# Patient Record
Sex: FEMALE | Race: WHITE | Employment: OTHER | URBAN - METROPOLITAN AREA
[De-identification: names, ages, dates, MRNs, and addresses within clinical notes are randomized per-mention and may not be internally consistent; named-entity substitution may affect disease eponyms.]

---

## 2018-11-20 ENCOUNTER — HOSPITAL ENCOUNTER (OUTPATIENT)
Dept: GENERAL RADIOLOGY | Age: 73
Discharge: HOME | End: 2018-11-20
Payer: MEDICARE

## 2018-11-20 ENCOUNTER — HOSPITAL ENCOUNTER (OUTPATIENT)
Age: 73
Discharge: HOME | End: 2018-11-20
Payer: MEDICARE

## 2018-11-20 DIAGNOSIS — R05.9 COUGH: ICD-10-CM

## 2018-11-20 PROCEDURE — 71046 X-RAY EXAM CHEST 2 VIEWS: CPT

## 2018-11-29 ENCOUNTER — HOSPITAL ENCOUNTER (OUTPATIENT)
Dept: CT IMAGING | Age: 73
Discharge: HOME | End: 2018-11-29
Payer: MEDICARE

## 2018-11-29 DIAGNOSIS — J98.4 DISEASE OF LUNG: ICD-10-CM

## 2018-11-29 LAB
ANION GAP SERPL CALCULATED.3IONS-SCNC: 10 MMOL/L (ref 3–16)
BUN BLDV-MCNC: 16 MG/DL (ref 7–20)
CALCIUM SERPL-MCNC: 10.3 MG/DL (ref 8.3–10.6)
CHLORIDE BLD-SCNC: 103 MMOL/L (ref 99–110)
CO2: 27 MMOL/L (ref 21–32)
CREAT SERPL-MCNC: 0.6 MG/DL (ref 0.6–1.2)
GFR AFRICAN AMERICAN: >60
GFR NON-AFRICAN AMERICAN: >60
GLUCOSE BLD-MCNC: 118 MG/DL (ref 70–99)
POTASSIUM SERPL-SCNC: 5 MMOL/L (ref 3.5–5.1)
SODIUM BLD-SCNC: 140 MMOL/L (ref 136–145)

## 2018-11-29 PROCEDURE — 80048 BASIC METABOLIC PNL TOTAL CA: CPT

## 2018-11-29 PROCEDURE — 71260 CT THORAX DX C+: CPT

## 2018-11-29 PROCEDURE — 36415 COLL VENOUS BLD VENIPUNCTURE: CPT

## 2018-11-29 PROCEDURE — 6360000004 HC RX CONTRAST MEDICATION: Performed by: INTERNAL MEDICINE

## 2018-11-29 RX ADMIN — IOPAMIDOL 75 ML: 755 INJECTION, SOLUTION INTRAVENOUS at 17:34

## 2018-12-17 ENCOUNTER — HOSPITAL ENCOUNTER (OUTPATIENT)
Dept: GENERAL RADIOLOGY | Age: 73
Discharge: HOME | End: 2018-12-17
Payer: MEDICARE

## 2018-12-17 ENCOUNTER — HOSPITAL ENCOUNTER (OUTPATIENT)
Age: 73
Discharge: HOME | End: 2018-12-17
Payer: MEDICARE

## 2018-12-17 DIAGNOSIS — J18.8 OTHER PNEUMONIA, UNSPECIFIED ORGANISM: ICD-10-CM

## 2018-12-17 PROCEDURE — 71046 X-RAY EXAM CHEST 2 VIEWS: CPT

## 2019-01-24 ENCOUNTER — APPOINTMENT (OUTPATIENT)
Dept: GENERAL RADIOLOGY | Age: 74
End: 2019-01-24
Payer: MEDICARE

## 2019-01-24 ENCOUNTER — HOSPITAL ENCOUNTER (EMERGENCY)
Age: 74
Discharge: HOME | End: 2019-01-24
Payer: MEDICARE

## 2019-01-24 VITALS
HEART RATE: 86 BPM | TEMPERATURE: 97 F | WEIGHT: 180 LBS | DIASTOLIC BLOOD PRESSURE: 76 MMHG | BODY MASS INDEX: 28.19 KG/M2 | OXYGEN SATURATION: 99 % | SYSTOLIC BLOOD PRESSURE: 133 MMHG | RESPIRATION RATE: 18 BRPM

## 2019-01-24 DIAGNOSIS — S52.601A CLOSED FRACTURE OF DISTAL END OF RIGHT ULNA, UNSPECIFIED FRACTURE MORPHOLOGY, INITIAL ENCOUNTER: Primary | ICD-10-CM

## 2019-01-24 PROCEDURE — 99284 EMERGENCY DEPT VISIT MOD MDM: CPT | Performed by: ORTHOPAEDIC SURGERY

## 2019-01-24 PROCEDURE — 73110 X-RAY EXAM OF WRIST: CPT

## 2019-01-24 PROCEDURE — 4500000023 HC ED LEVEL 3 PROCEDURE

## 2019-01-24 PROCEDURE — 6370000000 HC RX 637 (ALT 250 FOR IP): Performed by: NURSE PRACTITIONER

## 2019-01-24 PROCEDURE — 99283 EMERGENCY DEPT VISIT LOW MDM: CPT

## 2019-01-24 RX ORDER — HYDROCODONE BITARTRATE AND ACETAMINOPHEN 5; 325 MG/1; MG/1
1 TABLET ORAL EVERY 6 HOURS PRN
Qty: 10 TABLET | Refills: 0 | Status: SHIPPED | OUTPATIENT
Start: 2019-01-24 | End: 2019-01-27

## 2019-01-24 RX ORDER — LISINOPRIL 10 MG/1
10 TABLET ORAL DAILY
COMMUNITY

## 2019-01-24 RX ORDER — AMLODIPINE BESYLATE 5 MG/1
5 TABLET ORAL DAILY
COMMUNITY

## 2019-01-24 RX ORDER — IBUPROFEN 800 MG/1
800 TABLET ORAL EVERY 8 HOURS PRN
Qty: 20 TABLET | Refills: 0 | Status: ON HOLD | OUTPATIENT
Start: 2019-01-24 | End: 2021-07-11 | Stop reason: HOSPADM

## 2019-01-24 RX ORDER — IBUPROFEN 800 MG/1
800 TABLET ORAL ONCE
Status: COMPLETED | OUTPATIENT
Start: 2019-01-24 | End: 2019-01-24

## 2019-01-24 RX ADMIN — IBUPROFEN 800 MG: 800 TABLET, FILM COATED ORAL at 10:12

## 2019-01-24 ASSESSMENT — PAIN SCALES - GENERAL: PAINLEVEL_OUTOF10: 8

## 2019-01-24 ASSESSMENT — ENCOUNTER SYMPTOMS
ABDOMINAL PAIN: 0
NAUSEA: 0
SHORTNESS OF BREATH: 0
CHEST TIGHTNESS: 0
VOMITING: 0
DIARRHEA: 0

## 2019-01-24 ASSESSMENT — PAIN DESCRIPTION - PAIN TYPE: TYPE: ACUTE PAIN

## 2019-01-29 ENCOUNTER — OFFICE VISIT (OUTPATIENT)
Dept: ORTHOPEDIC SURGERY | Age: 74
End: 2019-01-29
Payer: MEDICARE

## 2019-01-29 VITALS
HEIGHT: 67 IN | DIASTOLIC BLOOD PRESSURE: 73 MMHG | SYSTOLIC BLOOD PRESSURE: 127 MMHG | WEIGHT: 180 LBS | BODY MASS INDEX: 28.25 KG/M2 | RESPIRATION RATE: 16 BRPM | HEART RATE: 77 BPM

## 2019-01-29 DIAGNOSIS — S52.691A OTHER CLOSED FRACTURE OF DISTAL END OF RIGHT ULNA, INITIAL ENCOUNTER: Primary | ICD-10-CM

## 2019-01-29 DIAGNOSIS — M25.531 RIGHT WRIST PAIN: ICD-10-CM

## 2019-01-29 PROCEDURE — G8419 CALC BMI OUT NRM PARAM NOF/U: HCPCS | Performed by: ORTHOPAEDIC SURGERY

## 2019-01-29 PROCEDURE — 1090F PRES/ABSN URINE INCON ASSESS: CPT | Performed by: ORTHOPAEDIC SURGERY

## 2019-01-29 PROCEDURE — 3017F COLORECTAL CA SCREEN DOC REV: CPT | Performed by: ORTHOPAEDIC SURGERY

## 2019-01-29 PROCEDURE — 1036F TOBACCO NON-USER: CPT | Performed by: ORTHOPAEDIC SURGERY

## 2019-01-29 PROCEDURE — 4040F PNEUMOC VAC/ADMIN/RCVD: CPT | Performed by: ORTHOPAEDIC SURGERY

## 2019-01-29 PROCEDURE — G8484 FLU IMMUNIZE NO ADMIN: HCPCS | Performed by: ORTHOPAEDIC SURGERY

## 2019-01-29 PROCEDURE — 1123F ACP DISCUSS/DSCN MKR DOCD: CPT | Performed by: ORTHOPAEDIC SURGERY

## 2019-01-29 PROCEDURE — 1101F PT FALLS ASSESS-DOCD LE1/YR: CPT | Performed by: ORTHOPAEDIC SURGERY

## 2019-01-29 PROCEDURE — L3908 WHO COCK-UP NONMOLDE PRE OTS: HCPCS | Performed by: ORTHOPAEDIC SURGERY

## 2019-01-29 PROCEDURE — 25600 CLTX DST RDL FX/EPHYS SEP WO: CPT | Performed by: ORTHOPAEDIC SURGERY

## 2019-01-29 PROCEDURE — G8427 DOCREV CUR MEDS BY ELIG CLIN: HCPCS | Performed by: ORTHOPAEDIC SURGERY

## 2019-01-29 PROCEDURE — G8400 PT W/DXA NO RESULTS DOC: HCPCS | Performed by: ORTHOPAEDIC SURGERY

## 2019-01-29 PROCEDURE — 99214 OFFICE O/P EST MOD 30 MIN: CPT | Performed by: ORTHOPAEDIC SURGERY

## 2019-01-29 RX ORDER — ZAFIRLUKAST 10 MG/1
TABLET, FILM COATED ORAL
Refills: 2 | COMMUNITY
Start: 2019-01-08 | End: 2022-05-24 | Stop reason: ALTCHOICE

## 2019-01-31 PROBLEM — S52.601A CLOSED FRACTURE OF LOWER END OF RIGHT ULNA: Status: ACTIVE | Noted: 2019-01-31

## 2019-03-12 ENCOUNTER — OFFICE VISIT (OUTPATIENT)
Dept: ORTHOPEDIC SURGERY | Age: 74
End: 2019-03-12

## 2019-03-12 VITALS — HEIGHT: 67 IN | BODY MASS INDEX: 28.25 KG/M2 | WEIGHT: 180 LBS | RESPIRATION RATE: 16 BRPM

## 2019-03-12 DIAGNOSIS — S52.691A OTHER CLOSED FRACTURE OF DISTAL END OF RIGHT ULNA, INITIAL ENCOUNTER: Primary | ICD-10-CM

## 2019-03-12 PROCEDURE — 99024 POSTOP FOLLOW-UP VISIT: CPT | Performed by: ORTHOPAEDIC SURGERY

## 2019-03-23 ENCOUNTER — HOSPITAL ENCOUNTER (OUTPATIENT)
Dept: PHYSICAL THERAPY | Age: 74
Setting detail: THERAPIES SERIES
Discharge: HOME | End: 2019-03-23
Payer: MEDICARE

## 2019-03-23 PROCEDURE — 97530 THERAPEUTIC ACTIVITIES: CPT

## 2019-03-23 PROCEDURE — 97110 THERAPEUTIC EXERCISES: CPT

## 2019-03-23 PROCEDURE — 97161 PT EVAL LOW COMPLEX 20 MIN: CPT

## 2019-03-23 ASSESSMENT — PAIN DESCRIPTION - PAIN TYPE: TYPE: ACUTE PAIN

## 2019-03-23 ASSESSMENT — PAIN SCALES - GENERAL: PAINLEVEL_OUTOF10: 2

## 2019-04-08 ENCOUNTER — HOSPITAL ENCOUNTER (OUTPATIENT)
Dept: PHYSICAL THERAPY | Age: 74
Setting detail: THERAPIES SERIES
Discharge: HOME | End: 2019-04-08
Payer: MEDICARE

## 2019-04-08 PROCEDURE — 97140 MANUAL THERAPY 1/> REGIONS: CPT

## 2019-04-08 PROCEDURE — 97110 THERAPEUTIC EXERCISES: CPT

## 2019-04-08 NOTE — FLOWSHEET NOTE
Physical Therapy Daily Treatment Note  Date:  2019    Patient Name:  Jacqueline Issa    :  1945  MRN: [id number]  Restrictions/Precautions:    Medical/Treatment Diagnosis Information:   Diagnosis: Closed fracture of distal end of Right Ulna  Treatment Diagnosis: Decreased R wrist and hand strength, ROM, neuromuscular control, flexiblity, tenderness to palpation capsular restrictions and pain. Tracking Information:  Physician Information Referring Practitioner: Genoveva Rai MD     Plan of Care Sent Date: 3/23 Signed Received: Yes   Visit Count / Total Visits      Insurance Approved Visits  /  Approved Dates:     Insurance Information PT Insurance Information: Medicare/[employer]    Progress Note/G-codes   []  Yes  [x]  No Next Due: #9     Pain level: 1/10 left wrist    Subjective:   - Pt states that she is continuing to feel improved and is not experiencing much pain at this time. She states that strength seems to be her biggest limiting factor and she is hoping to address that through therapy. Objective:  Observation:   - Patient arrives 12 minutes late to appt  Test measurements:      Exercises:  Exercise/Equipment Resistance/Repetitions Other comments   Neutral gripping with the digi-flex    Pincer  with the digi-flex x10 5'' squeeze  3# resistance    x10 5'' squeeze  3# resistance    Supination/pronation with hammer Neutral into pronation  2 x 10    Neutral into supination  2 x 10                                                                   Other Therapeutic Activities:  3/23/19 Pt was educated on PT POC, Diagnosis, Prognosis, pathomechanics as well as frequency and duration of scheduling future physical therapy appointments. Time was also taken on this day to answer all patient questions and participation in PT.  Extensive discussion with pt on significant lack of ROM, flexibility and strength on R wrist/hand compared to L as well as overall impacts with progression towards pt goals of improving functional usage of R hand/wrist. Pt educated on goals of PT and expected time frames for recovery instructing pt that HEP is imperative to progressing towards LTG's. Home Exercise Program:  3/23/19 Patient was educated on home exercise program including distrubution of handout describing exercises, sets, repetitions, frequency and intensity. Exercises/activities include: stretching of wrist flexors, extensors and supinators, AROM of wrist flex/ext, pro/sup and RD/UD, band for finger extensors and orange putty for gripping, finger pull apart and key turn. Manual Treatments: 4/8/19 - Grade 1-2 dorsal glides of distal radioulnar joint, grade 1-2 palmar glides of carpals on radius/ulna, grade 1-2 dorsal and palmar intercarpal glides, general distraction of carpals on radius/ulna, gentle PROM into wrist extension and forearm supination - 12'      Modalities:      Timed Code Treatment Minutes:  28    Total Treatment Minutes:  28    Treatment/Activity Tolerance:  [x] Patient tolerated treatment well [] Patient limited by fatigue  [] Patient limited by pain  [] Patient limited by other medical complications  [x] Other: Pt demos decreased ROM of the left wrist, particularly into extension and supination. Strengthening was geared toward increasing  strength in a neutral position, as well as addressing pincer  and supination/pronation control. 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       Prognosis: [x] Good [x] Fair  [] Poor    Patient Requires Follow-up: [x] Yes  [] No    Plan:   [x] Continue per plan of care [] Alter current plan (see comments)  [] Plan of care initiated [] Hold pending MD visit [] Discharge    Plan for Next Session:  Focus on restoration of wrist and hand ROM, joint mobilizations to  strength and neuromuscular control as symptoms allow; progress HEP for strength Electronically signed by:  Ninoska Young SPT  Therapist was present, directed the patient's care, made skilled judgement, and was responsible for assessment and treatment of the patient.     Nancy Akins, DPT, PT

## 2019-04-12 ENCOUNTER — HOSPITAL ENCOUNTER (OUTPATIENT)
Dept: PHYSICAL THERAPY | Age: 74
Setting detail: THERAPIES SERIES
Discharge: HOME | End: 2019-04-12
Payer: MEDICARE

## 2019-04-12 PROCEDURE — 97110 THERAPEUTIC EXERCISES: CPT

## 2019-04-12 PROCEDURE — 97140 MANUAL THERAPY 1/> REGIONS: CPT

## 2019-04-12 NOTE — FLOWSHEET NOTE
ROM, flexibility and strength on R wrist/hand compared to L as well as overall impacts with progression towards pt goals of improving functional usage of R hand/wrist. Pt educated on goals of PT and expected time frames for recovery instructing pt that HEP is imperative to progressing towards LTG's. Home Exercise Program:  3/23/19 Patient was educated on home exercise program including distrubution of handout describing exercises, sets, repetitions, frequency and intensity. Exercises/activities include: stretching of wrist flexors, extensors and supinators, AROM of wrist flex/ext, pro/sup and RD/UD, band for finger extensors and orange putty for gripping, finger pull apart and key turn. Manual Treatments:  4/12: Manual strength wrist flex/ext and pronation/supination. General wrist distraction at 1st row of carpals with anterior and posterior glides, MWM with anterior carpal glide with wrist ext and posterior carpal glide with flexion x 10 each x 12 mins total.    4/8/19 - Grade 1-2 dorsal glides of distal radioulnar joint, grade 1-2 palmar glides of carpals on radius/ulna, grade 1-2 dorsal and palmar intercarpal glides, general distraction of carpals on radius/ulna, gentle PROM into wrist extension and forearm supination - 12'      Modalities:      Timed Code Treatment Minutes:  31    Total Treatment Minutes:  31    Treatment/Activity Tolerance:  [x] Patient tolerated treatment well [] Patient limited by fatigue  [] Patient limited by pain  [] Patient limited by other medical complications  [x] Other: good ROM with manual techniques very soft end feel at end range in all planes with NO issues with pain, after manual with therex f/u and challenging resistance pt demonstrates a lot of shaking due to fatigue and lack of functional strength.  Pt needs to work to progress and maintain PROM and continue to work on building functional strength and neuromuscular control for full restoration of utilization of R wrist/hand.        Prognosis: [x] Good [x] Fair  [] Poor    Patient Requires Follow-up: [x] Yes  [] No    Plan:   [x] Continue per plan of care [] Alter current plan (see comments)  [] Plan of care initiated [] Hold pending MD visit [] Discharge    Plan for Next Session:  Focus on restoration of wrist and hand ROM, joint mobilizations to  strength and neuromuscular control as symptoms allow; progress HEP for strength     Electronically signed by:  Juancarlos Rosas PT

## 2019-04-16 ENCOUNTER — HOSPITAL ENCOUNTER (OUTPATIENT)
Dept: PHYSICAL THERAPY | Age: 74
Setting detail: THERAPIES SERIES
Discharge: HOME | End: 2019-04-16
Payer: MEDICARE

## 2019-04-16 PROCEDURE — 97140 MANUAL THERAPY 1/> REGIONS: CPT

## 2019-04-16 PROCEDURE — 97110 THERAPEUTIC EXERCISES: CPT

## 2019-04-16 NOTE — FLOWSHEET NOTE
R wrist/hand compared to L as well as overall impacts with progression towards pt goals of improving functional usage of R hand/wrist. Pt educated on goals of PT and expected time frames for recovery instructing pt that HEP is imperative to progressing towards LTG's. Home Exercise Program:  3/23/19 Patient was educated on home exercise program including distrubution of handout describing exercises, sets, repetitions, frequency and intensity. Exercises/activities include: stretching of wrist flexors, extensors and supinators, AROM of wrist flex/ext, pro/sup and RD/UD, band for finger extensors and orange putty for gripping, finger pull apart and key turn. Manual Treatments:  4/16, 4/12: Manual strength wrist flex/ext and pronation/supination. General wrist distraction at 1st row of carpals with anterior and posterior glides, MWM with anterior carpal glide with wrist ext and posterior carpal glide with flexion x 10 each x 12 mins total.    4/8/19 - Grade 1-2 dorsal glides of distal radioulnar joint, grade 1-2 palmar glides of carpals on radius/ulna, grade 1-2 dorsal and palmar intercarpal glides, general distraction of carpals on radius/ulna, gentle PROM into wrist extension and forearm supination - 12'      Modalities:      Timed Code Treatment Minutes:  31    Total Treatment Minutes:  31    Treatment/Activity Tolerance:  [x] Patient tolerated treatment well [] Patient limited by fatigue  [] Patient limited by pain  [] Patient limited by other medical complications  [x] Other: continues to progress well with overall mobility and ROM, PROM WFL for full wrist extension however still lacking functional strength for full AROM of wrist extension. NO issues with pain with ROM other than slight wrist pain with full end range of supination with PROM. Will continue to progress with physical therapy for full return of functional strength and neuromuscular control for achievement of LTG's.          Prognosis: [x] Good [x] Fair  [] Poor    Patient Requires Follow-up: [x] Yes  [] No    Plan:   [x] Continue per plan of care [] Alter current plan (see comments)  [] Plan of care initiated [] Hold pending MD visit [] Discharge    Plan for Next Session:  Focus on restoration of wrist and hand ROM, joint mobilizations to  strength and neuromuscular control as symptoms allow; progress HEP for strength     Electronically signed by:  Aurelio Cota PT

## 2019-04-19 ENCOUNTER — HOSPITAL ENCOUNTER (OUTPATIENT)
Dept: PHYSICAL THERAPY | Age: 74
Setting detail: THERAPIES SERIES
Discharge: HOME | End: 2019-04-19
Payer: MEDICARE

## 2019-04-19 PROCEDURE — 97530 THERAPEUTIC ACTIVITIES: CPT

## 2019-04-19 NOTE — FLOWSHEET NOTE
Physical Therapy Daily Treatment Note  Date:  2019    Patient Name:  Lynette Comer    :  1945  MRN: [id number]  Restrictions/Precautions:    Medical/Treatment Diagnosis Information:   Diagnosis: Closed fracture of distal end of Right Ulna  Treatment Diagnosis: Decreased R wrist and hand strength, ROM, neuromuscular control, flexiblity, tenderness to palpation capsular restrictions and pain. Tracking Information:  Physician Information Referring Practitioner: Teena Echevarria MD     Plan of Care Sent Date: 3/23 Signed Received: Yes   Visit Count / Total Visits      Insurance Approved Visits  /  Approved Dates:     Insurance Information PT Insurance Information: [email or web address]    Progress Note/G-codes   []  Yes  [x]  No Next Due: #9     Pain level: 1/10 left wrist    Subjective:  See progress note    Objective:  See progress note  Observation:   - Patient arrives 12 minutes late to appt  Test measurements:      Exercises:  Exercise/Equipment Resistance/Repetitions Other comments   Neutral gripping with the digi-flex    Pincer  with the digi-flex    Supination/pronation with Weighted bar    Wrist flex/ext After manual   therabar After manual                                                         Other Therapeutic Activities:  3/23/19 Pt was educated on PT POC, Diagnosis, Prognosis, pathomechanics as well as frequency and duration of scheduling future physical therapy appointments. Time was also taken on this day to answer all patient questions and participation in PT. Extensive discussion with pt on significant lack of ROM, flexibility and strength on R wrist/hand compared to L as well as overall impacts with progression towards pt goals of improving functional usage of R hand/wrist. Pt educated on goals of PT and expected time frames for recovery instructing pt that HEP is imperative to progressing towards LTG's.      Home Exercise Program:  3/23/19 Patient was educated on home exercise program including distrubution of handout describing exercises, sets, repetitions, frequency and intensity. Exercises/activities include: stretching of wrist flexors, extensors and supinators, AROM of wrist flex/ext, pro/sup and RD/UD, band for finger extensors and orange putty for gripping, finger pull apart and key turn. Manual Treatments:  4/16, 4/12: Manual strength wrist flex/ext and pronation/supination.  General wrist distraction at 1st row of carpals with anterior and posterior glides, MWM with anterior carpal glide with wrist ext and posterior carpal glide with flexion x 10 each x 12 mins total.    4/8/19 - Grade 1-2 dorsal glides of distal radioulnar joint, grade 1-2 palmar glides of carpals on radius/ulna, grade 1-2 dorsal and palmar intercarpal glides, general distraction of carpals on radius/ulna, gentle PROM into wrist extension and forearm supination - 12'      Modalities:      Timed Code Treatment Minutes:  31    Total Treatment Minutes:  31    Treatment/Activity Tolerance:  [x] Patient tolerated treatment well [] Patient limited by fatigue  [] Patient limited by pain  [] Patient limited by other medical complications  [x] Other: See progress note        Prognosis: [x] Good [x] Fair  [] Poor    Patient Requires Follow-up: [x] Yes  [] No    Plan:   [x] Continue per plan of care [] Alter current plan (see comments)  [] Plan of care initiated [] Hold pending MD visit [] Discharge    Plan for Next Session:  F/u prn     Electronically signed by:  Eric Bianchi PT

## 2019-04-19 NOTE — PROGRESS NOTES
Outpatient Physical Therapy    Phone: [phone] Fax: [phone]    Physical Therapy Progress/Discharge Note  Date: 2019        Patient Name:  Prince Montano    :  1945  MRN: [id number]  Restrictions/Precautions:      Medical/Treatment Diagnosis Information:  ·    ·    Insurance/Certification information:     Physician Information:     Plan of care signed (Y/N): ***   Visit# / total visits:  ***/***  Pain level: ***/10     Time Period for Report: ***   Cancels/No-shows to date:  ***    Plan of Care/Treatment to date:  ? Therapeutic Exercise      ? Modalities:  ? Therapeutic Activity        ? Ultrasound    ? Gait Training        ? Cervical Traction   ? Neuromuscular Re-education      ? Cold/hotpack    ? Instruction in HEP        ? Lumbar Traction  ? Manual Therapy        ? Electrical Stimulation            ? Aquatic Therapy        ? Iontophoresis            ? Lymphedema management  ? Women's Health     Other:  ? Vestibular Rehab        ?    ?  Needed                        Significant Findings At Last Visit/Comments:    Subjective:            Objective:                                                            Functional Outcome Measures:                                          Assessment:       Plan:              Progress towards goals:         Current Frequency/Duration:  # Days per week: ? 1 day # Weeks: ? 1 week ? 4 weeks      ? 2 days   ? 2 weeks ? 5 weeks      ? 3 days   ? 3 weeks ? 6 weeks     Rehab Potential: ? Excellent ? Good ? Fair  ? Poor     Goal Status:  ? Achieved ? Partially Achieved  ? Not Achieved     Patient Status: ? Continue per initial plan of Care     ? Patient now discharged     ? Additional visits requested, Please re-certify for additional visits:      Requested frequency/duration:  X/week for weeks    Electronically signed by:  Marina Owusu PT    If you have any questions or concerns, please don't hesitate to call.   Thank you for your referral.    Physician Signature:________________________________Date:__________________  By signing above, therapists plan is approved by physician

## 2019-04-22 NOTE — PROGRESS NOTES
Outpatient Physical Therapy    Phone: [phone] Fax: [phone]    Physical Therapy Progress/Discharge Note  Date: 2019        Patient Name:  Chayo Gibson    :  1945  MRN: [id number]    Medical/Treatment Diagnosis Information:  · Diagnosis: Closed fracture of distal end of Right Ulna  · Treatment Diagnosis: Decreased R wrist and hand strength, ROM, neuromuscular control, flexiblity, tenderness to palpation capsular restrictions and pain. Insurance/Certification information:  PT Insurance Information: Medicare/[employer]  Physician Information:  Referring Practitioner: Seun Parkinson MD  Plan of care signed (Y/N): y   Visit# / total visits:    Pain level: 0/10     Time Period for Report: 3/23 -    Cancels/No-shows to date:  0    Plan of Care/Treatment to date:  xTherapeutic Exercise      ? Modalities:  X Therapeutic Activity        ? Ultrasound    ? Gait Training        ? Cervical Traction   ? Neuromuscular Re-education      ? Cold/hotpack    X Instruction in HEP        ? Lumbar Traction  X Manual Therapy        ? Electrical Stimulation            ? Aquatic Therapy        ? Iontophoresis            ? Lymphedema management  ? Women's Health     Other:  ? Vestibular Rehab        ?    ?  Needed                        Significant Findings At Last Visit/Comments:    Subjective:  Subjective  Subjective: Pt reports she has continued to do really well, working hard on her HEP and feels things are coming a long really well. Difficulty at times with full functional strength opening smaller objects but feels her right had is much stronger than it was. Overall NO limitations and feels comfortable with her HEP.    Pain Screening  Patient Currently in Pain: Denies         Objective:            AROM RUE (degrees)  R Elbow Flexion 0-145: 140  R Elbow Extension 145-0: 0  R Forearm Pron 0-90: 90  R Forearm Supination  0-90: 85  R Wrist Flexion 0-80: 60  R Wrist Extension 0-70: 53  Strength RUE  R report Quick Dash score of less than or equal to 14 points to ensure subjective reporting of symptoms progressing towards LTG's and pt goals. GOAL MET    Current Frequency/Duration:  # Days per week: ? 1 day # Weeks: ? 1 week ? 4 weeks      X 2 days   X 2 weeks ? 5 weeks      ? 3 days   ? 3 weeks ? 6 weeks     Rehab Potential:   Excellent ? Good ? Fair  ? Poor     Goal Status:  ? Achieved X Partially Achieved  ? Not Achieved     Patient Status: X Continue per initial plan of Care; f/u prn within 30 days of this note, if pt does not contact this office within that time this will serve as her formal discharge not as well. ? Patient now discharged     ? Additional visits requested, Please re-certify for additional visits:      Requested frequency/duration:  X/week for weeks    Electronically signed by:        Wagner Mendoza PT DPT [id number]    If you have any questions or concerns, please don't hesitate to call.   Thank you for your referral.    Physician Signature:________________________________Date:__________________  By signing above, therapists plan is approved by physician

## 2019-05-14 ENCOUNTER — OFFICE VISIT (OUTPATIENT)
Dept: ORTHOPEDIC SURGERY | Age: 74
End: 2019-05-14
Payer: MEDICARE

## 2019-05-14 VITALS — HEIGHT: 67 IN | BODY MASS INDEX: 28.25 KG/M2 | RESPIRATION RATE: 16 BRPM | WEIGHT: 180 LBS

## 2019-05-14 DIAGNOSIS — S52.691A OTHER CLOSED FRACTURE OF DISTAL END OF RIGHT ULNA, INITIAL ENCOUNTER: Primary | ICD-10-CM

## 2019-05-14 PROCEDURE — 1123F ACP DISCUSS/DSCN MKR DOCD: CPT | Performed by: ORTHOPAEDIC SURGERY

## 2019-05-14 PROCEDURE — 1090F PRES/ABSN URINE INCON ASSESS: CPT | Performed by: ORTHOPAEDIC SURGERY

## 2019-05-14 PROCEDURE — G8400 PT W/DXA NO RESULTS DOC: HCPCS | Performed by: ORTHOPAEDIC SURGERY

## 2019-05-14 PROCEDURE — 3017F COLORECTAL CA SCREEN DOC REV: CPT | Performed by: ORTHOPAEDIC SURGERY

## 2019-05-14 PROCEDURE — 4040F PNEUMOC VAC/ADMIN/RCVD: CPT | Performed by: ORTHOPAEDIC SURGERY

## 2019-05-14 PROCEDURE — G8419 CALC BMI OUT NRM PARAM NOF/U: HCPCS | Performed by: ORTHOPAEDIC SURGERY

## 2019-05-14 PROCEDURE — 1036F TOBACCO NON-USER: CPT | Performed by: ORTHOPAEDIC SURGERY

## 2019-05-14 PROCEDURE — G8427 DOCREV CUR MEDS BY ELIG CLIN: HCPCS | Performed by: ORTHOPAEDIC SURGERY

## 2019-05-14 PROCEDURE — 99213 OFFICE O/P EST LOW 20 MIN: CPT | Performed by: ORTHOPAEDIC SURGERY

## 2019-05-15 NOTE — PROGRESS NOTES
CHIEF COMPLAINT: Right wrist pain/ minimally displaced distal ulna fracture. DATE OF INJURY: 1/24/2019, DOT: 1/29/2019    HISTORY:  Ms. Dagmar Sever is a 68 y.o.  female right handed who presents today for f/u evaluation of a right wrist injury. The patient reports that this injury occurred when she slipped on the ice and hit the curb with her right wrist while at home. She was first seen and evaluated in [facility], when she was x-rayed and splinted, and asked to f/u with Orthopedics. The patient denies any other injuries. Rates pain a 0/10 VAS, aching, at times and show improvment. Movement makes the pain worse, and resting makes the pain better. No numbness or tingling sensation. She works as a . She denies smoking, but is a diabetic. She has EDS.      Past Medical History:   Diagnosis Date    Asthma     Diverticulitis        Past Surgical History:   Procedure Laterality Date    BREAST LUMPECTOMY      HYSTERECTOMY         Social History     Socioeconomic History    Marital status:      Spouse name: Not on file    Number of children: Not on file    Years of education: Not on file    Highest education level: Not on file   Occupational History    Not on file   Social Needs    Financial resource strain: Not on file    Food insecurity:     Worry: Not on file     Inability: Not on file    Transportation needs:     Medical: Not on file     Non-medical: Not on file   Tobacco Use    Smoking status: Never Smoker    Smokeless tobacco: Never Used   Substance and Sexual Activity    Alcohol use: No    Drug use: No    Sexual activity: Not on file   Lifestyle    Physical activity:     Days per week: Not on file     Minutes per session: Not on file    Stress: Not on file   Relationships    Social connections:     Talks on phone: Not on file     Gets together: Not on file     Attends Gnosticist service: Not on file     Active member of club or organization: Not on file     Attends meetings of clubs or organizations: Not on file     Relationship status: Not on file    Intimate partner violence:     Fear of current or ex partner: Not on file     Emotionally abused: Not on file     Physically abused: Not on file     Forced sexual activity: Not on file   Other Topics Concern    Not on file   Social History Narrative    Not on file       No family history on file. Current Outpatient Medications on File Prior to Visit   Medication Sig Dispense Refill    zafirlukast (ACCOLATE) 10 MG tablet TK 1 T PO QD  2    lisinopril (PRINIVIL;ZESTRIL) 10 MG tablet Take 10 mg by mouth daily      amLODIPine (NORVASC) 5 MG tablet Take 5 mg by mouth daily      ibuprofen (ADVIL;MOTRIN) 800 MG tablet Take 1 tablet by mouth every 8 hours as needed for Pain or Fever 20 tablet 0    albuterol (PROVENTIL) (2.5 MG/3ML) 0.083% nebulizer solution Take 2.5 mg by nebulization every 6 hours as needed. No current facility-administered medications on file prior to visit. Pertinent items are noted in HPI  Review of systems reviewed from Patient History Form dated on 1/29/2019 and available in the patient's chart under the Media tab. No change. PHYSICAL EXAMINATION:  Ms. Judge Mcadams is a very pleasant 68 y.o.  female who presents today in no acute distress, awake, alert, and oriented. She is well dressed, nourished and  groomed. Patient with normal affect. Height is  5' 7\" (1.702 m), weight is 180 lb (81.6 kg), Body mass index is 28.19 kg/m². Resting respiratory rate is 16. On evaluation of her right upper extremity, there is no deformity. There is no swelling and no ecchymosis. She is not tender to palpation over the distal radius, and otherwise nontender over the remainder of the extremity. Range of motion is good of the right wrist, no mechanical block. The skin overlying the right wrist is intact without evidence of lesion, laceration or abrasion.   Distal pulses are 2+ and symmetric

## 2019-06-28 ENCOUNTER — APPOINTMENT (OUTPATIENT)
Dept: GENERAL RADIOLOGY | Age: 74
End: 2019-06-28
Payer: MEDICARE

## 2019-06-28 ENCOUNTER — HOSPITAL ENCOUNTER (EMERGENCY)
Age: 74
Discharge: HOME | End: 2019-06-29
Payer: MEDICARE

## 2019-06-28 VITALS
OXYGEN SATURATION: 96 % | WEIGHT: 180 LBS | BODY MASS INDEX: 28.25 KG/M2 | DIASTOLIC BLOOD PRESSURE: 76 MMHG | SYSTOLIC BLOOD PRESSURE: 115 MMHG | RESPIRATION RATE: 16 BRPM | HEART RATE: 75 BPM | TEMPERATURE: 98.2 F | HEIGHT: 67 IN

## 2019-06-28 DIAGNOSIS — M79.671 RIGHT FOOT PAIN: Primary | ICD-10-CM

## 2019-06-28 DIAGNOSIS — M25.471 RIGHT ANKLE SWELLING: ICD-10-CM

## 2019-06-28 PROCEDURE — 73610 X-RAY EXAM OF ANKLE: CPT

## 2019-06-28 PROCEDURE — 99284 EMERGENCY DEPT VISIT MOD MDM: CPT

## 2019-06-28 PROCEDURE — 73630 X-RAY EXAM OF FOOT: CPT

## 2019-06-28 RX ORDER — HYDROCODONE BITARTRATE AND ACETAMINOPHEN 5; 325 MG/1; MG/1
2 TABLET ORAL ONCE
Status: COMPLETED | OUTPATIENT
Start: 2019-06-29 | End: 2019-06-29

## 2019-06-28 ASSESSMENT — PAIN SCALES - GENERAL: PAINLEVEL_OUTOF10: 4

## 2019-06-29 PROCEDURE — 6370000000 HC RX 637 (ALT 250 FOR IP): Performed by: PHYSICIAN ASSISTANT

## 2019-06-29 RX ADMIN — HYDROCODONE BITARTRATE AND ACETAMINOPHEN 2 TABLET: 5; 325 TABLET ORAL at 00:10

## 2019-06-29 ASSESSMENT — ENCOUNTER SYMPTOMS
NAUSEA: 0
SHORTNESS OF BREATH: 0
VOMITING: 0

## 2019-06-29 ASSESSMENT — PAIN SCALES - GENERAL: PAINLEVEL_OUTOF10: 7

## 2019-06-29 NOTE — ED NOTES
Pt c/o right ankle pain after walking and hiking in [place] on vacation. Pt states the pain is so bad that they have been wearing their shoes at night to help give support. Pt states they can barely walk on it it due to tenderness. No redness or bruising noted on foot. Pt states pain is 7/10. Medication administered per order. Family is at bedside. Both aware of the plan of care. Call light is within reach.        Julius Gifford RN  06/29/19 [id number]

## 2019-06-29 NOTE — ED PROVIDER NOTES
Gastrointestinal: Negative for nausea and vomiting. Musculoskeletal: Positive for arthralgias. Skin: Negative for wound. Neurological: Negative for weakness and numbness. Positives and Pertinent negatives as per HPI. Except as noted abovein the ROS, all other systems were reviewed and negative. PAST MEDICAL HISTORY     Past Medical History:   Diagnosis Date    Asthma     Diverticulitis          SURGICAL HISTORY     Past Surgical History:   Procedure Laterality Date    BREAST LUMPECTOMY      HYSTERECTOMY           CURRENTMEDICATIONS       Discharge Medication List as of 6/29/2019 12:29 AM      CONTINUE these medications which have NOT CHANGED    Details   zafirlukast (ACCOLATE) 10 MG tablet TK 1 T PO QD, R-2Historical Med      lisinopril (PRINIVIL;ZESTRIL) 10 MG tablet Take 10 mg by mouth dailyHistorical Med      amLODIPine (NORVASC) 5 MG tablet Take 5 mg by mouth dailyHistorical Med      ibuprofen (ADVIL;MOTRIN) 800 MG tablet Take 1 tablet by mouth every 8 hours as needed for Pain or Fever, Disp-20 tablet, R-0Print      albuterol (PROVENTIL) (2.5 MG/3ML) 0.083% nebulizer solution Take 2.5 mg by nebulization every 6 hours as needed. ALLERGIES     Erythromycin and Other    FAMILYHISTORY     History reviewed. No pertinent family history.        SOCIAL HISTORY       Social History     Socioeconomic History    Marital status:      Spouse name: None    Number of children: None    Years of education: None    Highest education level: None   Occupational History    None   Social Needs    Financial resource strain: None    Food insecurity:     Worry: None     Inability: None    Transportation needs:     Medical: None     Non-medical: None   Tobacco Use    Smoking status: Never Smoker    Smokeless tobacco: Never Used   Substance and Sexual Activity    Alcohol use: No    Drug use: No    Sexual activity: None   Lifestyle    Physical activity:     Days per week: None Minutes per session: None    Stress: None   Relationships    Social connections:     Talks on phone: None     Gets together: None     Attends Alevism service: None     Active member of club or organization: None     Attends meetings of clubs or organizations: None     Relationship status: None    Intimate partner violence:     Fear of current or ex partner: None     Emotionally abused: None     Physically abused: None     Forced sexual activity: None   Other Topics Concern    None   Social History Narrative    None       SCREENINGS             PHYSICAL EXAM    (up to 7 for level 4, 8 or more for level 5)     ED Triage Vitals [06/28/19 2223]   BP Temp Temp src Pulse Resp SpO2 Height Weight   115/76 98.2 °F (36.8 °C) -- 75 16 96 % 5' 7\" (1.702 m) 180 lb (81.6 kg)       Physical Exam   Constitutional: She is oriented to person, place, and time. She appears well-developed and well-nourished. HENT:   Head: Normocephalic and atraumatic. Right Ear: External ear normal.   Left Ear: External ear normal.   Nose: Nose normal.   Eyes: Right eye exhibits no discharge. Left eye exhibits no discharge. Neck: Normal range of motion. Neck supple. No tracheal deviation present. Cardiovascular: Intact distal pulses. Pulmonary/Chest: Effort normal. No respiratory distress. Musculoskeletal: Normal range of motion. There is tenderness to palpation to the dorsal there is no overlying erythema, warmth or ecchymosis. Dorsalis pedis and posterior tibialis pulses are 2+. Normal sensation light touch. Neurovascularly intact. Compartments are soft. No tenderness over the calf. Negative Homans sign. Neurological: She is alert and oriented to person, place, and time. Skin: Skin is warm and dry. She is not diaphoretic. Psychiatric: She has a normal mood and affect. Her behavior is normal.   Nursing note and vitals reviewed.       DIAGNOSTIC RESULTS   LABS:    Labs Reviewed - No data to display    All other labs were within normal range or not returned as of this dictation. EKG: All EKG's are interpreted by the Emergency Department Physician in the absence of a cardiologist.  Please see their note for interpretation of EKG. RADIOLOGY:   Non-plain film images such as CT, Ultrasound and MRI are read by the radiologist. Plain radiographic images are visualized andpreliminarily interpreted by the  ED Provider with the below findings:        Interpretation perthe Radiologist below, if available at the time of this note:    XR ANKLE RIGHT (MIN 3 VIEWS)   Final Result   Mild lateral ankle soft tissue swelling. No underlying acute fracture or   malalignment. XR FOOT RIGHT (MIN 3 VIEWS)   Final Result   No acute osseous abnormality. VL Extremity Venous Right    (Results Pending)     Xr Ankle Right (min 3 Views)    Result Date: 6/29/2019  EXAMINATION: 3 XRAY VIEWS OF THE RIGHT ANKLE 6/28/2019 11:47 pm COMPARISON: None. HISTORY: ORDERING SYSTEM PROVIDED HISTORY: injury TECHNOLOGIST PROVIDED HISTORY: Reason for exam:->injury FINDINGS: Bones are intact and in anatomic alignment. Tibial plafond and talar dome are smooth. Grossly symmetric ankle mortise, slightly limited on oblique views. No joint effusion. Mild asymmetric lateral ankle soft tissue swelling. Mild lateral ankle soft tissue swelling. No underlying acute fracture or malalignment. Xr Foot Right (min 3 Views)    Result Date: 6/28/2019  EXAMINATION: 3 XRAY VIEWS OF THE RIGHT FOOT 6/28/2019 10:28 pm COMPARISON: None. HISTORY: ORDERING SYSTEM PROVIDED HISTORY: R foot pain TECHNOLOGIST PROVIDED HISTORY: Reason for exam:->R foot pain Ordering Physician Provided Reason for Exam: R foot pain Acuity: Acute Type of Exam: Initial FINDINGS: Bones are intact and in anatomic alignment. Joint spaces are maintained. No appreciable focal soft tissue swelling. No acute osseous abnormality.            PROCEDURES   Unless otherwise noted below, none

## 2021-05-25 ENCOUNTER — PRE-PROCEDURE TELEPHONE (OUTPATIENT)
Dept: WOMENS IMAGING | Age: 76
End: 2021-05-25

## 2021-05-25 ENCOUNTER — HOSPITAL ENCOUNTER (OUTPATIENT)
Dept: WOMENS IMAGING | Age: 76
Discharge: HOME | End: 2021-05-25
Payer: MEDICARE

## 2021-05-25 ENCOUNTER — HOSPITAL ENCOUNTER (OUTPATIENT)
Dept: ULTRASOUND IMAGING | Age: 76
Discharge: HOME | End: 2021-05-25
Payer: MEDICARE

## 2021-05-25 DIAGNOSIS — N64.4 MASTODYNIA: ICD-10-CM

## 2021-05-25 PROCEDURE — 76641 ULTRASOUND BREAST COMPLETE: CPT

## 2021-05-25 PROCEDURE — G0279 TOMOSYNTHESIS, MAMMO: HCPCS

## 2021-06-03 ENCOUNTER — HOSPITAL ENCOUNTER (OUTPATIENT)
Dept: ULTRASOUND IMAGING | Age: 76
Discharge: HOME | End: 2021-06-03
Payer: MEDICARE

## 2021-06-03 ENCOUNTER — HOSPITAL ENCOUNTER (OUTPATIENT)
Dept: WOMENS IMAGING | Age: 76
Discharge: HOME | End: 2021-06-03
Payer: MEDICARE

## 2021-06-03 DIAGNOSIS — R22.31 MASS OF AXILLA, RIGHT: ICD-10-CM

## 2021-06-03 DIAGNOSIS — N63.10 BREAST MASS, RIGHT: ICD-10-CM

## 2021-06-03 DIAGNOSIS — R92.8 ABNORMAL MAMMOGRAM: ICD-10-CM

## 2021-06-03 DIAGNOSIS — R59.0 AXILLARY LYMPHADENOPATHY: ICD-10-CM

## 2021-06-03 PROCEDURE — 88305 TISSUE EXAM BY PATHOLOGIST: CPT

## 2021-06-03 PROCEDURE — C1894 INTRO/SHEATH, NON-LASER: HCPCS

## 2021-06-03 PROCEDURE — 77065 DX MAMMO INCL CAD UNI: CPT

## 2021-06-03 PROCEDURE — 2500000003 HC RX 250 WO HCPCS: Performed by: INTERNAL MEDICINE

## 2021-06-03 PROCEDURE — 88342 IMHCHEM/IMCYTCHM 1ST ANTB: CPT

## 2021-06-03 PROCEDURE — 88360 TUMOR IMMUNOHISTOCHEM/MANUAL: CPT

## 2021-06-03 PROCEDURE — 88341 IMHCHEM/IMCYTCHM EA ADD ANTB: CPT

## 2021-06-03 RX ORDER — LIDOCAINE HYDROCHLORIDE AND EPINEPHRINE 10; 10 MG/ML; UG/ML
20 INJECTION, SOLUTION INFILTRATION; PERINEURAL ONCE
Status: COMPLETED | OUTPATIENT
Start: 2021-06-03 | End: 2021-06-03

## 2021-06-03 RX ORDER — LIDOCAINE HYDROCHLORIDE 10 MG/ML
5 INJECTION, SOLUTION EPIDURAL; INFILTRATION; INTRACAUDAL; PERINEURAL ONCE
Status: COMPLETED | OUTPATIENT
Start: 2021-06-03 | End: 2021-06-03

## 2021-06-03 RX ADMIN — LIDOCAINE HYDROCHLORIDE 5 ML: 10 INJECTION, SOLUTION EPIDURAL; INFILTRATION; INTRACAUDAL; PERINEURAL at 10:10

## 2021-06-03 RX ADMIN — LIDOCAINE HYDROCHLORIDE,EPINEPHRINE BITARTRATE 20 ML: 10; .01 INJECTION, SOLUTION INFILTRATION; PERINEURAL at 10:27

## 2021-06-03 RX ADMIN — LIDOCAINE HYDROCHLORIDE,EPINEPHRINE BITARTRATE 20 ML: 10; .01 INJECTION, SOLUTION INFILTRATION; PERINEURAL at 09:55

## 2021-06-03 RX ADMIN — LIDOCAINE HYDROCHLORIDE 5 ML: 10 INJECTION, SOLUTION EPIDURAL; INFILTRATION; INTRACAUDAL; PERINEURAL at 10:25

## 2021-06-03 RX ADMIN — LIDOCAINE HYDROCHLORIDE 5 ML: 10 INJECTION, SOLUTION EPIDURAL; INFILTRATION; INTRACAUDAL; PERINEURAL at 09:54

## 2021-06-03 ASSESSMENT — PAIN SCALES - GENERAL
PAINLEVEL_OUTOF10: 3
PAINLEVEL_OUTOF10: 3

## 2021-06-04 ENCOUNTER — FOLLOWUP TELEPHONE ENCOUNTER (OUTPATIENT)
Dept: WOMENS IMAGING | Age: 76
End: 2021-06-04

## 2021-06-04 NOTE — TELEPHONE ENCOUNTER
Nurse Navigator reviewed breast biopsy results showing Invasive carcinoma on all three sites, on the pathology report. NN explained the terminology and reviewed the results for ER/FL and the additional HER2 test. We discussed several questions and process for establishing a care team.     Patient offered [address] and patient prefers to stay at [facility]. NN offered additional website reading if interested. Given ACS, [email or web address], NCCN pt, and [email or web address].  Pt/family given NN phone number for further assistance. 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 Presented with pain in her axilla. Her daughter Kan Silva moved home and lives with her since the pandemic. She has a supportive sister in town also and strong jeffery.

## 2021-06-08 RX ORDER — ATORVASTATIN CALCIUM 20 MG/1
TABLET, FILM COATED ORAL
COMMUNITY
Start: 2021-05-08

## 2021-06-10 ENCOUNTER — OFFICE VISIT (OUTPATIENT)
Dept: SURGERY | Age: 76
End: 2021-06-10
Payer: MEDICARE

## 2021-06-10 VITALS
HEART RATE: 97 BPM | HEIGHT: 67 IN | OXYGEN SATURATION: 95 % | BODY MASS INDEX: 26.84 KG/M2 | WEIGHT: 171 LBS | RESPIRATION RATE: 19 BRPM | SYSTOLIC BLOOD PRESSURE: 148 MMHG | DIASTOLIC BLOOD PRESSURE: 90 MMHG

## 2021-06-10 DIAGNOSIS — C50.919 INVASIVE CARCINOMA OF BREAST (HCC): Primary | ICD-10-CM

## 2021-06-10 DIAGNOSIS — C50.911 LOCALLY ADVANCED CARCINOMA OF BREAST, RIGHT (HCC): ICD-10-CM

## 2021-06-10 DIAGNOSIS — C50.911 PRIMARY BREAST MALIGNANCY, RIGHT (HCC): Primary | ICD-10-CM

## 2021-06-10 PROCEDURE — 99205 OFFICE O/P NEW HI 60 MIN: CPT | Performed by: SURGERY

## 2021-06-10 PROCEDURE — 3017F COLORECTAL CA SCREEN DOC REV: CPT | Performed by: SURGERY

## 2021-06-10 PROCEDURE — 4040F PNEUMOC VAC/ADMIN/RCVD: CPT | Performed by: SURGERY

## 2021-06-10 PROCEDURE — 1090F PRES/ABSN URINE INCON ASSESS: CPT | Performed by: SURGERY

## 2021-06-10 PROCEDURE — G8417 CALC BMI ABV UP PARAM F/U: HCPCS | Performed by: SURGERY

## 2021-06-10 PROCEDURE — 1123F ACP DISCUSS/DSCN MKR DOCD: CPT | Performed by: SURGERY

## 2021-06-10 PROCEDURE — G2212 PROLONG OUTPT/OFFICE VIS: HCPCS | Performed by: SURGERY

## 2021-06-10 PROCEDURE — 1036F TOBACCO NON-USER: CPT | Performed by: SURGERY

## 2021-06-10 PROCEDURE — G8400 PT W/DXA NO RESULTS DOC: HCPCS | Performed by: SURGERY

## 2021-06-10 PROCEDURE — G8427 DOCREV CUR MEDS BY ELIG CLIN: HCPCS | Performed by: SURGERY

## 2021-06-10 RX ORDER — SODIUM CHLORIDE, SODIUM LACTATE, POTASSIUM CHLORIDE, CALCIUM CHLORIDE 600; 310; 30; 20 MG/100ML; MG/100ML; MG/100ML; MG/100ML
INJECTION, SOLUTION INTRAVENOUS CONTINUOUS
Status: CANCELLED | OUTPATIENT
Start: 2021-06-10

## 2021-06-10 RX ORDER — SODIUM CHLORIDE 9 MG/ML
25 INJECTION, SOLUTION INTRAVENOUS PRN
Status: CANCELLED | OUTPATIENT
Start: 2021-06-10

## 2021-06-10 RX ORDER — SODIUM CHLORIDE 0.9 % (FLUSH) 0.9 %
5-40 SYRINGE (ML) INJECTION EVERY 12 HOURS SCHEDULED
Status: CANCELLED | OUTPATIENT
Start: 2021-06-10

## 2021-06-10 RX ORDER — SODIUM CHLORIDE 0.9 % (FLUSH) 0.9 %
5-40 SYRINGE (ML) INJECTION PRN
Status: CANCELLED | OUTPATIENT
Start: 2021-06-10

## 2021-06-10 NOTE — PROGRESS NOTES
PCP:  Medical Oncology:  Radiation:  Other:      [id number] STAGE:  IIIA right breast cancer      HPI:  Ms. Hogan is a 77 y. o. woman who presents today with a new diagnosis of grade 3, right sided IDC and DCIS, ER - [place]- HER2 positive. She reports that begun noticing a change to the right breast in December 2020 2 January 2021. This began with slight redness and burning pain of the breast.  She then began to have some associated arm pain. She thought this may be a reaction to something and monitored for a short time. She elected to wait until sometime the past after her Covid vaccine to obtain breast imaging but then underwent diagnostic breast imaging. In the most recent months she has also noticed a retraction of the right nipple. She is uncertain whether she has noticed a contour change or an asymmetry of the appearance of the breast.  Prior to this she has had a benign left breast biopsy. She has a family history of breast cancer. She has not noticed any new abnormalities of the contralateral breast such as masses, skin changes, color changes, nipple discharge, or changes to the nipple-areolar complex. She is here today in initial consultation to discuss her recent breast diagnosis. She was referred by [facility] radiology. INTERVAL HISTORY:    On 5/25/2021 she underwent bilateral breast imaging. The left breast is negative. Stable postoperative changes are noted. Within the right breast there is a high density mass with spiculated margins in the 9 to 10 o'clock position measuring 5 cm. It is associated with pleomorphic calcifications. Additionally there is a high density oval mass with lobulated contour which appears continuous and located in the 10 to 11 o'clock position, measuring 6 cm. The right nipple appears retracted. There are at least 6 abnormal appearing lymph nodes. Ultrasound correlate of the right breast 9:00 location identified a 5.2 x 2.7 x 4 cm mass.   In the 11 o'clock position the lobulated mass measures 6.3 x 4.2 x 5 cm and appears continuous with the 9:00 mass. In the inferior aspect of the axillary tail there is a hypoechoic mass with indistinct margins and multiple surrounding abnormal lymph nodes. At least 9 abnormal lymph nodes are evident. BI-RADS 5. On 6/3/2021 she underwent two site core needle biopsy of the right breast and axillary lymph node. [id number]     Pathology of the right breast 9 o'clock position identified high-grade invasive carcinoma with DCIS. Metaplastic carcinoma cannot be excluded. ER negative [place] negative HER-2 positive. Pathology of the right breast 11 o'clock position identified high-grade invasive carcinoma with DCIS. Metaplastic carcinoma cannot be excluded. ER negative [place] negative HER-2 positive. Pathology of the right axillary lymph node identified metastatic carcinoma. Review of Systems   Respiratory:        Snoring   Psychiatric/Behavioral: The patient is nervous/anxious.    :    There isno new onset of persistent dry cough, abdominal pains, new onset of headache, change in bowel function, or bony tenderness to suggest metastatic disease at this time. Pathology:    Department of Pathology   FINAL SURGICAL PATHOLOGY REPORT   Patient Name: Varsha Masterson             Accession No:  [id number]    Age Sex:   1945    75 Y / F       Location:      [facility]   Account No:   [de-identified]                  Collected:     2021   [facility] Rec No:    [id number]                 Received:      2021   Attend Phys:   Symone Crook MD            Completed:     2021   Perform Phys: Symone Crook MD             FINAL DIAGNOSIS:        A. Right breast, 9 o'clock, 8 cm from nipple, biopsy:        - Invasive carcinoma (See Comment).        B. Right breast, 11 o'clock, 9 cm from nipple, biopsy:        - Invasive carcinoma (See Comment).        C. Axilla, right, biopsy:        - Positive for carcinoma (2.5 mm).         - Small number of lymphocytes present. COMMENT: Parts A and B show similar high grade carcinoma morphology. 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 GATA3 stain is mostly positive;   compatible with mammary carcinoma. P40 stains performed on both parts   show scattered reactivity, compatible with partial squamous   differentiation. Together with morphologically identified patchy   keratinization (especially in part B), a component of metaplastic   carcinoma cannot be excluded; definitive classification relies on   resection specimen if clinically indicated. Histologic Grade ([place] Histologic Score): Grade 3 (total score 8;   tubule/ nuclear/ mitotic score: 3/3/2)   Longest length on slide: 6 mm in part A and 10mm in part B     Biomarkers (Performed on block B1):      Estrogen receptor: Negative (<%)      Progesterone receptor: Negative (<1%)      HER2 IHC: Positive (Score 3+)     Biomarkers (Performed on block C1):      Estrogen receptor: Negative (<%)      Progesterone receptor: Negative (<1%)      HER2 IHC: Positive (Score 3+)     Cold ischemia time: Less than 1 hour   Duration of fixation: Greater than 6 hours and less than 72 hours   Performed by immunohistochemistry with manual quantitation. ER clone is   [place] SP1. IL clone is [place] clone [id number]. HER2 clone is [employer]way   anti-her-h2ineu (4B5).  Tests are performed on formalin fixed paraffin   embedded tissue using ULTRAVIEW universal DAB detection kit. Positive and   negative control tissue shows appropriate staining. ER and IL scoring includes the percentage of cells staining positive and   average intensity of expression.  Interpretation follows the ASCO/CAP   guidelines, with any staining of >1% considered positive.  ER results   between 1 and 10% are considered are considered low positive. Her2/cristiano scoring follows the ASCO/CAP guidelines: 0, 1+, 2+, 3+.  Her2/cristiano \"positive\" (3+) requires circumferential membrane staining that is   complete, intense and in >10% of tumor cells. Equivocal (2+) cases are   defined as weak to moderate complete membrane staining in >10% of cells. Negative cases (0 or 1+, respectively) display no staining or incomplete   membrane staining that is faint/barely perceptible and in >10% of tumor   cells.   Select cases, including all 2+/equivocal for Her2/cristiano on routine   IHC staining, will be sent for Her2/cristiano analysis by FISH. References:   Sussy Hairston et al, Estrogen and Progesterone Receptor Testing in   Breast Cancer, Arch Pathol Lab Med, Vol 144, May, 2020   Preet Bundy et al, Human Epidermal Growth Factor Receptor 2 Testing in   Breast Cancer, Arch Pathol Lab Med, Vol 142, November, 2018     All immunohistochemical (IHC) stains were performed using single   antibodies on separate tissue sections.  No multiple antibody cocktails   were used unless specifically documented.  Appropriate positive controls   were performed with each antibody and the results were reviewed.  The   control stains showed the expected positive results within technically   acceptable parameters. Analyte specific reagent (ASR) disclaimer: The use of one or more   reagents in the above tests is regulated as an analyte specific reagent. These tests were developed and their performance characteristics   determined by the [facility]. They have   not been cleared by the [employer] and Drug Administration. The FDA has   determined that such clearance or approval is not necessary.      Technical component is performed at [facility].   MELISSA/MELISSA             Past Medical History:   Diagnosis Date    Asthma     Diverticulitis    :        Past Surgical History:   Procedure Laterality Date    BREAST LUMPECTOMY      HYSTERECTOMY      US BREAST BIOPSY NEEDLE ADDITIONAL RIGHT Right 6/3/2021    US BREAST BIOPSY NEEDLE ADDITIONAL RIGHT 6/3/2021 [facility] ULTRASOUND    US BREAST NEEDLE BIOPSY RIGHT Right 6/3/2021    US BREAST NEEDLE BIOPSY RIGHT 6/3/2021 [facility] ULTRASOUND    US LYMPH NODE BIOPSY  6/3/2021    US LYMPH NODE BIOPSY 6/3/2021 [facility] ULTRASOUND   :        Allergies as of 06/10/2021 - Fully Reviewed 06/10/2021   Allergen Reaction Noted    Erythromycin  2019    Other  2010   :        Social History     Tobacco Use    Smoking status: Never Smoker    Smokeless tobacco: Never Used   Vaping Use    Vaping Use: Never used   Substance Use Topics    Alcohol use: No    Drug use: No   :      Family History:  Maternal aunt: Breast cancer, diagnosed age 61,  age 59  Maternal aunt: Breast cancer, diagnosed age 62s  Sister: Colon cancer, diagnosed age 50  No additional family history of breast or ovarian cancer    Hormonal History:   a.0  m.0  Menarche at age 16. Postmenopausal at age42  Hysterectomy: at age 37  No estrogen supplementation currently but 25-year use of Premarin. Stopped 10 years ago. OCP not taking    Medications:  Medication documentation has been reviewed in the electronic medical record and patient office intake form. Exam:  BP (!) 148/90 (Site: Right Upper Arm, Position: Sitting, Cuff Size: Medium Adult)   Pulse 97   Resp 19   Ht 5' 7\" (1.702 m)   Wt 171 lb (77.6 kg)   SpO2 95%   BMI 26.78 kg/m²     Constitutional: She appears well-nourished. No apparent distress. Breast: The patient was examined in the upright and supine position. She has a \"DD\" cup breast. Breasts are pathologically (malignancy) asymmetric and ptotic. Right: The right breast is notably misshapen with visible contour change to the upper portion of the breast.  There is a large palpably abnormal region encompassing the majority of the upper breast measuring at least 10+ centimeters. There are palpably enlarged but not fixed lymph nodes present.   There is a mild erythematous skin change associated with the mass. This does not encompass large majority of the breast.  There are no significant edematous changes. The nipple is sunken in and retracted. Left: There were no new masses or changes in breast contour. There were no skin changes of the breast or nipple areolar complex. There was no nipple inversion or discharge. There is no axillary lymphadenopathy palpated bilaterally. Head: Normocephalic andatraumatic. Eyes: EOM are normal. Pupils are equal, round, and reactive tolight. Neck: Neck supple. No tracheal deviation present.   : regular rate. Extremities appear well perfused. Pulmonary: respirations are non-labored and without audible distress  Lymphatics: no palpable axillary orcervical lymphadenopathy. Skin: No rash noted. No erythema. Neurologic: alert and oriented. Assessment/Plan:  [id number] STAGE:  IIIA right breast cancer  ER - [place]- HER2 positive. I have reviewed the results of her most recent breast imaging and pathology with her. The surgical rational and technical details of undergoing breast conservation therapy versus a mastectomy were reviewed. At this point, her tumor to breast ratio is unfavorable for breast conservation. Given the tumor size, her positive lymph node status and disease biology, I would like to refer her to for consideration for neoadjuvant chemotherapy to hopefully downstage the breast and axilla. We will have a more thorough discussion of surgical details at our postchemotherapy appointment. I generally reviewed the details of axillary procedures. I discussed the technique of a sentinel lymph node biopsy as well as a level I and II axillary lymph nodedissection. I discussed the potential effects of chemotherapy on both the breast and axilla. Systemic therapy including chemotherapy and endocrine therapy were reviewed. I discussed the potential clinical response that may be noticed soon after chemotherapy begins.   I discussed the possibility of complete pathologic response. I have recommended she undergo a consultation with medical oncology to discuss neoadjuvant chemotherapy. We discussed that radiation therapy is traditionally given to patients undergoing breast conservation therapy and at times following mastectomy to reduce the risk of local recurrence. I recommend a postoperative consultation with radiation oncology. Fertility does not apply. The role of genetic consultation was discussed. She would like to think about this. Reconstructive options in patients undergoing breast conservation versus a mastectomy were reviewed. If mastectomy is ultimately decided upon she would like to consider reconstructive options. We will revisit this as we approach surgery. The risk of identifying metastatic disease on staging studies was discussed and they will be obtained. Anticipated clinic follow up and survivorship were discussed. Self breast evaluation was reviewed. We will have her back in the surgical oncology office in about 8 weeks, midway through chemotherapy. All of the patient's questions were answered at this time however, she was encouraged to call the office with any further inquiries. Approximately 90 minutes of time were spent in preparation, direct patient contact, record review, imaging interpretation, care coordination, documentation and activities otherwise related to this encounter.

## 2021-06-11 ENCOUNTER — TELEPHONE (OUTPATIENT)
Dept: SURGERY | Age: 76
End: 2021-06-11

## 2021-06-11 DIAGNOSIS — C50.919 INVASIVE CARCINOMA OF BREAST (HCC): Primary | ICD-10-CM

## 2021-06-11 DIAGNOSIS — R92.8 ABNORMAL MAMMOGRAM OF RIGHT BREAST: ICD-10-CM

## 2021-06-11 DIAGNOSIS — C50.911 INVASIVE DUCTAL CARCINOMA OF RIGHT BREAST, STAGE 3 (HCC): ICD-10-CM

## 2021-06-11 DIAGNOSIS — R92.8 ABNORMAL MAMMOGRAM: ICD-10-CM

## 2021-06-11 NOTE — TELEPHONE ENCOUNTER
I called patient , to inform her I put two orders in the system an CT - Tap and bone scan. She will call central scheduling to set up appointments. Encouraged her to call with any questions or concerns. She is already set up for 8 week / mid chemo follow up.   Thanks, Callum Varghese

## 2021-06-17 ENCOUNTER — HOSPITAL ENCOUNTER (OUTPATIENT)
Dept: NON INVASIVE DIAGNOSTICS | Age: 76
Discharge: HOME | End: 2021-06-17
Payer: MEDICARE

## 2021-06-17 DIAGNOSIS — Z51.11 ENCOUNTER FOR ANTINEOPLASTIC CHEMOTHERAPY: ICD-10-CM

## 2021-06-17 LAB
LV EF: 58 %
LVEF MODALITY: NORMAL

## 2021-06-17 PROCEDURE — 93356 MYOCRD STRAIN IMG SPCKL TRCK: CPT

## 2021-06-17 PROCEDURE — 93306 TTE W/DOPPLER COMPLETE: CPT

## 2021-06-18 NOTE — PROGRESS NOTES
Name_______________________________________Printed:____________________  Date and time of Leno@[employer] Time:____[address]____________   1. The instructions given regarding when and if a patient needs to stop oral intake prior to surgery varies. Follow the specific instructions you were given                  _x__Nothing to eat or to drink after Midnight the night before.                   ____Carbo loading or ERAS instructions will be given to select patients-if you have been given those instructions -please do the following                           The evening before your surgery after dinner before midnight drink 40 ounces of gatorade. If you are diabetic use sugar free. The morning of surgery drink 40 ounces of water. This needs to be finished 3 hours prior to your surgery start time. 2. Take the following pills with a small sip of water on the morning of surgery___________________________________________________                  Do not take blood pressure medications ending in pril or sartan the timmy prior to surgery or the morning of surgery_   3. Aspirin, Ibuprofen, Advil, Naproxen, Vitamin E and other Anti-inflammatory products and supplements should be stopped for 5 -7days before surgery or as directed by your physician. 4. Check with your Doctor regarding stopping Plavix, Coumadin,Eliquis, Lovenox,Effient,Pradaxa,Xarelto, Fragmin or other blood thinners and follow their instructions. 5. Do not smoke, and do not drink any alcoholic beverages 24 hours prior to surgery. This includes NA Beer. Refrain from the usage of any recreational drugs. 6. You may brush your teeth and gargle the morning of surgery. DO NOT SWALLOW WATER   7. You MUST make arrangements for a responsible adult to stay on site while you are here and take you home after your surgery. You will not be allowed to leave alone or drive yourself home.   It is strongly suggested someone stay with you

## 2021-06-23 ENCOUNTER — APPOINTMENT (OUTPATIENT)
Dept: GENERAL RADIOLOGY | Age: 76
End: 2021-06-23
Attending: SURGERY
Payer: MEDICARE

## 2021-06-23 ENCOUNTER — ANESTHESIA (OUTPATIENT)
Dept: OPERATING ROOM | Age: 76
End: 2021-06-23
Payer: MEDICARE

## 2021-06-23 ENCOUNTER — ANESTHESIA EVENT (OUTPATIENT)
Dept: OPERATING ROOM | Age: 76
End: 2021-06-23
Payer: MEDICARE

## 2021-06-23 ENCOUNTER — TELEPHONE (OUTPATIENT)
Dept: SURGERY | Age: 76
End: 2021-06-23

## 2021-06-23 ENCOUNTER — HOSPITAL ENCOUNTER (OUTPATIENT)
Age: 76
Setting detail: OUTPATIENT SURGERY
Discharge: HOME | End: 2021-06-23
Attending: SURGERY | Admitting: SURGERY
Payer: MEDICARE

## 2021-06-23 VITALS
OXYGEN SATURATION: 100 % | RESPIRATION RATE: 16 BRPM | BODY MASS INDEX: 28.02 KG/M2 | HEIGHT: 67 IN | TEMPERATURE: 96.8 F | SYSTOLIC BLOOD PRESSURE: 158 MMHG | WEIGHT: 178.5 LBS | DIASTOLIC BLOOD PRESSURE: 73 MMHG | HEART RATE: 67 BPM

## 2021-06-23 VITALS
SYSTOLIC BLOOD PRESSURE: 107 MMHG | RESPIRATION RATE: 12 BRPM | TEMPERATURE: 96.8 F | DIASTOLIC BLOOD PRESSURE: 56 MMHG | OXYGEN SATURATION: 96 %

## 2021-06-23 DIAGNOSIS — Z17.0 MALIGNANT NEOPLASM OF RIGHT BREAST IN FEMALE, ESTROGEN RECEPTOR POSITIVE, UNSPECIFIED SITE OF BREAST (HCC): Primary | ICD-10-CM

## 2021-06-23 DIAGNOSIS — C50.911 MALIGNANT NEOPLASM OF RIGHT BREAST IN FEMALE, ESTROGEN RECEPTOR POSITIVE, UNSPECIFIED SITE OF BREAST (HCC): Primary | ICD-10-CM

## 2021-06-23 LAB
GLUCOSE BLD-MCNC: 150 MG/DL (ref 70–99)
PERFORMED ON: ABNORMAL

## 2021-06-23 PROCEDURE — C1788 PORT, INDWELLING, IMP: HCPCS | Performed by: SURGERY

## 2021-06-23 PROCEDURE — 36561 INSERT TUNNELED CV CATH: CPT | Performed by: SURGERY

## 2021-06-23 PROCEDURE — 6370000000 HC RX 637 (ALT 250 FOR IP): Performed by: ANESTHESIOLOGY

## 2021-06-23 PROCEDURE — 2580000003 HC RX 258: Performed by: NURSE ANESTHETIST, CERTIFIED REGISTERED

## 2021-06-23 PROCEDURE — 7100000000 HC PACU RECOVERY - FIRST 15 MIN: Performed by: SURGERY

## 2021-06-23 PROCEDURE — 3600000002 HC SURGERY LEVEL 2 BASE: Performed by: SURGERY

## 2021-06-23 PROCEDURE — 7100000001 HC PACU RECOVERY - ADDTL 15 MIN: Performed by: SURGERY

## 2021-06-23 PROCEDURE — 77001 FLUOROGUIDE FOR VEIN DEVICE: CPT

## 2021-06-23 PROCEDURE — 6360000002 HC RX W HCPCS: Performed by: NURSE ANESTHETIST, CERTIFIED REGISTERED

## 2021-06-23 PROCEDURE — 3600000012 HC SURGERY LEVEL 2 ADDTL 15MIN: Performed by: SURGERY

## 2021-06-23 PROCEDURE — 2580000003 HC RX 258: Performed by: SURGERY

## 2021-06-23 PROCEDURE — 71045 X-RAY EXAM CHEST 1 VIEW: CPT

## 2021-06-23 PROCEDURE — 7100000010 HC PHASE II RECOVERY - FIRST 15 MIN: Performed by: SURGERY

## 2021-06-23 PROCEDURE — 3700000000 HC ANESTHESIA ATTENDED CARE: Performed by: SURGERY

## 2021-06-23 PROCEDURE — 2500000003 HC RX 250 WO HCPCS: Performed by: NURSE ANESTHETIST, CERTIFIED REGISTERED

## 2021-06-23 PROCEDURE — 2500000003 HC RX 250 WO HCPCS: Performed by: SURGERY

## 2021-06-23 PROCEDURE — 2709999900 HC NON-CHARGEABLE SUPPLY: Performed by: SURGERY

## 2021-06-23 PROCEDURE — 6360000002 HC RX W HCPCS

## 2021-06-23 PROCEDURE — 3700000001 HC ADD 15 MINUTES (ANESTHESIA): Performed by: SURGERY

## 2021-06-23 PROCEDURE — 7100000011 HC PHASE II RECOVERY - ADDTL 15 MIN: Performed by: SURGERY

## 2021-06-23 PROCEDURE — 6360000002 HC RX W HCPCS: Performed by: SURGERY

## 2021-06-23 DEVICE — PORT IMPL INFUSION 16X26 MM PWR INJ POLYURETHANE CATH XCELA: Type: IMPLANTABLE DEVICE | Site: SUBCLAVIAN | Status: FUNCTIONAL

## 2021-06-23 RX ORDER — HYDROCODONE BITARTRATE AND ACETAMINOPHEN 5; 325 MG/1; MG/1
1 TABLET ORAL PRN
Status: COMPLETED | OUTPATIENT
Start: 2021-06-23 | End: 2021-06-23

## 2021-06-23 RX ORDER — DIPHENHYDRAMINE HYDROCHLORIDE 50 MG/ML
12.5 INJECTION INTRAMUSCULAR; INTRAVENOUS
Status: DISCONTINUED | OUTPATIENT
Start: 2021-06-23 | End: 2021-06-23 | Stop reason: HOSPADM

## 2021-06-23 RX ORDER — FENTANYL CITRATE 50 UG/ML
25 INJECTION, SOLUTION INTRAMUSCULAR; INTRAVENOUS EVERY 5 MIN PRN
Status: DISCONTINUED | OUTPATIENT
Start: 2021-06-23 | End: 2021-06-23 | Stop reason: HOSPADM

## 2021-06-23 RX ORDER — LIDOCAINE HYDROCHLORIDE 20 MG/ML
INJECTION, SOLUTION EPIDURAL; INFILTRATION; INTRACAUDAL; PERINEURAL PRN
Status: DISCONTINUED | OUTPATIENT
Start: 2021-06-23 | End: 2021-06-23 | Stop reason: SDUPTHER

## 2021-06-23 RX ORDER — PROMETHAZINE HYDROCHLORIDE 25 MG/ML
6.25 INJECTION, SOLUTION INTRAMUSCULAR; INTRAVENOUS EVERY 30 MIN PRN
Status: DISCONTINUED | OUTPATIENT
Start: 2021-06-23 | End: 2021-06-23 | Stop reason: HOSPADM

## 2021-06-23 RX ORDER — HYDROCODONE BITARTRATE AND ACETAMINOPHEN 5; 325 MG/1; MG/1
2 TABLET ORAL PRN
Status: COMPLETED | OUTPATIENT
Start: 2021-06-23 | End: 2021-06-23

## 2021-06-23 RX ORDER — HYDROMORPHONE HCL 110MG/55ML
0.5 PATIENT CONTROLLED ANALGESIA SYRINGE INTRAVENOUS EVERY 5 MIN PRN
Status: DISCONTINUED | OUTPATIENT
Start: 2021-06-23 | End: 2021-06-23 | Stop reason: HOSPADM

## 2021-06-23 RX ORDER — HYDROCODONE BITARTRATE AND ACETAMINOPHEN 5; 325 MG/1; MG/1
1-2 TABLET ORAL EVERY 4 HOURS PRN
Qty: 15 TABLET | Refills: 0 | Status: SHIPPED | OUTPATIENT
Start: 2021-06-23 | End: 2021-06-26

## 2021-06-23 RX ORDER — PROPOFOL 10 MG/ML
INJECTION, EMULSION INTRAVENOUS PRN
Status: DISCONTINUED | OUTPATIENT
Start: 2021-06-23 | End: 2021-06-23 | Stop reason: SDUPTHER

## 2021-06-23 RX ORDER — MEPERIDINE HYDROCHLORIDE 25 MG/ML
12.5 INJECTION INTRAMUSCULAR; INTRAVENOUS; SUBCUTANEOUS EVERY 5 MIN PRN
Status: DISCONTINUED | OUTPATIENT
Start: 2021-06-23 | End: 2021-06-23 | Stop reason: HOSPADM

## 2021-06-23 RX ORDER — FENTANYL CITRATE 50 UG/ML
50 INJECTION, SOLUTION INTRAMUSCULAR; INTRAVENOUS EVERY 5 MIN PRN
Status: DISCONTINUED | OUTPATIENT
Start: 2021-06-23 | End: 2021-06-23 | Stop reason: HOSPADM

## 2021-06-23 RX ORDER — ONDANSETRON 2 MG/ML
INJECTION INTRAMUSCULAR; INTRAVENOUS PRN
Status: DISCONTINUED | OUTPATIENT
Start: 2021-06-23 | End: 2021-06-23 | Stop reason: SDUPTHER

## 2021-06-23 RX ORDER — LIDOCAINE HYDROCHLORIDE 10 MG/ML
INJECTION, SOLUTION INFILTRATION; PERINEURAL
Status: COMPLETED | OUTPATIENT
Start: 2021-06-23 | End: 2021-06-23

## 2021-06-23 RX ORDER — HYDROMORPHONE HCL 110MG/55ML
0.25 PATIENT CONTROLLED ANALGESIA SYRINGE INTRAVENOUS EVERY 5 MIN PRN
Status: DISCONTINUED | OUTPATIENT
Start: 2021-06-23 | End: 2021-06-23 | Stop reason: HOSPADM

## 2021-06-23 RX ORDER — SODIUM CHLORIDE 9 MG/ML
INJECTION, SOLUTION INTRAVENOUS CONTINUOUS PRN
Status: DISCONTINUED | OUTPATIENT
Start: 2021-06-23 | End: 2021-06-23 | Stop reason: SDUPTHER

## 2021-06-23 RX ORDER — FENTANYL CITRATE 50 UG/ML
INJECTION, SOLUTION INTRAMUSCULAR; INTRAVENOUS PRN
Status: DISCONTINUED | OUTPATIENT
Start: 2021-06-23 | End: 2021-06-23 | Stop reason: SDUPTHER

## 2021-06-23 RX ORDER — PROPOFOL 10 MG/ML
INJECTION, EMULSION INTRAVENOUS CONTINUOUS PRN
Status: DISCONTINUED | OUTPATIENT
Start: 2021-06-23 | End: 2021-06-23 | Stop reason: SDUPTHER

## 2021-06-23 RX ADMIN — ONDANSETRON 4 MG: 2 INJECTION INTRAMUSCULAR; INTRAVENOUS at 12:30

## 2021-06-23 RX ADMIN — CEFAZOLIN 2000 MG: 10 INJECTION, POWDER, FOR SOLUTION INTRAVENOUS at 12:23

## 2021-06-23 RX ADMIN — PROPOFOL 40 MG: 10 INJECTION, EMULSION INTRAVENOUS at 12:30

## 2021-06-23 RX ADMIN — PROPOFOL 140 MCG/KG/MIN: 10 INJECTION, EMULSION INTRAVENOUS at 12:30

## 2021-06-23 RX ADMIN — FENTANYL CITRATE 25 MCG: 50 INJECTION, SOLUTION INTRAMUSCULAR; INTRAVENOUS at 12:56

## 2021-06-23 RX ADMIN — SODIUM CHLORIDE: 9 INJECTION, SOLUTION INTRAVENOUS at 12:23

## 2021-06-23 RX ADMIN — LIDOCAINE HYDROCHLORIDE 20 MG: 20 INJECTION, SOLUTION EPIDURAL; INFILTRATION; INTRACAUDAL; PERINEURAL at 12:30

## 2021-06-23 RX ADMIN — FENTANYL CITRATE 25 MCG: 50 INJECTION, SOLUTION INTRAMUSCULAR; INTRAVENOUS at 12:30

## 2021-06-23 RX ADMIN — FENTANYL CITRATE 25 MCG: 50 INJECTION, SOLUTION INTRAMUSCULAR; INTRAVENOUS at 13:06

## 2021-06-23 RX ADMIN — FENTANYL CITRATE 25 MCG: 50 INJECTION, SOLUTION INTRAMUSCULAR; INTRAVENOUS at 12:36

## 2021-06-23 RX ADMIN — HYDROCODONE BITARTRATE AND ACETAMINOPHEN 1 TABLET: 5; 325 TABLET ORAL at 14:10

## 2021-06-23 ASSESSMENT — PULMONARY FUNCTION TESTS
PIF_VALUE: 1
PIF_VALUE: 0
PIF_VALUE: 1

## 2021-06-23 ASSESSMENT — PAIN DESCRIPTION - PAIN TYPE: TYPE: SURGICAL PAIN

## 2021-06-23 ASSESSMENT — PAIN DESCRIPTION - ORIENTATION: ORIENTATION: LEFT

## 2021-06-23 ASSESSMENT — PAIN DESCRIPTION - LOCATION: LOCATION: SHOULDER

## 2021-06-23 ASSESSMENT — PAIN SCALES - GENERAL
PAINLEVEL_OUTOF10: 3
PAINLEVEL_OUTOF10: 2

## 2021-06-23 ASSESSMENT — PAIN DESCRIPTION - DESCRIPTORS: DESCRIPTORS: BURNING

## 2021-06-23 ASSESSMENT — PAIN DESCRIPTION - FREQUENCY: FREQUENCY: CONTINUOUS

## 2021-06-23 NOTE — BRIEF OP NOTE
Brief Postoperative Note      Patient: Paarm Irizarry  YOB: 1945  MRN: [id number]    Date of Procedure: 6/23/2021    Pre-Op Diagnosis: C50.411 BREAST CANCER    Post-Op Diagnosis: Same       Procedure(s):  PLACEMENT OF POWER PORT A CATHETER    Surgeon(s):  Tigre Rai MD    Assistant:  First Assistant: Pascale Gracia    Anesthesia: Monitor Anesthesia Care    Estimated Blood Loss (mL): Minimal    Complications: None    Specimens:   * No specimens in log *    Implants:  Implant Name Type Inv.  Item Serial No.  Lot No. LRB No. Used Action   PORT IMPL INFUSION [id number] MM PWR INJ POLYURETHANE CATH XCELA  PORT IMPL INFUSION [id number] MM PWR INJ POLYURETHANE CATH [facility]- [id number] Left 1 Implanted         Drains: * No LDAs found *    Findings: Left subclavian Port-A-Cath placed without complication    Electronically signed by Tigre Rai MD on 6/23/2021 at 1:01 PM

## 2021-06-23 NOTE — PROGRESS NOTES
Patient to PACU from OR. Awakens to voice. VSS. Denies pain. Surgical incision to left upper chest c/d/i.

## 2021-06-23 NOTE — PROGRESS NOTES
Pt to bay 6 vss incision site to upper left chest d and I well approx no swelling or drainage noted. Call light in reach up to br to void.  Will continue to monitor

## 2021-06-23 NOTE — PROGRESS NOTES
Pt request pain medicine for a level 3/10 pain to upper left chest. Discharge instructions given family at bedside. Pharmacy up to deliver medication.

## 2021-06-23 NOTE — ANESTHESIA PRE PROCEDURE
Department of Anesthesiology  Preprocedure Note       Name:  Rosa Maria Adams   Age:  77 y.o.  :  1945                                          MRN:  [id number]         Date:  2021      Surgeon: Petra Carlos):  Kaden Walker MD    Procedure: Procedure(s):  PLACEMENT OF POWER PORT A CATHETER    Medications prior to admission:   Prior to Admission medications    Medication Sig Start Date End Date Taking? Authorizing Provider   atorvastatin (LIPITOR) 20 MG tablet TAKE 1 TABLET BY MOUTH AT BEDTIME AS DIRECTED. 21   Historical Provider, MD   metFORMIN (GLUCOPHAGE) 1000 MG tablet TAKE 1 TABLET BY MOUTH TWO TIMES A DAY 5/3/21   Historical Provider, MD   zafirlukast (ACCOLATE) 10 MG tablet TK 1 T PO QD 19   Historical Provider, MD   lisinopril (PRINIVIL;ZESTRIL) 10 MG tablet Take 10 mg by mouth daily    Historical Provider, MD   amLODIPine (NORVASC) 5 MG tablet Take 5 mg by mouth daily    Historical Provider, MD   ibuprofen (ADVIL;MOTRIN) 800 MG tablet Take 1 tablet by mouth every 8 hours as needed for Pain or Fever 19   Jari Castleman, APRN - CNP   albuterol (PROVENTIL) (2.5 MG/3ML) 0.083% nebulizer solution Take 2.5 mg by nebulization every 6 hours as needed. Historical Provider, MD       Current medications:    No current facility-administered medications for this encounter. Current Outpatient Medications   Medication Sig Dispense Refill    atorvastatin (LIPITOR) 20 MG tablet TAKE 1 TABLET BY MOUTH AT BEDTIME AS DIRECTED.       metFORMIN (GLUCOPHAGE) 1000 MG tablet TAKE 1 TABLET BY MOUTH TWO TIMES A DAY      zafirlukast (ACCOLATE) 10 MG tablet TK 1 T PO QD  2    lisinopril (PRINIVIL;ZESTRIL) 10 MG tablet Take 10 mg by mouth daily      amLODIPine (NORVASC) 5 MG tablet Take 5 mg by mouth daily      ibuprofen (ADVIL;MOTRIN) 800 MG tablet Take 1 tablet by mouth every 8 hours as needed for Pain or Fever 20 tablet 0    albuterol (PROVENTIL) (2.5 MG/3ML) 0.083% nebulizer solution Take 2.5 mg by nebulization every 6 hours as needed. Allergies: Allergies   Allergen Reactions    Erythromycin     Other      fireants       Problem List:    Patient Active Problem List   Diagnosis Code    Closed fracture of lower end of right ulna S52.601A       Past Medical History:        Diagnosis Date    Asthma     Cancer ([facility] [address] 06/2021    right Breast    Diabetes mellitus ([facility] [address]     Diverticulitis     Hypertension        Past Surgical History:        Procedure Laterality Date    BREAST LUMPECTOMY Left     HYSTERECTOMY      US BREAST BIOPSY NEEDLE ADDITIONAL RIGHT Right 6/3/2021    US BREAST BIOPSY NEEDLE ADDITIONAL RIGHT 6/3/2021 [facility]    US BREAST NEEDLE BIOPSY RIGHT Right 6/3/2021    US BREAST NEEDLE BIOPSY RIGHT 6/3/2021 [facility]    US LYMPH NODE BIOPSY  6/3/2021    US LYMPH NODE BIOPSY 6/3/2021 [facility]       Social History:    Social History     Tobacco Use    Smoking status: Never Smoker    Smokeless tobacco: Never Used   Substance Use Topics    Alcohol use: No                                Counseling given: Not Answered      Vital Signs (Current):   Vitals:    06/18/21 1623   Weight: 180 lb (81.6 kg)   Height: 5' 7\" (1.702 m)                                              BP Readings from Last 3 Encounters:   06/10/21 (!) 148/90   06/28/19 115/76   01/29/19 127/73       NPO Status:                                                                                 BMI:   Wt Readings from Last 3 Encounters:   06/10/21 171 lb (77.6 kg)   06/28/19 180 lb (81.6 kg)   05/14/19 180 lb (81.6 kg)     Body mass index is 28.19 kg/m².     CBC:   Lab Results   Component Value Date    WBC 6.0 04/30/2021    RBC 4.06 04/30/2021    HGB 12.8 04/30/2021    HCT 37.9 04/30/2021    MCV 93.3 04/30/2021    RDW 13.2 04/30/2021     04/30/2021       CMP:   Lab Results   Component Value Date     04/30/2021    K 4.9 04/30/2021     04/30/2021    CO2 23 04/30/2021    BUN 19 04/30/2021    CREATININE 0.6 04/30/2021    GFRAA >60 04/30/2021    GFRAA >60 02/14/2013    AGRATIO 1.8 04/30/2021    LABGLOM >60 04/30/2021    GLUCOSE 139 04/30/2021    PROT 6.8 04/30/2021    PROT 7.0 02/14/2013    CALCIUM 10.9 04/30/2021    BILITOT 0.4 04/30/2021    ALKPHOS 92 04/30/2021    AST 22 04/30/2021    ALT 19 04/30/2021       POC Tests: No results for input(s): POCGLU, POCNA, POCK, POCCL, POCBUN, POCHEMO, POCHCT in the last 72 hours. Coags: No results found for: PROTIME, INR, APTT    HCG (If Applicable): No results found for: PREGTESTUR, PREGSERUM, HCG, HCGQUANT     ABGs: No results found for: PHART, PO2ART, LEF6EDU, PFC5AVK, BEART, T8SLRMOF     Type & Screen (If Applicable):  No results found for: LABABO, LABRH    Drug/Infectious Status (If Applicable):  No results found for: HIV, HEPCAB    COVID-19 Screening (If Applicable): No results found for: COVID19        Anesthesia Evaluation  Patient summary reviewed and Nursing notes reviewed  Airway: Mallampati: II  TM distance: >3 FB   Neck ROM: full  Mouth opening: > = 3 FB Dental:          Pulmonary:   (+) asthma:                            Cardiovascular:  Exercise tolerance: poor (<4 METS),   (+) hypertension: moderate,                   Neuro/Psych:               GI/Hepatic/Renal:             Endo/Other:    (+) DiabetesType II DM, well controlled, , .                  ROS comment: BREAST CA Abdominal:           Vascular:                                        Anesthesia Plan      MAC and general     ASA 2       Induction: intravenous. MIPS: Prophylactic antiemetics administered. Anesthetic plan and risks discussed with patient. Plan discussed with CRNA.                   Bobby Huang MD   6/23/2021

## 2021-06-23 NOTE — TELEPHONE ENCOUNTER
Called in Ativan 0.5mg tab-1 time dose. Take one tablet by mouth 1 hour prior to procedure - For anxiety F41.9. Called into [facility].    Thanks, Nayeli Livingston

## 2021-06-23 NOTE — ANESTHESIA POSTPROCEDURE EVALUATION
Department of Anesthesiology  Postprocedure Note    Patient: Rosa Maria Adams  MRN: [id number]  YOB: 1945  Date of evaluation: 6/23/2021  Time:  1:14 PM     Procedure Summary     Date: 06/23/21 Room / Location: [address]    Anesthesia Start: 1226 Anesthesia Stop: 6196    Procedure: PLACEMENT OF POWER PORT A CATHETER (N/A Neck) Diagnosis: (C50.411 BREAST CANCER)    Surgeons: Kaden Walker MD Responsible Provider: Radha Mayorga MD    Anesthesia Type: MAC, general ASA Status: 2          Anesthesia Type: MAC, general    Alexey Phase I: Alexey Score: 8    Alexey Phase II:      Last vitals: Reviewed and per EMR flowsheets.        Anesthesia Post Evaluation    Patient location during evaluation: PACU  Patient participation: complete - patient participated  Level of consciousness: awake and alert  Pain score: 2  Airway patency: patent  Nausea & Vomiting: no vomiting  Complications: no  Cardiovascular status: blood pressure returned to baseline  Respiratory status: acceptable  Hydration status: euvolemic

## 2021-06-23 NOTE — OP NOTE
Operative note                                                                  Preoperative diagnosis -stage IIIa carcinoma of the right breast    Postoperative diagnosis -same    Procedure - placement of left subclavian Port-A-Cath    Surgeon-Evnes Valdez with local    EBL-minimal    Operative indications and consent -43-year-old who is brought in today for placement of a Port-A-Cath for treatment of right-sided breast cancer. The patient was explained the risks, benefits and possible complications including risk of bleeding, pneumothorax, infection and port malfunction. Details of the procedure - the patient was brought to the operating room and placed in the supine position on the operative table. A timeout was performed. The patient was prepped and draped in the usual sterile fashion. The bed was placed in the Trendelenburg position. The skin and subcutaneous tissue was infiltrated with 1% lidocaine. A wire was then placed into the left subclavian vein. This was confirmed with fluoroscopy. The dilator and sheath were then placed over the wire. The wire and dilator were then removed and the Port-A-Cath tubing was fed into the distal superior vena cava. This was again confirmed with fluoroscopy. The skin and subcutaneous tissue was injected with 1% lidocaine approximately 2 cm beneath the initial insertion site. A 3 cm transverse incision was made and a prepectoral pocket was created using cautery. The Port-A-Cath tubing was fed from the initial insertion site into the pocket and then connected to the Port-A-Cath reservoir. The reservoir was then placed in the pocket and sutured in place with 2-0 Prolene sutures. The port was accessed. It had excellent blood return and flushed easily. The entire device was surveyed under fluoroscopy.   There were no kinks within the tubing and there was good position of the catheter tip in the superior vena cava. The subcutaneous layer was closed with interrupted 3-0 Vicryl sutures followed by a running 4-0 Vicryl suture in the skin. Dermabond was applied. The patient tolerated the procedure without difficulty and was transferred to the recovery room in stable condition.         Shi Carlos MD     Electronically signed by Shi Carlos MD on 6/23/2021 at 12:59 PM

## 2021-06-23 NOTE — PROGRESS NOTES
CLINICAL PHARMACY NOTE: MEDS TO BEDS    Total # of Prescriptions Filled: 1   The following medications were delivered to the patient:  · Norco 5-325 mg    Additional Documentation:   Delivered to Patient daughter-Signed  Lyndsay Sol CPhT

## 2021-06-25 DIAGNOSIS — C50.919 INVASIVE CARCINOMA OF BREAST (HCC): Primary | ICD-10-CM

## 2021-06-28 ENCOUNTER — HOSPITAL ENCOUNTER (OUTPATIENT)
Dept: NUCLEAR MEDICINE | Age: 76
Discharge: HOME | End: 2021-06-28
Payer: MEDICARE

## 2021-06-28 ENCOUNTER — HOSPITAL ENCOUNTER (OUTPATIENT)
Dept: CT IMAGING | Age: 76
Discharge: HOME | End: 2021-06-28
Payer: MEDICARE

## 2021-06-28 ENCOUNTER — HOSPITAL ENCOUNTER (OUTPATIENT)
Age: 76
Discharge: HOME | End: 2021-06-28
Payer: MEDICARE

## 2021-06-28 DIAGNOSIS — C50.911 INVASIVE DUCTAL CARCINOMA OF RIGHT BREAST, STAGE 3 (HCC): ICD-10-CM

## 2021-06-28 DIAGNOSIS — R92.8 ABNORMAL MAMMOGRAM OF RIGHT BREAST: ICD-10-CM

## 2021-06-28 DIAGNOSIS — C50.919 INVASIVE CARCINOMA OF BREAST (HCC): ICD-10-CM

## 2021-06-28 DIAGNOSIS — R92.8 ABNORMAL MAMMOGRAM: ICD-10-CM

## 2021-06-28 LAB
BUN BLDV-MCNC: 15 MG/DL (ref 7–20)
CREAT SERPL-MCNC: 0.6 MG/DL (ref 0.6–1.2)
GFR AFRICAN AMERICAN: >60
GFR NON-AFRICAN AMERICAN: >60

## 2021-06-28 PROCEDURE — 78306 BONE IMAGING WHOLE BODY: CPT

## 2021-06-28 PROCEDURE — 6360000004 HC RX CONTRAST MEDICATION: Performed by: SURGERY

## 2021-06-28 PROCEDURE — A9503 TC99M MEDRONATE: HCPCS | Performed by: SURGERY

## 2021-06-28 PROCEDURE — 74177 CT ABD & PELVIS W/CONTRAST: CPT

## 2021-06-28 PROCEDURE — 84520 ASSAY OF UREA NITROGEN: CPT

## 2021-06-28 PROCEDURE — 82565 ASSAY OF CREATININE: CPT

## 2021-06-28 PROCEDURE — 36415 COLL VENOUS BLD VENIPUNCTURE: CPT

## 2021-06-28 PROCEDURE — 2580000003 HC RX 258: Performed by: SURGERY

## 2021-06-28 PROCEDURE — 3430000000 HC RX DIAGNOSTIC RADIOPHARMACEUTICAL: Performed by: SURGERY

## 2021-06-28 RX ORDER — TC 99M MEDRONATE 20 MG/10ML
25.75 INJECTION, POWDER, LYOPHILIZED, FOR SOLUTION INTRAVENOUS
Status: COMPLETED | OUTPATIENT
Start: 2021-06-28 | End: 2021-06-28

## 2021-06-28 RX ORDER — SODIUM CHLORIDE 0.9 % (FLUSH) 0.9 %
5-40 SYRINGE (ML) INJECTION PRN
Status: DISCONTINUED | OUTPATIENT
Start: 2021-06-28 | End: 2021-06-29 | Stop reason: HOSPADM

## 2021-06-28 RX ADMIN — Medication 10 ML: at 09:07

## 2021-06-28 RX ADMIN — IOHEXOL 50 ML: 240 INJECTION, SOLUTION INTRATHECAL; INTRAVASCULAR; INTRAVENOUS; ORAL at 08:22

## 2021-06-28 RX ADMIN — IOPAMIDOL 75 ML: 755 INJECTION, SOLUTION INTRAVENOUS at 08:22

## 2021-06-28 RX ADMIN — TC 99M MEDRONATE 25.75 MILLICURIE: 20 INJECTION, POWDER, LYOPHILIZED, FOR SOLUTION INTRAVENOUS at 09:07

## 2021-07-08 ENCOUNTER — HOSPITAL ENCOUNTER (INPATIENT)
Age: 76
LOS: 3 days | Discharge: HOME | DRG: 684 | End: 2021-07-11
Attending: STUDENT IN AN ORGANIZED HEALTH CARE EDUCATION/TRAINING PROGRAM | Admitting: INTERNAL MEDICINE
Payer: MEDICARE

## 2021-07-08 ENCOUNTER — APPOINTMENT (OUTPATIENT)
Dept: CT IMAGING | Age: 76
DRG: 684 | End: 2021-07-08
Payer: MEDICARE

## 2021-07-08 DIAGNOSIS — N17.9 AKI (ACUTE KIDNEY INJURY) (HCC): Primary | ICD-10-CM

## 2021-07-08 LAB
A/G RATIO: 1.3 (ref 1.1–2.2)
ALBUMIN SERPL-MCNC: 3.8 G/DL (ref 3.4–5)
ALP BLD-CCNC: 130 U/L (ref 40–129)
ALT SERPL-CCNC: 20 U/L (ref 10–40)
ANION GAP SERPL CALCULATED.3IONS-SCNC: 16 MMOL/L (ref 3–16)
AST SERPL-CCNC: 15 U/L (ref 15–37)
BASOPHILS ABSOLUTE: 0 K/UL (ref 0–0.2)
BASOPHILS RELATIVE PERCENT: 0 %
BILIRUB SERPL-MCNC: <0.2 MG/DL (ref 0–1)
BILIRUBIN URINE: NEGATIVE
BLOOD, URINE: NEGATIVE
BUN BLDV-MCNC: 60 MG/DL (ref 7–20)
CALCIUM SERPL-MCNC: 9.1 MG/DL (ref 8.3–10.6)
CHLORIDE BLD-SCNC: 92 MMOL/L (ref 99–110)
CLARITY: ABNORMAL
CO2: 18 MMOL/L (ref 21–32)
COLOR: YELLOW
COMMENT UA: ABNORMAL
CREAT SERPL-MCNC: 4.6 MG/DL (ref 0.6–1.2)
EOSINOPHILS ABSOLUTE: 0 K/UL (ref 0–0.6)
EOSINOPHILS RELATIVE PERCENT: 0 %
EPITHELIAL CELLS, UA: 2 /HPF (ref 0–5)
GFR AFRICAN AMERICAN: 11
GFR NON-AFRICAN AMERICAN: 9
GLOBULIN: 2.9 G/DL
GLUCOSE BLD-MCNC: 347 MG/DL (ref 70–99)
GLUCOSE URINE: 100 MG/DL
HCT VFR BLD CALC: 40 % (ref 36–48)
HEMOGLOBIN: 13 G/DL (ref 12–16)
HYALINE CASTS: ABNORMAL /LPF (ref 0–2)
KETONES, URINE: NEGATIVE MG/DL
LACTIC ACID, SEPSIS: 1.6 MMOL/L (ref 0.4–1.9)
LEUKOCYTE ESTERASE, URINE: ABNORMAL
LIPASE: 44 U/L (ref 13–60)
LYMPHOCYTES ABSOLUTE: 1.5 K/UL (ref 1–5.1)
LYMPHOCYTES RELATIVE PERCENT: 6 %
MCH RBC QN AUTO: 30.4 PG (ref 26–34)
MCHC RBC AUTO-ENTMCNC: 32.6 G/DL (ref 31–36)
MCV RBC AUTO: 93.3 FL (ref 80–100)
MICROSCOPIC EXAMINATION: YES
MONOCYTES ABSOLUTE: 1 K/UL (ref 0–1.3)
MONOCYTES RELATIVE PERCENT: 4 %
NEUTROPHILS ABSOLUTE: 22.7 K/UL (ref 1.7–7.7)
NEUTROPHILS RELATIVE PERCENT: 90 %
NITRITE, URINE: NEGATIVE
PDW BLD-RTO: 13 % (ref 12.4–15.4)
PH UA: 5.5 (ref 5–8)
PLATELET # BLD: 332 K/UL (ref 135–450)
PLATELET SLIDE REVIEW: ADEQUATE
PMV BLD AUTO: 8.4 FL (ref 5–10.5)
POTASSIUM SERPL-SCNC: 4.5 MMOL/L (ref 3.5–5.1)
PROTEIN UA: 30 MG/DL
RBC # BLD: 4.28 M/UL (ref 4–5.2)
RBC # BLD: NORMAL 10*6/UL
RBC UA: 1 /HPF (ref 0–4)
SLIDE REVIEW: ABNORMAL
SODIUM BLD-SCNC: 126 MMOL/L (ref 136–145)
SPECIFIC GRAVITY UA: 1.02 (ref 1–1.03)
TOTAL PROTEIN: 6.7 G/DL (ref 6.4–8.2)
URINE REFLEX TO CULTURE: YES
URINE TYPE: ABNORMAL
UROBILINOGEN, URINE: 0.2 E.U./DL
WBC # BLD: 25.2 K/UL (ref 4–11)
WBC UA: 23 /HPF (ref 0–5)

## 2021-07-08 PROCEDURE — 1200000000 HC SEMI PRIVATE

## 2021-07-08 PROCEDURE — 99283 EMERGENCY DEPT VISIT LOW MDM: CPT

## 2021-07-08 PROCEDURE — 2580000003 HC RX 258: Performed by: PHYSICIAN ASSISTANT

## 2021-07-08 PROCEDURE — 83690 ASSAY OF LIPASE: CPT

## 2021-07-08 PROCEDURE — 83605 ASSAY OF LACTIC ACID: CPT

## 2021-07-08 PROCEDURE — 6360000002 HC RX W HCPCS: Performed by: PHYSICIAN ASSISTANT

## 2021-07-08 PROCEDURE — 87077 CULTURE AEROBIC IDENTIFY: CPT

## 2021-07-08 PROCEDURE — 85025 COMPLETE CBC W/AUTO DIFF WBC: CPT

## 2021-07-08 PROCEDURE — 87150 DNA/RNA AMPLIFIED PROBE: CPT

## 2021-07-08 PROCEDURE — 96372 THER/PROPH/DIAG INJ SC/IM: CPT

## 2021-07-08 PROCEDURE — 81001 URINALYSIS AUTO W/SCOPE: CPT

## 2021-07-08 PROCEDURE — 87086 URINE CULTURE/COLONY COUNT: CPT

## 2021-07-08 PROCEDURE — 87040 BLOOD CULTURE FOR BACTERIA: CPT

## 2021-07-08 PROCEDURE — 80053 COMPREHEN METABOLIC PANEL: CPT

## 2021-07-08 PROCEDURE — 74176 CT ABD & PELVIS W/O CONTRAST: CPT

## 2021-07-08 PROCEDURE — 36415 COLL VENOUS BLD VENIPUNCTURE: CPT

## 2021-07-08 RX ORDER — PROMETHAZINE HYDROCHLORIDE 25 MG/ML
25 INJECTION, SOLUTION INTRAMUSCULAR; INTRAVENOUS ONCE
Status: COMPLETED | OUTPATIENT
Start: 2021-07-08 | End: 2021-07-08

## 2021-07-08 RX ORDER — ONDANSETRON 8 MG/1
8 TABLET, ORALLY DISINTEGRATING ORAL EVERY 8 HOURS PRN
Status: ON HOLD | COMMUNITY
End: 2021-07-15 | Stop reason: HOSPADM

## 2021-07-08 RX ORDER — SODIUM CHLORIDE, SODIUM LACTATE, POTASSIUM CHLORIDE, CALCIUM CHLORIDE 600; 310; 30; 20 MG/100ML; MG/100ML; MG/100ML; MG/100ML
1000 INJECTION, SOLUTION INTRAVENOUS ONCE
Status: COMPLETED | OUTPATIENT
Start: 2021-07-08 | End: 2021-07-09

## 2021-07-08 RX ORDER — ONDANSETRON 2 MG/ML
4 INJECTION INTRAMUSCULAR; INTRAVENOUS ONCE
Status: COMPLETED | OUTPATIENT
Start: 2021-07-08 | End: 2021-07-08

## 2021-07-08 RX ADMIN — PROMETHAZINE HYDROCHLORIDE 25 MG: 25 INJECTION INTRAMUSCULAR; INTRAVENOUS at 19:35

## 2021-07-08 RX ADMIN — ONDANSETRON 4 MG: 2 INJECTION INTRAMUSCULAR; INTRAVENOUS at 23:47

## 2021-07-08 RX ADMIN — SODIUM CHLORIDE, POTASSIUM CHLORIDE, SODIUM LACTATE AND CALCIUM CHLORIDE 1000 ML: 600; 310; 30; 20 INJECTION, SOLUTION INTRAVENOUS at 22:20

## 2021-07-08 RX ADMIN — SODIUM CHLORIDE, POTASSIUM CHLORIDE, SODIUM LACTATE AND CALCIUM CHLORIDE 1000 ML: 600; 310; 30; 20 INJECTION, SOLUTION INTRAVENOUS at 23:46

## 2021-07-08 ASSESSMENT — ENCOUNTER SYMPTOMS
SHORTNESS OF BREATH: 0
DIARRHEA: 1
COUGH: 0
RHINORRHEA: 0
ABDOMINAL PAIN: 0
NAUSEA: 1
VOMITING: 1

## 2021-07-09 PROBLEM — E11.9 TYPE 2 DIABETES MELLITUS (HCC): Status: ACTIVE | Noted: 2021-07-09

## 2021-07-09 PROBLEM — E78.00 HYPERCHOLESTEROLEMIA: Chronic | Status: ACTIVE | Noted: 2021-07-09

## 2021-07-09 PROBLEM — I10 ESSENTIAL HYPERTENSION: Chronic | Status: ACTIVE | Noted: 2021-07-09

## 2021-07-09 PROBLEM — K52.9 GASTROENTERITIS: Status: ACTIVE | Noted: 2021-07-09

## 2021-07-09 LAB
ANION GAP SERPL CALCULATED.3IONS-SCNC: 14 MMOL/L (ref 3–16)
BANDED NEUTROPHILS RELATIVE PERCENT: 23 % (ref 0–7)
BASOPHILS ABSOLUTE: 0 K/UL (ref 0–0.2)
BASOPHILS RELATIVE PERCENT: 0 %
BUN BLDV-MCNC: 57 MG/DL (ref 7–20)
CALCIUM SERPL-MCNC: 9 MG/DL (ref 8.3–10.6)
CHLORIDE BLD-SCNC: 97 MMOL/L (ref 99–110)
CO2: 19 MMOL/L (ref 21–32)
CREAT SERPL-MCNC: 3.5 MG/DL (ref 0.6–1.2)
EOSINOPHILS ABSOLUTE: 0 K/UL (ref 0–0.6)
EOSINOPHILS RELATIVE PERCENT: 0 %
GFR AFRICAN AMERICAN: 15
GFR NON-AFRICAN AMERICAN: 13
GLUCOSE BLD-MCNC: 131 MG/DL (ref 70–99)
GLUCOSE BLD-MCNC: 159 MG/DL (ref 70–99)
GLUCOSE BLD-MCNC: 161 MG/DL (ref 70–99)
GLUCOSE BLD-MCNC: 196 MG/DL (ref 70–99)
GLUCOSE BLD-MCNC: 208 MG/DL (ref 70–99)
GLUCOSE BLD-MCNC: 243 MG/DL (ref 70–99)
HCT VFR BLD CALC: 36.6 % (ref 36–48)
HEMOGLOBIN: 12.1 G/DL (ref 12–16)
LYMPHOCYTES ABSOLUTE: 2.1 K/UL (ref 1–5.1)
LYMPHOCYTES RELATIVE PERCENT: 6 %
MCH RBC QN AUTO: 30.4 PG (ref 26–34)
MCHC RBC AUTO-ENTMCNC: 33 G/DL (ref 31–36)
MCV RBC AUTO: 92.3 FL (ref 80–100)
METAMYELOCYTES RELATIVE PERCENT: 2 %
MONOCYTES ABSOLUTE: 1.4 K/UL (ref 0–1.3)
MONOCYTES RELATIVE PERCENT: 4 %
NEUTROPHILS ABSOLUTE: 31.1 K/UL (ref 1.7–7.7)
NEUTROPHILS RELATIVE PERCENT: 64 %
PDW BLD-RTO: 13.2 % (ref 12.4–15.4)
PERFORMED ON: ABNORMAL
PLATELET # BLD: 342 K/UL (ref 135–450)
PLATELET SLIDE REVIEW: ADEQUATE
PMV BLD AUTO: 8.2 FL (ref 5–10.5)
POTASSIUM REFLEX MAGNESIUM: 4.1 MMOL/L (ref 3.5–5.1)
PROMYELOCYTES PERCENT: 1 %
RBC # BLD: 3.97 M/UL (ref 4–5.2)
RBC # BLD: NORMAL 10*6/UL
SLIDE REVIEW: ABNORMAL
SODIUM BLD-SCNC: 130 MMOL/L (ref 136–145)
TOXIC GRANULATION: PRESENT
URINE CULTURE, ROUTINE: NORMAL
WBC # BLD: 34.6 K/UL (ref 4–11)

## 2021-07-09 PROCEDURE — 6360000002 HC RX W HCPCS: Performed by: INTERNAL MEDICINE

## 2021-07-09 PROCEDURE — 1200000000 HC SEMI PRIVATE

## 2021-07-09 PROCEDURE — 2580000003 HC RX 258: Performed by: INTERNAL MEDICINE

## 2021-07-09 PROCEDURE — 85025 COMPLETE CBC W/AUTO DIFF WBC: CPT

## 2021-07-09 PROCEDURE — 80048 BASIC METABOLIC PNL TOTAL CA: CPT

## 2021-07-09 PROCEDURE — 6370000000 HC RX 637 (ALT 250 FOR IP): Performed by: INTERNAL MEDICINE

## 2021-07-09 RX ORDER — INSULIN LISPRO 100 [IU]/ML
0-12 INJECTION, SOLUTION INTRAVENOUS; SUBCUTANEOUS
Status: DISCONTINUED | OUTPATIENT
Start: 2021-07-09 | End: 2021-07-11 | Stop reason: HOSPADM

## 2021-07-09 RX ORDER — SODIUM CHLORIDE 0.9 % (FLUSH) 0.9 %
5-40 SYRINGE (ML) INJECTION EVERY 12 HOURS SCHEDULED
Status: DISCONTINUED | OUTPATIENT
Start: 2021-07-09 | End: 2021-07-11 | Stop reason: HOSPADM

## 2021-07-09 RX ORDER — SODIUM CHLORIDE 9 MG/ML
25 INJECTION, SOLUTION INTRAVENOUS PRN
Status: DISCONTINUED | OUTPATIENT
Start: 2021-07-09 | End: 2021-07-11 | Stop reason: HOSPADM

## 2021-07-09 RX ORDER — HEPARIN SODIUM 5000 [USP'U]/ML
5000 INJECTION, SOLUTION INTRAVENOUS; SUBCUTANEOUS EVERY 8 HOURS SCHEDULED
Status: DISCONTINUED | OUTPATIENT
Start: 2021-07-09 | End: 2021-07-11 | Stop reason: HOSPADM

## 2021-07-09 RX ORDER — NICOTINE POLACRILEX 4 MG
15 LOZENGE BUCCAL PRN
Status: DISCONTINUED | OUTPATIENT
Start: 2021-07-09 | End: 2021-07-11 | Stop reason: HOSPADM

## 2021-07-09 RX ORDER — ACETAMINOPHEN 325 MG/1
650 TABLET ORAL EVERY 6 HOURS PRN
Status: DISCONTINUED | OUTPATIENT
Start: 2021-07-09 | End: 2021-07-11 | Stop reason: HOSPADM

## 2021-07-09 RX ORDER — POLYETHYLENE GLYCOL 3350 17 G/17G
17 POWDER, FOR SOLUTION ORAL DAILY PRN
Status: DISCONTINUED | OUTPATIENT
Start: 2021-07-09 | End: 2021-07-11 | Stop reason: HOSPADM

## 2021-07-09 RX ORDER — SODIUM CHLORIDE 9 MG/ML
INJECTION, SOLUTION INTRAVENOUS CONTINUOUS
Status: DISCONTINUED | OUTPATIENT
Start: 2021-07-09 | End: 2021-07-11

## 2021-07-09 RX ORDER — DEXTROSE MONOHYDRATE 25 G/50ML
12.5 INJECTION, SOLUTION INTRAVENOUS PRN
Status: DISCONTINUED | OUTPATIENT
Start: 2021-07-09 | End: 2021-07-11 | Stop reason: HOSPADM

## 2021-07-09 RX ORDER — SODIUM CHLORIDE 0.9 % (FLUSH) 0.9 %
5-40 SYRINGE (ML) INJECTION PRN
Status: DISCONTINUED | OUTPATIENT
Start: 2021-07-09 | End: 2021-07-11 | Stop reason: HOSPADM

## 2021-07-09 RX ORDER — ZAFIRLUKAST 20 MG/1
10 TABLET, FILM COATED ORAL
Status: DISCONTINUED | OUTPATIENT
Start: 2021-07-09 | End: 2021-07-09

## 2021-07-09 RX ORDER — ATORVASTATIN CALCIUM 20 MG/1
20 TABLET, FILM COATED ORAL NIGHTLY
Status: DISCONTINUED | OUTPATIENT
Start: 2021-07-09 | End: 2021-07-11 | Stop reason: HOSPADM

## 2021-07-09 RX ORDER — ONDANSETRON 2 MG/ML
4 INJECTION INTRAMUSCULAR; INTRAVENOUS EVERY 6 HOURS PRN
Status: DISCONTINUED | OUTPATIENT
Start: 2021-07-09 | End: 2021-07-11 | Stop reason: HOSPADM

## 2021-07-09 RX ORDER — INSULIN LISPRO 100 [IU]/ML
0-6 INJECTION, SOLUTION INTRAVENOUS; SUBCUTANEOUS NIGHTLY
Status: DISCONTINUED | OUTPATIENT
Start: 2021-07-09 | End: 2021-07-11 | Stop reason: HOSPADM

## 2021-07-09 RX ORDER — ACETAMINOPHEN 650 MG/1
650 SUPPOSITORY RECTAL EVERY 6 HOURS PRN
Status: DISCONTINUED | OUTPATIENT
Start: 2021-07-09 | End: 2021-07-11 | Stop reason: HOSPADM

## 2021-07-09 RX ORDER — DEXTROSE MONOHYDRATE 50 MG/ML
100 INJECTION, SOLUTION INTRAVENOUS PRN
Status: DISCONTINUED | OUTPATIENT
Start: 2021-07-09 | End: 2021-07-11 | Stop reason: HOSPADM

## 2021-07-09 RX ORDER — ONDANSETRON 4 MG/1
4 TABLET, ORALLY DISINTEGRATING ORAL EVERY 8 HOURS PRN
Status: DISCONTINUED | OUTPATIENT
Start: 2021-07-09 | End: 2021-07-11 | Stop reason: HOSPADM

## 2021-07-09 RX ADMIN — NYSTATIN 500000 UNITS: 100000 SUSPENSION ORAL at 21:14

## 2021-07-09 RX ADMIN — NYSTATIN 500000 UNITS: 100000 SUSPENSION ORAL at 12:41

## 2021-07-09 RX ADMIN — ATORVASTATIN CALCIUM 20 MG: 20 TABLET, FILM COATED ORAL at 01:52

## 2021-07-09 RX ADMIN — HEPARIN SODIUM 5000 UNITS: 5000 INJECTION INTRAVENOUS; SUBCUTANEOUS at 21:14

## 2021-07-09 RX ADMIN — HEPARIN SODIUM 5000 UNITS: 5000 INJECTION INTRAVENOUS; SUBCUTANEOUS at 05:30

## 2021-07-09 RX ADMIN — NYSTATIN 500000 UNITS: 100000 SUSPENSION ORAL at 17:35

## 2021-07-09 RX ADMIN — INSULIN LISPRO 2 UNITS: 100 INJECTION, SOLUTION INTRAVENOUS; SUBCUTANEOUS at 08:02

## 2021-07-09 RX ADMIN — ATORVASTATIN CALCIUM 20 MG: 20 TABLET, FILM COATED ORAL at 21:14

## 2021-07-09 RX ADMIN — SODIUM CHLORIDE: 9 INJECTION, SOLUTION INTRAVENOUS at 01:56

## 2021-07-09 RX ADMIN — Medication 10 ML: at 01:52

## 2021-07-09 RX ADMIN — HEPARIN SODIUM 5000 UNITS: 5000 INJECTION INTRAVENOUS; SUBCUTANEOUS at 14:26

## 2021-07-09 RX ADMIN — ONDANSETRON 4 MG: 2 INJECTION INTRAMUSCULAR; INTRAVENOUS at 08:09

## 2021-07-09 RX ADMIN — INSULIN GLARGINE 15 UNITS: 100 INJECTION, SOLUTION SUBCUTANEOUS at 08:17

## 2021-07-09 RX ADMIN — SODIUM CHLORIDE: 9 INJECTION, SOLUTION INTRAVENOUS at 17:13

## 2021-07-09 RX ADMIN — SODIUM CHLORIDE: 9 INJECTION, SOLUTION INTRAVENOUS at 21:24

## 2021-07-09 RX ADMIN — Medication 10 ML: at 21:18

## 2021-07-09 RX ADMIN — SODIUM CHLORIDE: 9 INJECTION, SOLUTION INTRAVENOUS at 12:35

## 2021-07-09 RX ADMIN — Medication 10 ML: at 07:52

## 2021-07-09 RX ADMIN — INSULIN LISPRO 1 UNITS: 100 INJECTION, SOLUTION INTRAVENOUS; SUBCUTANEOUS at 21:15

## 2021-07-09 RX ADMIN — INSULIN LISPRO 2 UNITS: 100 INJECTION, SOLUTION INTRAVENOUS; SUBCUTANEOUS at 02:53

## 2021-07-09 RX ADMIN — INSULIN LISPRO 2 UNITS: 100 INJECTION, SOLUTION INTRAVENOUS; SUBCUTANEOUS at 12:36

## 2021-07-09 RX ADMIN — SODIUM CHLORIDE: 9 INJECTION, SOLUTION INTRAVENOUS at 07:52

## 2021-07-09 RX ADMIN — ACETAMINOPHEN 650 MG: 325 TABLET ORAL at 08:11

## 2021-07-09 ASSESSMENT — PAIN SCALES - GENERAL
PAINLEVEL_OUTOF10: 4
PAINLEVEL_OUTOF10: 2
PAINLEVEL_OUTOF10: 0

## 2021-07-09 ASSESSMENT — PAIN DESCRIPTION - DESCRIPTORS: DESCRIPTORS: SORE

## 2021-07-09 NOTE — ED NOTES
Report called to accepting RN on 4T. Nurse has no further questions at this time. Pt alert and oriented x3. Pt has IV that will be continued to inpatient room.       Yakelin Henning RN  07/09/21 0001

## 2021-07-09 NOTE — PROGRESS NOTES
Pt seen in  ED, admission completed. Pt is alert and oriented x 3. Pt lives at home with her daughter, and is being admitted for SWAPNA. Plan of care updated, all questions answered.

## 2021-07-09 NOTE — PROGRESS NOTES
Pt had  No stools today. Is able to take small amts of foods and keep it down. Daughter at bedside. VS are stable and call light close.

## 2021-07-09 NOTE — PROGRESS NOTES
Dr Les Lal saw pt and continue fluids at 200/hr for at least one more day. May leave yanes in at least one more day for fluid management.

## 2021-07-09 NOTE — H&P
Department of Internal Medicine  History & Physical      SUBJECTIVE: The patient is a 61-year-old white female with history of hypertension, diabetes and hypercholesterolemia, the patient was diagnosed with breast cancer, she has received her first chemotherapy last week, she started 2 days later with nausea vomiting and diarrhea, The patient presented to the ER yesterday and she was in acute renal failure, she denies any chest pain. ALLERGIES:   Allergies   Allergen Reactions    Erythromycin     Other      fireants      MEDICATIONS:   Prior to Admission medications    Medication Sig Start Date End Date Taking? Authorizing Provider   ondansetron (ZOFRAN-ODT) 8 MG TBDP disintegrating tablet Place 8 mg under the tongue every 8 hours as needed for Nausea or Vomiting   Yes Historical Provider, MD   Cyanocobalamin (VITAMIN B 12 PO) Take by mouth   Yes Historical Provider, MD   Cholecalciferol (VITAMIN D3) 125 MCG (5000 UT) TABS Take by mouth   Yes Historical Provider, MD   atorvastatin (LIPITOR) 20 MG tablet TAKE 1 TABLET BY MOUTH AT BEDTIME AS DIRECTED. 5/8/21  Yes Historical Provider, MD   metFORMIN (GLUCOPHAGE) 1000 MG tablet TAKE 1 TABLET BY MOUTH TWO TIMES A DAY 5/3/21  Yes Historical Provider, MD   lisinopril (PRINIVIL;ZESTRIL) 10 MG tablet Take 10 mg by mouth daily   Yes Historical Provider, MD   amLODIPine (NORVASC) 5 MG tablet Take 5 mg by mouth daily   Yes Historical Provider, MD   ibuprofen (ADVIL;MOTRIN) 800 MG tablet Take 1 tablet by mouth every 8 hours as needed for Pain or Fever 1/24/19  Yes Srini Rodriguez, APRN - CNP   zafirlukast (ACCOLATE) 10 MG tablet TK 1 T PO QD 1/8/19   Historical Provider, MD   albuterol (PROVENTIL) (2.5 MG/3ML) 0.083% nebulizer solution Take 2.5 mg by nebulization every 6 hours as needed.     Historical Provider, MD GREGG   Past Medical History:   Diagnosis Date    Asthma     Cancer ([facility] [address] 06/2021    right Breast    Diabetes mellitus ([facility] [address]     Diverticulitis     Hypertension       PSH:       Procedure Laterality Date    BREAST LUMPECTOMY Left     HYSTERECTOMY      PORT SURGERY N/A 2021    PLACEMENT OF POWER PORT A CATHETER performed by Siena Henning MD at [address] [place] [address] Street Right 6/3/2021    US BREAST BIOPSY NEEDLE ADDITIONAL RIGHT 6/3/2021 [facility]TRAS[facility]    US BREAST NEEDLE BIOPSY RIGHT Right 6/3/2021    US BREAST NEEDLE BIOPSY RIGHT 6/3/2021 [facility]    US LYMPH NODE BIOPSY  6/3/2021    US LYMPH NODE BIOPSY 6/3/2021 [facility]      FAMILY HISTORY:       Problem Relation Age of Onset    High Blood Pressure Mother     Stroke Mother     High Blood Pressure Father       SOCIAL HISTORY:   Social History     Tobacco Use    Smoking status: Never Smoker    Smokeless tobacco: Never Used   Substance Use Topics    Alcohol use: No        REVIEW OF SYSTEMS: -   General ROS:denies any headache but has weakness  Ophthalmic ROS: denies any blurred vision, double vision or vision loss  ENT ROS: denies any epistaxis, nasal discharge  Hematological and Lymphatic: denies any fatigue, night sweats, enlarge lymph nodes or bruising ,   Respiratory ROS: denies any shortness of breath, dyspnea on exertion, wheezing, or cough   Cardiovascular ROS: denies any chest pain, palpitations, shortness of breath, dizziness syncope or swelling in extremities  Gastrointestinal ROS: Had nausea, vomiting and diarrhea  Musculoskeletal ROS:denies any back pain or joint pain,  Neurological ROS: denies any mental status changes, seizures, memory loss, speech problems or muscle weakness in extremities   Dermatological ROS: denies any rash or skin lesions     OBJECTIVE:      PHYSICAL:   VITALS:  /63   Pulse 87   Temp 98.1 °F (36.7 °C) (Oral)   Resp 16   Ht 5' 7\" (1.702 m)   Wt 173 lb 11.2 oz (78.8 kg)   SpO2 94%   BMI 27.21 kg/m²   PULSE OXIMETRY RANGE: SpO2  Av.8 %  Min: 94 %  Max: 98 %    Intake/Output Summary (Last 24 hours) at 7/9/2021 1737  Last data filed at 7/9/2021 1235  Gross per 24 hour   Intake 200 ml   Output 450 ml   Net -250 ml      CONSTITUTIONAL:  awake, alert, cooperative, no apparent distress, and appears stated age  HEAD:  Normocephalic, atraumatic  HEENT:  ENT exam normal, no neck nodes or sinus tenderness  EYE: conjunctivae/corneas clear. PERRL, EOM's intact. Fundi benign. NECK:  Supple, symmetrical, trachea midline, no adenopathy, thyroid symmetric, not enlarged and no tenderness, skin normal  LUNGS:  No increased work of breathing, good air exchange, clear to auscultation bilaterally, no crackles or wheezing  CARDIOVASCULAR:  regular rate and rhythm  ABDOMEN:  No scars, normal bowel sounds, soft, non-distended, non-tender, no masses palpated, no hepatosplenomegally  MUSCULOSKELETAL:  There is no redness, warmth, or swelling of the joints. Full range of motion noted. Motor strength is 5 out of 5 all extremities bilaterally. Tone is normal.  NEUROLOGIC:  Awake, alert, oriented to name, place and time. Cranial nerves II-XII are grossly intact. Motor is 5 out of 5 bilaterally. Cerebellar finger to nose, heel to shin intact. Sensory is intact.   Babinski down going, Romberg negative, and gait is normal.  SKIN:  no bruising or bleeding  EDEMA: Normal    Data    Labs Reviewed   CBC WITH AUTO DIFFERENTIAL - Abnormal; Notable for the following components:       Result Value    WBC 25.2 (*)     Neutrophils Absolute 22.7 (*)     All other components within normal limits    Narrative:     Performed at:  [facility] [address], [place], [address]   Phone [phone]   COMPREHENSIVE METABOLIC PANEL - Abnormal; Notable for the following components:    Sodium 126 (*)     Chloride 92 (*)     CO2 18 (*)     Glucose 347 (*)     BUN 60 (*)     CREATININE 4.6 (*)     GFR Non- 9 (*)     GFR African American 11 (*)     Alkaline Phosphatase 130 (*)     All other components within normal limits    Narrative:     Performed at:  [facility]  [address], Formerly named [facility]   Phone [phone]   URINE RT REFLEX TO CULTURE - Abnormal; Notable for the following components:    Clarity, UA CLOUDY (*)     Glucose, Ur 100 (*)     Protein, UA 30 (*)     Leukocyte Esterase, Urine SMALL (*)     All other components within normal limits    Narrative:     Performed at:  [facility] [address], Formerly named [facility]   Phone [phone]   MICROSCOPIC URINALYSIS - Abnormal; Notable for the following components:    WBC, UA 23 (*)     All other components within normal limits    Narrative:     Performed at:  [facility] [address], Formerly named [facility]   Phone [phone]   BASIC METABOLIC PANEL W/ REFLEX TO MG FOR LOW K - Abnormal; Notable for the following components:    Sodium 130 (*)     Chloride 97 (*)     CO2 19 (*)     Glucose 208 (*)     BUN 57 (*)     CREATININE 3.5 (*)     GFR Non- 13 (*)     GFR  15 (*)     All other components within normal limits    Narrative:     Performed at:  [facility] [address] [facility],  [place], Formerly named [facility]   Phone [phone]   CBC WITH AUTO DIFFERENTIAL - Abnormal; Notable for the following components:    WBC 34.6 (*)     RBC 3.97 (*)     Neutrophils Absolute 31.1 (*)     Monocytes Absolute 1.4 (*)     Bands Relative 23 (*)     Metamyelocytes Relative 2 (*)     Promyelocytes Percent 1 (*)     Toxic Granulation Present (*)     All other components within normal limits    Narrative:     Performed at:  [facility] [address],  [place], Formerly named [facility]   Phone [phone]   POCT GLUCOSE - Abnormal; Notable for the following components:    POC Glucose 243 (*)     All other components within normal limits    Narrative:     Performed at:  [facility]  [address] [place]   Phone [phone]   POCT GLUCOSE - Abnormal; Notable for the following components:    POC Glucose 196 (*)     All other components within normal limits    Narrative:     Performed at:  [facility]  [address] [place]   Phone [phone]   POCT GLUCOSE - Abnormal; Notable for the following components:    POC Glucose 159 (*)     All other components within normal limits    Narrative:     Performed at:  [facility]  [address] [place]   Phone [phone]   POCT GLUCOSE - Abnormal; Notable for the following components:    POC Glucose 131 (*)     All other components within normal limits    Narrative:     Performed at:  [facility]  [address] [facility] [place]   Phone [phone]   CULTURE, BLOOD 2   CULTURE, BLOOD 1   CULTURE, URINE   GASTROINTESTINAL PANEL, MOLECULAR   FECAL LEUKOCYTES   LIPASE    Narrative:     Performed at:  [facility]  [address] [facility] [address], [facility] [place]   Phone [phone]   LACTATE, SEPSIS    Narrative:     Performed at:  [facility]  [address], [facility] [place]   Phone [phone]   POCT GLUCOSE   POCT GLUCOSE   POCT GLUCOSE   POCT GLUCOSE     CBC:   Lab Results   Component Value Date    WBC 34.6 07/09/2021    RBC 3.97 07/09/2021    HGB 12.1 07/09/2021    HCT 36.6 07/09/2021    MCV 92.3 07/09/2021    MCH 30.4 07/09/2021    MCHC 33.0 07/09/2021    RDW 13.2 07/09/2021     07/09/2021    MPV 8.2 07/09/2021     CMP:    Lab Results   Component Value Date     07/09/2021    K 4.1 07/09/2021    CL 97 07/09/2021    CO2 19 07/09/2021    BUN 57 07/09/2021    CREATININE 3.5 07/09/2021    GFRAA 15 07/09/2021    GFRAA >60 02/14/2013    AGRATIO 1.3 07/08/2021    LABGLOM 13 07/09/2021    GLUCOSE 208 07/09/2021    PROT 6.7 07/08/2021    PROT 7.0 02/14/2013    LABALBU 3.8 07/08/2021    CALCIUM 9.0 07/09/2021    BILITOT <0.2 07/08/2021    ALKPHOS 130 07/08/2021    AST 15 07/08/2021    ALT 20 07/08/2021       Imaging    CT ABDOMEN PELVIS WO CONTRAST Additional Contrast? None [[id number]] Collected: 07/08/21 2200      Order Status: Completed Updated: 07/08/21 2204     Narrative:       EXAMINATION:   CT OF THE ABDOMEN AND PELVIS WITHOUT CONTRAST 7/8/2021 8:30 pm     TECHNIQUE:   CT of the abdomen and pelvis was performed without the administration of   intravenous contrast. Multiplanar reformatted images are provided for review. Dose modulation, iterative reconstruction, and/or weight based adjustment of   the mA/kV was utilized to reduce the radiation dose to as low as reasonably   achievable. COMPARISON:   None. HISTORY:   ORDERING SYSTEM PROVIDED HISTORY: n/v/d   TECHNOLOGIST PROVIDED HISTORY:   Reason for exam:->n/v/d   Additional Contrast?->None   Decision Support Exception - unselect if not a suspected or confirmed   emergency medical condition->Emergency Medical Condition (MA)   Reason for Exam: N/V/D. Dehydration (oncologist sent d/t severe dehydration). Acuity: Acute   Type of Exam: Initial     FINDINGS:   Lower Chest:The lung bases are clear     Organs: The liver, spleen, adrenal glands, kidneys, and pancreas demonstrate   no acute abnormality. GI/Bowel: There is a small hiatal hernia.  No abnormally dilated loops of   bowel are seen.  There are multiple fluid-filled nondilated loops of large   and small bowel.      Peritoneum/Retroperitoneum: Unremarkable     Pelvis: Unremarkable     Bones: No suspicious osseous lesions are identified      Impression:       Multiple fluid-filled loops of large and small bowel, findings are most   consistent with gastroenteritis.            ASSESSMENT      Patient Active Problem List   Diagnosis    Closed fracture of lower end of right ulna    Malignant neoplasm of right breast in female, estrogen receptor positive ([address]    [facility]) [facility] [address]    Essential hypertension    Type 2 diabetes mellitus ([facility] [address]    Hypercholesterolemia    Gastroenteritis         PLAN  1. The patient was admitted to medical floor  2. The patient was started on IV fluids 200 cc per hour  3. Hold blood pressure medication  4. Hold metformin secondary to renal function  5. Recheck labs in the morning  6. The patient will start him on Lantus 15 units and medium dose Sliding scale, the patient was given Decadron with her chemotherapy  7. Heparin for DVT prophylaxis  8. This check labs in the morning  9.  Check stool cultures and C. difficile

## 2021-07-09 NOTE — ED PROVIDER NOTES
MidLevel Attestation   I havepersonally performed and/or participated in the history, exam and medical decision making and agree with all pertinent clinical information. I have also reviewed and agree with the past medical, family and social historyunless otherwise noted. I have personally performed a face to face diagnostic evaluation onthis patient. I have reviewed the mid-levels findings and agree. In brief, Bladimir Greer is a 77 y.o. female that presented to the emergency department with   Chief Complaint   Patient presents with   [facility]     oncologist sent d/t severe dehydration   . CONSTITUTIONAL: Well appearing, in no acute distress   EYES: PERRL, No injection, discharge or scleral icterus. NECK: Normal ROM, NO LAD and Moist mucous membranes. CARDIOVASCULAR: Regular rate and rhythm. No murmurs or gallop. PULMONARY/CHEST: Airway patent. No retractions. Breath sounds clear with good air entry bilaterally. ABDOMEN: Soft, Non-distended and non-tender, without guarding or rebound. SKIN: Acyanotic, warm, dry   MUSCULOSKELETAL: No swelling, tenderness or deformity   NEUROLOGICAL: Awake. Pulses intact. Grossly nonfocal     80-year-old female history of breast cancer presenting complaining of dehydration associate with nausea vomiting. Patient was sent by her oncologist here to emergency room for evaluation. Work-up significant for leukocytosis and SWAPNA which I believe is secondary to dehydration. Patient admitted for hydration. .    I have reviewed and interpreted all of the currently available lab results and diagnostics from this visit:  Results for orders placed or performed during the hospital encounter of 07/08/21   Culture, Blood 2    Specimen: Blood   Result Value Ref Range    Culture, Blood 2       No Growth to date. Any change in status will be called.    Culture, Urine    Specimen: Urine, clean catch   Result Value Ref Range    Urine Culture, Routine       <10,000 CFU/ml mixed skin/urogenital alejandra.  No further workup   CBC Auto Differential   Result Value Ref Range    WBC 25.2 (H) 4.0 - 11.0 K/uL    RBC 4.28 4.00 - 5.20 M/uL    Hemoglobin 13.0 12.0 - 16.0 g/dL    Hematocrit 40.0 36.0 - 48.0 %    MCV 93.3 80.0 - 100.0 fL    MCH 30.4 26.0 - 34.0 pg    MCHC 32.6 31.0 - 36.0 g/dL    RDW 13.0 12.4 - 15.4 %    Platelets 122 974 - 817 K/uL    MPV 8.4 5.0 - 10.5 fL    PLATELET SLIDE REVIEW Adequate     SLIDE REVIEW see below     Neutrophils % 90.0 %    Lymphocytes % 6.0 %    Monocytes % 4.0 %    Eosinophils % 0.0 %    Basophils % 0.0 %    Neutrophils Absolute 22.7 (H) 1.7 - 7.7 K/uL    Lymphocytes Absolute 1.5 1.0 - 5.1 K/uL    Monocytes Absolute 1.0 0.0 - 1.3 K/uL    Eosinophils Absolute 0.0 0.0 - 0.6 K/uL    Basophils Absolute 0.0 0.0 - 0.2 K/uL    RBC Morphology Normal    Comprehensive Metabolic Panel   Result Value Ref Range    Sodium 126 (L) 136 - 145 mmol/L    Potassium 4.5 3.5 - 5.1 mmol/L    Chloride 92 (L) 99 - 110 mmol/L    CO2 18 (L) 21 - 32 mmol/L    Anion Gap 16 3 - 16    Glucose 347 (H) 70 - 99 mg/dL    BUN 60 (H) 7 - 20 mg/dL    CREATININE 4.6 (H) 0.6 - 1.2 mg/dL    GFR Non-African American 9 (A) >60    GFR  11 (A) >60    Calcium 9.1 8.3 - 10.6 mg/dL    Total Protein 6.7 6.4 - 8.2 g/dL    Albumin 3.8 3.4 - 5.0 g/dL    Albumin/Globulin Ratio 1.3 1.1 - 2.2    Total Bilirubin <0.2 0.0 - 1.0 mg/dL    Alkaline Phosphatase 130 (H) 40 - 129 U/L    ALT 20 10 - 40 U/L    AST 15 15 - 37 U/L    Globulin 2.9 g/dL   Lipase   Result Value Ref Range    Lipase 44.0 13.0 - 60.0 U/L   Urinalysis Reflex to Culture    Specimen: Urine, clean catch   Result Value Ref Range    Color, UA YELLOW Straw/Yellow    Clarity, UA CLOUDY (A) Clear    Glucose, Ur 100 (A) Negative mg/dL    Bilirubin Urine Negative Negative    Ketones, Urine Negative Negative mg/dL    Specific Gravity, UA 1.017 1.005 - 1.030    Blood, Urine Negative Negative    pH, UA 5.5 5.0 - 8.0    Protein, UA 30 (A) Negative mg/dL Urobilinogen, Urine 0.2 <2.0 E.U./dL    Nitrite, Urine Negative Negative    Leukocyte Esterase, Urine SMALL (A) Negative    Microscopic Examination YES     Urine Type Voided     Urine Reflex to Culture Yes    Lactate, Sepsis   Result Value Ref Range    Lactic Acid, Sepsis 1.6 0.4 - 1.9 mmol/L   Microscopic Urinalysis   Result Value Ref Range    Hyaline Casts, UA 0-2 0 - 2 /LPF    Urinalysis Comments see below     WBC, UA 23 (H) 0 - 5 /HPF    RBC, UA 1 0 - 4 /HPF    Epithelial Cells, UA 2 0 - 5 /HPF   Basic Metabolic Panel w/ Reflex to MG   Result Value Ref Range    Sodium 130 (L) 136 - 145 mmol/L    Potassium reflex Magnesium 4.1 3.5 - 5.1 mmol/L    Chloride 97 (L) 99 - 110 mmol/L    CO2 19 (L) 21 - 32 mmol/L    Anion Gap 14 3 - 16    Glucose 208 (H) 70 - 99 mg/dL    BUN 57 (H) 7 - 20 mg/dL    CREATININE 3.5 (H) 0.6 - 1.2 mg/dL    GFR Non-African American 13 (A) >60    GFR  15 (A) >60    Calcium 9.0 8.3 - 10.6 mg/dL   CBC auto differential   Result Value Ref Range    WBC 34.6 (H) 4.0 - 11.0 K/uL    RBC 3.97 (L) 4.00 - 5.20 M/uL    Hemoglobin 12.1 12.0 - 16.0 g/dL    Hematocrit 36.6 36.0 - 48.0 %    MCV 92.3 80.0 - 100.0 fL    MCH 30.4 26.0 - 34.0 pg    MCHC 33.0 31.0 - 36.0 g/dL    RDW 13.2 12.4 - 15.4 %    Platelets 050 424 - 372 K/uL    MPV 8.2 5.0 - 10.5 fL    PLATELET SLIDE REVIEW Adequate     SLIDE REVIEW see below     Neutrophils % 64.0 %    Lymphocytes % 6.0 %    Monocytes % 4.0 %    Eosinophils % 0.0 %    Basophils % 0.0 %    Neutrophils Absolute 31.1 (H) 1.7 - 7.7 K/uL    Lymphocytes Absolute 2.1 1.0 - 5.1 K/uL    Monocytes Absolute 1.4 (H) 0.0 - 1.3 K/uL    Eosinophils Absolute 0.0 0.0 - 0.6 K/uL    Basophils Absolute 0.0 0.0 - 0.2 K/uL    Bands Relative 23 (H) 0 - 7 %    Metamyelocytes Relative 2 (A) %    Promyelocytes Percent 1 (A) %    Toxic Granulation Present (A)     RBC Morphology Normal    POCT Glucose   Result Value Ref Range    POC Glucose 243 (H) 70 - 99 mg/dl    Performed on Amrita Story on 07/09/21 at [address] [facility]    07/09/21 0126 07/09/21 0126  sodium chloride flush 0.9 % injection 5-40 mL  PRN      Acknowledged Bree KOWALSKI    07/09/21 0126 07/09/21 0126  0.9 % sodium chloride infusion  PRN      Acknowledged Bree KOWALSKI    07/09/21 0126 07/09/21 0126  ondansetron (ZOFRAN-ODT) disintegrating tablet 4 mg  EVERY 8 HOURS PRN      Last MAR action: See Alternative - by Adi Marie on 07/09/21 at [address]    07/09/21 0126 07/09/21 0126  ondansetron (ZOFRAN) injection 4 mg  EVERY 6 HOURS PRN      Last MAR action: Given - by Adi Marie on 07/09/21 at [address]    07/09/21 0126 07/09/21 0126  polyethylene glycol (GLYCOLAX) packet 17 g  DAILY PRN      Acknowledged [facility]    07/09/21 0126 07/09/21 0126  acetaminophen (TYLENOL) tablet 650 mg  EVERY 6 HOURS PRN      Last MAR action: Given - by Adi Marie on 07/09/21 at [address]    07/09/21 0126 07/09/21 0126  acetaminophen (TYLENOL) suppository 650 mg  EVERY 6 HOURS PRN      Last MAR action: See Alternative - by Adi Marie on 07/09/21 at [address]    07/09/21 0126 07/09/21 0126  glucose (GLUTOSE) 40 % oral gel 15 g  PRN      Acknowledged Bree KOWALSKI    07/09/21 0126 07/09/21 0126  dextrose 50 % IV solution  PRN      Acknowledged ADEEL CHAMBERS    07/09/21 0126 07/09/21 0126  glucagon (rDNA) injection 1 mg  PRN      Acknowledged Bree KOWALSKI    07/09/21 0126 07/09/21 0126  dextrose 5 % solution  PRN      Acknowledged Bree KOWALSKI    07/08/21 2015 07/08/21 2005  lactated ringers infusion 1,000 mL  ONCE      Last MAR action: Stopped - by Paulina Giang on 07/09/21 at [address]    07/08/21 1930 07/08/21 1917  promethazine (PHENERGAN) injection 25 mg  ONCE      Last MAR action: Given - by Mary Braxton on 07/08/21 at [place] [id number]    07/08/21 1930 07/08/21 1917  ondansetron (ZOFRAN) injection 4 mg  ONCE      Last MAR action: Given - by Sushila Rising on 07/08/21 at [address], [facility] [place] 2]    07/08/21 1930 07/08/21 1917  lactated ringers infusion 1,000 mL  ONCE      Last MAR action: Stopped - by Sushila Rising [facility]09/21 [facility]          Final Impression      1. SWAPNA (acute kidney injury) [facility])        DISPOSITION Admitted 07/08/2021 08:31:25 PM       This record is transcribed utilizing voice recognition technology. There are inherent limitations in this technology. In addition, there may be limitations in editing of this report. If there are any discrepancies, please contact me directly.     Kristin Fortune MD   7/9/2021         Nsehniitooh Izora Buerger, MD  07/09/21 2023

## 2021-07-09 NOTE — ED PROVIDER NOTES
[address] [facility]        Pt Name: Sol Espinal  MRN: [id number]  [place] 1945  Date of evaluation: 7/8/2021  Provider: Court Baig PA-C  PCP: Clara Jefferson MD  Note Started: 8:25 PM EDT        I have seen and evaluated this patient with my supervising physician Kristin Fortune MD.    CHIEF COMPLAINT       Chief Complaint   Patient presents with   [facility] Dehydration     oncologist sent d/t severe dehydration       HISTORY OF PRESENT ILLNESS   (Location, Timing/Onset, Context/Setting, Quality, Duration, Modifying Factors, Severity, Associated Signs and Symptoms)  Note limiting factors. Chief Complaint: Nausea, vomiting, dehydration    Sol Espinal is a 77 y.o. female who presents to the emergency department today for evaluation for dehydration, nausea and vomiting. The patient states that she has a history of breast cancer, and she states that she had her first chemotherapy treatment 1 week ago. The patient states that since having her chemotherapy treatment she states that she has had multiple episodes of vomiting and diarrhea. She denies any bilious emesis, hematemesis or coffee-ground emesis. She states that she has had multiple infusions at the infusion center, she states that she had lab work drawn, and was told to come to the emergency room due to her kidney function. When the patient arrives to the ED, she states that she is just overall not feeling well. She has no specific chest pain, shortness of breath or abdominal pain. She does not have any fever or chills. She has no cough or congestion. She denies any abdominal pain. No urinary symptoms. The patient otherwise has no complaints at this time. Nursing Notes were all reviewed and agreed with or any disagreements were addressed in the HPI.     REVIEW OF SYSTEMS    (2-9 systems for level 4, 10 or more for level 5)     Review of Systems   Constitutional: Positive for fatigue. Negative for activity change, appetite change, chills and fever. HENT: Negative for congestion and rhinorrhea. Respiratory: Negative for cough and shortness of breath. Cardiovascular: Negative for chest pain. Gastrointestinal: Positive for diarrhea, nausea and vomiting. Negative for abdominal pain. Genitourinary: Negative for difficulty urinating, dysuria and hematuria. Positives and Pertinent negatives as per HPI. Except as noted above in the ROS, all other systems were reviewed and negative. PAST MEDICAL HISTORY     Past Medical History:   Diagnosis Date    Asthma     Cancer ([facility] [address] 06/2021    right Breast    Diabetes mellitus ([facility] [address]     Diverticulitis     Hypertension          SURGICAL HISTORY     Past Surgical History:   Procedure Laterality Date    BREAST LUMPECTOMY Left     HYSTERECTOMY      PORT SURGERY N/A 6/23/2021    PLACEMENT OF POWER PORT A CATHETER performed by Aidan Ferrara MD at [address] [place] [address] Right 6/3/2021    US BREAST BIOPSY NEEDLE ADDITIONAL RIGHT 6/3/2021 [facility] ULTRASOUND    US BREAST NEEDLE BIOPSY RIGHT Right 6/3/2021    US BREAST NEEDLE BIOPSY RIGHT 6/3/2021 [facility] ULTRASOUND    US LYMPH NODE BIOPSY  6/3/2021    US LYMPH NODE BIOPSY 6/3/2021 [facility] ULTRASOUND         CURRENTMEDICATIONS       Previous Medications    ALBUTEROL (PROVENTIL) (2.5 MG/3ML) 0.083% NEBULIZER SOLUTION    Take 2.5 mg by nebulization every 6 hours as needed. AMLODIPINE (NORVASC) 5 MG TABLET    Take 5 mg by mouth daily    ATORVASTATIN (LIPITOR) 20 MG TABLET    TAKE 1 TABLET BY MOUTH AT BEDTIME AS DIRECTED.     IBUPROFEN (ADVIL;MOTRIN) 800 MG TABLET    Take 1 tablet by mouth every 8 hours as needed for Pain or Fever    LISINOPRIL (PRINIVIL;ZESTRIL) 10 MG TABLET    Take 10 mg by mouth daily    LOMITAPIDE MESYLATE PO    Take 1 capsule by mouth every 8 hours    METFORMIN (GLUCOPHAGE) 1000 MG TABLET    TAKE 1 TABLET BY MOUTH TWO TIMES Mental Status: She is alert and oriented to person, place, and time. Psychiatric:         Behavior: Behavior normal.         DIAGNOSTIC RESULTS   LABS:    Labs Reviewed   CBC WITH AUTO DIFFERENTIAL - Abnormal; Notable for the following components:       Result Value    WBC 25.2 (*)     Neutrophils Absolute 22.7 (*)     All other components within normal limits    Narrative:     Performed at:  [facility] [address] [facility]  [place], [place]   Phone [phone]   COMPREHENSIVE METABOLIC PANEL - Abnormal; Notable for the following components:    Sodium 126 (*)     Chloride 92 (*)     CO2 18 (*)     Glucose 347 (*)     BUN 60 (*)     CREATININE 4.6 (*)     GFR Non- 9 (*)     GFR African American 11 (*)     Alkaline Phosphatase 130 (*)     All other components within normal limits    Narrative:     Performed at:  [facility] [address] [employer], [place]   Phone [phone]   CULTURE, BLOOD 2   CULTURE, BLOOD 1   LIPASE    Narrative:     Performed at:  [facility] [address] [facility]  [place], [place] [place]   Phone [phone]   URINE RT REFLEX TO CULTURE   LACTATE, SEPSIS   LACTATE, SEPSIS       When ordered only abnormal lab results are displayed. All other labs were within normal range or not returned as of this dictation. EKG: When ordered, EKG's are interpreted by the Emergency Department Physician in the absence of a cardiologist.  Please see their note for interpretation of EKG. RADIOLOGY:   Non-plain film images such as CT, Ultrasound and MRI are read by the radiologist. Plain radiographic images are visualized and preliminarily interpreted by the ED Provider with the below findings:        Interpretation per the Radiologist below, if available at the time of this note:    CT ABDOMEN PELVIS WO CONTRAST Additional Contrast? None    (Results Pending)     No results found. specified.     DISCHARGE MEDICATIONS:  New Prescriptions    No medications on file       DISCONTINUED MEDICATIONS:  Discontinued Medications    No medications on file              (Please note that portions of this note were completed with a voice recognition program.  Efforts were made to edit the dictations but occasionally words are mis-transcribed.)    Deloris Guillen PA-C (electronically signed)            Deloris Guillen PA-C  07/08/21 [id number]

## 2021-07-10 LAB
ANION GAP SERPL CALCULATED.3IONS-SCNC: 9 MMOL/L (ref 3–16)
BANDED NEUTROPHILS RELATIVE PERCENT: 8 % (ref 0–7)
BASOPHILS ABSOLUTE: 0 K/UL (ref 0–0.2)
BASOPHILS RELATIVE PERCENT: 0 %
BUN BLDV-MCNC: 40 MG/DL (ref 7–20)
C DIFF TOXIN/ANTIGEN: NORMAL
CALCIUM SERPL-MCNC: 8.2 MG/DL (ref 8.3–10.6)
CHLORIDE BLD-SCNC: 113 MMOL/L (ref 99–110)
CO2: 17 MMOL/L (ref 21–32)
CREAT SERPL-MCNC: 1.5 MG/DL (ref 0.6–1.2)
DOHLE BODIES: PRESENT
EOSINOPHILS ABSOLUTE: 0 K/UL (ref 0–0.6)
EOSINOPHILS RELATIVE PERCENT: 0 %
GFR AFRICAN AMERICAN: 41
GFR NON-AFRICAN AMERICAN: 34
GI BACTERIAL PATHOGENS BY PCR: NORMAL
GLUCOSE BLD-MCNC: 122 MG/DL (ref 70–99)
GLUCOSE BLD-MCNC: 136 MG/DL (ref 70–99)
GLUCOSE BLD-MCNC: 144 MG/DL (ref 70–99)
GLUCOSE BLD-MCNC: 151 MG/DL (ref 70–99)
GLUCOSE BLD-MCNC: 163 MG/DL (ref 70–99)
HCT VFR BLD CALC: 32 % (ref 36–48)
HEMOGLOBIN: 10.8 G/DL (ref 12–16)
LYMPHOCYTES ABSOLUTE: 1.1 K/UL (ref 1–5.1)
LYMPHOCYTES RELATIVE PERCENT: 3 %
MCH RBC QN AUTO: 30.9 PG (ref 26–34)
MCHC RBC AUTO-ENTMCNC: 33.7 G/DL (ref 31–36)
MCV RBC AUTO: 91.8 FL (ref 80–100)
METAMYELOCYTES RELATIVE PERCENT: 2 %
MONOCYTES ABSOLUTE: 0.7 K/UL (ref 0–1.3)
MONOCYTES RELATIVE PERCENT: 2 %
MYELOCYTE PERCENT: 4 %
NEUTROPHILS ABSOLUTE: 33.8 K/UL (ref 1.7–7.7)
NEUTROPHILS RELATIVE PERCENT: 81 %
PDW BLD-RTO: 13.2 % (ref 12.4–15.4)
PERFORMED ON: ABNORMAL
PLATELET # BLD: 317 K/UL (ref 135–450)
PLATELET SLIDE REVIEW: ADEQUATE
PMV BLD AUTO: 8 FL (ref 5–10.5)
POTASSIUM SERPL-SCNC: 3.6 MMOL/L (ref 3.5–5.1)
RBC # BLD: 3.49 M/UL (ref 4–5.2)
RBC # BLD: NORMAL 10*6/UL
REPORT: NORMAL
SLIDE REVIEW: ABNORMAL
SODIUM BLD-SCNC: 139 MMOL/L (ref 136–145)
TOXIC GRANULATION: PRESENT
WBC # BLD: 35.6 K/UL (ref 4–11)
WHITE BLOOD CELLS (WBC), STOOL: ABNORMAL

## 2021-07-10 PROCEDURE — 6370000000 HC RX 637 (ALT 250 FOR IP): Performed by: INTERNAL MEDICINE

## 2021-07-10 PROCEDURE — 80048 BASIC METABOLIC PNL TOTAL CA: CPT

## 2021-07-10 PROCEDURE — 36415 COLL VENOUS BLD VENIPUNCTURE: CPT

## 2021-07-10 PROCEDURE — 2580000003 HC RX 258: Performed by: INTERNAL MEDICINE

## 2021-07-10 PROCEDURE — 6360000002 HC RX W HCPCS: Performed by: INTERNAL MEDICINE

## 2021-07-10 PROCEDURE — 87324 CLOSTRIDIUM AG IA: CPT

## 2021-07-10 PROCEDURE — 87505 NFCT AGENT DETECTION GI: CPT

## 2021-07-10 PROCEDURE — 85025 COMPLETE CBC W/AUTO DIFF WBC: CPT

## 2021-07-10 PROCEDURE — 87449 NOS EACH ORGANISM AG IA: CPT

## 2021-07-10 PROCEDURE — 1200000000 HC SEMI PRIVATE

## 2021-07-10 PROCEDURE — 83630 LACTOFERRIN FECAL (QUAL): CPT

## 2021-07-10 RX ADMIN — NYSTATIN 500000 UNITS: 100000 SUSPENSION ORAL at 09:15

## 2021-07-10 RX ADMIN — SODIUM CHLORIDE: 9 INJECTION, SOLUTION INTRAVENOUS at 05:53

## 2021-07-10 RX ADMIN — INSULIN LISPRO 1 UNITS: 100 INJECTION, SOLUTION INTRAVENOUS; SUBCUTANEOUS at 20:37

## 2021-07-10 RX ADMIN — NYSTATIN 500000 UNITS: 100000 SUSPENSION ORAL at 14:45

## 2021-07-10 RX ADMIN — NYSTATIN 500000 UNITS: 100000 SUSPENSION ORAL at 17:15

## 2021-07-10 RX ADMIN — HEPARIN SODIUM 5000 UNITS: 5000 INJECTION INTRAVENOUS; SUBCUTANEOUS at 20:38

## 2021-07-10 RX ADMIN — SODIUM CHLORIDE: 9 INJECTION, SOLUTION INTRAVENOUS at 01:19

## 2021-07-10 RX ADMIN — INSULIN LISPRO 2 UNITS: 100 INJECTION, SOLUTION INTRAVENOUS; SUBCUTANEOUS at 11:50

## 2021-07-10 RX ADMIN — HEPARIN SODIUM 5000 UNITS: 5000 INJECTION INTRAVENOUS; SUBCUTANEOUS at 14:45

## 2021-07-10 RX ADMIN — NYSTATIN 500000 UNITS: 100000 SUSPENSION ORAL at 20:37

## 2021-07-10 RX ADMIN — INSULIN LISPRO 2 UNITS: 100 INJECTION, SOLUTION INTRAVENOUS; SUBCUTANEOUS at 17:15

## 2021-07-10 RX ADMIN — ONDANSETRON 4 MG: 2 INJECTION INTRAMUSCULAR; INTRAVENOUS at 00:07

## 2021-07-10 RX ADMIN — ATORVASTATIN CALCIUM 20 MG: 20 TABLET, FILM COATED ORAL at 20:36

## 2021-07-10 RX ADMIN — SODIUM CHLORIDE: 9 INJECTION, SOLUTION INTRAVENOUS at 11:50

## 2021-07-10 RX ADMIN — SODIUM CHLORIDE: 9 INJECTION, SOLUTION INTRAVENOUS at 21:05

## 2021-07-10 RX ADMIN — INSULIN GLARGINE 15 UNITS: 100 INJECTION, SOLUTION SUBCUTANEOUS at 09:15

## 2021-07-10 RX ADMIN — HEPARIN SODIUM 5000 UNITS: 5000 INJECTION INTRAVENOUS; SUBCUTANEOUS at 05:50

## 2021-07-10 ASSESSMENT — PAIN SCALES - GENERAL
PAINLEVEL_OUTOF10: 0
PAINLEVEL_OUTOF10: 0

## 2021-07-10 NOTE — PROGRESS NOTES
Shift assessment completed. Routine vitals stable. Upon entering room, noticed patient's port leaking. Per patient, port had \"not been acting right\" since this morning. Port de-accessed at this time. Patient requesting port to not be re-accessed at this time due to the site being tender. Scheduled medications given. Palacio catheter in place. Patient is awake, alert and oriented. Respirations are easy and unlabored. Patient does not appear to be in distress. Call light within reach.

## 2021-07-10 NOTE — PROGRESS NOTES
Shift assessment complete. VSS. Pt denies pain. Palacio in place, no urethral drainage noted. Pt up to bathroom and back to bed. Port not accessed and tender from it getting pulled yesterday when up, no bleeding or swelling noted. No further needs. Will monitor.

## 2021-07-11 VITALS
SYSTOLIC BLOOD PRESSURE: 129 MMHG | RESPIRATION RATE: 18 BRPM | OXYGEN SATURATION: 97 % | HEART RATE: 94 BPM | BODY MASS INDEX: 27.26 KG/M2 | HEIGHT: 67 IN | WEIGHT: 173.7 LBS | TEMPERATURE: 97.9 F | DIASTOLIC BLOOD PRESSURE: 62 MMHG

## 2021-07-11 LAB
ANION GAP SERPL CALCULATED.3IONS-SCNC: 11 MMOL/L (ref 3–16)
BASOPHILS ABSOLUTE: 0 K/UL (ref 0–0.2)
BASOPHILS RELATIVE PERCENT: 0 %
BLOOD CULTURE, ROUTINE: ABNORMAL
BLOOD CULTURE, ROUTINE: ABNORMAL
BUN BLDV-MCNC: 20 MG/DL (ref 7–20)
CALCIUM SERPL-MCNC: 8.3 MG/DL (ref 8.3–10.6)
CHLORIDE BLD-SCNC: 110 MMOL/L (ref 99–110)
CO2: 17 MMOL/L (ref 21–32)
CREAT SERPL-MCNC: 0.9 MG/DL (ref 0.6–1.2)
DOHLE BODIES: PRESENT
EOSINOPHILS ABSOLUTE: 0 K/UL (ref 0–0.6)
EOSINOPHILS RELATIVE PERCENT: 0 %
GFR AFRICAN AMERICAN: >60
GFR NON-AFRICAN AMERICAN: >60
GLUCOSE BLD-MCNC: 112 MG/DL (ref 70–99)
GLUCOSE BLD-MCNC: 125 MG/DL (ref 70–99)
GLUCOSE BLD-MCNC: 155 MG/DL (ref 70–99)
HCT VFR BLD CALC: 32.5 % (ref 36–48)
HEMOGLOBIN: 10.6 G/DL (ref 12–16)
LYMPHOCYTES ABSOLUTE: 3.4 K/UL (ref 1–5.1)
LYMPHOCYTES RELATIVE PERCENT: 10 %
MCH RBC QN AUTO: 30.3 PG (ref 26–34)
MCHC RBC AUTO-ENTMCNC: 32.6 G/DL (ref 31–36)
MCV RBC AUTO: 93 FL (ref 80–100)
METAMYELOCYTES RELATIVE PERCENT: 4 %
MONOCYTES ABSOLUTE: 1 K/UL (ref 0–1.3)
MONOCYTES RELATIVE PERCENT: 3 %
MYELOCYTE PERCENT: 1 %
NEUTROPHILS ABSOLUTE: 29.8 K/UL (ref 1.7–7.7)
NEUTROPHILS RELATIVE PERCENT: 82 %
ORGANISM: ABNORMAL
PDW BLD-RTO: 13.1 % (ref 12.4–15.4)
PERFORMED ON: ABNORMAL
PERFORMED ON: ABNORMAL
PLATELET # BLD: 308 K/UL (ref 135–450)
PLATELET SLIDE REVIEW: ADEQUATE
PMV BLD AUTO: 7.3 FL (ref 5–10.5)
POTASSIUM SERPL-SCNC: 3.2 MMOL/L (ref 3.5–5.1)
RBC # BLD: 3.49 M/UL (ref 4–5.2)
RBC # BLD: NORMAL 10*6/UL
SLIDE REVIEW: ABNORMAL
SODIUM BLD-SCNC: 138 MMOL/L (ref 136–145)
TOXIC GRANULATION: PRESENT
WBC # BLD: 34.2 K/UL (ref 4–11)

## 2021-07-11 PROCEDURE — 6360000002 HC RX W HCPCS: Performed by: INTERNAL MEDICINE

## 2021-07-11 PROCEDURE — 36415 COLL VENOUS BLD VENIPUNCTURE: CPT

## 2021-07-11 PROCEDURE — 85025 COMPLETE CBC W/AUTO DIFF WBC: CPT

## 2021-07-11 PROCEDURE — 2580000003 HC RX 258: Performed by: INTERNAL MEDICINE

## 2021-07-11 PROCEDURE — 6370000000 HC RX 637 (ALT 250 FOR IP): Performed by: INTERNAL MEDICINE

## 2021-07-11 PROCEDURE — 80048 BASIC METABOLIC PNL TOTAL CA: CPT

## 2021-07-11 RX ORDER — POTASSIUM CHLORIDE 20 MEQ/1
40 TABLET, EXTENDED RELEASE ORAL ONCE
Status: COMPLETED | OUTPATIENT
Start: 2021-07-11 | End: 2021-07-11

## 2021-07-11 RX ADMIN — SODIUM CHLORIDE: 9 INJECTION, SOLUTION INTRAVENOUS at 05:29

## 2021-07-11 RX ADMIN — HEPARIN SODIUM 5000 UNITS: 5000 INJECTION INTRAVENOUS; SUBCUTANEOUS at 05:27

## 2021-07-11 RX ADMIN — NYSTATIN 500000 UNITS: 100000 SUSPENSION ORAL at 08:14

## 2021-07-11 RX ADMIN — INSULIN GLARGINE 15 UNITS: 100 INJECTION, SOLUTION SUBCUTANEOUS at 08:15

## 2021-07-11 RX ADMIN — POTASSIUM CHLORIDE 40 MEQ: 1500 TABLET, EXTENDED RELEASE ORAL at 10:29

## 2021-07-11 NOTE — DISCHARGE SUMMARY
1945   Visit Number       [id number]          Age                 77 year(s)   Accession Number   [id number]         Room Number         OP   Corporate ID       [id number]           Sonographer         Carter Murguia RVT, BS   Ordering Physician Rikki Ryan MD,                     Mercy Reece MD        Physician           [facility], [facility]  Procedure Type of Study   TTE procedure:ECHOCARDIOGRAM COMPLETE 2D W DOPPLER W COLOR. Procedure Date Date: 06/17/2021 Start: 01:14 PM Study Location: [facility] - Echo Lab Technical Quality: Adequate visualization Indications:Pre-chemotherapy. Patient Status: Routine Height: 67 inches Weight: 171 pounds BSA: 1.89 m2 BMI: 26.78 kg/m2 Rhythm: Within normal limits HR: 83 bpm BP: 148/90 mmHg  Conclusions   Summary  Normal left ventricle size, wall thickness, and systolic function with an  estimated ejection fraction of 55-60%. No regional wall motion abnormalities are seen. Global longitudinal strain: -20% (normal). Indeterminate diastolic function. Mild tricuspid regurgitation with a PASP of 40 mmHg. Signature   ------------------------------------------------------------------  Electronically signed by Jaycee Travis MD, [facility], [address]  (Interpreting physician) on 06/17/2021 at 03:14 PM  ------------------------------------------------------------------   Findings   Left Ventricle  Normal left ventricle size, wall thickness, and systolic function with an  estimated ejection fraction of 55-60%. No regional wall motion abnormalities are seen. Global longitudinal strain: -20% (normal). Average E/e': 10.3. Indeterminate diastolic function. Mitral Valve  Mitral valve leaflets appear mildly thickened. No evidence of mitral regurgitation or stenosis. Left Atrium  The left atrium is normal in size. Aortic Valve  Tricuspid aortic valve.   The aortic valve is mildly thickened but opens well with normal gradients. No evidence of aortic valve regurgitation. Aorta  The aortic root is normal in size. The ascending aorta is normal in size. Right Ventricle  The right ventricle is normal in size and function. TAPSE: 2.09 cm. RV s' velocity: 15.2 cm/s. Tricuspid Valve  The tricuspid valve is normal in structure and function. Mild tricuspid regurgitation with a PASP of 40 mmHg. Right Atrium  The right atrial size is normal.   Pulmonic Valve  The pulmonic valve is not well visualized. There is no evidence of pulmonic valve regurgitation or stenosis. Pericardial Effusion  No pericardial effusion noted. Pleural Effusion  No pleural effusion. Miscellaneous  The inferior vena cava appears normal in size with normal respiratory  variation.   M-Mode/2D Measurements (cm)   LV Diastolic Dimension: 2.71 cm LV Systolic Dimension: 2.8 cm  LV Septum Diastolic: 8.79 cm  LV PW Diastolic: 0.9 cm         AO Root Dimension: 3.5 cm                                  LA Dimension: 3.2 cm                                  LA Area: 14.7 cm2  LVOT: 2 cm                      LA volume/Index: 36.43 ml /19 ml/m2  Doppler Measurements   AV Peak Velocity: 177 cm/s     MV Peak E-Wave: 71.6 cm/s  AV Peak Gradient: 12.53 mmHg   MV Peak A-Wave: 101 cm/s  AV Mean Gradient: 7 mmHg       MV E/A Ratio: 0.71  LVOT Peak Velocity: 152 cm/s  AV Area (Continuity):2.41 cm2   TR Velocity:300 cm/s  TR Gradient:36 mmHg   E' Septal Velocity: 5.92 cm/s  E' Lateral Velocity: 8.49 cm/s  PV Peak Velocity: 122 cm/s  PV Peak Gradient: 5.95 mmHg   Aortic Valve   Peak Velocity: 177 cm/s     Mean Velocity: 132 cm/s  Peak Gradient: 12.53 mmHg   Mean Gradient: 7 mmHg  Area (continuity): 2.41 cm2  AV VTI: 32.4 cm  Aorta   Aortic Root: 3.5 cm  Ascending Aorta: 3.4 cm  LVOT Diameter: 2 cm      CT ABDOMEN PELVIS WO CONTRAST Additional Contrast? None    Result Date: 7/8/2021  EXAMINATION: CT OF THE ABDOMEN AND PELVIS WITHOUT abnormality. Left subclavian port with tip at the cavoatrial junction. No evidence of complication. NM BONE SCAN WHOLE BODY    Result Date: 6/28/2021  EXAMINATION: WHOLE BODY BONE SCAN  6/28/2021 TECHNIQUE: The patient was injected intravenously with  mCi of 99 mTc MDP and scintigraphy of the entire skeleton was performed approximately three hours later. COMPARISON: CT of the chest, abdomen, and pelvis performed 06/28/2021 HISTORY: ORDERING SYSTEM PROVIDED HISTORY: Invasive carcinoma of breast [facility]) TECHNOLOGIST PROVIDED HISTORY: Reason for exam:->screening for surgery Reason for Exam: Breast Cancer Acuity: Unknown Type of Exam: Initial FINDINGS: No abnormal foci of radiotracer uptake to suggest metastatic disease. No abnormal uptake corresponding to sclerotic lesion left acetabulum on CT is identified. Foci of activity in the shoulders, sternoclavicular joints, knees, and feet are most likely degenerative. The remainder of the osseous structures and soft tissues are unremarkable. Physiologic activity is present in the skeletal and renal collecting systems. No evidence to suggest osseous metastatic disease. CT CHEST ABDOMEN PELVIS W CONTRAST    Result Date: 6/28/2021  EXAMINATION: CT OF THE CHEST, ABDOMEN, AND PELVIS WITH CONTRAST 6/28/2021 8:23 am TECHNIQUE: CT of the chest, abdomen and pelvis was performed with the administration of intravenous contrast. Multiplanar reformatted images are provided for review. Dose modulation, iterative reconstruction, and/or weight based adjustment of the mA/kV was utilized to reduce the radiation dose to as low as reasonably achievable.  COMPARISON: Chest CT 2018 HISTORY: ORDERING SYSTEM PROVIDED HISTORY: Invasive carcinoma of breast [facility]) TECHNOLOGIST PROVIDED HISTORY: Additional Contrast?->Oral Reason for exam:->screening for surgery Reason for exam:->Thorax, abdomen and pelvis CT PAT Reason for Exam: invasive carcinoma of breast , screening prior to surgery in the left acetabulum. Spurring is seen in the spine. Spurring is seen in the hips. There is anterolisthesis of L4 on L5     Large right breast mass with associated right axillary and right subpectoral adenopathy. Small left lower lobe pulmonary nodule, likely benign as there is no change since 2018 No definite solid organ metastasis in abdomen or pelvis. Tiny sclerotic focus left acetabulum, favored to represent bone island and less likely sclerotic metastasis. Please see bone scan report     [place] VASCULAR ACCESS GUIDANCE    Result Date: 6/23/2021  Radiology exam is complete. No Radiologist dictation. Please follow up with ordering provider.        Discharge Exam:  /62   Pulse 94   Temp 97.9 °F (36.6 °C) (Axillary)   Resp 18   Ht 5' 7\" (1.702 m)   Wt 173 lb 11.2 oz (78.8 kg)   SpO2 97%   BMI 27.21 kg/m²     General Appearance:    Alert, cooperative, no distress, appears stated age   Head:    Normocephalic, without obvious abnormality, atraumatic   Eyes:    PERRL, conjunctiva/corneas clear, EOM's intact, fundi     benign, both eyes   Ears:    Normal TM's and external ear canals, both ears   Nose:   Nares normal, septum midline, mucosa normal, no drainage    or sinus tenderness   Throat:   Lips, mucosa, and tongue normal; teeth and gums normal   Neck:   Supple, symmetrical, trachea midline, no adenopathy;     thyroid:  no enlargement/tenderness/nodules; no carotid    bruit or JVD   Back:     Symmetric, no curvature, ROM normal, no CVA tenderness   Lungs:     Clear to auscultation bilaterally, respirations unlabored   Chest Wall:    No tenderness or deformity    Heart:    Regular rate and rhythm, S1 and S2 normal, no murmur, rub   or gallop   Breast Exam:    No tenderness, masses, or nipple abnormality   Abdomen:     Soft, non-tender, bowel sounds active all four quadrants,     no masses, no organomegaly   Genitalia:    Normal female without lesion, discharge or tenderness   Rectal:    Normal tone ;guaiac negative stool   Extremities:   Extremities normal, atraumatic, no cyanosis or edema   Pulses:   2+ and symmetric all extremities   Skin:   Skin color, texture, turgor normal, no rashes or lesions   Lymph nodes:   Cervical, supraclavicular, and axillary nodes normal   Neurologic:   CNII-XII intact, normal strength, sensation and reflexes     throughout       Disposition: home    Condition: stable    Discharge Medications:   Devi Hipolito   Home Medication Instructions B:[id number]    Printed on:07/11/21 6168   Medication Information                      albuterol (PROVENTIL) (2.5 MG/3ML) 0.083% nebulizer solution  Take 2.5 mg by nebulization every 6 hours as needed. amLODIPine (NORVASC) 5 MG tablet  Take 5 mg by mouth daily             atorvastatin (LIPITOR) 20 MG tablet  TAKE 1 TABLET BY MOUTH AT BEDTIME AS DIRECTED. Cholecalciferol (VITAMIN D3) 125 MCG (5000 UT) TABS  Take by mouth             Cyanocobalamin (VITAMIN B 12 PO)  Take by mouth             lisinopril (PRINIVIL;ZESTRIL) 10 MG tablet  Take 10 mg by mouth daily             metFORMIN (GLUCOPHAGE) 1000 MG tablet  TAKE 1 TABLET BY MOUTH TWO TIMES A DAY             nystatin (MYCOSTATIN) 268873 UNIT/ML suspension  Take 5 mLs by mouth 4 times daily             ondansetron (ZOFRAN-ODT) 8 MG TBDP disintegrating tablet  Place 8 mg under the tongue every 8 hours as needed for Nausea or Vomiting             zafirlukast (ACCOLATE) 10 MG tablet  TK 1 T PO QD                 Allergies: Allergies   Allergen Reactions    Erythromycin     Other      fireants         Patient Instructions: Activity: activity as tolerated  Diet: regular diet  Wound Care: none needed    Follow up Instructions: Follow-up with PCP: Bradley Morgan MD in  5 days.     Total time spent on day of discharge including face-to-face visit, examination, documentation, counseling, preparation of discharge plans and followup, and discharge medicine reconciliation and presciptions is 37 minutes    Signed:  Ludmila Kingston MD  7/11/2021  10:29 AM

## 2021-07-11 NOTE — PROGRESS NOTES
Discharge instructions given. F/u with Dr. Enrique Kwong and Dr. Solange Zayas this week. IV removed. Daughter at bedside. Taken down via wheelchair.

## 2021-07-12 ENCOUNTER — HOSPITAL ENCOUNTER (INPATIENT)
Age: 76
LOS: 3 days | Discharge: HOME | DRG: 872 | End: 2021-07-15
Attending: EMERGENCY MEDICINE | Admitting: INTERNAL MEDICINE
Payer: MEDICARE

## 2021-07-12 DIAGNOSIS — R78.81 BACTEREMIA: Primary | ICD-10-CM

## 2021-07-12 PROBLEM — A41.9 SEPTICEMIA (HCC): Status: ACTIVE | Noted: 2021-07-12

## 2021-07-12 LAB
A/G RATIO: 1.3 (ref 1.1–2.2)
ALBUMIN SERPL-MCNC: 3.3 G/DL (ref 3.4–5)
ALP BLD-CCNC: 145 U/L (ref 40–129)
ALT SERPL-CCNC: 21 U/L (ref 10–40)
ANION GAP SERPL CALCULATED.3IONS-SCNC: 11 MMOL/L (ref 3–16)
AST SERPL-CCNC: 22 U/L (ref 15–37)
BANDED NEUTROPHILS RELATIVE PERCENT: 2 % (ref 0–7)
BASOPHILS ABSOLUTE: 0 K/UL (ref 0–0.2)
BASOPHILS RELATIVE PERCENT: 0 %
BILIRUB SERPL-MCNC: <0.2 MG/DL (ref 0–1)
BUN BLDV-MCNC: 15 MG/DL (ref 7–20)
CALCIUM SERPL-MCNC: 8.9 MG/DL (ref 8.3–10.6)
CHLORIDE BLD-SCNC: 107 MMOL/L (ref 99–110)
CO2: 18 MMOL/L (ref 21–32)
CREAT SERPL-MCNC: 0.8 MG/DL (ref 0.6–1.2)
CULTURE, BLOOD 2: NORMAL
EOSINOPHILS ABSOLUTE: 0 K/UL (ref 0–0.6)
EOSINOPHILS RELATIVE PERCENT: 0 %
GFR AFRICAN AMERICAN: >60
GFR NON-AFRICAN AMERICAN: >60
GLOBULIN: 2.5 G/DL
GLUCOSE BLD-MCNC: 143 MG/DL (ref 70–99)
GLUCOSE BLD-MCNC: 184 MG/DL (ref 70–99)
HCT VFR BLD CALC: 33.3 % (ref 36–48)
HEMOGLOBIN: 10.9 G/DL (ref 12–16)
LACTIC ACID, SEPSIS: 0.8 MMOL/L (ref 0.4–1.9)
LYMPHOCYTES ABSOLUTE: 2.4 K/UL (ref 1–5.1)
LYMPHOCYTES RELATIVE PERCENT: 9 %
MCH RBC QN AUTO: 30.5 PG (ref 26–34)
MCHC RBC AUTO-ENTMCNC: 32.8 G/DL (ref 31–36)
MCV RBC AUTO: 92.8 FL (ref 80–100)
METAMYELOCYTES RELATIVE PERCENT: 1 %
MONOCYTES ABSOLUTE: 1.3 K/UL (ref 0–1.3)
MONOCYTES RELATIVE PERCENT: 5 %
MYELOCYTE PERCENT: 1 %
NEUTROPHILS ABSOLUTE: 22.7 K/UL (ref 1.7–7.7)
NEUTROPHILS RELATIVE PERCENT: 82 %
PDW BLD-RTO: 13.4 % (ref 12.4–15.4)
PERFORMED ON: ABNORMAL
PLATELET # BLD: 291 K/UL (ref 135–450)
PLATELET SLIDE REVIEW: ADEQUATE
PMV BLD AUTO: 7.1 FL (ref 5–10.5)
POTASSIUM SERPL-SCNC: 3.4 MMOL/L (ref 3.5–5.1)
RBC # BLD: 3.59 M/UL (ref 4–5.2)
RBC # BLD: NORMAL 10*6/UL
SLIDE REVIEW: ABNORMAL
SODIUM BLD-SCNC: 136 MMOL/L (ref 136–145)
TOTAL PROTEIN: 5.8 G/DL (ref 6.4–8.2)
TOXIC GRANULATION: PRESENT
WBC # BLD: 26.4 K/UL (ref 4–11)

## 2021-07-12 PROCEDURE — 85025 COMPLETE CBC W/AUTO DIFF WBC: CPT

## 2021-07-12 PROCEDURE — 6360000002 HC RX W HCPCS: Performed by: PHYSICIAN ASSISTANT

## 2021-07-12 PROCEDURE — 87040 BLOOD CULTURE FOR BACTERIA: CPT

## 2021-07-12 PROCEDURE — 2580000003 HC RX 258: Performed by: INTERNAL MEDICINE

## 2021-07-12 PROCEDURE — 6370000000 HC RX 637 (ALT 250 FOR IP): Performed by: INTERNAL MEDICINE

## 2021-07-12 PROCEDURE — 2580000003 HC RX 258: Performed by: PHYSICIAN ASSISTANT

## 2021-07-12 PROCEDURE — 80053 COMPREHEN METABOLIC PANEL: CPT

## 2021-07-12 PROCEDURE — 99223 1ST HOSP IP/OBS HIGH 75: CPT | Performed by: INTERNAL MEDICINE

## 2021-07-12 PROCEDURE — 99283 EMERGENCY DEPT VISIT LOW MDM: CPT

## 2021-07-12 PROCEDURE — 2500000003 HC RX 250 WO HCPCS: Performed by: INTERNAL MEDICINE

## 2021-07-12 PROCEDURE — 83605 ASSAY OF LACTIC ACID: CPT

## 2021-07-12 PROCEDURE — 1200000000 HC SEMI PRIVATE

## 2021-07-12 RX ORDER — ACETAMINOPHEN 650 MG/1
650 SUPPOSITORY RECTAL EVERY 6 HOURS PRN
Status: DISCONTINUED | OUTPATIENT
Start: 2021-07-12 | End: 2021-07-15 | Stop reason: HOSPADM

## 2021-07-12 RX ORDER — ATORVASTATIN CALCIUM 20 MG/1
20 TABLET, FILM COATED ORAL NIGHTLY
Status: DISCONTINUED | OUTPATIENT
Start: 2021-07-12 | End: 2021-07-15 | Stop reason: HOSPADM

## 2021-07-12 RX ORDER — DEXTROSE MONOHYDRATE 50 MG/ML
100 INJECTION, SOLUTION INTRAVENOUS PRN
Status: DISCONTINUED | OUTPATIENT
Start: 2021-07-12 | End: 2021-07-15 | Stop reason: HOSPADM

## 2021-07-12 RX ORDER — INSULIN LISPRO 100 [IU]/ML
0-3 INJECTION, SOLUTION INTRAVENOUS; SUBCUTANEOUS NIGHTLY
Status: DISCONTINUED | OUTPATIENT
Start: 2021-07-12 | End: 2021-07-15 | Stop reason: HOSPADM

## 2021-07-12 RX ORDER — SODIUM CHLORIDE 0.9 % (FLUSH) 0.9 %
5-40 SYRINGE (ML) INJECTION EVERY 12 HOURS SCHEDULED
Status: DISCONTINUED | OUTPATIENT
Start: 2021-07-12 | End: 2021-07-15 | Stop reason: HOSPADM

## 2021-07-12 RX ORDER — NICOTINE POLACRILEX 4 MG
15 LOZENGE BUCCAL PRN
Status: DISCONTINUED | OUTPATIENT
Start: 2021-07-12 | End: 2021-07-15 | Stop reason: HOSPADM

## 2021-07-12 RX ORDER — INSULIN LISPRO 100 [IU]/ML
0-6 INJECTION, SOLUTION INTRAVENOUS; SUBCUTANEOUS
Status: DISCONTINUED | OUTPATIENT
Start: 2021-07-13 | End: 2021-07-15 | Stop reason: HOSPADM

## 2021-07-12 RX ORDER — AMLODIPINE BESYLATE 5 MG/1
2.5 TABLET ORAL DAILY
Status: DISCONTINUED | OUTPATIENT
Start: 2021-07-13 | End: 2021-07-15 | Stop reason: HOSPADM

## 2021-07-12 RX ORDER — ONDANSETRON 4 MG/1
4 TABLET, ORALLY DISINTEGRATING ORAL EVERY 8 HOURS PRN
Status: DISCONTINUED | OUTPATIENT
Start: 2021-07-12 | End: 2021-07-15 | Stop reason: HOSPADM

## 2021-07-12 RX ORDER — ONDANSETRON 2 MG/ML
4 INJECTION INTRAMUSCULAR; INTRAVENOUS EVERY 6 HOURS PRN
Status: DISCONTINUED | OUTPATIENT
Start: 2021-07-12 | End: 2021-07-15 | Stop reason: HOSPADM

## 2021-07-12 RX ORDER — LISINOPRIL 10 MG/1
10 TABLET ORAL DAILY
Status: DISCONTINUED | OUTPATIENT
Start: 2021-07-13 | End: 2021-07-15 | Stop reason: HOSPADM

## 2021-07-12 RX ORDER — ACETAMINOPHEN 325 MG/1
650 TABLET ORAL EVERY 6 HOURS PRN
Status: DISCONTINUED | OUTPATIENT
Start: 2021-07-12 | End: 2021-07-15 | Stop reason: HOSPADM

## 2021-07-12 RX ORDER — DEXTROSE MONOHYDRATE 25 G/50ML
12.5 INJECTION, SOLUTION INTRAVENOUS PRN
Status: DISCONTINUED | OUTPATIENT
Start: 2021-07-12 | End: 2021-07-15 | Stop reason: HOSPADM

## 2021-07-12 RX ORDER — POLYETHYLENE GLYCOL 3350 17 G/17G
17 POWDER, FOR SOLUTION ORAL DAILY PRN
Status: DISCONTINUED | OUTPATIENT
Start: 2021-07-12 | End: 2021-07-15 | Stop reason: HOSPADM

## 2021-07-12 RX ORDER — SODIUM CHLORIDE 9 MG/ML
25 INJECTION, SOLUTION INTRAVENOUS PRN
Status: DISCONTINUED | OUTPATIENT
Start: 2021-07-12 | End: 2021-07-15 | Stop reason: HOSPADM

## 2021-07-12 RX ORDER — POTASSIUM CHLORIDE 20 MEQ/1
20 TABLET, EXTENDED RELEASE ORAL
Status: DISCONTINUED | OUTPATIENT
Start: 2021-07-12 | End: 2021-07-13

## 2021-07-12 RX ORDER — SODIUM CHLORIDE 0.9 % (FLUSH) 0.9 %
5-40 SYRINGE (ML) INJECTION PRN
Status: DISCONTINUED | OUTPATIENT
Start: 2021-07-12 | End: 2021-07-15 | Stop reason: HOSPADM

## 2021-07-12 RX ORDER — ZAFIRLUKAST 20 MG/1
10 TABLET, FILM COATED ORAL DAILY
Status: DISCONTINUED | OUTPATIENT
Start: 2021-07-13 | End: 2021-07-15 | Stop reason: HOSPADM

## 2021-07-12 RX ADMIN — SODIUM CHLORIDE 25 ML: 9 INJECTION, SOLUTION INTRAVENOUS at 21:36

## 2021-07-12 RX ADMIN — INSULIN LISPRO 1 UNITS: 100 INJECTION, SOLUTION INTRAVENOUS; SUBCUTANEOUS at 22:35

## 2021-07-12 RX ADMIN — POTASSIUM CHLORIDE 20 MEQ: 1500 TABLET, EXTENDED RELEASE ORAL at 19:26

## 2021-07-12 RX ADMIN — METRONIDAZOLE 500 MG: 500 INJECTION, SOLUTION INTRAVENOUS at 21:38

## 2021-07-12 RX ADMIN — Medication 10 ML: at 21:31

## 2021-07-12 RX ADMIN — NYSTATIN 500000 UNITS: 100000 SUSPENSION ORAL at 21:30

## 2021-07-12 RX ADMIN — ATORVASTATIN CALCIUM 20 MG: 20 TABLET, FILM COATED ORAL at 21:33

## 2021-07-12 RX ADMIN — CEFTRIAXONE 2000 MG: 2 INJECTION, POWDER, FOR SOLUTION INTRAMUSCULAR; INTRAVENOUS at 19:23

## 2021-07-12 ASSESSMENT — ENCOUNTER SYMPTOMS
VOMITING: 0
APNEA: 0
EYE DISCHARGE: 0
COUGH: 0
TROUBLE SWALLOWING: 0
STRIDOR: 0
NAUSEA: 0
SHORTNESS OF BREATH: 0
BLOOD IN STOOL: 0
DIARRHEA: 0
CHOKING: 0
ABDOMINAL PAIN: 0
RHINORRHEA: 0
FACIAL SWELLING: 0
CHEST TIGHTNESS: 0
COLOR CHANGE: 0
EYE REDNESS: 0
PHOTOPHOBIA: 0

## 2021-07-12 ASSESSMENT — PAIN SCALES - GENERAL: PAINLEVEL_OUTOF10: 0

## 2021-07-12 NOTE — ED NOTES
[facility]   Emergency Department Culture Follow-Up       Dain Leblanc (CSN: [id number]) was seen and evaluated at [facility] Emergency Department on 7/8/21 by provider  Dr Alena NEELY. A blood culture was positive and is growing 1/2 Moraxella osloensis . Sensitivity results: N/A      Treatment Course: The patient was admitted to the hospital and discharged with a WBC count of 34.2 and no antibiotics. Recommendation:    Call patient and ask to return to ED for re-evaluation, new blood cultures, and treatment. This recommendation was reviewed with and agreed by ED provider Dr. Venita Prado. Follow-Up:    The patient was contacted and notified of the result. The patient verbalized understanding and is agreeable to return to the ED for evaluation/treatment.     Thank you,    Chris Loving, PharmD  ED Pharmacist [id number]  7/12/2021

## 2021-07-12 NOTE — ED PROVIDER NOTES
I independently performed a history and physical on Felicia Liao. All diagnostic, treatment, and disposition decisions were made by myself in conjunction with the advanced practice provider. Briefly, this is a 77 y.o. female here for evaluation after a positive blood culture. She was recently in the hospital for an unrelated infection. Blood cultures are drawn. One grew out normal.  However, since being discharged home, the second grew Moraxella Osloensis. This is an incredibly unusual bacteria to grow in a blood culture. I received a call from pharmacy about this result. We agreed this was unlikely to be a contaminant, although it is possible since it was only 1 blood culture. Reviewing the patient's records. She is undergoing chemotherapy and may have a suppressed immune response. Pharmacy called and recommended she return to the emergency department for evaluation. Patient denies having any symptoms at this time, stating she feels well and has no complaints. She is feeling much better since being discharged from the hospital.  She has not had fever. On exam, the patient appears well-hydrated, well-nourished, and in no acute distress. Mucous membranes are moist. Speech is clear. Breathing is unlabored. Skin is dry. Mental status is normal. The patient moves all extremities. Face is symmetric without droop. Heart is RRR. Lungs are CTAB. MDM  I consulted with infectious disease, speaking with Dr. Anahi Castillo. He agreed this may be contaminant, but we cannot assume as much given the unusual bacteria and her immunocompromised status. He recommended two sets of repeat blood cultures, initiating treatment with Rocephin, and admission until blood cultures are clear. I spoke with Dr. Sagar Corley. We thoroughly discussed the history, physical exam, laboratory and imaging studies, as well as, emergency department course.  Based upon that discussion, we've decided to admit [place] for further management. FINAL IMPRESSION  1. Bacteremia        Blood pressure (!) 156/77, pulse 88, temperature 98.1 °F (36.7 °C), temperature source Oral, resp. rate 18, height 5' 7\" (1.702 m), weight 182 lb (82.6 kg), SpO2 97 %.      For further details of [facility] emergency department encounter, please see documentation by advanced practice provider, FLORINDA Espinoza.        Nathen Lawrence MD  07/13/21 6629

## 2021-07-12 NOTE — PROGRESS NOTES
Pt seen in  ED, admission completed. Pt is alert and oriented x 3. Pt lives at ED, and is being admitted for septicemia. Plan of care updated, all questions answered.

## 2021-07-12 NOTE — CONSULTS
hypertension  · Overweight due to excess calorie intake : Body mass index is 28.51 kg/m². Discussion:      The patient had 2 sets of blood cultures drawn on 7/8/2021.  1 set of blood culture came back positive for Moraxella. The patient is afebrile. Serum creatinine is 0.8 today. Her white cell count was 6000 on 4/30/2021 and is in normal range at baseline    White cell count was 25,200 on 7/8/2021 and has been staying elevated and was 34,200 yesterday. She was hospitalized for 3 days and was managed without antibiotics and was discharged yesterday. Blood culture from 7/8/21 had a growth of Moraxella. White cell count is 26,400. Patient has a Port-A-Cath in place, which was placed on 6/23/2021 for chemotherapy for recently diagnosed breast cancer    CT scan of abdomen and pelvis without contrast on 7/8/2021 was concerning for gastroenteritis      Plan:     Diagnostic Workup:    · Recommend sending 2 sets  blood cultures today  · Continue to follow fever curve, WBC count and blood cultures  · Follow up on liverand renal functions closely    Antimicrobials:    · Will recommend starting IV ceftriaxone 2 g every 24 hours  · We will start IV Flagyl 500 mg every 8 hours for empiric anaerobic coverage  · As the patient is immunocompromised, initial hypotension for past few days in addition to known leukocytosis may be secondary to bacteremia  · We will follow up on the culture results and clinical progress and will make further recommendations accordingly. · Continue close vitals monitoring. · Maintain good glycemic control. · Fall precautions. Aspiration precautions. · Continue to watch for new fever or diarrhea. · DVT prophylaxis. · Discussed all above with patient and RN.       Drug Monitoring:    · Continue serial monitoring for antibiotic toxicity as follows: CBC, CMP  · Continue to watch for following: new or worsening fever, hypotension, hives, lip swelling and redness or purulence at vascular access sites. I/v access Management:    · Continue to monitor i.v access sites for erythema, induration, discharge or tenderness. · As always, continue efforts to minimizetubes/lines/drains as clinically appropriate to reduce chances of line associated infections. Current isolation precautions: There are no current isolations documented for this patient. Level of complexity of consult: High     Risk of Complications/Morbidity: High     · Illness(es)/ Infection present that pose threat to life/bodily function. · There is potential for severe exacerbation of infection/side effects of treatment. · Therapy requires intensive monitoring for antimicrobial agent toxicity. Thank you for involving me in the care of your patient. I will continue to follow. If you have any additional questions, please do not hesitate to contact me. Subjective:     Presenting complaint in ER:     Chief Complaint   Patient presents with    Abnormal Lab     Was sent by office for elevated WBC & positive blood culture; takes steroid & chemo. HPI: Bladimir Greer is a 77 y.o. female patient, who was seen at the request of Dr. Hernan Velasco MD.    History was obtained from chart review and the patient. The patient was admitted on 7/12/2021. I have been consulted to see the patient for above mentioned reason(s). The patient has multiple medical comorbidities, and presented to the ER 4 days ago for dehydration and acute kidney injury. She had blood cultures done and was discharged after IV fluids. Her blood culture came back positive for Moraxella. The patient was called back to the ER and was admitted. I have been asked for my opinion for management for this patient. Past Medical History: All past medical history reviewed today.     Past Medical History:   Diagnosis Date    Asthma     Cancer ([facility] 06/2021    right Breast    Diabetes mellitus ([facility]     Diverticulitis     Hypertension          Past Surgical History: All pastsurgical history was reviewed today. Past Surgical History:   Procedure Laterality Date    BREAST LUMPECTOMY Left     HYSTERECTOMY      PORT SURGERY N/A 6/23/2021    PLACEMENT OF POWER PORT A CATHETER performed by Mindy Birch MD at [address] [place] [address] Street Right 6/3/2021    US BREAST BIOPSY NEEDLE ADDITIONAL RIGHT 6/3/2021 [facility]TRASOUND    US BREAST NEEDLE BIOPSY RIGHT Right 6/3/2021    US BREAST NEEDLE BIOPSY RIGHT 6/3/2021 [facility]AS[facility]    US LYMPH NODE BIOPSY  6/3/2021    US LYMPH NODE BIOPSY 6/3/2021 [facility]AS[facility]         Family History: All family history was reviewed today. Problem Relation Age of Onset    High Blood Pressure Mother     Stroke Mother     High Blood Pressure Father          Medications: All current and past medications were reviewed. Not in a hospital admission.  cefTRIAXone (ROCEPHIN) IV  2,000 mg Intravenous Once    potassium chloride  20 mEq Oral Daily with breakfast    [START ON 7/13/2021] cefTRIAXone (ROCEPHIN) IV  2,000 mg Intravenous Q24H    metroNIDAZOLE  500 mg Intravenous Q8H          REVIEW OF SYSTEMS:       Review of Systems   Constitutional: Positive for fatigue. Negative for chills, diaphoresis and unexpected weight change. HENT: Negative for congestion, ear discharge, ear pain, facial swelling, hearing loss, rhinorrhea and trouble swallowing. Eyes: Negative for photophobia, discharge, redness and visual disturbance. Respiratory: Negative for apnea, cough, choking, chest tightness, shortness of breath and stridor. Cardiovascular: Negative for chest pain and palpitations. Gastrointestinal: Negative for abdominal pain, blood in stool, diarrhea and nausea. Endocrine: Negative for polydipsia, polyphagia and polyuria.    Genitourinary: Negative for difficulty urinating, dysuria, frequency, hematuria, menstrual problem and vaginal discharge. Musculoskeletal: Negative for arthralgias, joint swelling, myalgias and neck stiffness. Skin: Negative for color change and rash. Allergic/Immunologic: Negative for immunocompromised state. Neurological: Negative for dizziness, seizures, speech difficulty, light-headedness and headaches. Hematological: Negative for adenopathy. Psychiatric/Behavioral: Negative for agitation, hallucinations and suicidal ideas. Objective:       PHYSICAL EXAM:      Vitals:   Vitals:    07/12/21 1557   BP: (!) 156/77   Pulse: 88   Resp: 18   Temp: 98.1 °F (36.7 °C)   TempSrc: Oral   SpO2: 97%   Weight: 182 lb (82.6 kg)   Height: 5' 7\" (1.702 m)       Physical Exam  Vitals and nursing note reviewed. Constitutional:       General: She is not in acute distress. Appearance: She is well-developed. She is not diaphoretic. HENT:      Head: Normocephalic. Right Ear: External ear normal.      Left Ear: External ear normal.      Nose: Nose normal.   Eyes:      General: No scleral icterus. Right eye: No discharge. Left eye: No discharge. Conjunctiva/sclera: Conjunctivae normal.      Pupils: Pupils are equal, round, and reactive to light. Cardiovascular:      Rate and Rhythm: Normal rate and regular rhythm. Heart sounds: No murmur heard. No friction rub. Pulmonary:      Effort: Pulmonary effort is normal.      Breath sounds: No stridor. No wheezing or rales. Chest:      Chest wall: No tenderness. Abdominal:      Palpations: Abdomen is soft. There is no mass. Tenderness: There is abdominal tenderness (mild to moderate, LLQ). There is no guarding or rebound. Musculoskeletal:         General: No tenderness. Cervical back: Normal range of motion and neck supple. Lymphadenopathy:      Cervical: No cervical adenopathy. Skin:     General: Skin is warm and dry. Findings: No erythema or rash.    Neurological:      Mental Status: She is alert and oriented to person, place, and time. Motor: No abnormal muscle tone. Psychiatric:         Judgment: Judgment normal.           Lines: All vascular access sites are healthy with no local erythema, discharge or tenderness. Intake and output:     No intake/output data recorded. Lab Data:   All available labs were reviewed by me today. CBC:   Recent Labs     07/10/21  0547 07/11/21  0625 07/12/21  1615   WBC 35.6* 34.2* 26.4*   RBC 3.49* 3.49* 3.59*   HGB 10.8* 10.6* 10.9*   HCT 32.0* 32.5* 33.3*    308 291   MCV 91.8 93.0 92.8   MCH 30.9 30.3 30.5   MCHC 33.7 32.6 32.8   RDW 13.2 13.1 13.4   BANDSPCT 8*  --  2        BMP:  Recent Labs     07/10/21  0547 07/11/21  0625 07/12/21  1615    138 136   K 3.6 3.2* 3.4*   * 110 107   CO2 17* 17* 18*   BUN 40* 20 15   CREATININE 1.5* 0.9 0.8   CALCIUM 8.2* 8.3 8.9   GLUCOSE 136* 125* 143*        Hepatic FunctionPanel:   Lab Results   Component Value Date    ALKPHOS 145 07/12/2021    ALT 21 07/12/2021    AST 22 07/12/2021    PROT 5.8 07/12/2021    PROT 7.0 02/14/2013    BILITOT <0.2 07/12/2021    LABALBU 3.3 07/12/2021       CPK:   Lab Results   Component Value Date    CKTOTAL 107 02/14/2013     ESR: No results found for: SEDRATE  CRP: No results found for: CRP      Imaging: All pertinent images and reports for the current visit were reviewed by meduring this visit. No orders to display       Outside records:    Labs, Microbiology, Radiology and pertinent results from Care everywhere, if available, were reviewed as a part ofthe consultation.       Problem list:       Patient Active Problem List   Diagnosis Code    Closed fracture of lower end of right ulna S52.601A    Malignant neoplasm of right breast in female, estrogen receptor positive ([facility] C50.911, Z17.0    Acute renal failure (ARF) (HCC) N17.9    Essential hypertension I10    Type 2 diabetes mellitus ([facility] E11.9    Hypercholesterolemia E78.00    Gastroenteritis K52.9    Septicemia ([facility] A41.9    Bandemia D72.825    Gram-negative bacteremia R78.81    History of breast cancer Z85.3    History of diverticulitis Z87.19    Immunocompromised ([address] Y59.7    Uncomplicated asthma K46.230    Overweight E66.3         Please note that this chart was generated using Dragon dictation software. Although every effort was made to ensure the accuracy of this automated transcription, some errors in transcription may have occurred inadvertently. If you may need any clarification, please do not hesitate to contact me through EPIC or at the phone number provided below with my electronic signature. Any pictures or media included in this note were obtained after taking informed verbal consent from the patient and with their approval to include those in the patient's medical record.       Nenita Urrutia MD, MPH  7/12/21, 6:56 PM EDT   [facility] Infectious Disease   [address] [place] [address], [address] [place] [address] [address]  Office: [phone]  Fax: [phone]  Clinic days:  Tuesday & Thursday

## 2021-07-12 NOTE — H&P
Department of Internal Medicine  History & Physical      SUBJECTIVE:  The patient is a 71-year-old white female who was recently admitted to the hospital with acute renal failure and  Gastroenteritis, The patient was discharged home, she had one blood culture which grew positive for Moraxella osleonsis, the patient is immunocompromised, currently undergoing chemotherapy for breast cancer, she denies any fever or chills, she denies any nausea or vomiting she had only one loose bowel movement today  ALLERGIES:   Allergies   Allergen Reactions    Other      fireants    Erythromycin Nausea Only      MEDICATIONS:   Prior to Admission medications    Medication Sig Start Date End Date Taking? Authorizing Provider   nystatin (MYCOSTATIN) 288842 UNIT/ML suspension Take 5 mLs by mouth 4 times daily 7/11/21   Mariana Shirley MD   ondansetron (ZOFRAN-ODT) 8 MG TBDP disintegrating tablet Place 8 mg under the tongue every 8 hours as needed for Nausea or Vomiting    Historical Provider, MD   Cyanocobalamin (VITAMIN B 12 PO) Take by mouth    Historical Provider, MD   Cholecalciferol (VITAMIN D3) 125 MCG (5000 UT) TABS Take by mouth    Historical Provider, MD   atorvastatin (LIPITOR) 20 MG tablet TAKE 1 TABLET BY MOUTH AT BEDTIME AS DIRECTED. 5/8/21   Historical Provider, MD   metFORMIN (GLUCOPHAGE) 1000 MG tablet TAKE 1 TABLET BY MOUTH TWO TIMES A DAY 5/3/21   Historical Provider, MD   zafirlukast (ACCOLATE) 10 MG tablet TK 1 T PO QD 1/8/19   Historical Provider, MD   lisinopril (PRINIVIL;ZESTRIL) 10 MG tablet Take 10 mg by mouth daily    Historical Provider, MD   amLODIPine (NORVASC) 5 MG tablet Take 5 mg by mouth daily    Historical Provider, MD   albuterol (PROVENTIL) (2.5 MG/3ML) 0.083% nebulizer solution Take 2.5 mg by nebulization every 6 hours as needed.     Historical Provider, MD GREGG   Past Medical History:   Diagnosis Date    Asthma     Cancer ([facility] 06/2021    right Breast    Diabetes mellitus ([facility]     Diverticulitis     Hypertension       PSH:       Procedure Laterality Date    BREAST LUMPECTOMY Left     HYSTERECTOMY      PORT SURGERY N/A 6/23/2021    PLACEMENT OF POWER PORT A CATHETER performed by Aidan Ferrara MD at [address] [place] [address] Street Right 6/3/2021    US BREAST BIOPSY NEEDLE ADDITIONAL RIGHT 6/3/2021 [facility]TRAS[facility]    US BREAST NEEDLE BIOPSY RIGHT Right 6/3/2021    US BREAST NEEDLE BIOPSY RIGHT 6/3/2021 [facility]TRAS[facility]    US LYMPH NODE BIOPSY  6/3/2021    US LYMPH NODE BIOPSY 6/3/2021 [facility]TRASOUND      FAMILY HISTORY:       Problem Relation Age of Onset    High Blood Pressure Mother     Stroke Mother     High Blood Pressure Father       SOCIAL HISTORY:   Social History     Tobacco Use    Smoking status: Never Smoker    Smokeless tobacco: Never Used   Substance Use Topics    Alcohol use: No        REVIEW OF SYSTEMS: -   General ROS:denies any headache or weakness  Ophthalmic ROS: denies any blurred vision, double vision or vision loss  ENT ROS: denies any epistaxis, nasal discharge  Hematological and Lymphatic: denies any fatigue, night sweats, enlarge lymph nodes or bruising ,   Respiratory ROS: denies any shortness of breath, dyspnea on exertion, wheezing, or cough   Cardiovascular ROS: denies any chest pain, palpitations, shortness of breath, dizziness syncope or swelling in extremities  Gastrointestinal ROS: denies any abdominal pain, acid reflux, change in bowel habits or blood in stool, dark or tarry stools     Musculoskeletal ROS:denies any back pain or joint pain,  Neurological ROS: denies any mental status changes, seizures, memory loss, speech problems or muscle weakness in extremities   Dermatological ROS: denies any rash or skin lesions     OBJECTIVE:      PHYSICAL:   VITALS:  BP (!) 156/77   Pulse 88   Temp 98.1 °F (36.7 °C) (Oral)   Resp 18   Ht 5' 7\" (1.702 m)   Wt 182 lb (82.6 kg)   SpO2 97%   BMI 28.51 kg/m²   PULSE OXIMETRY RANGE: SpO2  Av %  Min: 97 %  Max: 97 %  No intake or output data in the 24 hours ending 21 5719   CONSTITUTIONAL:  awake, alert, cooperative, no apparent distress, and appears stated age  HEAD:  Normocephalic, atraumatic  HEENT:  ENT exam normal, no neck nodes or sinus tenderness  EYE: conjunctivae/corneas clear. PERRL, EOM's intact. Fundi benign. NECK:  Supple, symmetrical, trachea midline, no adenopathy, thyroid symmetric, not enlarged and no tenderness, skin normal  LUNGS:  No increased work of breathing, good air exchange, clear to auscultation bilaterally, no crackles or wheezing  CARDIOVASCULAR:  regular rate and rhythm  ABDOMEN:  No scars, normal bowel sounds, soft, non-distended, non-tender, no masses palpated, no hepatosplenomegally  MUSCULOSKELETAL:  There is no redness, warmth, or swelling of the joints. Full range of motion noted. Motor strength is 5 out of 5 all extremities bilaterally. Tone is normal.  NEUROLOGIC:  Awake, alert, oriented to name, place and time. Cranial nerves II-XII are grossly intact. Motor is 5 out of 5 bilaterally. Cerebellar finger to nose, heel to shin intact. Sensory is intact.   Babinski down going, Romberg negative, and gait is normal.  SKIN:  no bruising or bleeding  EDEMA: Normal    Data    Labs Reviewed   CBC WITH AUTO DIFFERENTIAL - Abnormal; Notable for the following components:       Result Value    WBC 26.4 (*)     RBC 3.59 (*)     Hemoglobin 10.9 (*)     Hematocrit 33.3 (*)     Neutrophils Absolute 22.7 (*)     Metamyelocytes Relative 1 (*)     Myelocyte Percent 1 (*)     Toxic Granulation Present (*)     All other components within normal limits    Narrative:     Performed at:  [facility] [address], [facility], [address]   Phone [phone]   COMPREHENSIVE METABOLIC PANEL - Abnormal; Notable for the following components:    Potassium 3.4 (*)     CO2 18 (*)     Glucose 143 (*)     Total Protein 5.8 (*)     Albumin 3.3 (*)     Alkaline Phosphatase 145 (*)     All other components within normal limits    Narrative:     Performed at:  [facility]  [address] [facility]  [place], [address]   Phone [phone]   CULTURE, BLOOD 2   CULTURE, BLOOD 1   LACTATE, SEPSIS    Narrative:     Performed at:  [facility]  [address] [facility]  [place], [address]   Phone [phone]   LACTATE, SEPSIS     CBC:   Lab Results   Component Value Date    WBC 26.4 07/12/2021    RBC 3.59 07/12/2021    HGB 10.9 07/12/2021    HCT 33.3 07/12/2021    MCV 92.8 07/12/2021    MCH 30.5 07/12/2021    MCHC 32.8 07/12/2021    RDW 13.4 07/12/2021     07/12/2021    MPV 7.1 07/12/2021     CMP:    Lab Results   Component Value Date     07/12/2021    K 3.4 07/12/2021    K 4.1 07/09/2021     07/12/2021    CO2 18 07/12/2021    BUN 15 07/12/2021    CREATININE 0.8 07/12/2021    GFRAA >60 07/12/2021    GFRAA >60 02/14/2013    AGRATIO 1.3 07/12/2021    LABGLOM >60 07/12/2021    GLUCOSE 143 07/12/2021    PROT 5.8 07/12/2021    PROT 7.0 02/14/2013    LABALBU 3.3 07/12/2021    CALCIUM 8.9 07/12/2021    BILITOT <0.2 07/12/2021    ALKPHOS 145 07/12/2021    AST 22 07/12/2021    ALT 21 07/12/2021       Imaging  @[facility]    ASSESSMENT      Patient Active Problem List   Diagnosis    Closed fracture of lower end of right ulna    Malignant neoplasm of right breast in female, estrogen receptor positive ([facility]    Acute renal failure (ARF) ([facility]    Essential hypertension    Type 2 diabetes mellitus ([facility]    Hypercholesterolemia    Gastroenteritis    Septicemia ([facility]         PLAN  1. The patient was admitted to medical floor by recommendation of ID to be started on Rocephin until 2 negative blood cultures  2. The patient was started on Rocephin 1 g q.24  3. Resume home medications  4. Accu-Chek a.c. and h.s. and low dose sliding scale  5. Lovenox for DVT prophylaxis  6.  Replace potassium and recheck Chem-7 in a.m.  7. Blood cultures x2 drawn in the ER

## 2021-07-12 NOTE — ED PROVIDER NOTES
[address] [facility]        Pt Name: Donato Macdonald  MRN: [id number]  [place] 1945  Date of evaluation: 7/12/2021  Provider: Kwesi Cadet PA-C  PCP: Saroj Naidu MD  Note Started: 4:20 PM EDT        I have seen and evaluated this patient with my supervising physician Kelsy Castro MD.    CHIEF COMPLAINT       Chief Complaint   Patient presents with    Abnormal Lab     Was sent by office for elevated WBC & positive blood culture; takes steroid & chemo. HISTORY OF PRESENT ILLNESS   (Location, Timing/Onset, Context/Setting, Quality, Duration, Modifying Factors, Severity, Associated Signs and Symptoms)  Note limiting factors. Chief Complaint: Abnormal lab    Donato Macdonald is a 77 y.o. female who presents to the emergency department today for evaluation for an abnormal laboratory test.  The patient states that she has a history of breast cancer, and she states that she did started chemotherapy over 1 week ago. The patient states that she is actually seen in the hospital 4 days ago, she was admitted for dehydration, altered kidney function. The patient states that she was discharged home yesterday and she states that she has been doing much better. The patient states that she is feeling great today, she states that she is otherwise asymptomatic. The patient states that she was told come back in because of \"something with a blood culture\". Again when the patient rest the ED she is asymptomatic. She has no nausea, vomiting or diarrhea today. She is no chest pain. No shortness of breath. No abdominal pain. No fever chills per no cough or congestion. No urinary symptoms. No other complaints. Patient states that she does receive dexamethasone before and after chemotherapy treatments and she states that she did receive a white blood cell booster as well.     Nursing Notes were all reviewed and agreed with or any disagreements were addressed in the HPI. REVIEW OF SYSTEMS    (2-9 systems for level 4, 10 or more for level 5)     Review of Systems   Constitutional: Negative for activity change, appetite change, chills and fever. HENT: Negative for congestion and rhinorrhea. Respiratory: Negative for cough and shortness of breath. Cardiovascular: Negative for chest pain. Gastrointestinal: Negative for abdominal pain, diarrhea, nausea and vomiting. Genitourinary: Negative for difficulty urinating, dysuria and hematuria. Neurological: Negative for weakness and numbness. Positives and Pertinent negatives as per HPI. Except as noted above in the ROS, all other systems were reviewed and negative. PAST MEDICAL HISTORY     Past Medical History:   Diagnosis Date    Asthma     Cancer ([facility] 06/2021    right Breast    [facility]     Diverticulitis     Hypertension          SURGICAL HISTORY     Past Surgical History:   Procedure Laterality Date    BREAST LUMPECTOMY Left     HYSTERECTOMY      PORT SURGERY N/A 6/23/2021    PLACEMENT OF POWER PORT A CATHETER performed by Al Vera MD at [address] [place] [address] Right 6/3/2021    US BREAST BIOPSY NEEDLE ADDITIONAL RIGHT 6/3/2021 [facility] ULTRASOUND    US BREAST NEEDLE BIOPSY RIGHT Right 6/3/2021    US BREAST NEEDLE BIOPSY RIGHT 6/3/2021 [facility] ULTRASOUND    US LYMPH NODE BIOPSY  6/3/2021    US LYMPH NODE BIOPSY 6/3/2021 [facility] ULTRASOUND         CURRENTMEDICATIONS       Previous Medications    ALBUTEROL (PROVENTIL) (2.5 MG/3ML) 0.083% NEBULIZER SOLUTION    Take 2.5 mg by nebulization every 6 hours as needed. AMLODIPINE (NORVASC) 5 MG TABLET    Take 5 mg by mouth daily    ATORVASTATIN (LIPITOR) 20 MG TABLET    TAKE 1 TABLET BY MOUTH AT BEDTIME AS DIRECTED.     CHOLECALCIFEROL (VITAMIN D3) 125 MCG (5000 UT) TABS    Take by mouth    CYANOCOBALAMIN (VITAMIN B 12 PO)    Take by mouth    LISINOPRIL (PRINIVIL;ZESTRIL) 10 MG TABLET    Take 10 mg by mouth daily    METFORMIN (GLUCOPHAGE) 1000 MG TABLET    TAKE 1 TABLET BY MOUTH TWO TIMES A DAY    NYSTATIN (MYCOSTATIN) 762908 UNIT/ML SUSPENSION    Take 5 mLs by mouth 4 times daily    ONDANSETRON (ZOFRAN-ODT) 8 MG TBDP DISINTEGRATING TABLET    Place 8 mg under the tongue every 8 hours as needed for Nausea or Vomiting    ZAFIRLUKAST (ACCOLATE) 10 MG TABLET    TK 1 T PO QD         ALLERGIES     Other and Erythromycin    FAMILYHISTORY       Family History   Problem Relation Age of Onset    High Blood Pressure Mother     Stroke Mother     High Blood Pressure Father           SOCIAL HISTORY       Social History     Tobacco Use    Smoking status: Never Smoker    Smokeless tobacco: Never Used   Vaping Use    Vaping Use: Never used   Substance Use Topics    Alcohol use: No    Drug use: No       SCREENINGS             PHYSICAL EXAM    (up to 7 for level 4, 8 or more for level 5)     ED Triage Vitals [07/12/21 1557]   BP Temp Temp Source Pulse Resp SpO2 Height Weight   (!) 156/77 98.1 °F (36.7 °C) Oral 88 18 97 % 5' 7\" (1.702 m) 182 lb (82.6 kg)       Physical Exam  Vitals and nursing note reviewed. Constitutional:       Appearance: She is well-developed. She is not diaphoretic. HENT:      Head: Normocephalic and atraumatic. Right Ear: External ear normal.      Left Ear: External ear normal.      Nose: Nose normal.   Eyes:      General:         Right eye: No discharge. Left eye: No discharge. Neck:      Trachea: No tracheal deviation. Cardiovascular:      Rate and Rhythm: Normal rate and regular rhythm. Heart sounds: No murmur heard. Pulmonary:      Effort: Pulmonary effort is normal. No respiratory distress. Breath sounds: Normal breath sounds. No wheezing or rales. Abdominal:      General: Bowel sounds are normal. There is no distension. Palpations: Abdomen is soft. Tenderness: There is no abdominal tenderness. There is no guarding. Musculoskeletal:         General: Normal range of motion. Cervical back: Normal range of motion and neck supple. Skin:     General: Skin is warm and dry. Neurological:      Mental Status: She is alert and oriented to person, place, and time. Psychiatric:         Behavior: Behavior normal.         DIAGNOSTIC RESULTS   LABS:    Labs Reviewed   CBC WITH AUTO DIFFERENTIAL - Abnormal; Notable for the following components:       Result Value    WBC 26.4 (*)     RBC 3.59 (*)     Hemoglobin 10.9 (*)     Hematocrit 33.3 (*)     Neutrophils Absolute 22.7 (*)     Metamyelocytes Relative 1 (*)     Myelocyte Percent 1 (*)     Toxic Granulation Present (*)     All other components within normal limits    Narrative:     Performed at:  [facility] [address], [place], [address] [place]   Phone [phone]   COMPREHENSIVE METABOLIC PANEL - Abnormal; Notable for the following components:    Potassium 3.4 (*)     CO2 18 (*)     Glucose 143 (*)     Total Protein 5.8 (*)     Albumin 3.3 (*)     Alkaline Phosphatase 145 (*)     All other components within normal limits    Narrative:     Performed at:  [facility] [address] [facility], [facility] [place]   Phone [phone]   CULTURE, BLOOD 2   CULTURE, BLOOD 1   LACTATE, SEPSIS    Narrative:     Performed at:  [facility] [address], [place], [facility] [place]   Phone [phone]   LACTATE, SEPSIS       When ordered only abnormal lab results are displayed. All other labs were within normal range or not returned as of this dictation. EKG: When ordered, EKG's are interpreted by the Emergency Department Physician in the absence of a cardiologist.  Please see their note for interpretation of EKG.     RADIOLOGY:   Non-plain film images such as CT, Ultrasound and MRI are read by the radiologist. Plain radiographic images are visualized and preliminarily interpreted by the ED Provider with the below findings:        Interpretation per the Radiologist below, if available at the time of this note:    No orders to display     [facility] Additional Contrast? None    Result Date: 7/8/2021  EXAMINATION: CT OF THE ABDOMEN AND PELVIS WITHOUT CONTRAST 7/8/2021 8:30 pm TECHNIQUE: CT of the abdomen and pelvis was performed without the administration of intravenous contrast. Multiplanar reformatted images are provided for review. Dose modulation, iterative reconstruction, and/or weight based adjustment of the mA/kV was utilized to reduce the radiation dose to as low as reasonably achievable. COMPARISON: None. HISTORY: ORDERING SYSTEM PROVIDED HISTORY: n/v/d TECHNOLOGIST PROVIDED HISTORY: Reason for exam:->n/v/d Additional Contrast?->None Decision Support Exception - unselect if not a suspected or confirmed emergency medical condition->Emergency Medical Condition (MA) Reason for Exam: N/V/D. Dehydration (oncologist sent d/t severe dehydration). Acuity: Acute Type of Exam: Initial FINDINGS: Lower Chest:The lung bases are clear Organs: The liver, spleen, adrenal glands, kidneys, and pancreas demonstrate no acute abnormality. GI/Bowel: There is a small hiatal hernia. No abnormally dilated loops of bowel are seen. There are multiple fluid-filled nondilated loops of large and small bowel. Peritoneum/Retroperitoneum: Unremarkable Pelvis: Unremarkable Bones: No suspicious osseous lesions are identified     Multiple fluid-filled loops of large and small bowel, findings are most consistent with gastroenteritis.            PROCEDURES   Unless otherwise noted below, none     Procedures    CRITICAL CARE TIME   N/A    CONSULTS:  IP CONSULT TO PRIMARY CARE PROVIDER  IP CONSULT TO INFECTIOUS DISEASES  IP CONSULT TO PRIMARY CARE PROVIDER  IP CONSULT TO INFECTIOUS DISEASES      EMERGENCY DEPARTMENT COURSE and DIFFERENTIAL DIAGNOSIS/MDM:   Vitals:    Vitals:    07/12/21 1557   BP: (!) 156/77 Pulse: 88   Resp: 18   Temp: 98.1 °F (36.7 °C)   TempSrc: Oral   SpO2: 97%   Weight: 182 lb (82.6 kg)   Height: 5' 7\" (1.702 m)       Patient was given the following medications:  Medications   cefTRIAXone (ROCEPHIN) 2000 mg IVPB in D5W 50ml minibag (has no administration in time range)   potassium chloride (KLOR-CON M) extended release tablet 20 mEq (has no administration in time range)           Briefly, this is a 28-year-old female who presents to the emergency department today for evaluation for an abnormal laboratory test.  The patient was actually admitted on 7/8/2021 for SWAPNA by myself was actually discharged home yesterday. He was noted to have a white blood cell count however she states that she did receive dexamethasone before and after chemotherapy treatment and also received a white blood cell booster for her chemo. In review the patient's urine and blood culture was positive growing 1 of 2 of moraxella osloensis, patient is asymptomatic feels well to come back for further care and evaluation    She has leukocytosis of 26.4, is actually trending downward. Her CMP is unremarkable. Lactic acid is normal.    Did discuss the case with Dr. Sydnie Stovall, infectious disease who did recommend admission, and IV Rocephin, which has been given, Dr. Jero Richard has agreed on with patient, patient is updated and agreeable with plan. Stable for admission. FINAL IMPRESSION      1. Bacteremia          DISPOSITION/PLAN   DISPOSITION Admitted 07/12/2021 05:53:57 PM      PATIENT REFERRED TO:  No follow-up provider specified.     DISCHARGE MEDICATIONS:  New Prescriptions    No medications on file       DISCONTINUED MEDICATIONS:  Discontinued Medications    No medications on file              (Please note that portions of this note were completed with a voice recognition program.  Efforts were made to edit the dictations but occasionally words are mis-transcribed.)    Emanuel Yanez PA-C (electronically signed)

## 2021-07-12 NOTE — ED NOTES
Bed: 14  Expected date:   Expected time:   Means of arrival:   Comments:  Carlyn Petersen RN  07/12/21 6199

## 2021-07-13 PROBLEM — E83.42 HYPOMAGNESEMIA: Status: ACTIVE | Noted: 2021-07-13

## 2021-07-13 PROBLEM — E87.6 HYPOKALEMIA: Status: ACTIVE | Noted: 2021-07-13

## 2021-07-13 LAB
ANION GAP SERPL CALCULATED.3IONS-SCNC: 11 MMOL/L (ref 3–16)
BUN BLDV-MCNC: 13 MG/DL (ref 7–20)
C DIFF TOXIN/ANTIGEN: NORMAL
CALCIUM SERPL-MCNC: 8.5 MG/DL (ref 8.3–10.6)
CHLORIDE BLD-SCNC: 108 MMOL/L (ref 99–110)
CO2: 21 MMOL/L (ref 21–32)
CREAT SERPL-MCNC: 0.7 MG/DL (ref 0.6–1.2)
GFR AFRICAN AMERICAN: >60
GFR NON-AFRICAN AMERICAN: >60
GLUCOSE BLD-MCNC: 140 MG/DL (ref 70–99)
GLUCOSE BLD-MCNC: 152 MG/DL (ref 70–99)
GLUCOSE BLD-MCNC: 169 MG/DL (ref 70–99)
GLUCOSE BLD-MCNC: 177 MG/DL (ref 70–99)
GLUCOSE BLD-MCNC: 245 MG/DL (ref 70–99)
MAGNESIUM: 1 MG/DL (ref 1.8–2.4)
PERFORMED ON: ABNORMAL
POTASSIUM REFLEX MAGNESIUM: 3.3 MMOL/L (ref 3.5–5.1)
SODIUM BLD-SCNC: 140 MMOL/L (ref 136–145)

## 2021-07-13 PROCEDURE — 2580000003 HC RX 258: Performed by: INTERNAL MEDICINE

## 2021-07-13 PROCEDURE — 2500000003 HC RX 250 WO HCPCS: Performed by: INTERNAL MEDICINE

## 2021-07-13 PROCEDURE — 87324 CLOSTRIDIUM AG IA: CPT

## 2021-07-13 PROCEDURE — 80048 BASIC METABOLIC PNL TOTAL CA: CPT

## 2021-07-13 PROCEDURE — 6360000002 HC RX W HCPCS: Performed by: INTERNAL MEDICINE

## 2021-07-13 PROCEDURE — 6370000000 HC RX 637 (ALT 250 FOR IP): Performed by: INTERNAL MEDICINE

## 2021-07-13 PROCEDURE — 99233 SBSQ HOSP IP/OBS HIGH 50: CPT | Performed by: INTERNAL MEDICINE

## 2021-07-13 PROCEDURE — 87449 NOS EACH ORGANISM AG IA: CPT

## 2021-07-13 PROCEDURE — 87505 NFCT AGENT DETECTION GI: CPT

## 2021-07-13 PROCEDURE — 6370000000 HC RX 637 (ALT 250 FOR IP): Performed by: HOSPITALIST

## 2021-07-13 PROCEDURE — 36415 COLL VENOUS BLD VENIPUNCTURE: CPT

## 2021-07-13 PROCEDURE — 83735 ASSAY OF MAGNESIUM: CPT

## 2021-07-13 PROCEDURE — 1200000000 HC SEMI PRIVATE

## 2021-07-13 RX ORDER — LANOLIN ALCOHOL/MO/W.PET/CERES
3 CREAM (GRAM) TOPICAL NIGHTLY PRN
Status: DISCONTINUED | OUTPATIENT
Start: 2021-07-13 | End: 2021-07-15 | Stop reason: HOSPADM

## 2021-07-13 RX ORDER — POTASSIUM CHLORIDE 20 MEQ/1
20 TABLET, EXTENDED RELEASE ORAL 2 TIMES DAILY WITH MEALS
Status: DISCONTINUED | OUTPATIENT
Start: 2021-07-13 | End: 2021-07-13

## 2021-07-13 RX ORDER — LOPERAMIDE HYDROCHLORIDE 2 MG/1
2 CAPSULE ORAL 4 TIMES DAILY PRN
Status: DISCONTINUED | OUTPATIENT
Start: 2021-07-13 | End: 2021-07-15 | Stop reason: HOSPADM

## 2021-07-13 RX ORDER — POTASSIUM BICARBONATE 25 MEQ/1
25 TABLET, EFFERVESCENT ORAL 2 TIMES DAILY
Status: DISCONTINUED | OUTPATIENT
Start: 2021-07-13 | End: 2021-07-15 | Stop reason: HOSPADM

## 2021-07-13 RX ORDER — MAGNESIUM SULFATE IN WATER 40 MG/ML
2000 INJECTION, SOLUTION INTRAVENOUS ONCE
Status: COMPLETED | OUTPATIENT
Start: 2021-07-13 | End: 2021-07-13

## 2021-07-13 RX ADMIN — ENOXAPARIN SODIUM 40 MG: 40 INJECTION SUBCUTANEOUS at 08:04

## 2021-07-13 RX ADMIN — METRONIDAZOLE 500 MG: 500 INJECTION, SOLUTION INTRAVENOUS at 20:27

## 2021-07-13 RX ADMIN — LOPERAMIDE HYDROCHLORIDE 2 MG: 2 CAPSULE ORAL at 20:16

## 2021-07-13 RX ADMIN — METFORMIN HYDROCHLORIDE 1000 MG: 500 TABLET ORAL at 17:02

## 2021-07-13 RX ADMIN — Medication 10 ML: at 08:13

## 2021-07-13 RX ADMIN — MAGNESIUM SULFATE HEPTAHYDRATE 2000 MG: 40 INJECTION, SOLUTION INTRAVENOUS at 09:39

## 2021-07-13 RX ADMIN — POTASSIUM CHLORIDE 20 MEQ: 20 TABLET, EXTENDED RELEASE ORAL at 17:02

## 2021-07-13 RX ADMIN — INSULIN LISPRO 1 UNITS: 100 INJECTION, SOLUTION INTRAVENOUS; SUBCUTANEOUS at 20:22

## 2021-07-13 RX ADMIN — INSULIN LISPRO 1 UNITS: 100 INJECTION, SOLUTION INTRAVENOUS; SUBCUTANEOUS at 17:05

## 2021-07-13 RX ADMIN — ACETAMINOPHEN 650 MG: 325 TABLET ORAL at 10:46

## 2021-07-13 RX ADMIN — NYSTATIN 500000 UNITS: 100000 SUSPENSION ORAL at 17:02

## 2021-07-13 RX ADMIN — AMLODIPINE BESYLATE 2.5 MG: 5 TABLET ORAL at 08:03

## 2021-07-13 RX ADMIN — Medication 10 ML: at 20:19

## 2021-07-13 RX ADMIN — INSULIN LISPRO 1 UNITS: 100 INJECTION, SOLUTION INTRAVENOUS; SUBCUTANEOUS at 13:23

## 2021-07-13 RX ADMIN — NYSTATIN 500000 UNITS: 100000 SUSPENSION ORAL at 08:03

## 2021-07-13 RX ADMIN — LISINOPRIL 10 MG: 10 TABLET ORAL at 08:03

## 2021-07-13 RX ADMIN — NYSTATIN 500000 UNITS: 100000 SUSPENSION ORAL at 13:23

## 2021-07-13 RX ADMIN — METRONIDAZOLE 500 MG: 500 INJECTION, SOLUTION INTRAVENOUS at 13:49

## 2021-07-13 RX ADMIN — CEFTRIAXONE 2000 MG: 2 INJECTION, POWDER, FOR SOLUTION INTRAMUSCULAR; INTRAVENOUS at 17:05

## 2021-07-13 RX ADMIN — ONDANSETRON 4 MG: 2 INJECTION INTRAMUSCULAR; INTRAVENOUS at 03:23

## 2021-07-13 RX ADMIN — LOPERAMIDE HYDROCHLORIDE 2 MG: 2 CAPSULE ORAL at 06:52

## 2021-07-13 RX ADMIN — POTASSIUM BICARBONATE 25 MEQ: 978 TABLET, EFFERVESCENT ORAL at 20:18

## 2021-07-13 RX ADMIN — METRONIDAZOLE 500 MG: 500 INJECTION, SOLUTION INTRAVENOUS at 05:50

## 2021-07-13 RX ADMIN — NYSTATIN 500000 UNITS: 100000 SUSPENSION ORAL at 20:16

## 2021-07-13 RX ADMIN — METFORMIN HYDROCHLORIDE 1000 MG: 500 TABLET ORAL at 08:04

## 2021-07-13 RX ADMIN — INSULIN LISPRO 1 UNITS: 100 INJECTION, SOLUTION INTRAVENOUS; SUBCUTANEOUS at 08:08

## 2021-07-13 RX ADMIN — LOPERAMIDE HYDROCHLORIDE 2 MG: 2 CAPSULE ORAL at 13:23

## 2021-07-13 RX ADMIN — MELATONIN TAB 3 MG 3 MG: 3 TAB at 21:31

## 2021-07-13 RX ADMIN — SODIUM CHLORIDE 25 ML: 9 INJECTION, SOLUTION INTRAVENOUS at 05:50

## 2021-07-13 RX ADMIN — POTASSIUM CHLORIDE 20 MEQ: 1500 TABLET, EXTENDED RELEASE ORAL at 08:03

## 2021-07-13 RX ADMIN — ATORVASTATIN CALCIUM 20 MG: 20 TABLET, FILM COATED ORAL at 20:16

## 2021-07-13 ASSESSMENT — ENCOUNTER SYMPTOMS
STRIDOR: 0
EYE REDNESS: 0
FACIAL SWELLING: 0
NAUSEA: 0
CHEST TIGHTNESS: 0
ABDOMINAL PAIN: 0
APNEA: 0
TROUBLE SWALLOWING: 0
COLOR CHANGE: 0
DIARRHEA: 1
BLOOD IN STOOL: 0
EYE DISCHARGE: 0
CHOKING: 0
RHINORRHEA: 0
COUGH: 0
SHORTNESS OF BREATH: 0
PHOTOPHOBIA: 0

## 2021-07-13 ASSESSMENT — PAIN SCALES - GENERAL
PAINLEVEL_OUTOF10: 0
PAINLEVEL_OUTOF10: 0

## 2021-07-13 NOTE — PROGRESS NOTES
Message sent to Dr. Nino Tran regarding something for diarrhea and an oncology consult. New orders received for both.

## 2021-07-13 NOTE — PROGRESS NOTES
Physician Progress Note      Vivian Alcocer  CSN #:                  [id number]  :                       1945  ADMIT DATE:       2021 3:51 PM  [address] [place] DATE:  RESPONDING  PROVIDER #:        Leisa Gordon MD          QUERY TEXT:    Patient admitted with positive blood culture. Noted documentation of Acute   Kidney Injury in the active problems list in the H&P. In order to support the   diagnosis of SWAPNA, please include additional clinical indicators in your   documentation. Or please document if the diagnosis of SWAPNA has been ruled out   after further study    The medical record reflects the following:  Risk Factors: Chemo  Clinical Indicators: Pt discharged from the hospital 1 day before readmit for   SWAPNA and dehydration now returns for positive blood cultures and leukocytosis. Creatinine this admission 0.8-->0.7. Treatment: Monitoring    Defined by Kidney Disease Improving Global Outcomes (KDIGO) clinical practice   guideline for acute kidney injury:  -Increase in SCr by greater than or equal to 0.3 mg/dl within 48 hours; or  -Increase or decrease in SCr to greater than or equal to 1.5 times baseline,   which is known or presumed to have occurred within the prior 7 days; or  -Urine volume < 0.5ml/kg/h for 6 hours  Options provided:  -- Acute kidney injury evidenced by, Please document evidence as well as   baseline creatinine, if known. -- Acute kidney injury ruled out after study  -- Other - I will add my own diagnosis  -- Disagree - Not applicable / Not valid  -- Disagree - Clinically unable to determine / Unknown  -- Refer to Clinical Documentation Reviewer    PROVIDER RESPONSE TEXT:    Provider disagreed with this query.     Query created by: Sandy Bocanegra on 2021 11:39 AM      Electronically signed by:  Leisa Gordon MD 2021 12:16 PM

## 2021-07-13 NOTE — PROGRESS NOTES
Department of Internal Medicine  Progress Note      SUBJECTIVE: Denies any new complaints, had a few bowel movements overnight, no fever or chills, no cough, no nausea or vomiting. Review of Systems - General ROS:denies any headache or weakness    Respiratory ROS: denies any shortness of breath, dyspnea on exertion, wheezing, or cough   Cardiovascular ROS: denies any chest pain, palpitations, shortness of breath, dizziness syncope or swelling in extremities  Gastrointestinal ROS: denies any abdominal pain, acid reflux, has Loose stools, no blood in stool, dark or tarry stools       OBJECTIVE:      PHYSICAL:   VITALS:  BP (!) 143/70   Pulse 85   Temp 97.9 °F (36.6 °C) (Oral)   Resp 18   Ht 5' 7\" (1.702 m)   Wt 182 lb (82.6 kg)   SpO2 96%   BMI 28.51 kg/m²   PULSE OXIMETRY RANGE: SpO2  Av.2 %  Min: 95 %  Max: 97 %    Intake/Output Summary (Last 24 hours) at 2021 0942  Last data filed at 2021 6299  Gross per 24 hour   Intake 166.98 ml   Output --   Net 166.98 ml      CONSTITUTIONAL:  awake, alert, cooperative, no apparent distress, and appears stated age  NECK:  Supple, symmetrical, trachea midline, no adenopathy, thyroid symmetric, not enlarged and no tenderness, skin normal  LUNGS:  No increased work of breathing, good air exchange, clear to auscultation bilaterally, no crackles or wheezing  CARDIOVASCULAR:  regular rate and rhythm  ABDOMEN:  No scars, normal bowel sounds, soft, non-distended, non-tender, no masses palpated, no hepatosplenomegally  NEUROLOGIC:  Awake, alert, oriented to name, place and time. Cranial nerves II-XII are grossly intact. Motor is 5 out of 5 bilaterally. Cerebellar finger to nose, heel to shin intact. Sensory is intact.   Babinski down going, Romberg negative, and gait is normal.  EDEMA: Normal    Data      CBC:   Lab Results   Component Value Date    WBC 26.4 2021    RBC 3.59 2021    HGB 10.9 2021    HCT 33.3 2021    MCV 92.8 2021 MCH 30.5 07/12/2021    MCHC 32.8 07/12/2021    RDW 13.4 07/12/2021     07/12/2021    MPV 7.1 07/12/2021     CMP:    Lab Results   Component Value Date     07/13/2021    K 3.3 07/13/2021     07/13/2021    CO2 21 07/13/2021    BUN 13 07/13/2021    CREATININE 0.7 07/13/2021    GFRAA >60 07/13/2021    GFRAA >60 02/14/2013    AGRATIO 1.3 07/12/2021    LABGLOM >60 07/13/2021    GLUCOSE 152 07/13/2021    PROT 5.8 07/12/2021    PROT 7.0 02/14/2013    LABALBU 3.3 07/12/2021    CALCIUM 8.5 07/13/2021    BILITOT <0.2 07/12/2021    ALKPHOS 145 07/12/2021    AST 22 07/12/2021    ALT 21 07/12/2021       ASSESSMENT      Patient Active Problem List   Diagnosis    Closed fracture of lower end of right ulna    Malignant neoplasm of right breast in female, estrogen receptor positive ([address]    Acute renal failure (ARF) (HCC)    Essential hypertension    Type 2 diabetes mellitus (HCC)    Hypercholesterolemia    Gastroenteritis    Septicemia ([address]    Bandemia    Gram-negative bacteremia    History of breast cancer    History of diverticulitis    Immunocompromised ([address]    Uncomplicated asthma    Overweight    Hypokalemia    Hypomagnesemia         PLAN  1. Replace potassium and magnesium  2. Continue Flagyl and Rocephin as per ID  3. Await culture results  4. Recheck labs in the morning  5.  On Lovenox for DVT prophylaxis

## 2021-07-13 NOTE — ED NOTES
Report called to Chevy Sandhu on 4T. Pt able to be transported upstairs at this time.      Wei Caraballo RN  07/12/21 2005

## 2021-07-13 NOTE — PROGRESS NOTES
Patient admitted to room 4465 at this time. Oriented to room and call light system. Admission assessment complete. See flow sheet. Patient resting in bed quietly at this time. No complaints of pain or discomfort voiced at this time. Respirations easy and even. Denies needs at this time. The care plan and education has been reviewed and mutually agreed upon with the patient.

## 2021-07-13 NOTE — PROGRESS NOTES
Infectious Diseases   Progress Note      Admission Date: 7/12/2021  Hospital Day: Hospital Day: 2   Attending: Jorden Gardner MD  Date of service: 7/13/2021     Chief complaint/ Reason for consult:     · Gram-negative bacteremia with Moraxella osleonsis  · Bandemia  · History of breast cancer   · Immunocompromised  · History of diverticulitis    Microbiology:        I have reviewed allavailable micro lab data and cultures    · Blood culture (2/2) - collected on 7/12/2021: *Negative  · Stool CVA- collected on 7/13/2021: Negative      Antibiotics and immunizations:       Current antibiotics: All antibiotics and their doses were reviewed by me    Recent Abx Admin                   metronidazole (FLAGYL) 500 mg in NaCl 100 mL IVPB premix (mg) 500 mg New Bag 07/13/21 1349      Restarted  0621     500 mg New Bag  0550     500 mg New Bag 07/12/21 2138    cefTRIAXone (ROCEPHIN) 2000 mg IVPB in D5W 50ml minibag (mg) 2,000 mg New Bag 07/12/21 1923                  Immunization History: All immunization history was reviewed by me today. There is no immunization history on file for this patient. Known drug allergies: All allergies were reviewed and updated    Allergies   Allergen Reactions    Other      fireants    Erythromycin Nausea Only       Social history:     Social History:  All social andepidemiologic history was reviewed and updated by me today as needed. · Tobacco use:   reports that she has never smoked. She has never used smokeless tobacco.  · Alcohol use:   reports no history of alcohol use. · Currently [facility] in: [facility]  ·  reports no history of drug use.      COVID VACCINATION AND LAB RESULT RECORDS:     Internal Administration   First Dose      Second Dose           Last COVID Lab No results found for: SARS-COV-2, SARS-COV-2 RNA, SARS-COV-2, SARS-COV-2, SARS-COV-2 BY PCR, SARS-COV-2, SARS-COV-2, SARS-COV-2         Assessment:     The patient is a 77 y.o. old female who has a past medical history of Asthma, Cancer ([facility] (06/2021), [facility] Diverticulitis, and Hypertension. with following problems:    · Gram-negative bacteremia with Moraxella osleonsis-currently covered with ceftriaxone  · Bandemia-check CBC tomorrow  · History of breast cancer   · Immunocompromised  · History of diverticulitis  · Asthma  · Port-A-Cath in place  · Essential hypertension-blood pressure okay  · Overweight due to excess calorie intake : Body mass index is 28.51 kg/m². Discussion:      The patient is afebrile. I started him on IV ceftriaxone and IV Flagyl yesterday. The patient started having diarrhea last night. Stool C. difficile EIA was done by primary team earlier today and was negative. Serum creatinine 0.7. Repeat blood cultures from yesterday are in process    Unclear if bandemia secondary to recent steroid use or infection. Plan:     Diagnostic Workup:    · Will order CBC and CMP for tomorrow  · Follow-up on blood cultures from yesterday  · Continue to follow  fever curve, WBC count and blood cultures. · Continue to monitor blood counts, liver and renal function. · Will order stool GI PCR    Antimicrobials:    · Will continue IV ceftriaxone 2 g every 24 hours  · Continue IV Flagyl 500 mg every 8 hour  · Will follow up on the labs tomorrow and of the blood cultures from yesterday and will make further recommendations accordingly  · She will likely be able to switch her to oral antibiotics at the time of discharge  · Continue close vitals monitoring. · Maintain good glycemic control. · Fall precautions. Aspiration precautions. · Continue to watch for new fever or diarrhea. · DVT prophylaxis. · Discussed all above with patient and RN.       Drug Monitoring:    · Continue monitoring for antibiotic toxicity as follows: CBC, CMP   · Continue to watch for following: new or worsening fever, new hypotension, hives, lip swelling and redness or purulence at vascular access sites. I/v access Management:    · Continue to monitor i.v access sites for erythema, induration, discharge or tenderness. · As always, continue efforts to minimize tubes/lines/drains as clinically appropriate to reduce chances of line associated infections. Patient education and counseling:       · The patient was educated in detail about the side-effects of various antibiotics and things to watch for like new rashes, lip swelling, severe reaction, worsening diarrhea, break through fever etc.  · Discussed patient's condition and what to expect. All of the patient's questions were addressed in a satisfactory manner and patient verbalized understanding all instructions. Level of complexity of visit: High     Weight loss counseling:    Extensive weight loss counseling was done. It is important to set a realistic weight loss goal. First goal should be to avoid gaining more weight and staying at current weight (or within 5 percent). People at high risk of developing diabetes who are able to lose 5 percent of their body weight and maintain this weight will reduce their risk of developing diabetes by about 50 percent and reduce their blood pressure. Losing more than 15 percent of  body weight and staying at this weight is an extremely good result, even if you never reach your \"dream\" or \"ideal\" weight. Lifestyle changes including changing eating habits, substituting excess carbohydrates with proteins, stress reduction, using self-help programs like Weight Watchers®, Overeaters Anonymous®, and Take Off Pounds Sensibly (TOPS)© , following DASH diet and increasing exercise or walking briskly daily for half hour to and hour 5-7 days a week was suggested among other measures.  Information was given about various weight loss education programs and their websites like www.cdc.gov/healthyweight, www.choosemyplate.gov and www.health.gov/dietaryguidelines/    TIME SPENT TODAY:     - Spent over  36 minutes on visit (including interval history, physical exam, review of data including labs, cultures, imaging, development and implementation of treatment plan and coordination of complex care). More than 50 percent of this includes face-to-face time spent with the patient for counseling and coordination of care. Thank you for involving me in the care of your patient. I will continue to follow. If you have anyadditional questions, please do not hesitate to contact me. Subjective: Interval history: Interval history was obtained from chart review and patient/ RN. The patient is afebrile. She is tolerating antibiotics okay. Had diarrhea last night     REVIEW OF SYSTEMS:      Review of Systems   Constitutional: Negative for chills, diaphoresis and unexpected weight change. HENT: Negative for congestion, ear discharge, ear pain, facial swelling, hearing loss, rhinorrhea and trouble swallowing. Eyes: Negative for photophobia, discharge, redness and visual disturbance. Respiratory: Negative for apnea, cough, choking, chest tightness, shortness of breath and stridor. Cardiovascular: Negative for chest pain and palpitations. Gastrointestinal: Positive for diarrhea. Negative for abdominal pain, blood in stool and nausea. Endocrine: Negative for polydipsia, polyphagia and polyuria. Genitourinary: Negative for difficulty urinating, dysuria, frequency, hematuria, menstrual problem and vaginal discharge. Musculoskeletal: Negative for arthralgias, joint swelling, myalgias and neck stiffness. Skin: Negative for color change and rash. Allergic/Immunologic: Negative for immunocompromised state. Neurological: Negative for dizziness, seizures, speech difficulty, light-headedness and headaches. Hematological: Negative for adenopathy. Psychiatric/Behavioral: Negative for agitation, hallucinations and suicidal ideas. Past Medical History: All past medical history reviewed today.     Past Medical History:   Diagnosis Date    Asthma     Cancer [facility]) 06/2021    right Breast    Diabetes mellitus ([address]     Diverticulitis     Hypertension        Past Surgical History: All past surgical history was reviewed today. Past Surgical History:   Procedure Laterality Date    BREAST LUMPECTOMY Left     HYSTERECTOMY      PORT SURGERY N/A 6/23/2021    PLACEMENT OF POWER PORT A CATHETER performed by Satnam Robles MD at [address] [place] [address] Street Right 6/3/2021    US BREAST BIOPSY NEEDLE ADDITIONAL RIGHT 6/3/2021 [facility] ULTRASOUND    US BREAST NEEDLE BIOPSY RIGHT Right 6/3/2021    US BREAST NEEDLE BIOPSY RIGHT 6/3/2021 [facility] ULTRASOUND    US LYMPH NODE BIOPSY  6/3/2021    US LYMPH NODE BIOPSY 6/3/2021 [facility] ULTRASOUND       Family History: All family history was reviewed today. Problem Relation Age of Onset    High Blood Pressure Mother     Stroke Mother     High Blood Pressure Father        Objective:       PHYSICAL EXAM:      Vitals:   Vitals:    07/12/21 2326 07/13/21 0259 07/13/21 0802 07/13/21 1058   BP: (!) 152/89 (!) 143/78 (!) 143/70 120/62   Pulse: 84 80 85 81   Resp: 18 18 18 18   Temp: 98.8 °F (37.1 °C) 98.9 °F (37.2 °C) 97.9 °F (36.6 °C) 98.5 °F (36.9 °C)   TempSrc: Oral Oral Oral Oral   SpO2: 97% 95% 96% 97%   Weight:       Height:           Physical Exam  Vitals and nursing note reviewed. Constitutional:       General: She is not in acute distress. Appearance: She is well-developed. She is not diaphoretic. HENT:      Head: Normocephalic. Right Ear: External ear normal.      Left Ear: External ear normal.      Nose: Nose normal.   Eyes:      General: No scleral icterus. Right eye: No discharge. Left eye: No discharge. Conjunctiva/sclera: Conjunctivae normal.      Pupils: Pupils are equal, round, and reactive to light. Cardiovascular:      Rate and Rhythm: Normal rate and regular rhythm.       Heart sounds: No murmur heard.   No friction rub. Pulmonary:      Effort: Pulmonary effort is normal.      Breath sounds: No stridor. No wheezing or rales. Chest:      Chest wall: No tenderness. Abdominal:      Palpations: Abdomen is soft. There is no mass. Tenderness: There is no abdominal tenderness. There is no guarding or rebound. Musculoskeletal:         General: No tenderness. Cervical back: Normal range of motion and neck supple. Lymphadenopathy:      Cervical: No cervical adenopathy. Skin:     General: Skin is warm and dry. Findings: No erythema or rash. Neurological:      Mental Status: She is alert and oriented to person, place, and time. Motor: No abnormal muscle tone. Psychiatric:         Judgment: Judgment normal.            Lines: All vascular access sites are healthy with no local erythema, discharge or tenderness. Intake and output:    I/O last 3 completed shifts: In: 407 [P.O.:240; IV Piggyback:167]  Out: -     Lab Data:   All available labs and old records have been reviewed by me. CBC:  Recent Labs     07/11/21  0625 07/12/21  1615   WBC 34.2* 26.4*   RBC 3.49* 3.59*   HGB 10.6* 10.9*   HCT 32.5* 33.3*    291   MCV 93.0 92.8   MCH 30.3 30.5   MCHC 32.6 32.8   RDW 13.1 13.4   BANDSPCT  --  2        BMP:  Recent Labs     07/11/21  0625 07/12/21  1615 07/13/21  0609    136 140   K 3.2* 3.4* 3.3*    107 108   CO2 17* 18* 21   BUN 20 15 13   CREATININE 0.9 0.8 0.7   CALCIUM 8.3 8.9 8.5   GLUCOSE 125* 143* 152*        Hepatic Function Panel:   Lab Results   Component Value Date    ALKPHOS 145 07/12/2021    ALT 21 07/12/2021    AST 22 07/12/2021    PROT 5.8 07/12/2021    PROT 7.0 02/14/2013    BILITOT <0.2 07/12/2021    LABALBU 3.3 07/12/2021       CPK:   Lab Results   Component Value Date    CKTOTAL 107 02/14/2013     ESR: No results found for: SEDRATE  CRP: No results found for: CRP        Imaging:     All pertinent images and reports for the current visit were reviewed by me during this visit. No orders to display       Medications: All current and past medications were reviewed.  potassium chloride  20 mEq Oral BID WC    amLODIPine  2.5 mg Oral Daily    atorvastatin  20 mg Oral Nightly    lisinopril  10 mg Oral Daily    metFORMIN  1,000 mg Oral BID WC    nystatin  5 mL Oral 4x Daily    zafirlukast  10 mg Oral Daily    sodium chloride flush  5-40 mL Intravenous 2 times per day    enoxaparin  40 mg Subcutaneous Daily    insulin lispro  0-6 Units Subcutaneous TID WC    insulin lispro  0-3 Units Subcutaneous Nightly    cefTRIAXone (ROCEPHIN) IV  2,000 mg Intravenous Q24H    metroNIDAZOLE  500 mg Intravenous Q8H        sodium chloride 25 mL (07/13/21 0550)    dextrose         loperamide, sodium chloride flush, sodium chloride, ondansetron **OR** ondansetron, polyethylene glycol, acetaminophen **OR** acetaminophen, glucose, dextrose, glucagon (rDNA), dextrose      Problem list:       Patient Active Problem List   Diagnosis Code    Closed fracture of lower end of right ulna S52.601A    Malignant neoplasm of right breast in female, estrogen receptor positive ([facility] C50.911, Z17.0    Acute renal failure (ARF) ([address] N17.9    Essential hypertension I10    Type 2 diabetes mellitus ([address] E11.9    Hypercholesterolemia E78.00    Gastroenteritis K52.9    Septicemia ([address] A41.9    Bandemia D72.825    Gram-negative bacteremia R78.81    History of breast cancer Z85.3    History of diverticulitis Z87.19    Immunocompromised ([address] L66.4    Uncomplicated asthma M02.367    Overweight E66.3    Hypokalemia E87.6    Hypomagnesemia E83.42       Please note that this chart was generated using Dragon dictation software. Although every effort was made to ensure the accuracy of this automated transcription, some errors in transcription may have occurred inadvertently.  If you may need any clarification, please do not hesitate to contact me through [facility] or at the phone number provided below with my electronic signature. Any pictures or media included in this note were obtained after taking informed verbal consent from the patient and with their approval to include those in the patient's medical record.     Hemalatha Banegas MD, MPH  7/13/2021 , 3:13 PM   [facility] Infectious Disease   [address] [place] [address], [address] [place] [address]  Office: [phone]  Fax: [phone]  Clinic days:  Tuesday & Thursday

## 2021-07-13 NOTE — PROGRESS NOTES
Shift assessment complete. VSS. Scheduled medications given. No complaints of pain. Call light in reach.  NO further needs

## 2021-07-13 NOTE — PROGRESS NOTES
Patient asking for melatonin tonight. Message sent to on call Casimiro Vega MD. Waiting for response.

## 2021-07-13 NOTE — PROGRESS NOTES
Patient called and states that she has had 3 episodes of diarrhea since midnight. Hat placed in toilet. Instructed patient the next times she goes to call me so I can look at it. Patient acknowledged understanding. Denies needs at this time.

## 2021-07-13 NOTE — CONSULTS
[facility]   CONSULT NOTE    7/13/2021 1:25 PM    Patient Information: Mirna Sagastume   Date of Admit:  7/12/2021  Primary Care Physician:  Mariana Shirley MD  Requesting Physician:  Mariana Shirley MD    Reason for consult:   Evaluation and recommendations for breast cancer    Chief complaint:    Chief Complaint   Patient presents with    Abnormal Lab     Was sent by office for elevated WBC & positive blood culture; takes steroid & chemo. History of Present Illness:  Mirna Sagastume is a 77 y.o. female on Mariana Shirley MD service who was admitted on 7/12/2021 for nausea, vomiting, and diarrhea. She is a patient of Fabiola Ornelas MD who is treated for Her2+ breast cancer. She started treatment with carboplatin, docetaxel, trastuzumab and pertuzumab on 6/30/21. She received pegfilgrastim on 7/1/21. She was also recently treated for thrush with Diflucan. She came to the office on 7/8/21 for follow up and reported nausea, vomiting and diarrhea. She was unable to control her symptoms with antiemetics and antidiarrheals. She was given IV hydration in the office. Creatinine was 4.8, she was directed to the ER and was admitted 7/8 - 7/11. Her creatinine improved. After discharge, blood culture came back positive for moraxella osleonsis. She is receiving IV antibiotics. No fevers, chills or night sweats. She continues to have diarrhea. Past Medical History:     has a past medical history of Asthma, Cancer ([address] Diabetes mellitus ([address] Diverticulitis, and Hypertension.      Past Surgical History:    Past Surgical History:   Procedure Laterality Date    BREAST LUMPECTOMY Left     HYSTERECTOMY      PORT SURGERY N/A 6/23/2021    PLACEMENT OF POWER PORT A CATHETER performed by Laverne Wang MD at [address] Right 6/3/2021    US BREAST BIOPSY NEEDLE ADDITIONAL RIGHT 6/3/2021 [facility] ULTRASOUND    US BREAST NEEDLE BIOPSY RIGHT Right 6/3/2021    US BREAST NEEDLE BIOPSY RIGHT 6/3/2021 [facility] ULTRASOUND    US LYMPH NODE BIOPSY  6/3/2021    US LYMPH NODE BIOPSY 6/3/2021 [facility] ULTRASOUND        Current Medications:     potassium chloride  20 mEq Oral BID WC    amLODIPine  2.5 mg Oral Daily    atorvastatin  20 mg Oral Nightly    lisinopril  10 mg Oral Daily    metFORMIN  1,000 mg Oral BID WC    nystatin  5 mL Oral 4x Daily    zafirlukast  10 mg Oral Daily    sodium chloride flush  5-40 mL Intravenous 2 times per day    enoxaparin  40 mg Subcutaneous Daily    insulin lispro  0-6 Units Subcutaneous TID WC    insulin lispro  0-3 Units Subcutaneous Nightly    cefTRIAXone (ROCEPHIN) IV  2,000 mg Intravenous Q24H    metroNIDAZOLE  500 mg Intravenous Q8H       Allergies: Allergies   Allergen Reactions    Other      fireants    Erythromycin Nausea Only        Social History:    reports that she has never smoked. She has never used smokeless tobacco. She reports that she does not drink alcohol and does not use drugs. Family History:     family history includes High Blood Pressure in her father and mother; Stroke in her mother. ROS:      Constitutional:  No weight loss, No fever, No chills, No night sweats.    Eyes:  No impairment or change in vision  ENT / Mouth:  No pain, abnormal ulceration, bleeding, nasal drip or change in voice or hearing  Cardiovascular:  No chest pain, palpitations, new edema, or calf discomfort  Respiratory:  No pain, hemoptysis, change to breathing  Gastrointestinal:  No pain, cramping, jaundice, change to eating and bowel habits  Urinary:  No pain, bleeding or change in continence  Musculoskeletal:  No redness, pain, edema or weakness  Skin:  No pruritus, rash, change to nodules or lesions  Neurologic:  No discomfort, change in mental status, speech, sensory or motor activity  Psychiatric:  No change in concentration or change to affect or mood  Endocrine:  No hot flashes, increased thirst, or change to PROTIME, INR    IMAGING:    Narrative   EXAMINATION:   CT OF THE ABDOMEN AND PELVIS WITHOUT CONTRAST 7/8/2021 8:30 pm       TECHNIQUE:   CT of the abdomen and pelvis was performed without the administration of   intravenous contrast. Multiplanar reformatted images are provided for review. Dose modulation, iterative reconstruction, and/or weight based adjustment of   the mA/kV was utilized to reduce the radiation dose to as low as reasonably   achievable.       COMPARISON:   None.       HISTORY:   ORDERING SYSTEM PROVIDED HISTORY: n/v/d   TECHNOLOGIST PROVIDED HISTORY:   Reason for exam:->n/v/d   Additional Contrast?->None   Decision Support Exception - unselect if not a suspected or confirmed   emergency medical condition->Emergency Medical Condition (MA)   Reason for Exam: N/V/D. Dehydration (oncologist sent d/t severe dehydration). Acuity: Acute   Type of Exam: Initial       FINDINGS:   Lower Chest:The lung bases are clear       Organs: The liver, spleen, adrenal glands, kidneys, and pancreas demonstrate   no acute abnormality.       GI/Bowel: There is a small hiatal hernia.  No abnormally dilated loops of   bowel are seen. [facility] are multiple fluid-filled nondilated loops of large   and small bowel.       Peritoneum/Retroperitoneum: Unremarkable       Pelvis: Unremarkable       Bones: No suspicious osseous lesions are identified           Impression   Multiple fluid-filled loops of large and small bowel, findings are most   consistent with gastroenteritis.           IMPRESSION/RECOMMENDATIONS:    Active Problems:    Septicemia (HCC)    Hypokalemia    Hypomagnesemia  Resolved Problems:    * No resolved hospital problems. *       67 year old female with a history of asthma, DMII and HTN. She has:      1. Bacteremia:  -Blood culture with gram-negative moraxella osleonsis.    -Receiving IV Rocephin and Flagyl.  -Infectious disease is following.  -Has port-a-cath.   -Has leukocytosis which could be related to pegfilgrastim. 2. Right side breast cancer:  -Received C1 carboplatin, Taxotere, Herceptin, Perjeta and Neulasta on 6/30/21.  -Planning to change treatment to taxol and carboplatin on days 1 and 8 of 21 day cycle and Herceptin and Perjeta every 3 weeks. This will be done as an outpatient. 3. Diarrhea  -Likely related to Taxotere.  -C. Diff and stool culture negative.  -Continue imodium PRN. 4. Hypokalemia and hypomagnesemia:  -Due to diarrhea. -Receiving oral and IV replacement. 5. Thrush  -Continue oral nystatin. This plan was discussed with the patient and he/she verbalized understanding. Thank you for allowing us to participate in the care of this patient.      Melisa Sidhu, [facility]  [facility], [address] [place] [address].  [phone]

## 2021-07-14 LAB
ALBUMIN SERPL-MCNC: 2.9 G/DL (ref 3.4–5)
ALP BLD-CCNC: 123 U/L (ref 40–129)
ALT SERPL-CCNC: 35 U/L (ref 10–40)
ANION GAP SERPL CALCULATED.3IONS-SCNC: 9 MMOL/L (ref 3–16)
AST SERPL-CCNC: 35 U/L (ref 15–37)
ATYPICAL LYMPHOCYTE RELATIVE PERCENT: 1 % (ref 0–6)
BANDED NEUTROPHILS RELATIVE PERCENT: 1 % (ref 0–7)
BASOPHILS ABSOLUTE: 0 K/UL (ref 0–0.2)
BASOPHILS RELATIVE PERCENT: 0 %
BILIRUB SERPL-MCNC: <0.2 MG/DL (ref 0–1)
BILIRUBIN DIRECT: <0.2 MG/DL (ref 0–0.3)
BILIRUBIN, INDIRECT: ABNORMAL MG/DL (ref 0–1)
BUN BLDV-MCNC: 9 MG/DL (ref 7–20)
CALCIUM SERPL-MCNC: 8.5 MG/DL (ref 8.3–10.6)
CHLORIDE BLD-SCNC: 108 MMOL/L (ref 99–110)
CO2: 22 MMOL/L (ref 21–32)
CREAT SERPL-MCNC: 0.6 MG/DL (ref 0.6–1.2)
EOSINOPHILS ABSOLUTE: 0 K/UL (ref 0–0.6)
EOSINOPHILS RELATIVE PERCENT: 0 %
GFR AFRICAN AMERICAN: >60
GFR NON-AFRICAN AMERICAN: >60
GLUCOSE BLD-MCNC: 127 MG/DL (ref 70–99)
GLUCOSE BLD-MCNC: 127 MG/DL (ref 70–99)
GLUCOSE BLD-MCNC: 141 MG/DL (ref 70–99)
GLUCOSE BLD-MCNC: 151 MG/DL (ref 70–99)
GLUCOSE BLD-MCNC: 159 MG/DL (ref 70–99)
GLUCOSE BLD-MCNC: 176 MG/DL (ref 70–99)
HCT VFR BLD CALC: 28.9 % (ref 36–48)
HEMOGLOBIN: 9.9 G/DL (ref 12–16)
LYMPHOCYTES ABSOLUTE: 1.4 K/UL (ref 1–5.1)
LYMPHOCYTES RELATIVE PERCENT: 8 %
MAGNESIUM: 1.2 MG/DL (ref 1.8–2.4)
MCH RBC QN AUTO: 31 PG (ref 26–34)
MCHC RBC AUTO-ENTMCNC: 34.2 G/DL (ref 31–36)
MCV RBC AUTO: 90.5 FL (ref 80–100)
METAMYELOCYTES RELATIVE PERCENT: 1 %
MONOCYTES ABSOLUTE: 0.8 K/UL (ref 0–1.3)
MONOCYTES RELATIVE PERCENT: 5 %
MYELOCYTE PERCENT: 1 %
NEUTROPHILS ABSOLUTE: 13.8 K/UL (ref 1.7–7.7)
NEUTROPHILS RELATIVE PERCENT: 83 %
PDW BLD-RTO: 13.1 % (ref 12.4–15.4)
PERFORMED ON: ABNORMAL
PLATELET # BLD: 225 K/UL (ref 135–450)
PLATELET SLIDE REVIEW: ADEQUATE
PMV BLD AUTO: 7.1 FL (ref 5–10.5)
POTASSIUM SERPL-SCNC: 3.5 MMOL/L (ref 3.5–5.1)
RBC # BLD: 3.19 M/UL (ref 4–5.2)
RBC # BLD: NORMAL 10*6/UL
SLIDE REVIEW: ABNORMAL
SODIUM BLD-SCNC: 139 MMOL/L (ref 136–145)
TOTAL PROTEIN: 5.3 G/DL (ref 6.4–8.2)
TOXIC GRANULATION: PRESENT
WBC # BLD: 16.1 K/UL (ref 4–11)

## 2021-07-14 PROCEDURE — 36415 COLL VENOUS BLD VENIPUNCTURE: CPT

## 2021-07-14 PROCEDURE — 6370000000 HC RX 637 (ALT 250 FOR IP): Performed by: HOSPITALIST

## 2021-07-14 PROCEDURE — 94760 N-INVAS EAR/PLS OXIMETRY 1: CPT

## 2021-07-14 PROCEDURE — 99233 SBSQ HOSP IP/OBS HIGH 50: CPT | Performed by: INTERNAL MEDICINE

## 2021-07-14 PROCEDURE — 80048 BASIC METABOLIC PNL TOTAL CA: CPT

## 2021-07-14 PROCEDURE — 2500000003 HC RX 250 WO HCPCS: Performed by: INTERNAL MEDICINE

## 2021-07-14 PROCEDURE — 6370000000 HC RX 637 (ALT 250 FOR IP): Performed by: INTERNAL MEDICINE

## 2021-07-14 PROCEDURE — 2580000003 HC RX 258: Performed by: INTERNAL MEDICINE

## 2021-07-14 PROCEDURE — 1200000000 HC SEMI PRIVATE

## 2021-07-14 PROCEDURE — 80076 HEPATIC FUNCTION PANEL: CPT

## 2021-07-14 PROCEDURE — 85025 COMPLETE CBC W/AUTO DIFF WBC: CPT

## 2021-07-14 PROCEDURE — 83735 ASSAY OF MAGNESIUM: CPT

## 2021-07-14 PROCEDURE — 6360000002 HC RX W HCPCS: Performed by: INTERNAL MEDICINE

## 2021-07-14 RX ORDER — GREEN TEA/HOODIA GORDONII 315-12.5MG
1 CAPSULE ORAL 2 TIMES DAILY
Qty: 20 TABLET | Refills: 0 | Status: SHIPPED | OUTPATIENT
Start: 2021-07-14 | End: 2021-07-24

## 2021-07-14 RX ORDER — MAGNESIUM SULFATE IN WATER 40 MG/ML
2000 INJECTION, SOLUTION INTRAVENOUS ONCE
Status: COMPLETED | OUTPATIENT
Start: 2021-07-14 | End: 2021-07-14

## 2021-07-14 RX ORDER — CIPROFLOXACIN 500 MG/1
500 TABLET, FILM COATED ORAL 2 TIMES DAILY
Qty: 20 TABLET | Refills: 0 | Status: SHIPPED | OUTPATIENT
Start: 2021-07-14 | End: 2021-07-24

## 2021-07-14 RX ADMIN — Medication 10 ML: at 09:00

## 2021-07-14 RX ADMIN — Medication 10 ML: at 21:58

## 2021-07-14 RX ADMIN — LOPERAMIDE HYDROCHLORIDE 2 MG: 2 CAPSULE ORAL at 12:47

## 2021-07-14 RX ADMIN — MAGNESIUM SULFATE HEPTAHYDRATE 2000 MG: 40 INJECTION, SOLUTION INTRAVENOUS at 08:33

## 2021-07-14 RX ADMIN — METRONIDAZOLE 500 MG: 500 INJECTION, SOLUTION INTRAVENOUS at 04:30

## 2021-07-14 RX ADMIN — ATORVASTATIN CALCIUM 20 MG: 20 TABLET, FILM COATED ORAL at 21:57

## 2021-07-14 RX ADMIN — LISINOPRIL 10 MG: 10 TABLET ORAL at 08:31

## 2021-07-14 RX ADMIN — AMLODIPINE BESYLATE 2.5 MG: 5 TABLET ORAL at 08:31

## 2021-07-14 RX ADMIN — NYSTATIN 500000 UNITS: 100000 SUSPENSION ORAL at 21:57

## 2021-07-14 RX ADMIN — POTASSIUM BICARBONATE 25 MEQ: 978 TABLET, EFFERVESCENT ORAL at 21:57

## 2021-07-14 RX ADMIN — NYSTATIN 500000 UNITS: 100000 SUSPENSION ORAL at 17:04

## 2021-07-14 RX ADMIN — SODIUM CHLORIDE 25 ML: 9 INJECTION, SOLUTION INTRAVENOUS at 21:59

## 2021-07-14 RX ADMIN — ACETAMINOPHEN 650 MG: 325 TABLET ORAL at 04:32

## 2021-07-14 RX ADMIN — INSULIN LISPRO 1 UNITS: 100 INJECTION, SOLUTION INTRAVENOUS; SUBCUTANEOUS at 22:08

## 2021-07-14 RX ADMIN — METFORMIN HYDROCHLORIDE 1000 MG: 500 TABLET ORAL at 08:31

## 2021-07-14 RX ADMIN — LOPERAMIDE HYDROCHLORIDE 2 MG: 2 CAPSULE ORAL at 03:22

## 2021-07-14 RX ADMIN — METRONIDAZOLE 500 MG: 500 INJECTION, SOLUTION INTRAVENOUS at 12:52

## 2021-07-14 RX ADMIN — CEFTRIAXONE 2000 MG: 2 INJECTION, POWDER, FOR SOLUTION INTRAMUSCULAR; INTRAVENOUS at 17:09

## 2021-07-14 RX ADMIN — METRONIDAZOLE 500 MG: 500 INJECTION, SOLUTION INTRAVENOUS at 22:02

## 2021-07-14 RX ADMIN — METFORMIN HYDROCHLORIDE 1000 MG: 500 TABLET ORAL at 17:04

## 2021-07-14 RX ADMIN — POTASSIUM BICARBONATE 25 MEQ: 978 TABLET, EFFERVESCENT ORAL at 08:31

## 2021-07-14 RX ADMIN — INSULIN LISPRO 1 UNITS: 100 INJECTION, SOLUTION INTRAVENOUS; SUBCUTANEOUS at 17:09

## 2021-07-14 RX ADMIN — ENOXAPARIN SODIUM 40 MG: 40 INJECTION SUBCUTANEOUS at 08:31

## 2021-07-14 RX ADMIN — NYSTATIN 500000 UNITS: 100000 SUSPENSION ORAL at 12:47

## 2021-07-14 RX ADMIN — NYSTATIN 500000 UNITS: 100000 SUSPENSION ORAL at 08:31

## 2021-07-14 RX ADMIN — LOPERAMIDE HYDROCHLORIDE 2 MG: 2 CAPSULE ORAL at 18:48

## 2021-07-14 ASSESSMENT — ENCOUNTER SYMPTOMS
CHOKING: 0
BLOOD IN STOOL: 0
EYE REDNESS: 0
FACIAL SWELLING: 0
SHORTNESS OF BREATH: 0
DIARRHEA: 1
COLOR CHANGE: 0
COUGH: 0
CHEST TIGHTNESS: 0
PHOTOPHOBIA: 0
STRIDOR: 0
TROUBLE SWALLOWING: 0
EYE DISCHARGE: 0
NAUSEA: 0
APNEA: 0
RHINORRHEA: 0
ABDOMINAL PAIN: 0

## 2021-07-14 ASSESSMENT — PAIN SCALES - GENERAL
PAINLEVEL_OUTOF10: 0
PAINLEVEL_OUTOF10: 2
PAINLEVEL_OUTOF10: 0

## 2021-07-14 NOTE — PROGRESS NOTES
Oncology and Hematology Care   Progress Note      7/14/2021 9:40 AM        Name: Paulett Siemens . Admitted: 7/12/2021    SUBJECTIVE:  Patient feeling much better. Diarrhea is less. Afebrile.      Reviewed interval ancillary notes    Current Medications  magnesium sulfate 2000 mg in 50 mL IVPB premix, Once  loperamide (IMODIUM) capsule 2 mg, 4x Daily PRN  melatonin tablet 3 mg, Nightly PRN  potassium bicarbonate (K-LYTE) disintegrating tablet 25 mEq, BID  amLODIPine (NORVASC) tablet 2.5 mg, Daily  atorvastatin (LIPITOR) tablet 20 mg, Nightly  lisinopril (PRINIVIL;ZESTRIL) tablet 10 mg, Daily  metFORMIN (GLUCOPHAGE) tablet 1,000 mg, BID WC  nystatin (MYCOSTATIN) 993027 UNIT/ML suspension 500,000 Units, 4x Daily  zafirlukast (ACCOLATE) tablet 10 mg (Patient Supplied), Daily  sodium chloride flush 0.9 % injection 5-40 mL, 2 times per day  sodium chloride flush 0.9 % injection 5-40 mL, PRN  0.9 % sodium chloride infusion, PRN  enoxaparin (LOVENOX) injection 40 mg, Daily  ondansetron (ZOFRAN-ODT) disintegrating tablet 4 mg, Q8H PRN   Or  ondansetron (ZOFRAN) injection 4 mg, Q6H PRN  polyethylene glycol (GLYCOLAX) packet 17 g, Daily PRN  acetaminophen (TYLENOL) tablet 650 mg, Q6H PRN   Or  acetaminophen (TYLENOL) suppository 650 mg, Q6H PRN  glucose (GLUTOSE) 40 % oral gel 15 g, PRN  dextrose 50 % IV solution, PRN  glucagon (rDNA) injection 1 mg, PRN  dextrose 5 % solution, PRN  insulin lispro (1 Unit Dial) 0-6 Units, TID WC  insulin lispro (1 Unit Dial) 0-3 Units, Nightly  cefTRIAXone (ROCEPHIN) 2000 mg IVPB in D5W 50ml minibag, Q24H  metronidazole (FLAGYL) 500 mg in NaCl 100 mL IVPB premix, Q8H        Objective:  /80   Pulse 89   Temp 99.2 °F (37.3 °C) (Oral)   Resp 18   Ht 5' 7\" (1.702 m)   Wt 182 lb (82.6 kg)   SpO2 95%   BMI 28.51 kg/m²     Intake/Output Summary (Last 24 hours) at 7/14/2021 0940  Last data filed at 7/14/2021 0854  Gross per 24 hour   Intake 240 ml   Output --   Net 240 ml Wt Readings from Last 3 Encounters:   07/12/21 182 lb (82.6 kg)   07/08/21 173 lb 11.2 oz (78.8 kg)   06/23/21 178 lb 8 oz (81 kg)       General appearance:  Appears comfortable  Eyes: Sclera clear. Pupils equal.  ENT: Moist oral mucosa. Trachea midline, no adenopathy. Cardiovascular: Regular rhythm, normal S1, S2. No murmur. No edema in lower extremities  Respiratory: Not using accessory muscles. Good inspiratory effort. Clear to auscultation bilaterally, no wheeze or crackles. GI: Abdomen soft, no tenderness, not distended  Musculoskeletal: No cyanosis in digits, neck supple  Neurology: CN 2-12 grossly intact. No speech or motor deficits  Psych: Normal affect. Alert and oriented in time, place and person  Skin: Warm, dry, normal turgor    Labs and Tests:  CBC:   Recent Labs     07/12/21  1615 07/14/21  0529   WBC 26.4* 16.1*   HGB 10.9* 9.9*    225     BMP:    Recent Labs     07/12/21  1615 07/13/21  0609 07/14/21  0529    140 139   K 3.4* 3.3* 3.5    108 108   CO2 18* 21 22   BUN 15 13 9   CREATININE 0.8 0.7 0.6   GLUCOSE 143* 152* 151*     Hepatic:   Recent Labs     07/12/21  1615   AST 22   ALT 21   BILITOT <0.2   ALKPHOS 145*       ASSESSMENT AND PLAN    Active Problems:    Septicemia (HCC)    Hypokalemia    Hypomagnesemia    Weight loss counseling, encounter for  Resolved Problems:    * No resolved hospital problems. *      70 year old female with a history of asthma, DMII and HTN. She has:        1. Bacteremia:  -Blood culture with gram-negative moraxella osleonsis. Repeat cultures pending.   -Receiving IV Rocephin and Flagyl.  -Infectious disease is following.  -Has port-a-cath.   -Has leukocytosis which could be related to pegfilgrastim, improved.         2.  Right side breast cancer:  -Received C1 carboplatin, Taxotere, Herceptin, Perjeta and Neulasta on 6/30/21.  -Planning to change treatment to taxol and carboplatin on days 1 and 8 of 21 day cycle and Herceptin and Perjeta every 3 weeks. This will be done as an outpatient on 7/28/21.        3. Diarrhea  -Likely related to Taxotere.  -C. Diff and stool culture negative.  -Continue imodium PRN.       4. Hypokalemia and hypomagnesemia:  -Due to diarrhea. -Receiving oral and IV replacement.         5. Thrush  -Continue oral nystatin.        Dangelo Noriega [place]  Oncology Hematology [facility].  [phone]    Patient was seen and examined. Agree with above. Continue current care and antibiotics as per ID recommendations.     Yaritza Fuentes MD

## 2021-07-14 NOTE — PLAN OF CARE
Problem: Falls - Risk of:  Goal: Will remain free from falls  Description: Will remain free from falls  Outcome: Ongoing  Note: Patient independent in room. Low fall risk charted.    Goal: Absence of physical injury  Description: Absence of physical injury  Outcome: Ongoing     Problem: Metabolic:  Goal: Ability to maintain appropriate glucose levels will improve  Description: Ability to maintain appropriate glucose levels will improve  Outcome: Ongoing

## 2021-07-14 NOTE — PROGRESS NOTES
Received a call angela Huerta @ [facility] . Infusion cost for current drugs @ home as follows: Ceftriaxone 2000mg every 24 hours, is 123.00 weekly, and Flagyl 500 mg every 8 hours is 98.00 weekly. Discharge planner notified.

## 2021-07-14 NOTE — PROGRESS NOTES
CLINICAL PHARMACY NOTE: MEDS TO BEDS    Total # of Prescriptions Filled: 2   The following medications were delivered to the patient:  · Cipro 500 mg  · Acidophilus     Additional Documentation:    Delivered to Patient daughter-signed  Mercedes Pena CPhT

## 2021-07-14 NOTE — PROGRESS NOTES
Department of Internal Medicine  Progress Note      SUBJECTIVE:  The patient was seen and examined at the bedside at lunch. She states she is having small amounts of diarrhea still. Had 3 bouts overnight and 3 this morning so far. States it is improved with the immodium. She denies any fever or chills. Denies any chest pain, shortness of breath,palpitations or headaches. Denies any abdominal pain. Review of Systems - General ROS:denies any headache or weakness  Ophthalmic ROS: denies any blurred vision, double vision or vision loss  ENT ROS: denies any epistaxis, nasal discharge  Hematological and Lymphatic denies any fatigue, night sweats, enlarge lymph nodes or bruising ,   Respiratory ROS: denies any shortness of breath, dyspnea on exertion, wheezing, or cough   Cardiovascular ROS: denies any chest pain, palpitations, shortness of breath, dizziness syncope or swelling in extremities  Gastrointestinal ROS: denies any abdominal pain, acid reflux, change in bowel habits or blood in stool, dark or tarry stools, + for diarrhea   Musculoskeletal ROS:denies any back pain or joint pain,  Neurological ROS: denies any mental status changes, seizures, memory loss, speech problems or muscle weakness in extremities   Dermatological ROS: denies any rash or skin lesions     OBJECTIVE:      PHYSICAL:   VITALS:  BP (!) 140/75   Pulse 80   Temp 99.2 °F (37.3 °C) (Oral)   Resp 18   Ht 5' 7\" (1.702 m)   Wt 182 lb (82.6 kg)   SpO2 95%   BMI 28.51 kg/m²   PULSE OXIMETRY RANGE: SpO2  Av.3 %  Min: 95 %  Max: 98 %    Intake/Output Summary (Last 24 hours) at 2021 1254  Last data filed at 2021 0854  Gross per 24 hour   Intake 240 ml   Output --   Net 240 ml      CONSTITUTIONAL:  awake, alert, cooperative, no apparent distress, and appears stated age  HEAD:  Normocephalic, atraumatic  HEENT:  ENT exam normal, no neck nodes or sinus tenderness  EYE: conjunctivae/corneas clear. PERRL, EOM's intact.  Fundi benign. NECK:  Supple, symmetrical, trachea midline, no adenopathy, thyroid symmetric, not enlarged and no tenderness, skin normal  LUNGS:  No increased work of breathing, good air exchange, clear to auscultation bilaterally, no crackles or wheezing  CARDIOVASCULAR:  Normal apical impulse, regular rate and rhythm, normal S1 and S2, no S3 or S4, and no murmur noted  ABDOMEN:  Normal bowel sounds, soft, non-distended, non-tender, no masses palpated, no hepatosplenomegally  MUSCULOSKELETAL:  There is no redness, warmth, or swelling of the joints. Full range of motion noted. Motor strength is 5 out of 5 all extremities bilaterally. Tone is normal.  NEUROLOGIC:  Awake, alert, oriented to name, place and time. Cranial nerves II-XII are grossly intact. Motor is 5 out of 5 bilaterally. Cerebellar finger to nose, heel to shin intact. Sensory is intact.   Babinski down going, Romberg negative, and gait is normal.  SKIN:  normal skin color, texture, turgor  EDEMA: Normal    Data      CBC with Differential:    Lab Results   Component Value Date    WBC 16.1 07/14/2021    RBC 3.19 07/14/2021    HGB 9.9 07/14/2021    HCT 28.9 07/14/2021     07/14/2021    MCV 90.5 07/14/2021    MCH 31.0 07/14/2021    MCHC 34.2 07/14/2021    RDW 13.1 07/14/2021    SEGSPCT 67.9 08/09/2012    BANDSPCT 1 07/14/2021    METASPCT 1 07/14/2021    LYMPHOPCT 8.0 07/14/2021    PROMYELOPCT 1 07/09/2021    MONOPCT 5.0 07/14/2021    MYELOPCT 1 07/14/2021    BASOPCT 0.0 07/14/2021    MONOSABS 0.8 07/14/2021    LYMPHSABS 1.4 07/14/2021    EOSABS 0.0 07/14/2021    BASOSABS 0.0 07/14/2021    DIFFTYPE Auto-K 08/09/2012     CMP:    Lab Results   Component Value Date     07/14/2021    K 3.5 07/14/2021    K 3.3 07/13/2021     07/14/2021    CO2 22 07/14/2021    BUN 9 07/14/2021    CREATININE 0.6 07/14/2021    GFRAA >60 07/14/2021    GFRAA >60 02/14/2013    AGRATIO 1.3 07/12/2021    LABGLOM >60 07/14/2021    GLUCOSE 151 07/14/2021    PROT 5.3 07/14/2021    PROT 7.0 02/14/2013    LABALBU 2.9 07/14/2021    CALCIUM 8.5 07/14/2021    BILITOT <0.2 07/14/2021    ALKPHOS 123 07/14/2021    AST 35 07/14/2021    ALT 35 07/14/2021       ASSESSMENT      Patient Active Problem List   Diagnosis    Closed fracture of lower end of right ulna    Malignant neoplasm of right breast in female, estrogen receptor positive ([facility] [address]    Acute renal failure (ARF) (HCC)    Essential hypertension    Type 2 diabetes mellitus ([facility] [address]    Hypercholesterolemia    Gastroenteritis    Septicemia ([facility] [address]    Bandemia    Gram-negative bacteremia    History of breast cancer    History of diverticulitis    Immunocompromised ([facility] [address]    Uncomplicated asthma    Overweight    Hypokalemia    Hypomagnesemia    Encounter for medication counseling         PLAN  1. Septicemia- Id is following, pt is on flagyl and rocephin. 2. Blood cultures x 2 show ngtd, stool for bacterial pcr pending. 3. Hypokalemia/Hypomagnesemia- replacing as needed. 4. Continue to monitor labs. 5. Plan of care discussed with patient and her nurse. 6. Case discussed with Dr. Ashlyn Kat who is in agreement with the plan of care.

## 2021-07-14 NOTE — PROGRESS NOTES
Shift assessment complete. VSS. Scheduled medications given. No complaints of pain. IV Mg replaced at this time per orders. Call light in reach. No further needs.

## 2021-07-14 NOTE — PLAN OF CARE
Problem: Falls - Risk of:  Goal: Will remain free from falls  Description: Will remain free from falls  7/14/2021 1256 by Shelton Nieto RN  Outcome: Ongoing  7/13/2021 2309 by Geri Holstein, RN  Outcome: Ongoing  Note: Patient independent in room. Low fall risk charted.    Goal: Absence of physical injury  Description: Absence of physical injury  7/14/2021 1256 by Shelton Nieto RN  Outcome: Ongoing  7/13/2021 2309 by Geri Holstein, RN  Outcome: Ongoing     Problem: Metabolic:  Goal: Ability to maintain appropriate glucose levels will improve  Description: Ability to maintain appropriate glucose levels will improve  7/14/2021 1256 by Shelton Nieto RN  Outcome: Ongoing  7/13/2021 2309 by Geri Holstein, RN  Outcome: Ongoing

## 2021-07-14 NOTE — FLOWSHEET NOTE
Patient's head to toe assessment at this time. Patient resting comfortably in bed with respirations even and unlabored. Daughter at bedside. Pt awake, alert, and oriented. Pt up to bathroom, independent in room. All nightly medications given per MAR. PRN imodium given per patient request for diarrhea. No other needs expressed at this time, call light within reach. Will continue to monitor.      2131 PRN melatonin given per pt request for sleep.        07/13/21 2013   Vital Signs   Temp 98.1 °F (36.7 °C)   Temp Source Oral   Pulse 76   Heart Rate Source Monitor   Resp 18   /64   BP Location Left upper arm   Patient Position Semi fowlers   Level of Consciousness Alert (0)   MEWS Score 1   Patient Currently in Pain Denies   Pain Assessment   Pain Assessment 0-10   Pain Level 0   Oxygen Therapy   SpO2 97 %   Pulse Oximeter Device Mode Intermittent   Pulse Oximeter Device Location Finger   O2 Device None (Room air)   O2 Flow Rate (L/min) 0 L/min

## 2021-07-14 NOTE — PROGRESS NOTES
Infectious Diseases   Progress Note      Admission Date: 7/12/2021  Hospital Day: Hospital Day: 3   Attending: Cecelia Closs, MD  Date of service: 7/14/2021     Chief complaint/ Reason for consult:     · Gram-negative bacteremia with Moraxella osleonsis  · Bandemia  · History of breast cancer   · Immunocompromised  · History of diverticulitis    Microbiology:        I have reviewed allavailable micro lab data and cultures    · Blood culture (2/2) - collected on 7/12/2021: *Negative  · Stool CVA- collected on 7/13/2021: Negative      Antibiotics and immunizations:       Current antibiotics: All antibiotics and their doses were reviewed by me    Recent Abx Admin                   metronidazole (FLAGYL) 500 mg in NaCl 100 mL IVPB premix (mg) 500 mg New Bag 07/14/21 1252     500 mg New Bag  0430     500 mg New Bag 07/13/21 2027    cefTRIAXone (ROCEPHIN) 2000 mg IVPB in D5W 50ml minibag (mg) 2,000 mg New Bag 07/13/21 1705                  Immunization History: All immunization history was reviewed by me today. There is no immunization history on file for this patient. Known drug allergies: All allergies were reviewed and updated    Allergies   Allergen Reactions    Other      fireants    Erythromycin Nausea Only       Social history:     Social History:  All social andepidemiologic history was reviewed and updated by me today as needed. · Tobacco use:   reports that she has never smoked. She has never used smokeless tobacco.  · Alcohol use:   reports no history of alcohol use. · Currently lives in: [facility]  ·  reports no history of drug use.      COVID VACCINATION AND LAB RESULT RECORDS:     Internal Administration   First Dose      Second Dose           Last COVID Lab No results found for: SARS-COV-2, SARS-COV-2 RNA, SARS-COV-2, SARS-COV-2, SARS-COV-2 BY PCR, SARS-COV-2, SARS-COV-2, SARS-COV-2         Assessment:     The patient is a 77 y.o. old female who  has a past medical history of Asthma, Cancer ([facility] (06/2021), [facility] Diverticulitis, and Hypertension. with following problems:    · Gram-negative bacteremia with Moraxella osleonsis-covered with ceftriaxone  · Bandemia-this is improving  · History of breast cancer   · Immunocompromised  · History of diverticulitis  · Asthma  · Port-A-Cath in place  · Essential hypertension-BP controlled  · Overweight due to excess calorie intake : Body mass index is 28.51 kg/m².-Counseling done      Discussion:      He is on empiric IV ceftriaxone and IV Flagyl. Repeat blood cultures from 7/20/2021 are running negative, which is reassuring. Stool C. difficile was negative. Stool GI PCR is in process. White cell count is slowly improving and is 16,100 today. Serum creatinine 0.6 today. Plan:     Diagnostic Workup:    · Follow-up on stool GI PCR  · Continue to follow  fever curve, WBC count and blood cultures. · Continue to monitor blood counts, liver and renal function. Antimicrobials:    · Will continue IV ceftriaxone 2 g every 24 hour  · Continue IV Flagyl 500 mg every 8 hour  · If stool GI PCR is negative, can stop IV Flagyl at discharge and switch IV ceftriaxone to p.o. Cipro 500 mg every 12 hour at discharge  · Recommend 10-day course of oral ciprofloxacin. I have already reconciled her discharge antibiotic. · We will follow up on the culture results and clinical progress and will make further recommendations accordingly. · Recommend oral probiotic twice daily while on Cipro. · Continue close vitals monitoring. · Maintain good glycemic control. · Fall precautions. Aspiration precautions. · Continue to watch for new fever or diarrhea. · DVT prophylaxis. · Discussed all above with patient and RN.   · Discussed with patient's daughter at bedside      Drug Monitoring:    · Continue monitoring for antibiotic toxicity as follows: CBC, CMP, QTc interval  · Continue to watch for following: new or worsening fever, new hypotension, hives, lip swelling and redness or purulence at vascular access sites. I/v access Management:    · Continue to monitor i.v access sites for erythema, induration, discharge or tenderness. · As always, continue efforts to minimize tubes/lines/drains as clinically appropriate to reduce chances of line associated infections. Patient education and counseling:        · The patient was educated in detail about the side-effects of various antibiotics and things to watch for like new rashes, lip swelling, severe reaction, worsening diarrhea, break through fever etc.  · Discussed patient's condition and what to expect. All of the patient's questions were addressed in a satisfactory manner and patient verbalized understanding all instructions. Fluoroquinolone related instructions:     Patient instructed to watch for low or high blood sugars, muscle pains and ankle tendon pain while on Ciprofloxacin or Levofloxacin. Patient was advised to keep a sugar candy at all times as all fluoroquinolones have the potential of causing hypoglycemia. If these symptoms develops, patient was instructed to stop the antibiotic and call my office at [phone]. Use sunscreen when going in bright sun while on Ciprofloxacin or Levofloxacin as these antibiotics can cause photosensitivity. Take 1 hour before or 2 hour after dairy, calcium, iron, magnesium, aluminum or zinc.      Level of complexity of visit: High       TIME SPENT TODAY:     - Spent over  36* minutes on visit (including interval history, physical exam, review of data including labs, cultures, imaging, development and implementation of treatment plan and coordination of complex care). More than 50 percent of this includes face-to-face time spent with the patient for counseling and coordination of care. Thank you for involving me in the care of your patient. I will continue to follow.  If you have anyadditional questions, please do not hesitate to contact me. Subjective: Interval history: Interval history was obtained from chart review and patient/ RN. He is afebrile. She is tolerating ceftriaxone okay. He is tolerating Flagyl okay. Diarrhea is slowing down     REVIEW OF SYSTEMS:      Review of Systems   Constitutional: Negative for chills, diaphoresis and unexpected weight change. HENT: Negative for congestion, ear discharge, ear pain, facial swelling, hearing loss, rhinorrhea and trouble swallowing. Eyes: Negative for photophobia, discharge, redness and visual disturbance. Respiratory: Negative for apnea, cough, choking, chest tightness, shortness of breath and stridor. Cardiovascular: Negative for chest pain and palpitations. Gastrointestinal: Positive for diarrhea (Improving). Negative for abdominal pain, blood in stool and nausea. Endocrine: Negative for polydipsia, polyphagia and polyuria. Genitourinary: Negative for difficulty urinating, dysuria, frequency, hematuria, menstrual problem and vaginal discharge. Musculoskeletal: Negative for arthralgias, joint swelling, myalgias and neck stiffness. Skin: Negative for color change and rash. Allergic/Immunologic: Negative for immunocompromised state. Neurological: Negative for dizziness, seizures, speech difficulty, light-headedness and headaches. Hematological: Negative for adenopathy. Psychiatric/Behavioral: Negative for agitation, hallucinations and suicidal ideas. Past Medical History: All past medical history reviewed today. Past Medical History:   Diagnosis Date    Asthma     Cancer ([facility] 06/2021    right Breast    Diabetes mellitus ([facility]     Diverticulitis     Hypertension        Past Surgical History: All past surgical history was reviewed today.     Past Surgical History:   Procedure Laterality Date    BREAST LUMPECTOMY Left     HYSTERECTOMY      PORT SURGERY N/A 6/23/2021    PLACEMENT OF POWER PORT A CATHETER performed by Lucille Diaz Candida Harrison MD at . [address] RIGHT Right 6/3/2021    US BREAST BIOPSY NEEDLE ADDITIONAL RIGHT 6/3/2021 [facility]    US BREAST NEEDLE BIOPSY RIGHT Right 6/3/2021    US BREAST NEEDLE BIOPSY RIGHT 6/3/2021 [facility]    US LYMPH NODE BIOPSY  6/3/2021    US LYMPH NODE BIOPSY 6/3/2021 [facility]       Family History: All family history was reviewed today. Problem Relation Age of Onset    High Blood Pressure Mother     Stroke Mother     High Blood Pressure Father        Objective:       PHYSICAL EXAM:      Vitals:   Vitals:    07/14/21 0800 07/14/21 0802 07/14/21 1130 07/14/21 1600   BP: 127/80  (!) 140/75 (!) 145/73   Pulse: 89 89 80 73   Resp: 18  18 18   Temp: 99.2 °F (37.3 °C)   98.2 °F (36.8 °C)   TempSrc: Oral   Oral   SpO2: 95%  95% 97%   Weight:       Height:           Physical Exam  Vitals and nursing note reviewed. Constitutional:       General: She is not in acute distress. Appearance: She is well-developed. She is not diaphoretic. HENT:      Head: Normocephalic. Right Ear: External ear normal.      Left Ear: External ear normal.      Nose: Nose normal.   Eyes:      General: No scleral icterus. Right eye: No discharge. Left eye: No discharge. Conjunctiva/sclera: Conjunctivae normal.      Pupils: Pupils are equal, round, and reactive to light. Cardiovascular:      Rate and Rhythm: Normal rate and regular rhythm. Heart sounds: No murmur heard. No friction rub. Pulmonary:      Effort: Pulmonary effort is normal.      Breath sounds: No stridor. No wheezing or rales. Chest:      Chest wall: No tenderness. Abdominal:      Palpations: Abdomen is soft. There is no mass. Tenderness: There is no abdominal tenderness. There is no guarding or rebound. Musculoskeletal:         General: No tenderness. Cervical back: Normal range of motion and neck supple.    Lymphadenopathy:      Cervical: No cervical adenopathy. Skin:     General: Skin is warm and dry. Findings: No erythema or rash. Comments: Port-A-Cath site okay. Neurological:      Mental Status: She is alert and oriented to person, place, and time. Motor: No abnormal muscle tone. Psychiatric:         Judgment: Judgment normal.            Lines: All vascular access sites are healthy with no local erythema, discharge or tenderness. Intake and output:    I/O last 3 completed shifts: In: 240 [P.O.:240]  Out: -     Lab Data:   All available labs and old records have been reviewed by me. CBC:  Recent Labs     07/12/21  1615 07/14/21  0529   WBC 26.4* 16.1*   RBC 3.59* 3.19*   HGB 10.9* 9.9*   HCT 33.3* 28.9*    225   MCV 92.8 90.5   MCH 30.5 31.0   MCHC 32.8 34.2   RDW 13.4 13.1   BANDSPCT 2 1        BMP:  Recent Labs     07/12/21  1615 07/13/21  0609 07/14/21  0529    140 139   K 3.4* 3.3* 3.5    108 108   CO2 18* 21 22   BUN 15 13 9   CREATININE 0.8 0.7 0.6   CALCIUM 8.9 8.5 8.5   GLUCOSE 143* 152* 151*        Hepatic Function Panel:   Lab Results   Component Value Date    ALKPHOS 123 07/14/2021    ALT 35 07/14/2021    AST 35 07/14/2021    PROT 5.3 07/14/2021    PROT 7.0 02/14/2013    BILITOT <0.2 07/14/2021    BILIDIR <0.2 07/14/2021    IBILI see below 07/14/2021    LABALBU 2.9 07/14/2021       CPK:   Lab Results   Component Value Date    CKTOTAL 107 02/14/2013     ESR: No results found for: SEDRATE  CRP: No results found for: CRP        Imaging: All pertinent images and reports for the current visit were reviewed by me during this visit. No orders to display       Medications: All current and past medications were reviewed.      potassium bicarbonate  25 mEq Oral BID    amLODIPine  2.5 mg Oral Daily    atorvastatin  20 mg Oral Nightly    lisinopril  10 mg Oral Daily    metFORMIN  1,000 mg Oral BID WC    nystatin  5 mL Oral 4x Daily    zafirlukast  10 mg Oral Daily    sodium chloride flush  5-40 mL Intravenous 2 times per day    enoxaparin  40 mg Subcutaneous Daily    insulin lispro  0-6 Units Subcutaneous TID WC    insulin lispro  0-3 Units Subcutaneous Nightly    cefTRIAXone (ROCEPHIN) IV  2,000 mg Intravenous Q24H    metroNIDAZOLE  500 mg Intravenous Q8H        sodium chloride 25 mL (07/13/21 0550)    dextrose         loperamide, melatonin, sodium chloride flush, sodium chloride, ondansetron **OR** ondansetron, polyethylene glycol, acetaminophen **OR** acetaminophen, glucose, dextrose, glucagon (rDNA), dextrose      Problem list:       Patient Active Problem List   Diagnosis Code    Closed fracture of lower end of right ulna S52.601A    Malignant neoplasm of right breast in female, estrogen receptor positive ([facility] C50.911, Z17.0    Acute renal failure (ARF) ([facility] N17.9    Essential hypertension I10    Type 2 diabetes mellitus ([facility] E11.9    Hypercholesterolemia E78.00    Gastroenteritis K52.9    Septicemia (HCC) A41.9    Bandemia D72.825    Gram-negative bacteremia R78.81    History of breast cancer Z85.3    History of diverticulitis Z87.19    Immunocompromised ([facility] U59.5    Uncomplicated asthma D30.732    Overweight E66.3    Hypokalemia E87.6    Hypomagnesemia E83.42    Encounter for medication counseling Z71.89       Please note that this chart was generated using Dragon dictation software. Although every effort was made to ensure the accuracy of this automated transcription, some errors in transcription may have occurred inadvertently. If you may need any clarification, please do not hesitate to contact me through EPIC or at the phone number provided below with my electronic signature. Any pictures or media included in this note were obtained after taking informed verbal consent from the patient and with their approval to include those in the patient's medical record.     Domingo Gonzalez MD, MPH  7/14/2021 , [address] [place] [address] Infectious Disease   [phone] [facility], [address]

## 2021-07-15 VITALS
HEART RATE: 80 BPM | WEIGHT: 182 LBS | DIASTOLIC BLOOD PRESSURE: 70 MMHG | BODY MASS INDEX: 28.56 KG/M2 | TEMPERATURE: 98.2 F | SYSTOLIC BLOOD PRESSURE: 163 MMHG | HEIGHT: 67 IN | OXYGEN SATURATION: 97 % | RESPIRATION RATE: 16 BRPM

## 2021-07-15 LAB
A/G RATIO: 1.3 (ref 1.1–2.2)
ALBUMIN SERPL-MCNC: 3.1 G/DL (ref 3.4–5)
ALP BLD-CCNC: 126 U/L (ref 40–129)
ALT SERPL-CCNC: 34 U/L (ref 10–40)
ANION GAP SERPL CALCULATED.3IONS-SCNC: 8 MMOL/L (ref 3–16)
AST SERPL-CCNC: 26 U/L (ref 15–37)
BANDED NEUTROPHILS RELATIVE PERCENT: 6 % (ref 0–7)
BASOPHILS ABSOLUTE: 0 K/UL (ref 0–0.2)
BASOPHILS RELATIVE PERCENT: 0 %
BILIRUB SERPL-MCNC: <0.2 MG/DL (ref 0–1)
BUN BLDV-MCNC: 8 MG/DL (ref 7–20)
CALCIUM SERPL-MCNC: 9 MG/DL (ref 8.3–10.6)
CHLORIDE BLD-SCNC: 106 MMOL/L (ref 99–110)
CO2: 25 MMOL/L (ref 21–32)
CREAT SERPL-MCNC: 0.6 MG/DL (ref 0.6–1.2)
EOSINOPHILS ABSOLUTE: 0.1 K/UL (ref 0–0.6)
EOSINOPHILS RELATIVE PERCENT: 1 %
GFR AFRICAN AMERICAN: >60
GFR NON-AFRICAN AMERICAN: >60
GI BACTERIAL PATHOGENS BY PCR: NORMAL
GLOBULIN: 2.3 G/DL
GLUCOSE BLD-MCNC: 120 MG/DL (ref 70–99)
GLUCOSE BLD-MCNC: 140 MG/DL (ref 70–99)
GLUCOSE BLD-MCNC: 144 MG/DL (ref 70–99)
HCT VFR BLD CALC: 30 % (ref 36–48)
HEMOGLOBIN: 10.1 G/DL (ref 12–16)
LYMPHOCYTES ABSOLUTE: 1.1 K/UL (ref 1–5.1)
LYMPHOCYTES RELATIVE PERCENT: 10 %
MAGNESIUM: 1.4 MG/DL (ref 1.8–2.4)
MCH RBC QN AUTO: 30.7 PG (ref 26–34)
MCHC RBC AUTO-ENTMCNC: 33.6 G/DL (ref 31–36)
MCV RBC AUTO: 91.2 FL (ref 80–100)
MONOCYTES ABSOLUTE: 1 K/UL (ref 0–1.3)
MONOCYTES RELATIVE PERCENT: 9 %
MYELOCYTE PERCENT: 2 %
NEUTROPHILS ABSOLUTE: 9 K/UL (ref 1.7–7.7)
NEUTROPHILS RELATIVE PERCENT: 71 %
PDW BLD-RTO: 13.3 % (ref 12.4–15.4)
PERFORMED ON: ABNORMAL
PERFORMED ON: ABNORMAL
PLATELET # BLD: 224 K/UL (ref 135–450)
PLATELET SLIDE REVIEW: ADEQUATE
PMV BLD AUTO: 7.4 FL (ref 5–10.5)
POTASSIUM REFLEX MAGNESIUM: 3.8 MMOL/L (ref 3.5–5.1)
PROMYELOCYTES PERCENT: 1 %
RBC # BLD: 3.29 M/UL (ref 4–5.2)
RBC # BLD: NORMAL 10*6/UL
SLIDE REVIEW: ABNORMAL
SODIUM BLD-SCNC: 139 MMOL/L (ref 136–145)
TOTAL PROTEIN: 5.4 G/DL (ref 6.4–8.2)
WBC # BLD: 11.2 K/UL (ref 4–11)

## 2021-07-15 PROCEDURE — 6370000000 HC RX 637 (ALT 250 FOR IP): Performed by: HOSPITALIST

## 2021-07-15 PROCEDURE — 80053 COMPREHEN METABOLIC PANEL: CPT

## 2021-07-15 PROCEDURE — 6370000000 HC RX 637 (ALT 250 FOR IP): Performed by: INTERNAL MEDICINE

## 2021-07-15 PROCEDURE — 36415 COLL VENOUS BLD VENIPUNCTURE: CPT

## 2021-07-15 PROCEDURE — 83735 ASSAY OF MAGNESIUM: CPT

## 2021-07-15 PROCEDURE — 2580000003 HC RX 258: Performed by: INTERNAL MEDICINE

## 2021-07-15 PROCEDURE — 6360000002 HC RX W HCPCS: Performed by: INTERNAL MEDICINE

## 2021-07-15 PROCEDURE — 2500000003 HC RX 250 WO HCPCS: Performed by: INTERNAL MEDICINE

## 2021-07-15 PROCEDURE — 85025 COMPLETE CBC W/AUTO DIFF WBC: CPT

## 2021-07-15 RX ORDER — MAGNESIUM SULFATE IN WATER 40 MG/ML
2000 INJECTION, SOLUTION INTRAVENOUS ONCE
Status: COMPLETED | OUTPATIENT
Start: 2021-07-15 | End: 2021-07-15

## 2021-07-15 RX ADMIN — Medication 10 ML: at 09:34

## 2021-07-15 RX ADMIN — SODIUM CHLORIDE 25 ML: 9 INJECTION, SOLUTION INTRAVENOUS at 05:59

## 2021-07-15 RX ADMIN — LOPERAMIDE HYDROCHLORIDE 2 MG: 2 CAPSULE ORAL at 01:19

## 2021-07-15 RX ADMIN — ENOXAPARIN SODIUM 40 MG: 40 INJECTION SUBCUTANEOUS at 09:32

## 2021-07-15 RX ADMIN — ONDANSETRON 4 MG: 4 TABLET, ORALLY DISINTEGRATING ORAL at 01:24

## 2021-07-15 RX ADMIN — LOPERAMIDE HYDROCHLORIDE 2 MG: 2 CAPSULE ORAL at 09:32

## 2021-07-15 RX ADMIN — MAGNESIUM SULFATE HEPTAHYDRATE 2000 MG: 40 INJECTION, SOLUTION INTRAVENOUS at 11:15

## 2021-07-15 RX ADMIN — METRONIDAZOLE 500 MG: 500 INJECTION, SOLUTION INTRAVENOUS at 06:00

## 2021-07-15 RX ADMIN — METFORMIN HYDROCHLORIDE 1000 MG: 500 TABLET ORAL at 09:38

## 2021-07-15 RX ADMIN — LISINOPRIL 10 MG: 10 TABLET ORAL at 09:32

## 2021-07-15 RX ADMIN — NYSTATIN 500000 UNITS: 100000 SUSPENSION ORAL at 11:58

## 2021-07-15 RX ADMIN — NYSTATIN 500000 UNITS: 100000 SUSPENSION ORAL at 09:32

## 2021-07-15 RX ADMIN — INSULIN LISPRO 1 UNITS: 100 INJECTION, SOLUTION INTRAVENOUS; SUBCUTANEOUS at 11:58

## 2021-07-15 RX ADMIN — AMLODIPINE BESYLATE 2.5 MG: 5 TABLET ORAL at 09:32

## 2021-07-15 RX ADMIN — Medication 10 ML: at 05:59

## 2021-07-15 RX ADMIN — POTASSIUM BICARBONATE 25 MEQ: 978 TABLET, EFFERVESCENT ORAL at 09:34

## 2021-07-15 ASSESSMENT — PAIN SCALES - GENERAL
PAINLEVEL_OUTOF10: 0
PAINLEVEL_OUTOF10: 0

## 2021-07-15 NOTE — DISCHARGE SUMMARY
6/17/2021  Transthoracic Echocardiography Report (TTE)  Demographics   Patient Name       QUIRINO Woo   Date of Study      06/17/2021         Gender              Female   Patient Number     [id number]         Date of Birth       1945   Visit Number       [id number]          Age                 77 year(s)   Accession Number   [id number]         Room Number         OP   Corporate ID       [id number]           Sonographer         Yadira Helton RVT, BS   Ordering Physician Amara Donovan MD,                     Theron Gottron MD        Physician           [facility], [facility]  Procedure Type of Study   TTE procedure:ECHOCARDIOGRAM COMPLETE 2D W DOPPLER W COLOR. Procedure Date Date: 06/17/2021 Start: 01:14 PM Study Location: [facility] - Echo Lab Technical Quality: Adequate visualization Indications:Pre-chemotherapy. Patient Status: Routine Height: 67 inches Weight: 171 pounds BSA: 1.89 m2 BMI: 26.78 kg/m2 Rhythm: Within normal limits HR: 83 bpm BP: 148/90 mmHg  Conclusions   Summary  Normal left ventricle size, wall thickness, and systolic function with an  estimated ejection fraction of 55-60%. No regional wall motion abnormalities are seen. Global longitudinal strain: -20% (normal). Indeterminate diastolic function. Mild tricuspid regurgitation with a PASP of 40 mmHg. Signature   ------------------------------------------------------------------  Electronically signed by Ramses Simeon MD, [facility], [address]  (Interpreting physician) on 06/17/2021 at 03:14 PM  ------------------------------------------------------------------   Findings   Left Ventricle  Normal left ventricle size, wall thickness, and systolic function with an  estimated ejection fraction of 55-60%. No regional wall motion abnormalities are seen. Global longitudinal strain: -20% (normal). Average E/e': 10.3.   Indeterminate diastolic function. Mitral Valve  Mitral valve leaflets appear mildly thickened. No evidence of mitral regurgitation or stenosis. Left Atrium  The left atrium is normal in size. Aortic Valve  Tricuspid aortic valve. The aortic valve is mildly thickened but opens well with normal gradients. No evidence of aortic valve regurgitation. Aorta  The aortic root is normal in size. The ascending aorta is normal in size. Right Ventricle  The right ventricle is normal in size and function. TAPSE: 2.09 cm. RV s' velocity: 15.2 cm/s. Tricuspid Valve  The tricuspid valve is normal in structure and function. Mild tricuspid regurgitation with a PASP of 40 mmHg. Right Atrium  The right atrial size is normal.   Pulmonic Valve  The pulmonic valve is not well visualized. There is no evidence of pulmonic valve regurgitation or stenosis. Pericardial Effusion  No pericardial effusion noted. Pleural Effusion  No pleural effusion. Miscellaneous  The inferior vena cava appears normal in size with normal respiratory  variation.   M-Mode/2D Measurements (cm)   LV Diastolic Dimension: 2.25 cm LV Systolic Dimension: 2.8 cm  LV Septum Diastolic: 1.10 cm  LV PW Diastolic: 0.9 cm         AO Root Dimension: 3.5 cm                                  LA Dimension: 3.2 cm                                  LA Area: 14.7 cm2  LVOT: 2 cm                      LA volume/Index: 36.43 ml /19 ml/m2  Doppler Measurements   AV Peak Velocity: 177 cm/s     MV Peak E-Wave: 71.6 cm/s  AV Peak Gradient: 12.53 mmHg   MV Peak A-Wave: 101 cm/s  AV Mean Gradient: 7 mmHg       MV E/A Ratio: 0.71  LVOT Peak Velocity: 152 cm/s  AV Area (Continuity):2.41 cm2   TR Velocity:300 cm/s  TR Gradient:36 mmHg   E' Septal Velocity: 5.92 cm/s  E' Lateral Velocity: 8.49 cm/s  PV Peak Velocity: 122 cm/s  PV Peak Gradient: 5.95 mmHg   Aortic Valve   Peak Velocity: 177 cm/s     Mean Velocity: 132 cm/s  Peak Gradient: 12.53 mmHg   Mean Gradient: 7 mmHg  Area (continuity): 2.41 cm2  AV VTI: 32.4 cm  Aorta   Aortic Root: 3.5 cm  Ascending Aorta: 3.4 cm  LVOT Diameter: 2 cm      CT ABDOMEN PELVIS WO CONTRAST Additional Contrast? None    Result Date: 7/8/2021  EXAMINATION: CT OF THE ABDOMEN AND PELVIS WITHOUT CONTRAST 7/8/2021 8:30 pm TECHNIQUE: CT of the abdomen and pelvis was performed without the administration of intravenous contrast. Multiplanar reformatted images are provided for review. Dose modulation, iterative reconstruction, and/or weight based adjustment of the mA/kV was utilized to reduce the radiation dose to as low as reasonably achievable. COMPARISON: None. HISTORY: ORDERING SYSTEM PROVIDED HISTORY: n/v/d TECHNOLOGIST PROVIDED HISTORY: Reason for exam:->n/v/d Additional Contrast?->None Decision Support Exception - unselect if not a suspected or confirmed emergency medical condition->Emergency Medical Condition (MA) Reason for Exam: N/V/D. Dehydration (oncologist sent d/t severe dehydration). Acuity: Acute Type of Exam: Initial FINDINGS: Lower Chest:The lung bases are clear Organs: The liver, spleen, adrenal glands, kidneys, and pancreas demonstrate no acute abnormality. GI/Bowel: There is a small hiatal hernia. No abnormally dilated loops of bowel are seen. There are multiple fluid-filled nondilated loops of large and small bowel. Peritoneum/Retroperitoneum: Unremarkable Pelvis: Unremarkable Bones: No suspicious osseous lesions are identified     Multiple fluid-filled loops of large and small bowel, findings are most consistent with gastroenteritis. XR CHEST PORTABLE    Result Date: 6/23/2021  EXAMINATION: ONE XRAY VIEW OF THE CHEST 6/23/2021 1:27 pm COMPARISON: Chest radiograph December 17, 2018 and priors.  HISTORY: ORDERING SYSTEM PROVIDED HISTORY: s/p left subclavian port TECHNOLOGIST PROVIDED HISTORY: Reason for exam:->s/p left subclavian port Reason for Exam: s/p left subclavian port Acuity: Acute Type of Exam: Initial FINDINGS: There has been interval placement a pelvis, likely phleboliths. Peritoneum/Retroperitoneum: Atherosclerotic change seen in aorta. No developing retroperitoneal adenopathy. Bones/Soft Tissues: Tiny periumbilical hernia containing fat is seen. Sclerotic focus is seen in the left acetabulum. Spurring is seen in the spine. Spurring is seen in the hips. There is anterolisthesis of L4 on L5     Large right breast mass with associated right axillary and right subpectoral adenopathy. Small left lower lobe pulmonary nodule, likely benign as there is no change since 2018 No definite solid organ metastasis in abdomen or pelvis. Tiny sclerotic focus left acetabulum, favored to represent bone island and less likely sclerotic metastasis. Please see bone scan report     [facility] VASCULAR ACCESS GUIDANCE    Result Date: 6/23/2021  Radiology exam is complete. No Radiologist dictation. Please follow up with ordering provider.        Discharge Exam:  BP (!) 163/70   Pulse 80   Temp 98.2 °F (36.8 °C) (Oral)   Resp 16   Ht 5' 7\" (1.702 m)   Wt 182 lb (82.6 kg)   SpO2 97%   BMI 28.51 kg/m²     General Appearance:    Alert, cooperative, no distress, appears stated age   Head:    Normocephalic, without obvious abnormality, atraumatic   Eyes:    PERRL, conjunctiva/corneas clear, EOM's intact, fundi     benign, both eyes   Ears:    Normal TM's and external ear canals, both ears   Nose:   Nares normal, septum midline, mucosa normal, no drainage    or sinus tenderness   Throat:   Lips, mucosa, and tongue normal; teeth and gums normal   Neck:   Supple, symmetrical, trachea midline, no adenopathy;     thyroid:  no enlargement/tenderness/nodules; no carotid    bruit or JVD   Back:     Symmetric, no curvature, ROM normal, no CVA tenderness   Lungs:     Clear to auscultation bilaterally, respirations unlabored   Chest Wall:    No tenderness or deformity    Heart:    Regular rate and rhythm, S1 and S2 normal, no murmur, rub   or gallop   Breast Exam:    No tenderness, masses, or nipple abnormality   Abdomen:     Soft, non-tender, bowel sounds active all four quadrants,     no masses, no organomegaly   Genitalia:    Normal female without lesion, discharge or tenderness   Rectal:    Normal tone ;guaiac negative stool   Extremities:   Extremities normal, atraumatic, no cyanosis or edema   Pulses:   2+ and symmetric all extremities   Skin:   Skin color, texture, turgor normal, no rashes or lesions   Lymph nodes:   Cervical, supraclavicular, and axillary nodes normal   Neurologic:   CNII-XII intact, normal strength, sensation and reflexes     throughout       Disposition: home    Condition: stable    Discharge Medications:   Quentin Phyllis   Home Medication Instructions JK    Printed on:07/15/21 1412   Medication Information                      albuterol (PROVENTIL) (2.5 MG/3ML) 0.083% nebulizer solution  Take 2.5 mg by nebulization every 6 hours as needed. amLODIPine (NORVASC) 5 MG tablet  Take 5 mg by mouth daily             atorvastatin (LIPITOR) 20 MG tablet  TAKE 1 TABLET BY MOUTH AT BEDTIME AS DIRECTED. Cholecalciferol (VITAMIN D3) 125 MCG (5000 UT) TABS  Take by mouth             ciprofloxacin (CIPRO) 500 MG tablet  Take 1 tablet by mouth 2 times daily for 10 days             Cyanocobalamin (VITAMIN B 12 PO)  Take by mouth             lisinopril (PRINIVIL;ZESTRIL) 10 MG tablet  Take 10 mg by mouth daily             metFORMIN (GLUCOPHAGE) 1000 MG tablet  TAKE 1 TABLET BY MOUTH TWO TIMES A DAY             nystatin (MYCOSTATIN) 483849 UNIT/ML suspension  Take 5 mLs by mouth 4 times daily             Probiotic Acidophilus (FLORANEX) TABS  Take 1 tablet by mouth 2 times daily for 10 days             zafirlukast (ACCOLATE) 10 MG tablet  TK 1 T PO QD                 Allergies: Allergies   Allergen Reactions    Other      fireants    Erythromycin Nausea Only         Patient Instructions:    Activity: activity as tolerated  Diet: regular diet  Wound Care: none needed    Follow up Instructions: Follow-up with PCP: Aimee Sánchez MD in  1 week.     Total time spent on day of discharge including face-to-face visit, examination, documentation, counseling, preparation of discharge plans and followup, and discharge medicine reconciliation and presciptions is 36 minutes    Signed:  Aimee Sánchez MD  7/15/2021  2:12 PM

## 2021-07-15 NOTE — PROGRESS NOTES
Physician Progress Note      Jemal Dumont  CSN #:                  [id number]  :                       1945  ADMIT DATE:       2021 3:51 PM  [address] [place] DATE:  RESPONDING  PROVIDER #:        Benja Mello MD          QUERY TEXT:    Patient admitted with positive blood cultures and elevated WBC. Noted   documentation of bacteremia by oncology and infectious disease and septicemia   by internal medicine. If possible, please document in progress notes and discharge summary if you   are evaluating and /or treating any of the following: The medical record reflects the following:  Risk Factors: Immunocompromised 2/2 chemo/breast cancer  Clinical Indicators: ED---Patient's urine and blood culture was positive   growing 1 of 2 of moraxella osloensis, patient is asymptomatic. Elevated WBC   of 26.4. she did receive dexamethasone before and after chemotherapy treatment   and also received a white blood cell booster for her chemo. Infectious   disease agreed this may be contaminant, but we cannot assume as much given the   unusual bacteria and her immunocompromised status. Blanchie Bevels Blanchie Bevels No other indictors of   sepsis. Repeat blood culture are so far negative. Treatment: Rocephin, Flagyl  Options provided:  -- Bacteremia confirmed and septicemia ruled out  -- Septicemia confirmed and bacteremia ruled out  -- Bacteremia and septicemia confirmed  -- Other - I will add my own diagnosis  -- Disagree - Not applicable / Not valid  -- Disagree - Clinically unable to determine / Unknown  -- Refer to Clinical Documentation Reviewer    PROVIDER RESPONSE TEXT:    After study, bacteremia confirmed and septicemia ruled out.     Query created by: Tamara Ganser on 2021 7:45 AM      Electronically signed by:  Benja Mello MD 7/15/2021 1:08 PM

## 2021-07-15 NOTE — PLAN OF CARE
Problem: Falls - Risk of:  Goal: Will remain free from falls  Description: Will remain free from falls  Outcome: Ongoing  Goal: Absence of physical injury  Description: Absence of physical injury  Outcome: Ongoing     Problem: Metabolic:  Goal: Ability to maintain appropriate glucose levels will improve  Description: Ability to maintain appropriate glucose levels will improve  Outcome: Ongoing

## 2021-07-15 NOTE — PROGRESS NOTES
Assessment complete. Alert, oriented x4. C/o loose stools, Imodium not due at this time. The care plan and education has been reviewed and mutually agreed upon with the patient. In bed, bed in lowest position, call light and bedside table within reach. No further needs expressed at this time. 0124  C/o loose stools; given PRN Imodium per MAR. C/o nausea when she has diarrhea; given PRN Zofran per [facility]    0602  Patient reports that the last time she went to the bathroom, she only urinated.

## 2021-07-15 NOTE — PROGRESS NOTES
0800: Pt resting comfortably in bed. R upper extremity alert for current breast cancer. Port not accessed at this time.

## 2021-07-15 NOTE — PROGRESS NOTES
[facility]   Progress Note      7/15/2021 9:35 AM        Name: Honey Omer . Admitted: 7/12/2021    SUBJECTIVE:  She is feeling better, diarrhea continues to improve, she remains afebrile, she is hoping to go home soon.      Reviewed interval ancillary notes    Current Medications  loperamide (IMODIUM) capsule 2 mg, 4x Daily PRN  melatonin tablet 3 mg, Nightly PRN  potassium bicarbonate (K-LYTE) disintegrating tablet 25 mEq, BID  amLODIPine (NORVASC) tablet 2.5 mg, Daily  atorvastatin (LIPITOR) tablet 20 mg, Nightly  lisinopril (PRINIVIL;ZESTRIL) tablet 10 mg, Daily  metFORMIN (GLUCOPHAGE) tablet 1,000 mg, BID WC  nystatin (MYCOSTATIN) 588303 UNIT/ML suspension 500,000 Units, 4x Daily  zafirlukast (ACCOLATE) tablet 10 mg (Patient Supplied), Daily  sodium chloride flush 0.9 % injection 5-40 mL, 2 times per day  sodium chloride flush 0.9 % injection 5-40 mL, PRN  0.9 % sodium chloride infusion, PRN  enoxaparin (LOVENOX) injection 40 mg, Daily  ondansetron (ZOFRAN-ODT) disintegrating tablet 4 mg, Q8H PRN   Or  ondansetron (ZOFRAN) injection 4 mg, Q6H PRN  polyethylene glycol (GLYCOLAX) packet 17 g, Daily PRN  acetaminophen (TYLENOL) tablet 650 mg, Q6H PRN   Or  acetaminophen (TYLENOL) suppository 650 mg, Q6H PRN  glucose (GLUTOSE) 40 % oral gel 15 g, PRN  dextrose 50 % IV solution, PRN  glucagon (rDNA) injection 1 mg, PRN  dextrose 5 % solution, PRN  insulin lispro (1 Unit Dial) 0-6 Units, TID WC  insulin lispro (1 Unit Dial) 0-3 Units, Nightly  cefTRIAXone (ROCEPHIN) 2000 mg IVPB in D5W 50ml minibag, Q24H  metronidazole (FLAGYL) 500 mg in NaCl 100 mL IVPB premix, Q8H        Objective:  /77   Pulse 81   Temp 98.1 °F (36.7 °C) (Oral)   Resp 14   Ht 5' 7\" (1.702 m)   Wt 182 lb (82.6 kg)   SpO2 95%   BMI 28.51 kg/m²     Intake/Output Summary (Last 24 hours) at 7/15/2021 0935  Last data filed at 7/15/2021 0809  Gross per 24 hour   Intake 896.98 ml   Output --   Net 896.98 ml      Wt Readings from Last 3 Encounters:   07/12/21 182 lb (82.6 kg)   07/08/21 173 lb 11.2 oz (78.8 kg)   06/23/21 178 lb 8 oz (81 kg)       General appearance:  Appears comfortable  Eyes: Sclera clear. Pupils equal.  ENT: Moist oral mucosa. Trachea midline, no adenopathy. Cardiovascular: Regular rhythm, normal S1, S2. No murmur. No edema in lower extremities  Respiratory: Not using accessory muscles. Good inspiratory effort. Clear to auscultation bilaterally, no wheeze or crackles. GI: Abdomen soft, no tenderness, not distended  Musculoskeletal: No cyanosis in digits, neck supple  Neurology: CN 2-12 grossly intact. No speech or motor deficits  Psych: Normal affect. Alert and oriented in time, place and person  Skin: Warm, dry, normal turgor    Labs and Tests:  CBC:   Recent Labs     07/12/21  1615 07/14/21  0529 07/15/21  0529   WBC 26.4* 16.1* 11.2*   HGB 10.9* 9.9* 10.1*    225 224     BMP:    Recent Labs     07/13/21  0609 07/14/21  0529 07/15/21  0529    139 139   K 3.3* 3.5 3.8    108 106   CO2 21 22 25   BUN 13 9 8   CREATININE 0.7 0.6 0.6   GLUCOSE 152* 151* 140*     Hepatic:   Recent Labs     07/12/21  1615 07/14/21  0529 07/15/21  0529   AST 22 35 26   ALT 21 35 34   BILITOT <0.2 <0.2 <0.2   ALKPHOS 145* 123 126       ASSESSMENT AND PLAN    Active Problems:    Septicemia ([facility] [address]    Hypokalemia    Hypomagnesemia    Encounter for medication counseling  Resolved Problems:    * No resolved hospital problems. *      1. Bacteremia:  -Blood culture with gram-negative moraxella osleonsis. Repeat cultures pending.   -Receiving IV Rocephin and Flagyl.  -Infectious disease is following.  -Has port-a-cath.   -Has leukocytosis which could be related to pegfilgrastim, improved.         2.  Right side breast cancer:  -Received C1 carboplatin, Taxotere, Herceptin, Perjeta and Neulasta on 6/30/21.  -Planning to change treatment to taxol and carboplatin on days 1 and 8 of 21 day cycle and Herceptin and Perjeta every 3 weeks. This will be done as an outpatient on 7/28/21.        3. Diarrhea  -Likely related to Taxotere.  -C. Diff and stool culture negative.  -Continue imodium PRN.       4. Hypokalemia and hypomagnesemia:  -Due to diarrhea. -Receiving oral and IV replacement.         5. Thrush  -Continue oral nystatin.       OK for discharge from oncology perspective. She will f/u in office on 7/28/21.      Dong Kay, [place]  On[facility]

## 2021-07-16 LAB
BLOOD CULTURE, ROUTINE: NORMAL
CULTURE, BLOOD 2: NORMAL

## 2021-08-19 ENCOUNTER — OFFICE VISIT (OUTPATIENT)
Dept: SURGERY | Age: 76
End: 2021-08-19
Payer: MEDICARE

## 2021-08-19 VITALS
OXYGEN SATURATION: 97 % | WEIGHT: 172 LBS | SYSTOLIC BLOOD PRESSURE: 128 MMHG | DIASTOLIC BLOOD PRESSURE: 78 MMHG | HEART RATE: 90 BPM | BODY MASS INDEX: 26.94 KG/M2

## 2021-08-19 DIAGNOSIS — C50.911 INVASIVE DUCTAL CARCINOMA OF RIGHT BREAST, STAGE 3 (HCC): Primary | ICD-10-CM

## 2021-08-19 PROCEDURE — 1123F ACP DISCUSS/DSCN MKR DOCD: CPT | Performed by: SURGERY

## 2021-08-19 PROCEDURE — 4040F PNEUMOC VAC/ADMIN/RCVD: CPT | Performed by: SURGERY

## 2021-08-19 PROCEDURE — 3017F COLORECTAL CA SCREEN DOC REV: CPT | Performed by: SURGERY

## 2021-08-19 PROCEDURE — 1090F PRES/ABSN URINE INCON ASSESS: CPT | Performed by: SURGERY

## 2021-08-19 PROCEDURE — 99214 OFFICE O/P EST MOD 30 MIN: CPT | Performed by: SURGERY

## 2021-08-19 PROCEDURE — G8400 PT W/DXA NO RESULTS DOC: HCPCS | Performed by: SURGERY

## 2021-08-19 PROCEDURE — G8417 CALC BMI ABV UP PARAM F/U: HCPCS | Performed by: SURGERY

## 2021-08-19 PROCEDURE — G8427 DOCREV CUR MEDS BY ELIG CLIN: HCPCS | Performed by: SURGERY

## 2021-08-19 PROCEDURE — 1036F TOBACCO NON-USER: CPT | Performed by: SURGERY

## 2021-08-19 NOTE — PROGRESS NOTES
PCP:  Medical Oncology: Meli Harris  Radiation:  Other:      [id number] STAGE:  IIIA right breast cancer      HPI:  Ms. Lashae Ryan is a 77 y.o. 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 She initially reported that began noticing a change to the right breast in December 2020 2 January 2021. This began with slight redness and burning pain of the breast.  She then began to have some associated arm pain. She thought this may be a reaction to something and monitored for a short time. She elected to wait until sometime the past after her Covid vaccine to obtain breast imaging but then underwent diagnostic breast imaging. In the most recent months she has also noticed a retraction of the right nipple. She is uncertain whether she has noticed a contour change or an asymmetry of the appearance of the breast.  Prior to this she has had a benign left breast biopsy. She has a family history of breast cancer. She has not noticed any new abnormalities of the contralateral breast such as masses, skin changes, color changes, nipple discharge, or changes to the nipple-areolar complex. Since her last encounter she is initiated neoadjuvant chemotherapy. Her tentative end date is 11/3/2021. She reports noticing the mass shrinking in size and her pain is nearly gone. INTERVAL HISTORY:    On 5/25/2021 she underwent bilateral breast imaging. The left breast is negative. Stable postoperative changes are noted. Within the right breast there is a high density mass with spiculated margins in the 9 to 10 o'clock position measuring 5 cm. It is associated with pleomorphic calcifications. Additionally there is a high density oval mass with lobulated contour which appears continuous and located in the 10 to 11 o'clock position, measuring 6 cm. The right nipple appears retracted. There are at least 6 abnormal appearing lymph nodes.   Ultrasound correlate of the right breast 9:00 location identified a 5.2 x 2.7 x 4 cm mass. In the 11 o'clock position the lobulated mass measures 6.3 x 4.2 x 5 cm and appears continuous with the 9:00 mass. In the inferior aspect of the axillary tail there is a hypoechoic mass with indistinct margins and multiple surrounding abnormal lymph nodes. At least 9 abnormal lymph nodes are evident. BI-RADS 5. On 6/3/2021 she underwent two site core needle biopsy of the right breast and axillary lymph node. [id number]     Pathology of the right breast 9 o'clock position identified high-grade invasive carcinoma with DCIS. Metaplastic carcinoma cannot be excluded. ER negative [place] negative HER-2 positive. Pathology of the right breast 11 o'clock position identified high-grade invasive carcinoma with DCIS. Metaplastic carcinoma cannot be excluded. ER negative [place] negative HER-2 positive. Pathology of the right axillary lymph node identified metastatic carcinoma. She was initiated neoadjuvant chemotherapy with carboplatin, Taxotere, Herceptin and Perjeta    Review of Systems   Respiratory:        Snoring   Psychiatric/Behavioral: The patient is nervous/anxious.    :    There is no new onset of persistent dry cough, abdominal pains, new onset of headache, change in bowel function, or bony tenderness to suggest metastatic disease at this time. Pathology:    Department of Pathology   FINAL SURGICAL PATHOLOGY REPORT   Patient Name: Darice Carrel             Accession No:  [id number]    Age Sex:   1945    75 Y / F       Location:      [facility]   Account No:   [de-identified]                  Collected:     2021   Med Rec No:    [id number]                 Received:      2021   Attend Phys:   Abhi Casarez MD            Completed:     2021   Perform Phys: Abhi Casarez MD             FINAL DIAGNOSIS:        A. Right breast, 9 o'clock, 8 cm from nipple, biopsy:        - Invasive carcinoma (See Comment).         B. Right breast, 11 o'clock, 9 cm from nipple, biopsy:        - Invasive carcinoma (See Comment).        C. Axilla, right, biopsy:        - Positive for carcinoma (2.5 mm).      - Small number of lymphocytes present. COMMENT: Parts A and B show similar high grade carcinoma morphology. 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 GATA3 stain is mostly positive;   compatible with mammary carcinoma. P40 stains performed on both parts   show scattered reactivity, compatible with partial squamous   differentiation. Together with morphologically identified patchy   keratinization (especially in part B), a component of metaplastic   carcinoma cannot be excluded; definitive classification relies on   resection specimen if clinically indicated. Histologic Grade ([place] Histologic Score): Grade 3 (total score 8;   tubule/ nuclear/ mitotic score: 3/3/2)   Longest length on slide: 6 mm in part A and 10mm in part B     Biomarkers (Performed on block B1):      Estrogen receptor: Negative (<%)      Progesterone receptor: Negative (<1%)      HER2 IHC: Positive (Score 3+)     Biomarkers (Performed on block C1):      Estrogen receptor: Negative (<%)      Progesterone receptor: Negative (<1%)      HER2 IHC: Positive (Score 3+)     Cold ischemia time: Less than 1 hour   Duration of fixation: Greater than 6 hours and less than 72 hours   Performed by immunohistochemistry with manual quantitation. ER clone is   [place] SP1. GA clone is [place] clone [id number]. HER2 clone is [facility]   anti-her-h2ineu (4B5).  Tests are performed on formalin fixed paraffin   embedded tissue using ULTRAVIEW universal DAB detection kit. Positive and   negative control tissue shows appropriate staining.      ER and GA scoring includes the percentage of cells staining positive and   average intensity of expression.  Interpretation follows the ASCO/CAP   guidelines, with any staining of Breast    Diabetes mellitus ([facility] [address]     Diverticulitis     Hypertension    :        Past Surgical History:   Procedure Laterality Date    BREAST LUMPECTOMY Left     HYSTERECTOMY      PORT SURGERY N/A 2021    PLACEMENT OF POWER PORT A CATHETER performed by Madelaine Bush MD at [address] [place] [address] Street Right 6/3/2021    US BREAST BIOPSY NEEDLE ADDITIONAL RIGHT 6/3/2021 [facility] ULTRASOUND    US BREAST NEEDLE BIOPSY RIGHT Right 6/3/2021    US BREAST NEEDLE BIOPSY RIGHT 6/3/2021 [facility] ULTRASOUND    US LYMPH NODE BIOPSY  6/3/2021    US LYMPH NODE BIOPSY 6/3/2021 [facility] ULTRASOUND   :        Allergies as of 2021 - Fully Reviewed 2021   Allergen Reaction Noted    Other  2010    Erythromycin Nausea Only 2019   :        Social History     Tobacco Use    Smoking status: Never Smoker    Smokeless tobacco: Never Used   Vaping Use    Vaping Use: Never used   Substance Use Topics    Alcohol use: No    Drug use: No   :      Family History:  Maternal aunt: Breast cancer, diagnosed age 61,  age 59  Maternal aunt: Breast cancer, diagnosed age 62s  Sister: Colon cancer, diagnosed age 50  No additional family history of breast or ovarian cancer    Hormonal History:   a.0  m.0  Menarche at age 16. Postmenopausal at age40  Hysterectomy: at age 37  No estrogen supplementation currently but 25-year use of Premarin. Stopped 10 years ago. OCP not taking    Medications:  Medication documentation has been reviewed in the electronic medical record and patient office intake form. Exam:  /78   Pulse 90   Wt 172 lb (78 kg)   SpO2 97%   BMI 26.94 kg/m²     Constitutional: She appears well-nourished. No apparent distress. Breast: The patient was examined in the upright and supine position. She has a \"DD\" cup breast. Breasts are pathologically (malignancy) asymmetric and ptotic. This is somewhat less pronounced.        Right: The right breast is notably misshapen with visible contour change to the upper portion of the breast.  The previously large mass is now softer in texture. The previously palpably abnormal region encompassing the majority of the upper breast now measures about 5+ centimeters. There are palpably enlarged but not fixed lymph nodes present. The previous mild erythematous skin change associated with the mass is less evident. There are no significant edematous changes. The nipple is sunken in and retracted. Left: There were no new masses or changes in breast contour. There were no skin changes of the breast or nipple areolar complex. There was no nipple inversion or discharge. There is no axillary lymphadenopathy palpated bilaterally. Head: Normocephalic andatraumatic. Eyes: EOM are normal. Pupils are equal, round, and reactive tolight. Neck: Neck supple. No tracheal deviation present.   : regular rate. Extremities appear well perfused. Pulmonary: respirations are non-labored and without audible distress  Lymphatics: no palpable axillary orcervical lymphadenopathy. Skin: No rash noted. No erythema. Neurologic: alert and oriented. Assessment/Plan:  [id number] STAGE:  IIIA right breast cancer  ER - [place]- HER2 positive. Ongoing neoadjuvant chemotherapy    I reviewed her physical exam findings. She is having a partial response at this time. She is about shelter through treatment. The surgical rational and technical details of undergoing breast conservation therapy versus a mastectomy were reviewed. At this point she has decided to move forward with bilateral mastectomies. She is interested in reconstruction and we will refer her to plastic surgery. We discussed the role of radiation and how it can influence reconstruction. She is planning genetic testing later this week. She believes systemic treatment should be complete at the beginning of November. She will let us know if this date is altered. At this time we will plan to see her back at that time with interval right breast mammography and ultrasound. We will also reevaluate the right axillary lymph node and potentially have it tagged for excision in the future. All of the patient's questions were answered at this time however, she was encouraged to call the office with any further inquiries. Approximately 30 minutes of time were spent in preparation, direct patient contact, record review, imaging interpretation, care coordination, documentation and activities otherwise related to this encounter.

## 2021-08-20 DIAGNOSIS — C50.911 LOCALLY ADVANCED CARCINOMA OF BREAST, RIGHT (HCC): ICD-10-CM

## 2021-08-20 DIAGNOSIS — C50.911 INVASIVE DUCTAL CARCINOMA OF RIGHT BREAST, STAGE 3 (HCC): Primary | ICD-10-CM

## 2021-09-13 ENCOUNTER — TELEPHONE (OUTPATIENT)
Dept: SURGERY | Age: 76
End: 2021-09-13

## 2021-09-30 ENCOUNTER — HOSPITAL ENCOUNTER (OUTPATIENT)
Dept: NON INVASIVE DIAGNOSTICS | Age: 76
Discharge: HOME | End: 2021-09-30
Payer: MEDICARE

## 2021-09-30 DIAGNOSIS — Z51.11 ENCOUNTER FOR ANTINEOPLASTIC CHEMOTHERAPY: ICD-10-CM

## 2021-09-30 DIAGNOSIS — C50.411 MALIGNANT NEOPLASM OF UPPER-OUTER QUADRANT OF RIGHT FEMALE BREAST, UNSPECIFIED ESTROGEN RECEPTOR STATUS (HCC): ICD-10-CM

## 2021-09-30 LAB
LV EF: 60 %
LVEF MODALITY: NORMAL

## 2021-09-30 PROCEDURE — 93356 MYOCRD STRAIN IMG SPCKL TRCK: CPT

## 2021-09-30 PROCEDURE — 93306 TTE W/DOPPLER COMPLETE: CPT

## 2021-10-04 ENCOUNTER — HOSPITAL ENCOUNTER (OUTPATIENT)
Dept: ULTRASOUND IMAGING | Age: 76
Discharge: HOME | End: 2021-10-04
Payer: MEDICARE

## 2021-10-04 ENCOUNTER — HOSPITAL ENCOUNTER (OUTPATIENT)
Dept: WOMENS IMAGING | Age: 76
Discharge: HOME | End: 2021-10-04
Payer: MEDICARE

## 2021-10-04 DIAGNOSIS — C50.911 LOCALLY ADVANCED CARCINOMA OF BREAST, RIGHT (HCC): ICD-10-CM

## 2021-10-04 DIAGNOSIS — C50.411 MALIGNANT NEOPLASM OF UPPER-OUTER QUADRANT OF RIGHT FEMALE BREAST, UNSPECIFIED ESTROGEN RECEPTOR STATUS (HCC): ICD-10-CM

## 2021-10-04 DIAGNOSIS — C50.911 INVASIVE DUCTAL CARCINOMA OF RIGHT BREAST, STAGE 3 (HCC): ICD-10-CM

## 2021-10-04 PROCEDURE — 76641 ULTRASOUND BREAST COMPLETE: CPT

## 2021-10-04 PROCEDURE — G0279 TOMOSYNTHESIS, MAMMO: HCPCS

## 2021-11-14 ENCOUNTER — HOSPITAL ENCOUNTER (EMERGENCY)
Age: 76
Discharge: HOME | End: 2021-11-15
Attending: EMERGENCY MEDICINE
Payer: MEDICARE

## 2021-11-14 ENCOUNTER — APPOINTMENT (OUTPATIENT)
Dept: GENERAL RADIOLOGY | Age: 76
End: 2021-11-14
Payer: MEDICARE

## 2021-11-14 DIAGNOSIS — E83.42 HYPOMAGNESEMIA: Primary | ICD-10-CM

## 2021-11-14 DIAGNOSIS — I49.1 PAC (PREMATURE ATRIAL CONTRACTION): ICD-10-CM

## 2021-11-14 DIAGNOSIS — R25.1 TREMOR: ICD-10-CM

## 2021-11-14 LAB
A/G RATIO: 1.4 (ref 1.1–2.2)
ALBUMIN SERPL-MCNC: 3.9 G/DL (ref 3.4–5)
ALP BLD-CCNC: 93 U/L (ref 40–129)
ALT SERPL-CCNC: 35 U/L (ref 10–40)
ANION GAP SERPL CALCULATED.3IONS-SCNC: 12 MMOL/L (ref 3–16)
AST SERPL-CCNC: 32 U/L (ref 15–37)
BASOPHILS ABSOLUTE: 0 K/UL (ref 0–0.2)
BASOPHILS RELATIVE PERCENT: 0.2 %
BILIRUB SERPL-MCNC: 0.3 MG/DL (ref 0–1)
BILIRUBIN URINE: NEGATIVE
BLOOD, URINE: NEGATIVE
BUN BLDV-MCNC: 10 MG/DL (ref 7–20)
CALCIUM SERPL-MCNC: 9.6 MG/DL (ref 8.3–10.6)
CHLORIDE BLD-SCNC: 102 MMOL/L (ref 99–110)
CLARITY: CLEAR
CO2: 27 MMOL/L (ref 21–32)
COLOR: YELLOW
CREAT SERPL-MCNC: 0.6 MG/DL (ref 0.6–1.2)
EOSINOPHILS ABSOLUTE: 0 K/UL (ref 0–0.6)
EOSINOPHILS RELATIVE PERCENT: 0.1 %
EPITHELIAL CELLS, UA: 1 /HPF (ref 0–5)
GFR AFRICAN AMERICAN: >60
GFR NON-AFRICAN AMERICAN: >60
GLUCOSE BLD-MCNC: 176 MG/DL (ref 70–99)
GLUCOSE URINE: NEGATIVE MG/DL
HCT VFR BLD CALC: 25 % (ref 36–48)
HEMOGLOBIN: 8.6 G/DL (ref 12–16)
HYALINE CASTS: 2 /LPF (ref 0–8)
KETONES, URINE: NEGATIVE MG/DL
LEUKOCYTE ESTERASE, URINE: ABNORMAL
LYMPHOCYTES ABSOLUTE: 1 K/UL (ref 1–5.1)
LYMPHOCYTES RELATIVE PERCENT: 30.5 %
MAGNESIUM: 0.9 MG/DL (ref 1.8–2.4)
MCH RBC QN AUTO: 35.1 PG (ref 26–34)
MCHC RBC AUTO-ENTMCNC: 34.3 G/DL (ref 31–36)
MCV RBC AUTO: 102.4 FL (ref 80–100)
MICROSCOPIC EXAMINATION: YES
MONOCYTES ABSOLUTE: 0.2 K/UL (ref 0–1.3)
MONOCYTES RELATIVE PERCENT: 6.6 %
NEUTROPHILS ABSOLUTE: 2.1 K/UL (ref 1.7–7.7)
NEUTROPHILS RELATIVE PERCENT: 62.6 %
NITRITE, URINE: NEGATIVE
PDW BLD-RTO: 15.6 % (ref 12.4–15.4)
PH UA: 7 (ref 5–8)
PLATELET # BLD: 234 K/UL (ref 135–450)
PMV BLD AUTO: 6.7 FL (ref 5–10.5)
POTASSIUM REFLEX MAGNESIUM: 3.7 MMOL/L (ref 3.5–5.1)
PROTEIN UA: ABNORMAL MG/DL
RBC # BLD: 2.44 M/UL (ref 4–5.2)
RBC UA: 1 /HPF (ref 0–4)
SODIUM BLD-SCNC: 141 MMOL/L (ref 136–145)
SPECIFIC GRAVITY UA: 1.01 (ref 1–1.03)
TOTAL PROTEIN: 6.6 G/DL (ref 6.4–8.2)
TROPONIN: <0.01 NG/ML
URINE REFLEX TO CULTURE: YES
URINE TYPE: ABNORMAL
UROBILINOGEN, URINE: 0.2 E.U./DL
WBC # BLD: 3.3 K/UL (ref 4–11)
WBC UA: 12 /HPF (ref 0–5)

## 2021-11-14 PROCEDURE — 81001 URINALYSIS AUTO W/SCOPE: CPT

## 2021-11-14 PROCEDURE — 83735 ASSAY OF MAGNESIUM: CPT

## 2021-11-14 PROCEDURE — 93005 ELECTROCARDIOGRAM TRACING: CPT | Performed by: PHYSICIAN ASSISTANT

## 2021-11-14 PROCEDURE — 87086 URINE CULTURE/COLONY COUNT: CPT

## 2021-11-14 PROCEDURE — 6360000002 HC RX W HCPCS: Performed by: PHYSICIAN ASSISTANT

## 2021-11-14 PROCEDURE — 36415 COLL VENOUS BLD VENIPUNCTURE: CPT

## 2021-11-14 PROCEDURE — 84484 ASSAY OF TROPONIN QUANT: CPT

## 2021-11-14 PROCEDURE — 85025 COMPLETE CBC W/AUTO DIFF WBC: CPT

## 2021-11-14 PROCEDURE — 71045 X-RAY EXAM CHEST 1 VIEW: CPT

## 2021-11-14 PROCEDURE — 84443 ASSAY THYROID STIM HORMONE: CPT

## 2021-11-14 PROCEDURE — 80053 COMPREHEN METABOLIC PANEL: CPT

## 2021-11-14 PROCEDURE — 96365 THER/PROPH/DIAG IV INF INIT: CPT

## 2021-11-14 PROCEDURE — 99283 EMERGENCY DEPT VISIT LOW MDM: CPT

## 2021-11-14 PROCEDURE — 96366 THER/PROPH/DIAG IV INF ADDON: CPT

## 2021-11-14 RX ORDER — LORAZEPAM 2 MG/ML
1 INJECTION INTRAMUSCULAR ONCE
Status: DISCONTINUED | OUTPATIENT
Start: 2021-11-14 | End: 2021-11-14

## 2021-11-14 RX ORDER — 0.9 % SODIUM CHLORIDE 0.9 %
500 INTRAVENOUS SOLUTION INTRAVENOUS ONCE
Status: DISCONTINUED | OUTPATIENT
Start: 2021-11-14 | End: 2021-11-14

## 2021-11-14 RX ORDER — MAGNESIUM SULFATE IN WATER 40 MG/ML
2000 INJECTION, SOLUTION INTRAVENOUS ONCE
Status: COMPLETED | OUTPATIENT
Start: 2021-11-14 | End: 2021-11-14

## 2021-11-14 RX ORDER — MAGNESIUM SULFATE IN WATER 40 MG/ML
2000 INJECTION, SOLUTION INTRAVENOUS ONCE
Status: COMPLETED | OUTPATIENT
Start: 2021-11-15 | End: 2021-11-15

## 2021-11-14 RX ADMIN — MAGNESIUM SULFATE HEPTAHYDRATE 2000 MG: 40 INJECTION, SOLUTION INTRAVENOUS at 20:19

## 2021-11-14 NOTE — ED PROVIDER NOTES
[facility]        Pt Name: Isela Cazares  MRN: [id number]  [place] 1945  Date of evaluation: 11/14/2021  Provider: Tao Burrell PA-C  PCP: Darby Adan MD  Note Started: 1:56 PM EST        I have seen and evaluated this patient with my supervising physician Melodie Hickman DO.    CHIEF COMPLAINT       Chief Complaint   Patient presents with    Hypertension     Oncologist sent patient to ER due to HTN. HISTORY OF PRESENT ILLNESS   (Location, Timing/Onset, Context/Setting, Quality, Duration, Modifying Factors, Severity, Associated Signs and Symptoms)  Note limiting factors. Chief Complaint: Elevated blood pressure and feeling \"shaky. \"    Isela Cazares is a 77 y.o. female who presents to the emergency department with a chief complaint of feeling shaky. States she has been feeling like this for the past few weeks. Started on chemotherapy recently with history of breast cancer. Had her last chemotherapy treatments 4 days ago Wednesday. States she was only able to sleep 1 hour last night just because she has been feeling shaky and anxious. She has been taking 0.5 mg of lorazepam twice a day to help her with this but she did not take it today. She states she is having some tingling in her bilateral hands. Denies suicidal or homicidal ideation. Has been dealing with some diarrhea off and on for multiple months since the summer since she started chemotherapy but states she has not had a bowel movement in 3 days. Denies bloody stool, dysuria, hematuria, chest pain, shortness of breath, cough, fevers, nausea, vomiting or any other symptoms. Nursing Notes were all reviewed and agreed with or any disagreements were addressed in the HPI. REVIEW OF SYSTEMS    (2-9 systems for level 4, 10 or more for level 5)     Review of Systems    Positives and Pertinent negatives as per HPI.  Except as noted above in the ROS, all other systems were reviewed and negative. PAST MEDICAL HISTORY     Past Medical History:   Diagnosis Date    Asthma     Cancer ([facility] 06/2021    right Breast    Diabetes mellitus ([facility]     Diverticulitis     Hypertension          SURGICAL HISTORY     Past Surgical History:   Procedure Laterality Date    BREAST LUMPECTOMY Left     HYSTERECTOMY      PORT SURGERY N/A 6/23/2021    PLACEMENT OF POWER PORT A CATHETER performed by Ida Lou MD at [address] [place] [address] Street Right 6/3/2021    US BREAST BIOPSY NEEDLE ADDITIONAL RIGHT 6/3/2021 [facility]    US BREAST NEEDLE BIOPSY RIGHT Right 6/3/2021    US BREAST NEEDLE BIOPSY RIGHT 6/3/2021 [facility]    US LYMPH NODE BIOPSY  6/3/2021    US LYMPH NODE BIOPSY 6/3/2021 [facility] ULTR[facility]OUND         CURRENTMEDICATIONS       Previous Medications    ALBUTEROL (PROVENTIL) (2.5 MG/3ML) 0.083% NEBULIZER SOLUTION    Take 2.5 mg by nebulization every 6 hours as needed. AMLODIPINE (NORVASC) 5 MG TABLET    Take 5 mg by mouth daily    ATORVASTATIN (LIPITOR) 20 MG TABLET    TAKE 1 TABLET BY MOUTH AT BEDTIME AS DIRECTED.     CHOLECALCIFEROL (VITAMIN D3) 125 MCG (5000 UT) TABS    Take by mouth    CYANOCOBALAMIN (VITAMIN B 12 PO)    Take by mouth    LISINOPRIL (PRINIVIL;ZESTRIL) 10 MG TABLET    Take 10 mg by mouth daily    METFORMIN (GLUCOPHAGE) 1000 MG TABLET    TAKE 1 TABLET BY MOUTH TWO TIMES A DAY    ZAFIRLUKAST (ACCOLATE) 10 MG TABLET    TK 1 T PO QD         ALLERGIES     Other and Erythromycin    FAMILYHISTORY       Family History   Problem Relation Age of Onset    High Blood Pressure Mother     Stroke Mother     High Blood Pressure Father           SOCIAL HISTORY       Social History     Tobacco Use    Smoking status: Never Smoker    Smokeless tobacco: Never Used   Vaping Use    Vaping Use: Never used   Substance Use Topics    Alcohol use: No    Drug use: No       SCREENINGS             PHYSICAL EXAM    (up to 7 for level 4, 8 or more for level 5)     ED Triage Vitals [11/14/21 1326]   BP Temp Temp Source Pulse Resp SpO2 Height Weight   (!) 156/83 98.6 °F (37 °C) Oral 116 12 98 % 5' 7\" (1.702 m) 167 lb 4 oz (75.9 kg)       Physical Exam  Vitals and nursing note reviewed. Constitutional:       Appearance: She is well-developed. She is not diaphoretic. HENT:      Head: Atraumatic. Nose: Nose normal.   Eyes:      General:         Right eye: No discharge. Left eye: No discharge. Cardiovascular:      Rate and Rhythm: Tachycardia present. Rhythm irregular. Heart sounds: No murmur heard. No friction rub. No gallop. Comments: Tachycardic irregular rate  Pulmonary:      Effort: Pulmonary effort is normal. No respiratory distress. Breath sounds: No stridor. No wheezing, rhonchi or rales. Abdominal:      General: Bowel sounds are normal. There is no distension. Palpations: Abdomen is soft. There is no mass. Tenderness: There is no abdominal tenderness. There is no guarding or rebound. Hernia: No hernia is present. Musculoskeletal:         General: No swelling. Normal range of motion. Cervical back: Normal range of motion. Skin:     General: Skin is warm and dry. Findings: No erythema or rash. Neurological:      Mental Status: She is alert and oriented to person, place, and time. Cranial Nerves: No cranial nerve deficit.    Psychiatric:         Behavior: Behavior normal.         DIAGNOSTIC RESULTS   LABS:    Labs Reviewed   CBC WITH AUTO DIFFERENTIAL - Abnormal; Notable for the following components:       Result Value    WBC 3.3 (*)     RBC 2.44 (*)     Hemoglobin 8.6 (*)     Hematocrit 25.0 (*)     .4 (*)     MCH 35.1 (*)     RDW 15.6 (*)     All other components within normal limits    Narrative:     Performed at:  [facility] [address], [facility], [address]   Phone [phone]   COMPREHENSIVE METABOLIC PANEL W/ REFLEX TO MG FOR LOW K - Abnormal; Notable for the following components:    Glucose 176 (*)     All other components within normal limits    Narrative:     Sandy Ochoar  [facility] tel. [phone],  Chemistry results called to and read back by rn er, 11/14/2021 14:37, by WALTER  Performed at:  [facility] [address] [facility], [facility] [place]   Phone [phone]   MAGNESIUM - Abnormal; Notable for the following components:    Magnesium 0.90 (*)     All other components within normal limits    Narrative:     Sandy Stager  [facility] tel. [phone],  Chemistry results called to and read back by rn er, 11/14/2021 14:37, by WALTER  Performed at:  [facility] [address] [facility], [facility] [place] [employer]   Phone [phone]   TROPONIN    Narrative:     Sandy Ochoar  [facility] tel. [phone],  Chemistry results called to and read back by rn er, 11/14/2021 14:37, by Malorie Zamora  Performed at:  [facility] [address] [facility], [facility] [place] [employer]   Phone [phone]   URINE RT REFLEX TO CULTURE   TSH WITH REFLEX       When ordered only abnormal lab results are displayed. All other labs were within normal range or not returned as of this dictation. EKG: When ordered, EKG's are interpreted by the Emergency Department Physician in the absence of a cardiologist.  Please see their note for interpretation of EKG. RADIOLOGY:   Non-plain film images such as CT, Ultrasound and MRI are read by the radiologist. Plain radiographic images are visualized and preliminarily interpreted by the ED Provider with the below findings:        Interpretation per the Radiologist below, if available at the time of this note:    XR CHEST PORTABLE   Final Result   No acute process. No results found.         PROCEDURES   Unless otherwise noted below, none     Procedures    CRITICAL CARE TIME   N/A    CONSULTS:  None      EMERGENCY DEPARTMENT COURSE and DIFFERENTIAL DIAGNOSIS/MDM:   Vitals:    Vitals:    11/14/21 1326   BP: (!) 156/83   Pulse: 116   Resp: 12   Temp: 98.6 °F (37 °C)   TempSrc: Oral   SpO2: 98%   Weight: 167 lb 4 oz (75.9 kg)   Height: 5' 7\" (1.702 m)       Patient was given the following medications:  Medications   magnesium sulfate 2000 mg in 50 mL IVPB premix (2,000 mg IntraVENous New Bag 11/14/21 2019)           Patient presented with feeling shaky and some tingling in her bilateral hands. Was found to have a magnesium of 0.9. Some IV magnesium was ordered. She did have EKG that revealed multiple PACs and she did have a mildly tachycardic and irregular rhythm on exam.  She denies any pain and is nontoxic in appearance. Remainder of laboratory testing is unremarkable. Patient still waiting to get IV magnesium infusion at the end of my shift. Currently do not have any inpatient rooms. Will be turned over to attending physician. Can recheck chemistry and EKG after IV magnesium to see if the patient will be able to be safely discharged home or not. Please see attending note for final disposition. FINAL IMPRESSION    1. Hypomagnesemia    DISPOSITION/PLAN   DISPOSITION        PATIENT REFERRED TO:  No follow-up provider specified.     DISCHARGE MEDICATIONS:  New Prescriptions    No medications on file       DISCONTINUED MEDICATIONS:  Discontinued Medications    No medications on file              (Please note that portions of this note were completed with a voice recognition program.  Efforts were made to edit the dictations but occasionally words are mis-transcribed.)    Ramakrishna Doyle PA-C (electronically signed)            Ramakrishna Doyle PA-C  11/14/21 2030

## 2021-11-15 VITALS
DIASTOLIC BLOOD PRESSURE: 68 MMHG | OXYGEN SATURATION: 95 % | HEIGHT: 67 IN | HEART RATE: 91 BPM | RESPIRATION RATE: 16 BRPM | BODY MASS INDEX: 26.25 KG/M2 | TEMPERATURE: 98.6 F | SYSTOLIC BLOOD PRESSURE: 140 MMHG | WEIGHT: 167.25 LBS

## 2021-11-15 LAB
EKG ATRIAL RATE: 102 BPM
EKG DIAGNOSIS: NORMAL
EKG P AXIS: 57 DEGREES
EKG P-R INTERVAL: 122 MS
EKG Q-T INTERVAL: 334 MS
EKG QRS DURATION: 94 MS
EKG QTC CALCULATION (BAZETT): 435 MS
EKG R AXIS: -28 DEGREES
EKG T AXIS: 60 DEGREES
EKG VENTRICULAR RATE: 102 BPM
TSH REFLEX: 0.9 UIU/ML (ref 0.27–4.2)

## 2021-11-15 PROCEDURE — 93010 ELECTROCARDIOGRAM REPORT: CPT | Performed by: INTERNAL MEDICINE

## 2021-11-15 PROCEDURE — 6360000002 HC RX W HCPCS: Performed by: EMERGENCY MEDICINE

## 2021-11-15 RX ADMIN — MAGNESIUM SULFATE HEPTAHYDRATE 2000 MG: 40 INJECTION, SOLUTION INTRAVENOUS at 00:16

## 2021-11-15 NOTE — ED NOTES
Malin needle removed. Reviewed discharge instructions, home meds, and follow up care with patient, verbalized understanding.       Nicolette Urias RN  11/15/21 1545

## 2021-11-15 NOTE — ED PROVIDER NOTES
I independently performed a history and physical on [facility]. All diagnostic, treatment, and disposition decisions were made by myself in conjunction with the advanced practice provider. Briefly, this is a 77 y.o. female here for extremity tremors ongoing for the past 2 to 3 days. Associated with difficulty sleeping. Patient also reports difficulty with fine motor movement in her hands. Currently on chemotherapy for metastatic breast cancer. She denies any fevers, chills, chest pain, shortness of breath or focal weakness. .    On exam, patient afebrile and nontoxic. No distress. Heart irregular irregular rhythm, normal rate. Lungs CTAB. Abdomen soft, nondistended, nontender to palpation in all quadrants. A&Ox4. PERRL. Face symmetric, speech clear. CN 2-12 intact. 5/5 motor and sensation grossly intact all extremities. No pronator drift. Normal finger to nose, normal heel to shin. Downward Babinskis. Normal gait observed. Fine resting tremors of upper extremities noted. EKG  EKG was reviewed by emergency department physician in the absence of a cardiologist    Narrow complex sinus rhythm, rate 102, normal axis, normal NC and QRS intervals, normal Qtc, no ST elevations or depressions, normal t-wave morphology, impression sinus tachycardia with frequent PACs and incomplete RBBB, no STEMI      Screenings            MDM    Patient afebrile and nontoxic. No distress. EKG no STEMI, troponin normal, ACS or malignant dysrhythmia is not immediately evident. Neuro exam without focal deficit, very low suspicion for CVA/TIA, regardless patient with time of symptom onset at least 2 days prior to arrival emergency department and would not be a candidate for consideration of thrombolytics. No findings to suggest infectious etiology. Laboratory work-up shows no evidence of endorgan dysfunction.   There is a significant hypomagnesemia of 0.9, on record review patient with chronically low magnesium however typically ranges around 1.2-1.4. Reports she has been on high-dose magnesium supplements in the past, however recently cut back on these. I suspect patient's symptoms are likely secondary to hypomagnesemia. Patient received 2 g of magnesium in the emergency department. I have lengthy discussion with patient and her daughter regarding etiology of symptoms. I also discussed case with her PCP Dr. Kelvin Lira, given her immunocompromise state and elevated risk of hospitalization during COVID-19 pandemic, the most prudent course of action is felt to be discharged to self-care with increased magnesium supplementation. Dr. Kelvin Lira is in agreement. Patient is very agreeable to this plan and feels comfortable turning to home, she does not want to be admitted to the hospital.  We will give another 2 g of magnesium here and will provide for increased supplementation at home. Will be able to follow-up with Dr. Kelvin Lira in clinic this week. Strict return precautions to the emergency department were discussed at length with patient and her daughter. Management decisions relating to this patient encounter were made during the [place] [address] public [facility] emergency. As a result, admission to the hospital and further ED observation/treatment pose increased risk due to multiple factors. At this point in time, the risk of hospital admission or further ED observation/treatment of this patient is deemed to be greater than the risk of discharge. I engaged in a shared decision-making conversation with the patient. The patient agrees and wishes to go home.       Patient Referrals:  Callie Euceda MD  [address] [place] [address], [address] [place] [address] Road  [phone]    In 1 day        Discharge Medications:  Discharge Medication List as of 11/15/2021  1:12 AM      START taking these medications    Details   Magnesium 400 MG CAPS Take 400 mg by mouth 2 times daily, Disp-60 capsule, R-0Print             FINAL IMPRESSION  1. Hypomagnesemia    2. Tremor    3. PAC (premature atrial contraction)        Blood pressure (!) 140/68, pulse 91, temperature 98.6 °F (37 °C), temperature source Oral, resp. rate 16, height 5' 7\" (1.702 m), weight 167 lb 4 oz (75.9 kg), SpO2 95 %. For further details of Светлана Padilla emergency department encounter, please see documentation by advanced practice provider, FLORINDA Elder.     Sun Collins DO (electronically signed)  Attending Emergency Physician       Sun Collins DO  11/15/21 1538

## 2021-11-16 LAB — URINE CULTURE, ROUTINE: NORMAL

## 2021-11-23 ENCOUNTER — TELEPHONE (OUTPATIENT)
Dept: WOMENS IMAGING | Age: 76
End: 2021-11-23

## 2021-12-09 ENCOUNTER — TELEPHONE (OUTPATIENT)
Dept: SURGERY | Age: 76
End: 2021-12-09

## 2021-12-09 NOTE — TELEPHONE ENCOUNTER
Patient called with last Chemo treatment on 12/29/21. She also has appointment with Dr Samantha Crocker on 12/13/21.     Please call patient at [phone] (F)  Thank you

## 2021-12-10 NOTE — TELEPHONE ENCOUNTER
Patient called back. I will wait for verification from Dr. Rosalio Kamara if she'll need to follow up in the office. Patient is aware Dr. Rosalio Kamara won't be back in the office till Monday.      Thanks, Magui Calabrese

## 2021-12-10 NOTE — TELEPHONE ENCOUNTER
Left a message to return my call. Patient will need post chemo imaging. I will contact the nurse navigator for assistance will scheduling these.       Thanks, Darren Valerio

## 2021-12-13 ENCOUNTER — OFFICE VISIT (OUTPATIENT)
Dept: SURGERY | Age: 76
End: 2021-12-13
Payer: MEDICARE

## 2021-12-13 VITALS
BODY MASS INDEX: 24.55 KG/M2 | HEIGHT: 67 IN | HEART RATE: 108 BPM | WEIGHT: 156.4 LBS | DIASTOLIC BLOOD PRESSURE: 97 MMHG | SYSTOLIC BLOOD PRESSURE: 136 MMHG | OXYGEN SATURATION: 98 %

## 2021-12-13 DIAGNOSIS — Z17.1 MALIGNANT NEOPLASM OF RIGHT BREAST IN FEMALE, ESTROGEN RECEPTOR NEGATIVE, UNSPECIFIED SITE OF BREAST (HCC): Primary | ICD-10-CM

## 2021-12-13 DIAGNOSIS — C50.911 MALIGNANT NEOPLASM OF RIGHT BREAST IN FEMALE, ESTROGEN RECEPTOR NEGATIVE, UNSPECIFIED SITE OF BREAST (HCC): Primary | ICD-10-CM

## 2021-12-13 DIAGNOSIS — Q79.60 EHLERS-DANLOS SYNDROME: ICD-10-CM

## 2021-12-13 PROCEDURE — G8400 PT W/DXA NO RESULTS DOC: HCPCS | Performed by: SURGERY

## 2021-12-13 PROCEDURE — 3017F COLORECTAL CA SCREEN DOC REV: CPT | Performed by: SURGERY

## 2021-12-13 PROCEDURE — 4040F PNEUMOC VAC/ADMIN/RCVD: CPT | Performed by: SURGERY

## 2021-12-13 PROCEDURE — G8420 CALC BMI NORM PARAMETERS: HCPCS | Performed by: SURGERY

## 2021-12-13 PROCEDURE — 99215 OFFICE O/P EST HI 40 MIN: CPT | Performed by: SURGERY

## 2021-12-13 PROCEDURE — 1090F PRES/ABSN URINE INCON ASSESS: CPT | Performed by: SURGERY

## 2021-12-13 PROCEDURE — G8484 FLU IMMUNIZE NO ADMIN: HCPCS | Performed by: SURGERY

## 2021-12-13 PROCEDURE — 1036F TOBACCO NON-USER: CPT | Performed by: SURGERY

## 2021-12-13 PROCEDURE — G8427 DOCREV CUR MEDS BY ELIG CLIN: HCPCS | Performed by: SURGERY

## 2021-12-13 PROCEDURE — 1123F ACP DISCUSS/DSCN MKR DOCD: CPT | Performed by: SURGERY

## 2021-12-13 NOTE — PROGRESS NOTES
[facility]    CC: Breast CA     Referring physician: Angelique Gonzalez MD    HPI: This is a 77 y.o. female with a PMHx as delineated below who presents in consultation for breast reconstruction. She was found to have right breast changes that were concerning, however with COVID, she deferred evaluation until later. She was found to have right breast cancer and desires to proceed with bilateral mastectomy with reconstruction. Plastic surgery was consulted for evaluation and treatment. The specifics of her breast cancer workup / treatment is as follows:     Diagnosis: invasive ductal  Mo/Yr Diagnosed: 12/20  Breast Involved:Right  Tumor Size & Grade: xT3P4Ga  Stage: 3A  Mendez status: Positive  ER: Negative ID: Negative HER2: Positive    Oncologist: Dr. Ernst Junior MD  Radiation: YES (WILL NEED ADJUVANT RADIATION)    Chemo/Meds: Completion of neoadjuvant chemotherapy projected 12/29/21  Pregnancy/Miscarriages: 0     PMHx:   Past Medical History:   Diagnosis Date    Asthma     Cancer ([facility] [address] 06/2021    right Breast    Diabetes mellitus ([facility]     Diverticulitis     Hypertension    HgbA1c - 6.9 (10/21)  Floyde Dienes Syndrome    PSHx:   Past Surgical History:   Procedure Laterality Date    BREAST LUMPECTOMY Left     HYSTERECTOMY      PORT SURGERY N/A 6/23/2021    PLACEMENT OF POWER PORT A CATHETER performed by Veto Solano MD at [address] [place] [address] Right 6/3/2021    US BREAST BIOPSY NEEDLE ADDITIONAL RIGHT 6/3/2021 [facility]    US BREAST NEEDLE BIOPSY RIGHT Right 6/3/2021    US BREAST NEEDLE BIOPSY RIGHT 6/3/2021 [facility]    US LYMPH NODE BIOPSY  6/3/2021    US LYMPH NODE BIOPSY 6/3/2021 [facility] ULTRASOUND     Allergies:    Allergies   Allergen Reactions    Other      fireants    Erythromycin Nausea Only     FHx: Past history of breast CA: Yes (multiple members)    Meds:   Current Outpatient Medications   Medication Sig Dispense Refill  Magnesium 400 MG CAPS Take 400 mg by mouth 2 times daily 60 capsule 0    Cyanocobalamin (VITAMIN B 12 PO) Take by mouth      Cholecalciferol (VITAMIN D3) 125 MCG (5000 UT) TABS Take by mouth      atorvastatin (LIPITOR) 20 MG tablet TAKE 1 TABLET BY MOUTH AT BEDTIME AS DIRECTED.  metFORMIN (GLUCOPHAGE) 1000 MG tablet TAKE 1 TABLET BY MOUTH TWO TIMES A DAY (Patient not taking: Reported on 8/19/2021)      zafirlukast (ACCOLATE) 10 MG tablet TK 1 T PO QD (Patient not taking: Reported on 8/19/2021)  2    lisinopril (PRINIVIL;ZESTRIL) 10 MG tablet Take 10 mg by mouth daily (Patient not taking: Reported on 8/19/2021)      amLODIPine (NORVASC) 5 MG tablet Take 5 mg by mouth daily      albuterol (PROVENTIL) (2.5 MG/3ML) 0.083% nebulizer solution Take 2.5 mg by nebulization every 6 hours as needed. (Patient not taking: Reported on 8/19/2021)       No current facility-administered medications for this visit. SocHx: Smoking: No    ETOH: none    Drug use: No    ROS   Constitutional: Negative for chills and fever. HENT: Negative for congestion, facial swelling, and voice change. Eyes: Negative for photophobia and visual disturbance. Respiratory: Negative for apnea, cough, chest tightness and shortness of breath. Cardiovascular: Negative for chest pain and palpitations. Gastrointestinal: Negative for dysphagia and early satiety. Genitourinary: Negative for difficulty urinating, dysuria, flank pain, frequency and hematuria. Musculoskeletal: Negative for new gait problem, joint swelling and myalgias. Skin: Negative for color change, pallor and rash. Endocrine: negative for tremors, temperature intolerance or polydipsia. Allergic/Immunologic: Negative for new environmental or food allergies. Neurological: Negative for dizziness, seizures, speech difficulty, numbness. Hematological: Negative for adenopathy. Psychiatric/Behavioral: Negative for agitation and confusion.       EXAM  BP (!) 136/97   Pulse 108   Ht 5' 7\" (1.702 m)   Wt 156 lb 6.4 oz (70.9 kg)   SpO2 98%   BMI 24.50 kg/m²     GEN: NAD, pleasant  CVS: RRR  PULM: No respiratory distress  HEENT: PERRLA/EOMI; hearing appears within normal limits  NECK: Supple with trachea in midline, no masses  EXT: No lymphedema noted  ABD: soft/NT/ND   NEURO: No focal deficits, no obvious CN deficits  BACK: Bilateral latiss muscle intact  BREAST:   Physical Exam    Bra size: 40D  Desired bra size: Smaller  Ptosis grade:   R: 3   L: 3  The left breast size is larger than the right breast.  There were no palpable masses with no palpable lymphadenopathy in the ipsilateral right axilla. Nipple retraction: No    Left breast sternal notch to nipple: 31 cm  Right breast sternal notch to nipple: 28.5 cm    Left breast nipple to inframammary fold: 12.5 cm  Right breast nipple to inframammary fold: 12 cm    Left breast width 15.6 cm  Right breast width 16.5 cm    IMAGING: Reviewed    IMP: 77 y.o. female with breast cancer who presents for discussion regarding reconstruction options  PLAN: Complex patient given comorbid condition. With her EDS, diabetes, neoadjuvant chemotherapy, and need for adjuvant XRT, she is at higher risk of complication from a reconstructive standpoint. Thus, would not offer immediate reconstruction, but will await completion of her treatment - including adjuvant. We discussed options from a delayed manner including TE/IBR vs autologous. Will follow-up after XRT. A discussion regarding surgical options including immediate vs delayed approaches, implant based vs autologous, as well as additional planned revisional surgeries was performed with the patient. Multiple autologous donor sites were discussed as well. Clinical photos were obtained. We additionally discussed the FDA recommendations regarding monitoring of silicone implants. The risks, benefits, alternatives, outcomes, personnel involved were discussed.   Specifically, the risks including, but not limited to: bleeding that may necessitate transfusion or operation, infection, seroma, reoperation, nonhealing, poor cosmetic outcome, asymmetry, scarring, partial or total flap loss, donor site morbidity with possible hernia/bulge, VTE (DVT/PE), and death were discussed. All questions were answered in a satisfactory manner according to the patient. The patient was counseled at length about the risks of rajani Covid-19 during their perioperative period and any recovery window from their procedure. The patient was made aware that rajani Covid-19  may worsen their prognosis for recovering from their procedure  and lend to a higher morbidity and/or mortality risk. All material risks, benefits, and reasonable alternatives including postponing the procedure were discussed. The patient does wish to proceed with the procedure at this time.     Sandra Carroll MD  [facility]  12/13/21

## 2021-12-17 DIAGNOSIS — R92.8 OTHER ABNORMAL AND INCONCLUSIVE FINDINGS ON DIAGNOSTIC IMAGING OF BREAST: ICD-10-CM

## 2021-12-17 DIAGNOSIS — C50.911 INVASIVE DUCTAL CARCINOMA OF RIGHT BREAST, STAGE 3 (HCC): Primary | ICD-10-CM

## 2021-12-17 DIAGNOSIS — C50.911 LOCALLY ADVANCED CARCINOMA OF BREAST, RIGHT (HCC): ICD-10-CM

## 2022-01-03 NOTE — PROGRESS NOTES
PCP:  Medical Oncology: Rajeev Mcclendon  Radiation:  Other: Christopher Vásquez      [id number] STAGE:  IIIA right breast cancer      HPI:  [facility]. [facility] is a 77 y.o. woman who presents today in follow-up of her diagnosis of grade 3, right sided IDC and DCIS, ER - [place]- HER2 positive. She initially reported that began noticing a change to the right breast in December 2020 2 January 2021. This began with slight redness and burning pain of the breast.  She then began to have some associated arm pain. She thought this may be a reaction to something and monitored for a short time. She elected to wait until sometime the past after her Covid vaccine to obtain breast imaging but then underwent diagnostic breast imaging. In the most recent months she has also noticed a retraction of the right nipple. She is uncertain whether she has noticed a contour change or an asymmetry of the appearance of the breast.  Prior to this she has had a benign left breast biopsy. She has a family history of breast cancer. She has not noticed any new abnormalities of the contralateral breast such as masses, skin changes, color changes, nipple discharge, or changes to the nipple-areolar complex. Since her last encounter she is initiated neoadjuvant chemotherapy. Her tentative end date is 11/3/2021. She reports noticing the mass shrinking in size and her pain is nearly gone. INTERVAL HISTORY:    On 5/25/2021 she underwent bilateral breast imaging. The left breast is negative. Stable postoperative changes are noted. Within the right breast there is a high density mass with spiculated margins in the 9 to 10 o'clock position measuring 5 cm. It is associated with pleomorphic calcifications. Additionally there is a high density oval mass with lobulated contour which appears continuous and located in the 10 to 11 o'clock position, measuring 6 cm. The right nipple appears retracted. There are at least 6 abnormal appearing lymph nodes.   Ultrasound correlate of the right breast 9:00 location identified a 5.2 x 2.7 x 4 cm mass. In the 11 o'clock position the lobulated mass measures 6.3 x 4.2 x 5 cm and appears continuous with the 9:00 mass. In the inferior aspect of the axillary tail there is a hypoechoic mass with indistinct margins and multiple surrounding abnormal lymph nodes. At least 9 abnormal lymph nodes are evident. BI-RADS 5. On 6/3/2021 she underwent two site core needle biopsy of the right breast and axillary lymph node. [id number]     Pathology of the right breast 9 o'clock position identified high-grade invasive carcinoma with DCIS. Metaplastic carcinoma cannot be excluded. ER negative ID negative HER-2 positive. Pathology of the right breast 11 o'clock position identified high-grade invasive carcinoma with DCIS. Metaplastic carcinoma cannot be excluded. ER negative ID negative HER-2 positive. Pathology of the right axillary lymph node identified metastatic carcinoma. She was initiated neoadjuvant chemotherapy with carboplatin, Taxotere, Herceptin and Perjeta    On 8/26/2021 she underwent genetic testing which returned negative for pathogenic mutations. Accession #[id number]. On 12/29/2021 she completed neoadjuvant chemotherapy. On 1/5/2022 she underwent interval right breast and axillary imaging. The known malignant mass in the right breast demonstrates continued improvement in decreasing size and bulk consistent with response to therapy. Residual asymmetry with calcifications measures 7 x 6 x 5 cm. The previously biopsied lymph node has normalized in appearance. There is an additional normal-appearing lymph node more superior to the biopsied node. BI-RADS 6.           Review of Systems   Respiratory:        Snoring   Psychiatric/Behavioral: The patient is nervous/anxious.    :    There is no new onset of persistent dry cough, abdominal pains, new onset of headache, change in bowel function, or bony tenderness to suggest metastatic disease at this time. Pathology:    Department of Pathology   FINAL SURGICAL PATHOLOGY REPORT   Patient Name: Jose Wesley             Accession No:  [id number]    Age Sex:   1945    75 Y / F       Location:      [facility]   Account No:   [de-identified]                  Collected:     2021   Med Rec No:    [id number]                 Received:      2021   Attend Phys:   Wes Roque MD            Completed:     2021   Perform Phys: Wes Roque MD             FINAL DIAGNOSIS:        A. Right breast, 9 o'clock, 8 cm from nipple, biopsy:        - Invasive carcinoma (See Comment).        B. Right breast, 11 o'clock, 9 cm from nipple, biopsy:        - Invasive carcinoma (See Comment).        C. Axilla, right, biopsy:        - Positive for carcinoma (2.5 mm).      - Small number of lymphocytes present. COMMENT: Parts A and B show similar high grade carcinoma morphology. 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 GATA3 stain is mostly positive;   compatible with mammary carcinoma. P40 stains performed on both parts   show scattered reactivity, compatible with partial squamous   differentiation. Together with morphologically identified patchy   keratinization (especially in part B), a component of metaplastic   carcinoma cannot be excluded; definitive classification relies on   resection specimen if clinically indicated.      Histologic Grade ([place] Histologic Score): Grade 3 (total score 8;   tubule/ nuclear/ mitotic score: 3/3/2)   Longest length on slide: 6 mm in part A and 10mm in part B     Biomarkers (Performed on block B1):      Estrogen receptor: Negative (<%)      Progesterone receptor: Negative (<1%)      HER2 IHC: Positive (Score 3+)     Biomarkers (Performed on block C1):      Estrogen receptor: Negative (<%)      Progesterone receptor: Negative (<1%)      HER2 IHC: Positive (Score 3+) Cold ischemia time: Less than 1 hour   Duration of fixation: Greater than 6 hours and less than 72 hours   Performed by immunohistochemistry with manual quantitation. ER clone is   [place] SP1. MT clone is [place] clone [id number]. HER2 clone is [facility]   [id number]-her-[id number] (4B5).  Tests are performed on formalin fixed paraffin   embedded tissue using ULTRAVIEW universal DAB detection kit. Positive and   negative control tissue shows appropriate staining. ER and MT scoring includes the percentage of cells staining positive and   average intensity of expression.  Interpretation follows the ASCO/CAP   guidelines, with any staining of >1% considered positive.  ER results   between 1 and 10% are considered are considered low positive. Her2/cristiano scoring follows the ASCO/CAP guidelines: 0, 1+, 2+, 3+. Her2/cristiano   \"positive\" (3+) requires circumferential membrane staining that is   complete, intense and in >10% of tumor cells. Equivocal (2+) cases are   defined as weak to moderate complete membrane staining in >10% of cells. Negative cases (0 or 1+, respectively) display no staining or incomplete   membrane staining that is faint/barely perceptible and in >10% of tumor   cells.   Select cases, including all 2+/equivocal for Her2/cristiano on routine   IHC staining, will be sent for Her2/cristiano analysis by FISH.      References:   Rancho Koehler et al, Estrogen and Progesterone Receptor Testing in   Breast Cancer, Arch Pathol Lab Med, Vol 144, May, 2020   Carlita Calabrese et al, Human Epidermal Growth Factor Receptor 2 Testing in   Breast Cancer, Arch Pathol Lab Med, Vol 142, November, 2018     All immunohistochemical (IHC) stains were performed using single   antibodies on separate tissue sections.  No multiple antibody cocktails   were used unless specifically documented.  Appropriate positive controls   were performed with each antibody and the results were reviewed.  The   control stains showed the expected positive results within technically   acceptable parameters. Analyte specific reagent (ASR) disclaimer: The use of one or more   reagents in the above tests is regulated as an analyte specific reagent. These tests were developed and their performance characteristics   determined by the [facility]. They have   not been cleared by the [employer] and Drug Administration. The FDA has   determined that such clearance or approval is not necessary. Technical component is performed at [facility].   JINMI/MELISSA             Past Medical History:   Diagnosis Date    Asthma     Cancer [facility]) 2021    right Breast    Diabetes mellitus ([address]     Diverticulitis     Hypertension    :        Past Surgical History:   Procedure Laterality Date    BREAST LUMPECTOMY Left     HYSTERECTOMY      PORT SURGERY N/A 2021    PLACEMENT OF POWER PORT A CATHETER performed by Charles Bardales MD at [address] [place] [address] Street Right 6/3/2021    US BREAST BIOPSY NEEDLE ADDITIONAL RIGHT 6/3/2021 [facility] ULTRASOUND    US BREAST NEEDLE BIOPSY RIGHT Right 6/3/2021    US BREAST NEEDLE BIOPSY RIGHT 6/3/2021 [facility] ULTRASOUND    US LYMPH NODE BIOPSY  6/3/2021    US LYMPH NODE BIOPSY 6/3/2021 [facility] ULTRASOUND   :        Allergies as of 2022 - Fully Reviewed 2021   Allergen Reaction Noted    Other  2010    Erythromycin Nausea Only 2019   :        Social History     Tobacco Use    Smoking status: Never Smoker    Smokeless tobacco: Never Used   Vaping Use    Vaping Use: Never used   Substance Use Topics    Alcohol use: No    Drug use: No   :      Family History:  Maternal aunt: Breast cancer, diagnosed age 61,  age 59  Maternal aunt: Breast cancer, diagnosed age 62s  Sister: Colon cancer, diagnosed age 50  No additional family history of breast or ovarian cancer    Hormonal History:   a.0  m.0  Menarche at age 16.   Postmenopausal at age42  Hysterectomy: at age 37  No estrogen supplementation currently but 25-year use of Premarin. Stopped 10 years ago. OCP not taking    Medications:  Medication documentation has been reviewed in the electronic medical record and patient office intake form. Exam:  There were no vitals taken for this visit. Constitutional: She appears well-nourished. No apparent distress. Breast: The patient was examined in the upright and supine position. She has a \"DD\" cup breast. Breasts are pathologically (malignancy) asymmetric and ptotic. This is somewhat less pronounced. Right: The right breast is no longer misshapen with visible contour change to the upper portion of the breast.  The breast is now softer with much more normal appearance. The previously large mass is now softer in texture and difficult to palpate. The previously palpable axillary lymph node is now difficult to identify. The previous mild erythematous skin change associated with the mass is no longer evident. There are no significant edematous changes. The nipple is no longer sunken in and retracted. Left: There were no new masses or changes in breast contour. There were no skin changes of the breast or nipple areolar complex. There was no nipple inversion or discharge. There is no axillary lymphadenopathy palpated bilaterally. Head: Normocephalic andatraumatic. Eyes: EOM are normal. Pupils are equal, round, and reactive tolight. Neck: Neck supple. No tracheal deviation present.   : regular rate. Extremities appear well perfused. Pulmonary: respirations are non-labored and without audible distress  Lymphatics: no palpable axillary orcervical lymphadenopathy. Skin: No rash noted. No erythema. Neurologic: alert and oriented. Assessment/Plan:  [id number] STAGE:  IIIA right breast cancer  ER - [place]- HER2 positive. S/p neoadjuvant chemotherapy    I reviewed her physical exam findings.   She is having a partial response at this time. She is about penitentiary through treatment. At this point she has decided to move forward with bilateral mastectomies. We discussed the role of radiation and how it can influence reconstruction. She has met with plastic surgery to discuss options for reconstruction. At this time given the entire clinical scenario delayed reconstruction was the recommendation. The surgical rational and technical details of undergoing a mastectomy were reviewed. We discussed that although we remove the breast in this procedure, there is still a risk of recurrent disease and reviewed expectations on residual breast tissue. We discussed margins. We reviewed possible hospitalization and the need for surgical drains. We discussed additional risk and benefits of surgery which include but are not limited to anesthesia related risks, bleeding, infection (<3%), wound complications and unappealing cosmetics. We also discussed the possibility of future recurrences. We discussed the role of a sentinel node biopsy after completion of chemotherapy. I described the procedure of both an injection of radioisotope as well as blue dye with migration to the axilla. I discussed side effects including discoloration of the urine, stool, and breast as well as the <10% possibility that the axilla would not map. We discussed the potential need for further surgery. We reviewed the 3-5% risk of lymphedema. We discussed additional risks such as permanent arm numbness, the potential for nerve injury, range of motion limitations and the possibility of a false negative finding (<10% based on ACOSOG  data and criteria). I made it quite clear that depending on her response to chemotherapy we may need to proceed with an axillary dissection. We discussed the level I and II ALND procedure, risks and benefits including its risk of lymphedema as it compares to a SLNB.         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 She reports to be prone to keloid formation with scars. We discussed potential expectations with her anticipated breast scars. Genetic testing was negative. Our plan at this time is for bilateral total mastectomies (left prophylactic) with localization of the prior positive right axillary lymph node, sentinel lymph node biopsy, frozen section and possible axillary lymph node dissection. Potential reconstruction in a delayed fashion. All of the patient's questions were answered at this time however, she was encouraged to call the office with any further inquiries. Approximately 60 minutes of time were spent in preparation, direct patient contact, record review, imaging interpretation, care coordination, documentation and activities otherwise related to this encounter.

## 2022-01-05 ENCOUNTER — HOSPITAL ENCOUNTER (OUTPATIENT)
Dept: ULTRASOUND IMAGING | Age: 77
Discharge: HOME | End: 2022-01-05
Payer: MEDICARE

## 2022-01-05 ENCOUNTER — HOSPITAL ENCOUNTER (OUTPATIENT)
Dept: WOMENS IMAGING | Age: 77
Discharge: HOME | End: 2022-01-05
Payer: MEDICARE

## 2022-01-05 VITALS — BODY MASS INDEX: 27.14 KG/M2 | WEIGHT: 159 LBS | HEIGHT: 64 IN

## 2022-01-05 DIAGNOSIS — R92.8 ABNORMAL MAMMOGRAM: ICD-10-CM

## 2022-01-05 DIAGNOSIS — Z09 FOLLOW UP: ICD-10-CM

## 2022-01-05 DIAGNOSIS — R92.8 OTHER ABNORMAL AND INCONCLUSIVE FINDINGS ON DIAGNOSTIC IMAGING OF BREAST: ICD-10-CM

## 2022-01-05 DIAGNOSIS — C50.911 INVASIVE DUCTAL CARCINOMA OF RIGHT BREAST, STAGE 3 (HCC): ICD-10-CM

## 2022-01-05 DIAGNOSIS — C50.911 LOCALLY ADVANCED CARCINOMA OF BREAST, RIGHT (HCC): ICD-10-CM

## 2022-01-05 PROCEDURE — 76642 ULTRASOUND BREAST LIMITED: CPT

## 2022-01-05 PROCEDURE — G0279 TOMOSYNTHESIS, MAMMO: HCPCS

## 2022-01-06 ENCOUNTER — OFFICE VISIT (OUTPATIENT)
Dept: SURGERY | Age: 77
End: 2022-01-06
Payer: MEDICARE

## 2022-01-06 VITALS
HEART RATE: 106 BPM | BODY MASS INDEX: 26.98 KG/M2 | OXYGEN SATURATION: 97 % | SYSTOLIC BLOOD PRESSURE: 140 MMHG | WEIGHT: 158 LBS | RESPIRATION RATE: 18 BRPM | DIASTOLIC BLOOD PRESSURE: 73 MMHG | HEIGHT: 64 IN

## 2022-01-06 DIAGNOSIS — C50.911 INVASIVE DUCTAL CARCINOMA OF RIGHT BREAST, STAGE 3 (HCC): Primary | ICD-10-CM

## 2022-01-06 PROCEDURE — 1090F PRES/ABSN URINE INCON ASSESS: CPT | Performed by: SURGERY

## 2022-01-06 PROCEDURE — 99215 OFFICE O/P EST HI 40 MIN: CPT | Performed by: SURGERY

## 2022-01-06 PROCEDURE — 3017F COLORECTAL CA SCREEN DOC REV: CPT | Performed by: SURGERY

## 2022-01-06 PROCEDURE — 4040F PNEUMOC VAC/ADMIN/RCVD: CPT | Performed by: SURGERY

## 2022-01-06 PROCEDURE — G8417 CALC BMI ABV UP PARAM F/U: HCPCS | Performed by: SURGERY

## 2022-01-06 PROCEDURE — G8400 PT W/DXA NO RESULTS DOC: HCPCS | Performed by: SURGERY

## 2022-01-06 PROCEDURE — 1036F TOBACCO NON-USER: CPT | Performed by: SURGERY

## 2022-01-06 PROCEDURE — G8427 DOCREV CUR MEDS BY ELIG CLIN: HCPCS | Performed by: SURGERY

## 2022-01-06 PROCEDURE — 1123F ACP DISCUSS/DSCN MKR DOCD: CPT | Performed by: SURGERY

## 2022-01-06 PROCEDURE — G8484 FLU IMMUNIZE NO ADMIN: HCPCS | Performed by: SURGERY

## 2022-01-06 RX ORDER — SODIUM CHLORIDE 9 MG/ML
25 INJECTION, SOLUTION INTRAVENOUS PRN
Status: CANCELLED | OUTPATIENT
Start: 2022-01-06

## 2022-01-06 RX ORDER — SODIUM CHLORIDE 0.9 % (FLUSH) 0.9 %
5-40 SYRINGE (ML) INJECTION EVERY 12 HOURS SCHEDULED
Status: CANCELLED | OUTPATIENT
Start: 2022-01-06

## 2022-01-06 RX ORDER — SODIUM CHLORIDE, SODIUM LACTATE, POTASSIUM CHLORIDE, CALCIUM CHLORIDE 600; 310; 30; 20 MG/100ML; MG/100ML; MG/100ML; MG/100ML
INJECTION, SOLUTION INTRAVENOUS CONTINUOUS
Status: CANCELLED | OUTPATIENT
Start: 2022-01-06

## 2022-01-06 RX ORDER — SODIUM CHLORIDE 0.9 % (FLUSH) 0.9 %
5-40 SYRINGE (ML) INJECTION PRN
Status: CANCELLED | OUTPATIENT
Start: 2022-01-06

## 2022-01-27 ENCOUNTER — HOSPITAL ENCOUNTER (OUTPATIENT)
Dept: ULTRASOUND IMAGING | Age: 77
Discharge: HOME | End: 2022-01-27
Payer: MEDICARE

## 2022-01-27 ENCOUNTER — HOSPITAL ENCOUNTER (OUTPATIENT)
Age: 77
Discharge: HOME | End: 2022-01-27
Payer: MEDICARE

## 2022-01-27 ENCOUNTER — HOSPITAL ENCOUNTER (OUTPATIENT)
Dept: WOMENS IMAGING | Age: 77
Discharge: HOME | End: 2022-01-27
Payer: MEDICARE

## 2022-01-27 DIAGNOSIS — C50.911 INVASIVE DUCTAL CARCINOMA OF RIGHT BREAST, STAGE 3 (HCC): ICD-10-CM

## 2022-01-27 DIAGNOSIS — R92.8 ABNORMAL MAMMOGRAM: ICD-10-CM

## 2022-01-27 PROCEDURE — U0005 INFEC AGEN DETEC AMPLI PROBE: HCPCS

## 2022-01-27 PROCEDURE — A4648 IMPLANTABLE TISSUE MARKER: HCPCS

## 2022-01-27 PROCEDURE — 77065 DX MAMMO INCL CAD UNI: CPT

## 2022-01-27 PROCEDURE — 2500000003 HC RX 250 WO HCPCS: Performed by: SURGERY

## 2022-01-27 PROCEDURE — U0003 INFECTIOUS AGENT DETECTION BY NUCLEIC ACID (DNA OR RNA); SEVERE ACUTE RESPIRATORY SYNDROME CORONAVIRUS 2 (SARS-COV-2) (CORONAVIRUS DISEASE [COVID-19]), AMPLIFIED PROBE TECHNIQUE, MAKING USE OF HIGH THROUGHPUT TECHNOLOGIES AS DESCRIBED BY CMS-2020-01-R: HCPCS

## 2022-01-27 RX ORDER — LIDOCAINE HYDROCHLORIDE 10 MG/ML
5 INJECTION, SOLUTION EPIDURAL; INFILTRATION; INTRACAUDAL; PERINEURAL ONCE
Status: COMPLETED | OUTPATIENT
Start: 2022-01-27 | End: 2022-01-27

## 2022-01-27 RX ADMIN — LIDOCAINE HYDROCHLORIDE 5 ML: 10 INJECTION, SOLUTION EPIDURAL; INFILTRATION; INTRACAUDAL; PERINEURAL at 14:15

## 2022-01-28 LAB — SARS-COV-2: NOT DETECTED

## 2022-01-28 RX ORDER — INSULIN GLARGINE 300 U/ML
2 INJECTION, SOLUTION SUBCUTANEOUS EVERY MORNING
COMMUNITY

## 2022-01-28 RX ORDER — INSULIN ASPART INJECTION 100 [IU]/ML
INJECTION, SOLUTION SUBCUTANEOUS
COMMUNITY

## 2022-01-28 NOTE — PROGRESS NOTES
Name_______________________________________Printed:____________________  Date and time of surgery__2/1/2022__0815____________________Arrival Time:___0645  [facility] [place]_   1. The instructions given regarding when and if a patient needs to stop oral intake prior to surgery varies. Follow the specific instructions you were given                  _x__Nothing to eat or to drink after Midnight the night before.                   ____Carbo loading or ERAS instructions will be given to select patients-if you have been given those instructions -please do the following                           The evening before your surgery after dinner before midnight drink 40 ounces of gatorade. If you are diabetic use sugar free. The morning of surgery drink 40 ounces of water. This needs to be finished 3 hours prior to your surgery start time. 2. Take the following pills with a small sip of water on the morning of surgery___none________________________________________________                  Do not take blood pressure medications ending in pril or sartan the timmy prior to surgery or the morning of surgery_   3. Aspirin, Ibuprofen, Advil, Naproxen, Vitamin E and other Anti-inflammatory products and supplements should be stopped for 5 -7days before surgery or as directed by your physician. 4. Check with your Doctor regarding stopping Plavix, Coumadin,Eliquis, Lovenox,Effient,Pradaxa,Xarelto, Fragmin or other blood thinners and follow their instructions. 5. Do not smoke, and do not drink any alcoholic beverages 24 hours prior to surgery. This includes NA Beer. Refrain from the usage of any recreational drugs. 6. You may brush your teeth and gargle the morning of surgery. DO NOT SWALLOW WATER   7. You MUST make arrangements for a responsible adult to stay on site while you are here and take you home after your surgery. You will not be allowed to leave alone or drive yourself home.   It is strongly suggested someone stay with you the first 24 hrs. Your surgery will be cancelled if you do not have a ride home. 8. A parent/legal guardian must accompany a child scheduled for surgery and plan to stay at the hospital until the child is discharged. Please do not bring other children with you. 9. Please wear simple, loose fitting clothing to the hospital.  Megan Miller not bring valuables (money, credit cards, checkbooks, etc.) Do not wear any makeup (including no eye makeup) or nail polish on your fingers or toes. 10. DO NOT wear any jewelry or piercings on day of surgery. All body piercing jewelry must be removed. 11. If you have ___dentures, they will be removed before going to the OR; we will provide you a container. If you wear ___contact lenses or _x__glasses, they will be removed; please bring a case for them. 12. Please see your family doctor/pediatrician for a history & physical and/or concerning medications. Bring any test results/reports from your physician's office. PCP__________________Phone___________H&P Appt. Date________             13 If you  have a Living Will and Durable Power of  for Healthcare, please bring in a copy. 15. Notify your Surgeon if you develop any illness between now and surgery  time, cough, cold, fever, sore throat, nausea, vomiting, etc.  Please notify your surgeon if you experience dizziness, shortness of breath or blurred vision between now & the time of your surgery             15. DO NOT shave your operative site 96 hours prior to surgery. For face & neck surgery, men may use an electric razor 48 hours prior to surgery. 16. Shower the night before or morning of surgery using an antibacterial soap or as you have been instructed. 17. To provide excellent care visitors will be limited to one in the room at any given time. 18.  Please bring picture ID and insurance card.              19.  Visit our web site for additional information:  [email or web address]              20.During flu season no children under the age of 15 are permitted in the hospital for the safety of all patients. 21. If you take a long acting insulin in the evening only  take half of your usual  dose the night  before your procedure              22. If you use a c-pap please bring DOS if staying overnight,             23.For your convenience [facility] has a pharmacy on site to fill your prescriptions. 24. If you use oxygen and have a portable tank please bring it  with you the DOS             25. Bring a complete list of all your medications with name and dose include any supplements. 26. Other__________________________________________   *Please call pre admission testing if you any further questions   [address]    [place] [id number]  [phone]   [facility] [address] DEANNE MORALES   [phone]           COVID TESTING    _x__ Covid test to be done 3-5 days prior to scheduled surgery -patient aware they are REQUIRED to bring a copy of the negative result DOS-if they receive a positive result to notify their surgeon         If known - indicate where patient is getting covid test done __covid 1/27/2022  [facility]_________________________________________________________    ___ Rapid - DOS    ___ Other___________________________________      [facility] POLICY(subject to change)    There is a one visitor policy at [facility] for all surgeries and endoscopies. Whether the visitor can stay or will be asked to wait in the car will depend on the current policy and if social distancing can be maintained. The policy is subject to change at any time. Please make sure the visitor has a cell phone that is on,charged and able to accept calls, as this may be the way that the staff communicates with them. Pain management is NO VISITOR policyThe patients ride is expected to remain in the car with a cell phone for communication. If the ride is leaving the hospital grounds please make sure they are back in time for pickup. Have the patient inform the staff on arrival what their rides plans are while the patient is in the facility. At the MAIN there is one visitor allowed. Please note that the visitor policy is subject to change. All above information reviewed with patient in person or by phone. Patient verbalizes understanding. All questions and concerns addressed.                                                                                                  Patient/Rep_patient___________________                                                                                                                                    PRE OP INSTRUCTIONS

## 2022-01-31 ENCOUNTER — ANESTHESIA EVENT (OUTPATIENT)
Dept: OPERATING ROOM | Age: 77
End: 2022-01-31
Payer: MEDICARE

## 2022-02-01 ENCOUNTER — HOSPITAL ENCOUNTER (OUTPATIENT)
Dept: NUCLEAR MEDICINE | Age: 77
Discharge: HOME | End: 2022-02-01
Attending: SURGERY
Payer: MEDICARE

## 2022-02-01 ENCOUNTER — HOSPITAL ENCOUNTER (OUTPATIENT)
Age: 77
Setting detail: OUTPATIENT SURGERY
Discharge: HOME | End: 2022-02-01
Attending: SURGERY | Admitting: SURGERY
Payer: MEDICARE

## 2022-02-01 ENCOUNTER — ANESTHESIA (OUTPATIENT)
Dept: OPERATING ROOM | Age: 77
End: 2022-02-01
Payer: MEDICARE

## 2022-02-01 VITALS
RESPIRATION RATE: 15 BRPM | DIASTOLIC BLOOD PRESSURE: 59 MMHG | SYSTOLIC BLOOD PRESSURE: 119 MMHG | OXYGEN SATURATION: 95 % | TEMPERATURE: 95.7 F

## 2022-02-01 VITALS
TEMPERATURE: 96.8 F | HEIGHT: 66 IN | DIASTOLIC BLOOD PRESSURE: 78 MMHG | SYSTOLIC BLOOD PRESSURE: 144 MMHG | OXYGEN SATURATION: 93 % | HEART RATE: 90 BPM | WEIGHT: 160 LBS | BODY MASS INDEX: 25.71 KG/M2 | RESPIRATION RATE: 15 BRPM

## 2022-02-01 DIAGNOSIS — G89.18 POSTOPERATIVE PAIN: Primary | ICD-10-CM

## 2022-02-01 DIAGNOSIS — C50.911 INVASIVE DUCTAL CARCINOMA OF RIGHT BREAST, STAGE 3 (HCC): ICD-10-CM

## 2022-02-01 LAB
ABO/RH: NORMAL
ANTIBODY SCREEN: NORMAL
GLUCOSE BLD-MCNC: 179 MG/DL (ref 70–99)
GLUCOSE BLD-MCNC: 285 MG/DL (ref 70–99)
PERFORMED ON: ABNORMAL
PERFORMED ON: ABNORMAL

## 2022-02-01 PROCEDURE — 38525 BIOPSY/REMOVAL LYMPH NODES: CPT | Performed by: SURGERY

## 2022-02-01 PROCEDURE — C9290 INJ, BUPIVACAINE LIPOSOME: HCPCS | Performed by: SURGERY

## 2022-02-01 PROCEDURE — 2709999900 HC NON-CHARGEABLE SUPPLY: Performed by: SURGERY

## 2022-02-01 PROCEDURE — 7100000001 HC PACU RECOVERY - ADDTL 15 MIN: Performed by: SURGERY

## 2022-02-01 PROCEDURE — 6360000002 HC RX W HCPCS: Performed by: SURGERY

## 2022-02-01 PROCEDURE — 38900 IO MAP OF SENT LYMPH NODE: CPT | Performed by: SURGERY

## 2022-02-01 PROCEDURE — 88331 PATH CONSLTJ SURG 1 BLK 1SPC: CPT

## 2022-02-01 PROCEDURE — A9520 TC99 TILMANOCEPT DIAG 0.5MCI: HCPCS

## 2022-02-01 PROCEDURE — 7100000000 HC PACU RECOVERY - FIRST 15 MIN: Performed by: SURGERY

## 2022-02-01 PROCEDURE — 3700000000 HC ANESTHESIA ATTENDED CARE: Performed by: SURGERY

## 2022-02-01 PROCEDURE — 86900 BLOOD TYPING SEROLOGIC ABO: CPT

## 2022-02-01 PROCEDURE — 86901 BLOOD TYPING SEROLOGIC RH(D): CPT

## 2022-02-01 PROCEDURE — 6360000002 HC RX W HCPCS: Performed by: ANESTHESIOLOGY

## 2022-02-01 PROCEDURE — 2720000010 HC SURG SUPPLY STERILE: Performed by: SURGERY

## 2022-02-01 PROCEDURE — 88305 TISSUE EXAM BY PATHOLOGIST: CPT

## 2022-02-01 PROCEDURE — 3430000000 HC RX DIAGNOSTIC RADIOPHARMACEUTICAL: Performed by: SURGERY

## 2022-02-01 PROCEDURE — 88307 TISSUE EXAM BY PATHOLOGIST: CPT

## 2022-02-01 PROCEDURE — 86850 RBC ANTIBODY SCREEN: CPT

## 2022-02-01 PROCEDURE — 11401 EXC TR-EXT B9+MARG 0.6-1 CM: CPT | Performed by: SURGERY

## 2022-02-01 PROCEDURE — 38792 RA TRACER ID OF SENTINL NODE: CPT | Performed by: SURGERY

## 2022-02-01 PROCEDURE — 6370000000 HC RX 637 (ALT 250 FOR IP): Performed by: NURSE ANESTHETIST, CERTIFIED REGISTERED

## 2022-02-01 PROCEDURE — 7100000011 HC PHASE II RECOVERY - ADDTL 15 MIN: Performed by: SURGERY

## 2022-02-01 PROCEDURE — 2500000003 HC RX 250 WO HCPCS: Performed by: NURSE ANESTHETIST, CERTIFIED REGISTERED

## 2022-02-01 PROCEDURE — 2500000003 HC RX 250 WO HCPCS: Performed by: SURGERY

## 2022-02-01 PROCEDURE — 88342 IMHCHEM/IMCYTCHM 1ST ANTB: CPT

## 2022-02-01 PROCEDURE — 6360000002 HC RX W HCPCS: Performed by: NURSE ANESTHETIST, CERTIFIED REGISTERED

## 2022-02-01 PROCEDURE — 88341 IMHCHEM/IMCYTCHM EA ADD ANTB: CPT

## 2022-02-01 PROCEDURE — 3600000014 HC SURGERY LEVEL 4 ADDTL 15MIN: Performed by: SURGERY

## 2022-02-01 PROCEDURE — 2580000003 HC RX 258: Performed by: SURGERY

## 2022-02-01 PROCEDURE — 3430000000 HC RX DIAGNOSTIC RADIOPHARMACEUTICAL

## 2022-02-01 PROCEDURE — 7100000010 HC PHASE II RECOVERY - FIRST 15 MIN: Performed by: SURGERY

## 2022-02-01 PROCEDURE — 3700000001 HC ADD 15 MINUTES (ANESTHESIA): Performed by: SURGERY

## 2022-02-01 PROCEDURE — A9520 TC99 TILMANOCEPT DIAG 0.5MCI: HCPCS | Performed by: SURGERY

## 2022-02-01 PROCEDURE — 3600000004 HC SURGERY LEVEL 4 BASE: Performed by: SURGERY

## 2022-02-01 PROCEDURE — 38792 RA TRACER ID OF SENTINL NODE: CPT

## 2022-02-01 PROCEDURE — 6370000000 HC RX 637 (ALT 250 FOR IP): Performed by: SURGERY

## 2022-02-01 PROCEDURE — 19303 MAST SIMPLE COMPLETE: CPT | Performed by: SURGERY

## 2022-02-01 PROCEDURE — 2580000003 HC RX 258: Performed by: NURSE ANESTHETIST, CERTIFIED REGISTERED

## 2022-02-01 RX ORDER — LIDOCAINE HYDROCHLORIDE 20 MG/ML
INJECTION, SOLUTION EPIDURAL; INFILTRATION; INTRACAUDAL; PERINEURAL PRN
Status: DISCONTINUED | OUTPATIENT
Start: 2022-02-01 | End: 2022-02-01 | Stop reason: SDUPTHER

## 2022-02-01 RX ORDER — SODIUM CHLORIDE 0.9 % (FLUSH) 0.9 %
5-40 SYRINGE (ML) INJECTION PRN
Status: DISCONTINUED | OUTPATIENT
Start: 2022-02-01 | End: 2022-02-01 | Stop reason: HOSPADM

## 2022-02-01 RX ORDER — SODIUM CHLORIDE 9 MG/ML
25 INJECTION, SOLUTION INTRAVENOUS PRN
Status: CANCELLED | OUTPATIENT
Start: 2022-02-01

## 2022-02-01 RX ORDER — BUPIVACAINE HYDROCHLORIDE 2.5 MG/ML
INJECTION, SOLUTION INFILTRATION; PERINEURAL
Status: COMPLETED | OUTPATIENT
Start: 2022-02-01 | End: 2022-02-01

## 2022-02-01 RX ORDER — HYDROMORPHONE HCL 110MG/55ML
PATIENT CONTROLLED ANALGESIA SYRINGE INTRAVENOUS PRN
Status: DISCONTINUED | OUTPATIENT
Start: 2022-02-01 | End: 2022-02-01 | Stop reason: SDUPTHER

## 2022-02-01 RX ORDER — SUCCINYLCHOLINE/SOD CL,ISO/PF 200MG/10ML
SYRINGE (ML) INTRAVENOUS PRN
Status: DISCONTINUED | OUTPATIENT
Start: 2022-02-01 | End: 2022-02-01 | Stop reason: SDUPTHER

## 2022-02-01 RX ORDER — PROCHLORPERAZINE EDISYLATE 5 MG/ML
5 INJECTION INTRAMUSCULAR; INTRAVENOUS
Status: DISCONTINUED | OUTPATIENT
Start: 2022-02-01 | End: 2022-02-01 | Stop reason: HOSPADM

## 2022-02-01 RX ORDER — PROPOFOL 10 MG/ML
INJECTION, EMULSION INTRAVENOUS PRN
Status: DISCONTINUED | OUTPATIENT
Start: 2022-02-01 | End: 2022-02-01 | Stop reason: SDUPTHER

## 2022-02-01 RX ORDER — OXYCODONE HYDROCHLORIDE AND ACETAMINOPHEN 5; 325 MG/1; MG/1
1 TABLET ORAL EVERY 6 HOURS PRN
Qty: 28 TABLET | Refills: 0 | Status: SHIPPED | OUTPATIENT
Start: 2022-02-01 | End: 2022-02-08

## 2022-02-01 RX ORDER — ONDANSETRON 2 MG/ML
4 INJECTION INTRAMUSCULAR; INTRAVENOUS
Status: DISCONTINUED | OUTPATIENT
Start: 2022-02-01 | End: 2022-02-01 | Stop reason: HOSPADM

## 2022-02-01 RX ORDER — SODIUM CHLORIDE 0.9 % (FLUSH) 0.9 %
10 SYRINGE (ML) INJECTION EVERY 12 HOURS SCHEDULED
Status: CANCELLED | OUTPATIENT
Start: 2022-02-01

## 2022-02-01 RX ORDER — SODIUM CHLORIDE 0.9 % (FLUSH) 0.9 %
10 SYRINGE (ML) INJECTION PRN
Status: CANCELLED | OUTPATIENT
Start: 2022-02-01

## 2022-02-01 RX ORDER — SODIUM CHLORIDE 9 MG/ML
25 INJECTION, SOLUTION INTRAVENOUS PRN
Status: DISCONTINUED | OUTPATIENT
Start: 2022-02-01 | End: 2022-02-01 | Stop reason: HOSPADM

## 2022-02-01 RX ORDER — LIDOCAINE HYDROCHLORIDE 10 MG/ML
1 INJECTION, SOLUTION EPIDURAL; INFILTRATION; INTRACAUDAL; PERINEURAL
Status: CANCELLED | OUTPATIENT
Start: 2022-02-01 | End: 2022-02-01

## 2022-02-01 RX ORDER — HYDRALAZINE HYDROCHLORIDE 20 MG/ML
5 INJECTION INTRAMUSCULAR; INTRAVENOUS EVERY 10 MIN PRN
Status: DISCONTINUED | OUTPATIENT
Start: 2022-02-01 | End: 2022-02-01 | Stop reason: HOSPADM

## 2022-02-01 RX ORDER — ALBUTEROL SULFATE 90 UG/1
AEROSOL, METERED RESPIRATORY (INHALATION) PRN
Status: DISCONTINUED | OUTPATIENT
Start: 2022-02-01 | End: 2022-02-01 | Stop reason: SDUPTHER

## 2022-02-01 RX ORDER — FENTANYL CITRATE 50 UG/ML
25 INJECTION, SOLUTION INTRAMUSCULAR; INTRAVENOUS EVERY 5 MIN PRN
Status: DISCONTINUED | OUTPATIENT
Start: 2022-02-01 | End: 2022-02-01 | Stop reason: HOSPADM

## 2022-02-01 RX ORDER — BUPIVACAINE HYDROCHLORIDE 2.5 MG/ML
INJECTION, SOLUTION EPIDURAL; INFILTRATION; INTRACAUDAL
Status: COMPLETED | OUTPATIENT
Start: 2022-02-01 | End: 2022-02-01

## 2022-02-01 RX ORDER — SODIUM CHLORIDE, SODIUM LACTATE, POTASSIUM CHLORIDE, CALCIUM CHLORIDE 600; 310; 30; 20 MG/100ML; MG/100ML; MG/100ML; MG/100ML
INJECTION, SOLUTION INTRAVENOUS CONTINUOUS
Status: DISCONTINUED | OUTPATIENT
Start: 2022-02-01 | End: 2022-02-01 | Stop reason: HOSPADM

## 2022-02-01 RX ORDER — LABETALOL HYDROCHLORIDE 5 MG/ML
5 INJECTION, SOLUTION INTRAVENOUS EVERY 10 MIN PRN
Status: DISCONTINUED | OUTPATIENT
Start: 2022-02-01 | End: 2022-02-01 | Stop reason: HOSPADM

## 2022-02-01 RX ORDER — SODIUM CHLORIDE, SODIUM LACTATE, POTASSIUM CHLORIDE, CALCIUM CHLORIDE 600; 310; 30; 20 MG/100ML; MG/100ML; MG/100ML; MG/100ML
INJECTION, SOLUTION INTRAVENOUS CONTINUOUS PRN
Status: DISCONTINUED | OUTPATIENT
Start: 2022-02-01 | End: 2022-02-01 | Stop reason: SDUPTHER

## 2022-02-01 RX ORDER — PHENYLEPHRINE HCL IN 0.9% NACL 1 MG/10 ML
SYRINGE (ML) INTRAVENOUS PRN
Status: DISCONTINUED | OUTPATIENT
Start: 2022-02-01 | End: 2022-02-01 | Stop reason: SDUPTHER

## 2022-02-01 RX ORDER — EPHEDRINE SULFATE/0.9% NACL/PF 50 MG/5 ML
SYRINGE (ML) INTRAVENOUS PRN
Status: DISCONTINUED | OUTPATIENT
Start: 2022-02-01 | End: 2022-02-01 | Stop reason: SDUPTHER

## 2022-02-01 RX ORDER — FIBRINOGEN HUMAN AND THROMBIN HUMAN 1 ML
KIT TOPICAL
Status: COMPLETED | OUTPATIENT
Start: 2022-02-01 | End: 2022-02-01

## 2022-02-01 RX ORDER — SODIUM CHLORIDE, SODIUM LACTATE, POTASSIUM CHLORIDE, CALCIUM CHLORIDE 600; 310; 30; 20 MG/100ML; MG/100ML; MG/100ML; MG/100ML
INJECTION, SOLUTION INTRAVENOUS CONTINUOUS
Status: CANCELLED | OUTPATIENT
Start: 2022-02-01

## 2022-02-01 RX ORDER — ONDANSETRON 2 MG/ML
INJECTION INTRAMUSCULAR; INTRAVENOUS PRN
Status: DISCONTINUED | OUTPATIENT
Start: 2022-02-01 | End: 2022-02-01 | Stop reason: SDUPTHER

## 2022-02-01 RX ORDER — ROCURONIUM BROMIDE 10 MG/ML
INJECTION, SOLUTION INTRAVENOUS PRN
Status: DISCONTINUED | OUTPATIENT
Start: 2022-02-01 | End: 2022-02-01 | Stop reason: SDUPTHER

## 2022-02-01 RX ORDER — HYDROMORPHONE HCL 110MG/55ML
0.25 PATIENT CONTROLLED ANALGESIA SYRINGE INTRAVENOUS EVERY 5 MIN PRN
Status: DISCONTINUED | OUTPATIENT
Start: 2022-02-01 | End: 2022-02-01 | Stop reason: HOSPADM

## 2022-02-01 RX ORDER — HYDROCODONE BITARTRATE AND ACETAMINOPHEN 5; 325 MG/1; MG/1
1 TABLET ORAL
Status: DISCONTINUED | OUTPATIENT
Start: 2022-02-01 | End: 2022-02-01 | Stop reason: HOSPADM

## 2022-02-01 RX ORDER — FENTANYL CITRATE 50 UG/ML
INJECTION, SOLUTION INTRAMUSCULAR; INTRAVENOUS
Status: DISCONTINUED
Start: 2022-02-01 | End: 2022-02-01 | Stop reason: HOSPADM

## 2022-02-01 RX ORDER — FENTANYL CITRATE 50 UG/ML
INJECTION, SOLUTION INTRAMUSCULAR; INTRAVENOUS PRN
Status: DISCONTINUED | OUTPATIENT
Start: 2022-02-01 | End: 2022-02-01 | Stop reason: SDUPTHER

## 2022-02-01 RX ORDER — DEXAMETHASONE SODIUM PHOSPHATE 4 MG/ML
INJECTION, SOLUTION INTRA-ARTICULAR; INTRALESIONAL; INTRAMUSCULAR; INTRAVENOUS; SOFT TISSUE PRN
Status: DISCONTINUED | OUTPATIENT
Start: 2022-02-01 | End: 2022-02-01 | Stop reason: SDUPTHER

## 2022-02-01 RX ORDER — SODIUM CHLORIDE 0.9 % (FLUSH) 0.9 %
5-40 SYRINGE (ML) INJECTION EVERY 12 HOURS SCHEDULED
Status: DISCONTINUED | OUTPATIENT
Start: 2022-02-01 | End: 2022-02-01 | Stop reason: HOSPADM

## 2022-02-01 RX ADMIN — HYDROMORPHONE HYDROCHLORIDE 0.4 MG: 2 INJECTION, SOLUTION INTRAMUSCULAR; INTRAVENOUS; SUBCUTANEOUS at 09:27

## 2022-02-01 RX ADMIN — CEFAZOLIN 2000 MG: 10 INJECTION, POWDER, FOR SOLUTION INTRAVENOUS at 08:26

## 2022-02-01 RX ADMIN — SODIUM CHLORIDE, POTASSIUM CHLORIDE, SODIUM LACTATE AND CALCIUM CHLORIDE: 600; 310; 30; 20 INJECTION, SOLUTION INTRAVENOUS at 07:07

## 2022-02-01 RX ADMIN — Medication 100 MCG: at 08:58

## 2022-02-01 RX ADMIN — ROCURONIUM BROMIDE 10 MG: 10 INJECTION, SOLUTION INTRAVENOUS at 08:32

## 2022-02-01 RX ADMIN — Medication 10 MG: at 09:08

## 2022-02-01 RX ADMIN — ROCURONIUM BROMIDE 10 MG: 10 INJECTION, SOLUTION INTRAVENOUS at 09:15

## 2022-02-01 RX ADMIN — Medication 100 MCG: at 11:15

## 2022-02-01 RX ADMIN — SUGAMMADEX 100 MG: 100 INJECTION, SOLUTION INTRAVENOUS at 13:07

## 2022-02-01 RX ADMIN — FENTANYL CITRATE 50 MCG: 50 INJECTION, SOLUTION INTRAMUSCULAR; INTRAVENOUS at 09:15

## 2022-02-01 RX ADMIN — CEFAZOLIN 2000 MG: 10 INJECTION, POWDER, FOR SOLUTION INTRAVENOUS at 12:26

## 2022-02-01 RX ADMIN — FENTANYL CITRATE 50 MCG: 50 INJECTION, SOLUTION INTRAMUSCULAR; INTRAVENOUS at 08:29

## 2022-02-01 RX ADMIN — ALBUTEROL SULFATE 6 PUFF: 90 AEROSOL, METERED RESPIRATORY (INHALATION) at 09:03

## 2022-02-01 RX ADMIN — PROPOFOL 150 MG: 10 INJECTION, EMULSION INTRAVENOUS at 08:32

## 2022-02-01 RX ADMIN — ONDANSETRON 4 MG: 2 INJECTION INTRAMUSCULAR; INTRAVENOUS at 12:56

## 2022-02-01 RX ADMIN — HYDROMORPHONE HYDROCHLORIDE 0.2 MG: 2 INJECTION, SOLUTION INTRAMUSCULAR; INTRAVENOUS; SUBCUTANEOUS at 13:07

## 2022-02-01 RX ADMIN — Medication 10 MG: at 10:26

## 2022-02-01 RX ADMIN — HYDROMORPHONE HYDROCHLORIDE 0.2 MG: 2 INJECTION, SOLUTION INTRAMUSCULAR; INTRAVENOUS; SUBCUTANEOUS at 11:02

## 2022-02-01 RX ADMIN — HYDROMORPHONE HYDROCHLORIDE 0.2 MG: 2 INJECTION, SOLUTION INTRAMUSCULAR; INTRAVENOUS; SUBCUTANEOUS at 13:10

## 2022-02-01 RX ADMIN — Medication 100 MCG: at 12:43

## 2022-02-01 RX ADMIN — Medication 100 MCG: at 10:20

## 2022-02-01 RX ADMIN — DEXAMETHASONE SODIUM PHOSPHATE 10 MG: 4 INJECTION, SOLUTION INTRAMUSCULAR; INTRAVENOUS at 08:44

## 2022-02-01 RX ADMIN — ROCURONIUM BROMIDE 10 MG: 10 INJECTION, SOLUTION INTRAVENOUS at 11:02

## 2022-02-01 RX ADMIN — SODIUM CHLORIDE, POTASSIUM CHLORIDE, SODIUM LACTATE AND CALCIUM CHLORIDE: 600; 310; 30; 20 INJECTION, SOLUTION INTRAVENOUS at 07:38

## 2022-02-01 RX ADMIN — FENTANYL CITRATE 25 MCG: 0.05 INJECTION, SOLUTION INTRAMUSCULAR; INTRAVENOUS at 14:13

## 2022-02-01 RX ADMIN — Medication 120 MG: at 08:32

## 2022-02-01 RX ADMIN — HYDROMORPHONE HYDROCHLORIDE 0.4 MG: 2 INJECTION, SOLUTION INTRAMUSCULAR; INTRAVENOUS; SUBCUTANEOUS at 11:49

## 2022-02-01 RX ADMIN — TILMANOCEPT 0.5 MILLICURIE: KIT at 09:39

## 2022-02-01 RX ADMIN — LIDOCAINE HYDROCHLORIDE 100 MG: 20 INJECTION, SOLUTION EPIDURAL; INFILTRATION; INTRACAUDAL; PERINEURAL at 08:32

## 2022-02-01 RX ADMIN — ROCURONIUM BROMIDE 10 MG: 10 INJECTION, SOLUTION INTRAVENOUS at 11:50

## 2022-02-01 RX ADMIN — HYDROMORPHONE HYDROCHLORIDE 0.4 MG: 2 INJECTION, SOLUTION INTRAMUSCULAR; INTRAVENOUS; SUBCUTANEOUS at 09:34

## 2022-02-01 RX ADMIN — ROCURONIUM BROMIDE 10 MG: 10 INJECTION, SOLUTION INTRAVENOUS at 08:47

## 2022-02-01 RX ADMIN — Medication 100 MCG: at 10:41

## 2022-02-01 ASSESSMENT — PULMONARY FUNCTION TESTS
PIF_VALUE: 17
PIF_VALUE: 16
PIF_VALUE: 18
PIF_VALUE: 19
PIF_VALUE: 20
PIF_VALUE: 17
PIF_VALUE: 15
PIF_VALUE: 18
PIF_VALUE: 11
PIF_VALUE: 14
PIF_VALUE: 11
PIF_VALUE: 17
PIF_VALUE: 19
PIF_VALUE: 19
PIF_VALUE: 14
PIF_VALUE: 19
PIF_VALUE: 19
PIF_VALUE: 2
PIF_VALUE: 17
PIF_VALUE: 33
PIF_VALUE: 18
PIF_VALUE: 18
PIF_VALUE: 17
PIF_VALUE: 20
PIF_VALUE: 14
PIF_VALUE: 17
PIF_VALUE: 19
PIF_VALUE: 11
PIF_VALUE: 1
PIF_VALUE: 18
PIF_VALUE: 0
PIF_VALUE: 18
PIF_VALUE: 17
PIF_VALUE: 19
PIF_VALUE: 18
PIF_VALUE: 17
PIF_VALUE: 18
PIF_VALUE: 19
PIF_VALUE: 17
PIF_VALUE: 17
PIF_VALUE: 24
PIF_VALUE: 18
PIF_VALUE: 18
PIF_VALUE: 16
PIF_VALUE: 14
PIF_VALUE: 18
PIF_VALUE: 17
PIF_VALUE: 15
PIF_VALUE: 17
PIF_VALUE: 11
PIF_VALUE: 18
PIF_VALUE: 18
PIF_VALUE: 16
PIF_VALUE: 18
PIF_VALUE: 17
PIF_VALUE: 18
PIF_VALUE: 17
PIF_VALUE: 19
PIF_VALUE: 14
PIF_VALUE: 17
PIF_VALUE: 14
PIF_VALUE: 19
PIF_VALUE: 16
PIF_VALUE: 19
PIF_VALUE: 18
PIF_VALUE: 18
PIF_VALUE: 17
PIF_VALUE: 18
PIF_VALUE: 16
PIF_VALUE: 18
PIF_VALUE: 19
PIF_VALUE: 16
PIF_VALUE: 13
PIF_VALUE: 18
PIF_VALUE: 18
PIF_VALUE: 20
PIF_VALUE: 15
PIF_VALUE: 19
PIF_VALUE: 17
PIF_VALUE: 14
PIF_VALUE: 19
PIF_VALUE: 14
PIF_VALUE: 18
PIF_VALUE: 18
PIF_VALUE: 19
PIF_VALUE: 17
PIF_VALUE: 19
PIF_VALUE: 16
PIF_VALUE: 19
PIF_VALUE: 19
PIF_VALUE: 17
PIF_VALUE: 12
PIF_VALUE: 18
PIF_VALUE: 19
PIF_VALUE: 16
PIF_VALUE: 18
PIF_VALUE: 18
PIF_VALUE: 17
PIF_VALUE: 17
PIF_VALUE: 18
PIF_VALUE: 15
PIF_VALUE: 4
PIF_VALUE: 24
PIF_VALUE: 18
PIF_VALUE: 18
PIF_VALUE: 17
PIF_VALUE: 13
PIF_VALUE: 18
PIF_VALUE: 16
PIF_VALUE: 0
PIF_VALUE: 18
PIF_VALUE: 18
PIF_VALUE: 14
PIF_VALUE: 19
PIF_VALUE: 18
PIF_VALUE: 17
PIF_VALUE: 2
PIF_VALUE: 18
PIF_VALUE: 18
PIF_VALUE: 19
PIF_VALUE: 25
PIF_VALUE: 11
PIF_VALUE: 18
PIF_VALUE: 17
PIF_VALUE: 16
PIF_VALUE: 19
PIF_VALUE: 18
PIF_VALUE: 19
PIF_VALUE: 3
PIF_VALUE: 18
PIF_VALUE: 16
PIF_VALUE: 19
PIF_VALUE: 18
PIF_VALUE: 14
PIF_VALUE: 20
PIF_VALUE: 18
PIF_VALUE: 17
PIF_VALUE: 18
PIF_VALUE: 24
PIF_VALUE: 11
PIF_VALUE: 18
PIF_VALUE: 18
PIF_VALUE: 17
PIF_VALUE: 19
PIF_VALUE: 18
PIF_VALUE: 15
PIF_VALUE: 3
PIF_VALUE: 18
PIF_VALUE: 17
PIF_VALUE: 11
PIF_VALUE: 24
PIF_VALUE: 18
PIF_VALUE: 19
PIF_VALUE: 20
PIF_VALUE: 15
PIF_VALUE: 18
PIF_VALUE: 18
PIF_VALUE: 26
PIF_VALUE: 19
PIF_VALUE: 19
PIF_VALUE: 11
PIF_VALUE: 14
PIF_VALUE: 17
PIF_VALUE: 14
PIF_VALUE: 19
PIF_VALUE: 15
PIF_VALUE: 16
PIF_VALUE: 11
PIF_VALUE: 20
PIF_VALUE: 18
PIF_VALUE: 16
PIF_VALUE: 16
PIF_VALUE: 17
PIF_VALUE: 20
PIF_VALUE: 17
PIF_VALUE: 19
PIF_VALUE: 16
PIF_VALUE: 17
PIF_VALUE: 20
PIF_VALUE: 14
PIF_VALUE: 18
PIF_VALUE: 18
PIF_VALUE: 19
PIF_VALUE: 18
PIF_VALUE: 17
PIF_VALUE: 16
PIF_VALUE: 21
PIF_VALUE: 12
PIF_VALUE: 19
PIF_VALUE: 17
PIF_VALUE: 18
PIF_VALUE: 17
PIF_VALUE: 21
PIF_VALUE: 20
PIF_VALUE: 18
PIF_VALUE: 19
PIF_VALUE: 14
PIF_VALUE: 19
PIF_VALUE: 18
PIF_VALUE: 11
PIF_VALUE: 19
PIF_VALUE: 1
PIF_VALUE: 16
PIF_VALUE: 18
PIF_VALUE: 17
PIF_VALUE: 18
PIF_VALUE: 19
PIF_VALUE: 23
PIF_VALUE: 19
PIF_VALUE: 15
PIF_VALUE: 19
PIF_VALUE: 18
PIF_VALUE: 21
PIF_VALUE: 18
PIF_VALUE: 17
PIF_VALUE: 14
PIF_VALUE: 14
PIF_VALUE: 20
PIF_VALUE: 19
PIF_VALUE: 18
PIF_VALUE: 6
PIF_VALUE: 18
PIF_VALUE: 21
PIF_VALUE: 17
PIF_VALUE: 19
PIF_VALUE: 19
PIF_VALUE: 14
PIF_VALUE: 19
PIF_VALUE: 19
PIF_VALUE: 17
PIF_VALUE: 31
PIF_VALUE: 19
PIF_VALUE: 20
PIF_VALUE: 17
PIF_VALUE: 19
PIF_VALUE: 19
PIF_VALUE: 18
PIF_VALUE: 19
PIF_VALUE: 4
PIF_VALUE: 19
PIF_VALUE: 16
PIF_VALUE: 19
PIF_VALUE: 18
PIF_VALUE: 19
PIF_VALUE: 14
PIF_VALUE: 18
PIF_VALUE: 14
PIF_VALUE: 18
PIF_VALUE: 25
PIF_VALUE: 21
PIF_VALUE: 18
PIF_VALUE: 18
PIF_VALUE: 1
PIF_VALUE: 15
PIF_VALUE: 18
PIF_VALUE: 19
PIF_VALUE: 16
PIF_VALUE: 18
PIF_VALUE: 16
PIF_VALUE: 19
PIF_VALUE: 17
PIF_VALUE: 18
PIF_VALUE: 20
PIF_VALUE: 24
PIF_VALUE: 14
PIF_VALUE: 18
PIF_VALUE: 17
PIF_VALUE: 11
PIF_VALUE: 18
PIF_VALUE: 19
PIF_VALUE: 17
PIF_VALUE: 14
PIF_VALUE: 18
PIF_VALUE: 5
PIF_VALUE: 18
PIF_VALUE: 20
PIF_VALUE: 14
PIF_VALUE: 18
PIF_VALUE: 18
PIF_VALUE: 14
PIF_VALUE: 13
PIF_VALUE: 17
PIF_VALUE: 20
PIF_VALUE: 19
PIF_VALUE: 14
PIF_VALUE: 19

## 2022-02-01 ASSESSMENT — PAIN SCALES - GENERAL
PAINLEVEL_OUTOF10: 0
PAINLEVEL_OUTOF10: 8

## 2022-02-01 ASSESSMENT — ENCOUNTER SYMPTOMS: SHORTNESS OF BREATH: 0

## 2022-02-01 NOTE — ANESTHESIA PRE PROCEDURE
Department of Anesthesiology  Preprocedure Note       Name:  Flo Willson   Age:  77 y.o.  :  1945                                          MRN:  [id number]         Date:  2022      Surgeon: Melina Mckenzie):  Siva Merlos MD    Procedure: Procedure(s):  BILATERAL TOTAL MASTECTOMY, RIGHT LOCALIZED SENTINEL LYMPH NODE BIOPSY-FROZEN SECTIONS-POSSIBLE AXILLARY LYMPH NODE DISSECTION, TECHNETIUM NINETY-NINE AND INJECTABLE BLUE DYE IN THE OPERATING ROOM    Medications prior to admission:   Prior to Admission medications    Medication Sig Start Date End Date Taking?  Authorizing Provider   Insulin Aspart, w/Niacinamide, (FIASP FLEXTOUCH) 100 UNIT/ML SOPN Inject into the skin Sliding scale 2 units bid   Yes Historical Provider, MD   Insulin Glargine, 1 Unit Dial, (TOUJEO SOLOSTAR) 300 UNIT/ML SOPN Inject 2 Units into the skin every morning   Yes Historical Provider, MD   Cyanocobalamin (VITAMIN B 12 PO) Take by mouth   Yes Historical Provider, MD   Cholecalciferol (VITAMIN D3) 125 MCG (5000 UT) TABS Take by mouth   Yes Historical Provider, MD   atorvastatin (LIPITOR) 20 MG tablet TAKE 1 TABLET BY MOUTH AT BEDTIME AS DIRECTED. 21  Yes Historical Provider, MD   Magnesium 400 MG CAPS Take 400 mg by mouth 2 times daily  Patient taking differently: Take 400 mg by mouth 2 times daily 2 tabs in am, 2 tabs afternoon, 1 tab hs 11/15/21   Leann Jean-Baptiste DO   metFORMIN (GLUCOPHAGE) 1000 MG tablet TAKE 1 TABLET BY MOUTH TWO TIMES A DAY  Patient not taking: Reported on 2022 5/3/21   Historical Provider, MD   zafirlukast (ACCOLATE) 10 MG tablet TK 1 T PO QD  Patient not taking: Reported on 2022   Historical Provider, MD   lisinopril (PRINIVIL;ZESTRIL) 10 MG tablet Take 10 mg by mouth daily   Patient not taking: Reported on 2022    Historical Provider, MD   amLODIPine (NORVASC) 5 MG tablet Take 5 mg by mouth daily  Patient not taking: Reported on 2022    Historical Provider, MD       Current medications:    Current Facility-Administered Medications   Medication Dose Route Frequency Provider Last Rate Last Admin    0.9 % sodium chloride infusion  25 mL IntraVENous PRN Rolando Meadows MD        ceFAZolin (ANCEF) 2000 mg in dextrose 5 % 50 mL IVPB  2,000 mg IntraVENous On Call to [address] [place] [address], MD        lactated ringers infusion   IntraVENous Continuous Rolando Meadows MD        sodium chloride flush 0.9 % injection 5-40 mL  5-40 mL IntraVENous 2 times per day Rolando Meadows MD        sodium chloride flush 0.9 % injection 5-40 mL  5-40 mL IntraVENous PRN Rolando Meadows MD         Facility-Administered Medications Ordered in Other Encounters   Medication Dose Route Frequency Provider Last Rate Last Admin    lactated ringers infusion   IntraVENous Continuous PRN DARNELL Baird - CRNA   New Bag at 02/01/22 7686       Allergies:     Allergies   Allergen Reactions    Other      fireants    Erythromycin Nausea Only       Problem List:    Patient Active Problem List   Diagnosis Code    Closed fracture of lower end of right ulna S52.601A    Malignant neoplasm of right breast in female, estrogen receptor positive ([facility] C50.911, Z17.0    Acute renal failure (ARF) (HCC) N17.9    Essential hypertension I10    Type 2 diabetes mellitus ([address] E11.9    Hypercholesterolemia E78.00    Gastroenteritis K52.9    Septicemia ([address] A41.9    Bandemia D72.825    Gram-negative bacteremia R78.81    History of breast cancer Z85.3    History of diverticulitis Z87.19    Immunocompromised ([address] Y34.7    Uncomplicated asthma E67.673    Overweight E66.3    Hypokalemia E87.6    Hypomagnesemia E83.42    Encounter for medication counseling Z71.89       Past Medical History:        Diagnosis Date    Anesthesia     sensitive, doesnt take much    Asthma     resolved    Breast cancer ([address]     Cancer ([address] 06/2021    right Breast    Diabetes mellitus ([address]     Diverticulitis     History of chemotherapy 12/29/2021    Dr Yvrose Machado [facility], treament concluded    Hx antineoplastic chemo     Hypertension     no meds due to chemo    Sleep apnea     Mild - wears no mask       Past Surgical History:        Procedure Laterality Date    BREAST LUMPECTOMY Left     HYSTERECTOMY      OVARY REMOVAL      PORT SURGERY N/A 6/23/2021    PLACEMENT OF POWER PORT A CATHETER performed by Hudson Katz MD at [address] [place] [address] Street Right 6/3/2021    US BREAST BIOPSY NEEDLE ADDITIONAL RIGHT 6/3/2021 [facility]    US BREAST NEEDLE BIOPSY RIGHT Right 6/3/2021    US BREAST NEEDLE BIOPSY RIGHT 6/3/2021 [facility]    US GUIDED NEEDLE LOC OF RIGHT BREAST Right 1/27/2022    US GUIDED NEEDLE LOC OF RIGHT BREAST 1/27/2022 [facility]    US LYMPH NODE BIOPSY  6/3/2021    US LYMPH NODE BIOPSY 6/3/2021 [facility]AS[facility]       Social History:    Social History     Tobacco Use    Smoking status: Never Smoker    Smokeless tobacco: Never Used   Substance Use Topics    Alcohol use: No                                Counseling given: Not Answered      Vital Signs (Current):   Vitals:    01/28/22 1116   Weight: 160 lb (72.6 kg)   Height: 5' 6.5\" (1.689 m)                                              BP Readings from Last 3 Encounters:   01/06/22 (!) 140/73   12/13/21 (!) 136/97   11/15/21 (!) 140/68       NPO Status:                                                                                 BMI:   Wt Readings from Last 3 Encounters:   01/28/22 160 lb (72.6 kg)   01/06/22 158 lb (71.7 kg)   01/05/22 159 lb (72.1 kg)     Body mass index is 25.44 kg/m².     CBC:   Lab Results   Component Value Date    WBC 3.3 11/14/2021    RBC 2.44 11/14/2021    HGB 8.6 11/14/2021    HCT 25.0 11/14/2021    .4 11/14/2021    RDW 15.6 11/14/2021     11/14/2021       CMP:   Lab Results   Component Value Date     11/14/2021    K 3.7 11/14/2021     11/14/2021    CO2 27 11/14/2021    BUN 10 11/14/2021    CREATININE 0.6 11/14/2021    GFRAA >60 11/14/2021    GFRAA >60 02/14/2013    AGRATIO 1.4 11/14/2021    LABGLOM >60 11/14/2021    GLUCOSE 176 11/14/2021    PROT 6.6 11/14/2021    PROT 7.0 02/14/2013    CALCIUM 9.6 11/14/2021    BILITOT 0.3 11/14/2021    ALKPHOS 93 11/14/2021    AST 32 11/14/2021    ALT 35 11/14/2021       POC Tests: No results for input(s): POCGLU, POCNA, POCK, POCCL, POCBUN, POCHEMO, POCHCT in the last 72 hours. Coags: No results found for: PROTIME, INR, APTT    HCG (If Applicable): No results found for: PREGTESTUR, PREGSERUM, HCG, HCGQUANT     ABGs: No results found for: PHART, PO2ART, YBK8LRP, XQX4HMN, BEART, L2TBEBMZ     Type & Screen (If Applicable):  No results found for: LABABO, LABRH    Drug/Infectious Status (If Applicable):  No results found for: HIV, HEPCAB    COVID-19 Screening (If Applicable):   Lab Results   Component Value Date    COVID19 Not Detected 01/27/2022           Anesthesia Evaluation  Patient summary reviewed and Nursing notes reviewed no history of anesthetic complications:   Airway: Mallampati: I  TM distance: >3 FB   Neck ROM: full  Mouth opening: > = 3 FB Dental: normal exam         Pulmonary:   (+) sleep apnea:  asthma:     (-) shortness of breath                           Cardiovascular:    (+) hypertension:,     (-)  angina          Echocardiogram reviewed                  Neuro/Psych:      (-) CVA           GI/Hepatic/Renal:        (-) GERD and liver disease       Endo/Other:    (+) DiabetesType II DM, , malignancy/cancer. (-) hypothyroidism               Abdominal:             Vascular:     - PVD. Other Findings:           Anesthesia Plan      general     ASA 3       Induction: intravenous. MIPS: Postoperative opioids intended and Prophylactic antiemetics administered. Anesthetic plan and risks discussed with patient. Use of blood products discussed with patient whom.    Plan discussed with Melody Rodriguez MD   2/1/2022

## 2022-02-01 NOTE — PROGRESS NOTES
Pt assisted to void using bedpan, approx 300 ml of blue urine voided; pt assisted to clean self and re-position in bed.

## 2022-02-01 NOTE — ANESTHESIA POSTPROCEDURE EVALUATION
Department of Anesthesiology  Postprocedure Note    Patient: Julia Adams  MRN: [id number]  YOB: 1945  Date of evaluation: 2/1/2022  Time:  1:51 PM     Procedure Summary     Date: 02/01/22 Room / Location: [place] [address] / [facility]    Anesthesia Start: 0150 Anesthesia Stop: 8981    Procedure: BILATERAL TOTAL MASTECTOMY, RIGHT LOCALIZED SENTINEL LYMPH NODE BIOPSY-FROZEN SECTIONS-POSSIBLE AXILLARY LYMPH NODE DISSECTION, TECHNETIUM NINETY-NINE AND INJECTABLE BLUE DYE IN THE OPERATING ROOM, EXCISION OF RIGHT BREAST SKIN LESION (Bilateral Breast) Diagnosis: (C50.911 INVASIVE DUCTAL CARCINOMA OF RIGHT BREAST, STAGE 3 ([address]- PRIMARY)    Surgeons: Mendy Weinstein MD Responsible Provider: Uma Shelton MD    Anesthesia Type: general ASA Status: 3          Anesthesia Type: general    Alexey Phase I: Alexey Score: 5    Alexey Phase II:      Last vitals: Reviewed and per EMR flowsheets.        Anesthesia Post Evaluation    Patient location during evaluation: PACU  Patient participation: complete - patient participated  Level of consciousness: awake and alert  Airway patency: patent  Nausea & Vomiting: no nausea and no vomiting  Complications: no  Cardiovascular status: hemodynamically stable  Respiratory status: acceptable  Hydration status: stable  Multimodal analgesia pain management approach

## 2022-02-01 NOTE — PROGRESS NOTES
Pt awake and alert at this time. Pt on RA, and VSS. Pt denies pain and nausea, tolerating PO. Skin warm to chest. Pt meets criteria to be discharged from Phase 1.

## 2022-02-01 NOTE — PROGRESS NOTES
Pt arrived from OR to PACU bay 4. Report received from OR staff. Pt asleep, minimally responsive to direct stimuli. Surgical incisions dressings in place to bi-lateral breasts, no active bleeding noted; pt arrives with surgical bra in place . Pt arrives with simple mask in place infusing 4L oxygen, vitals as charted.

## 2022-02-01 NOTE — OP NOTE
Operative Note      Postoperative Note    Suzie House  YOB: 1945  [id number]    Pre-operative Diagnosis: [id number] STAGE:  IIIA right breast cancer    Post-operative Diagnosis: Same    Procedure: Injection of isosulfuran blue dye for lymphatic mapping, injection of radiopharmaceutical for lymphatic mapping, lymphatic mapping with neoprobe unit, right deep axillary selective lymph node dissection with targeting,right total mastectomy, left prophylactic total mastectomy, excision of right chest wall skin lesion. Anesthesia: General    Surgeons/Assistants: Stephan Zhang  Assistant: ARELIS Francisco    Estimated Blood Loss: 50    Drains:  15F round under mastectomy flaps bilaterally    Complications: None apparent at conclusion of procedure    Specimens: bilateral breast tissue, sentinel nodes (see below for details), skin lesion of right chest wall    Findings: sentinel nodes, see below    Post-Op Condition: Stable    Disposition: to recovery room  Description of Procedure:   Ms. Tiff Graf is a 77 y.o. woman with a clinical T3 N1 right breast cancer. She has underwent neoadjuvant chemotherapy with good partial response. Her prior positive lymph node has normalized in appearance. She has elected to proceed with bilateral total mastectomy and selective lymph node dissection with localization of prior positive lymph node with possible axillary dissection. She will not be undergoing immediate chest wall reconstruction at this time. The indications for the planned procedure, along with the potential benefits and risks which include but are not limited to the risk of anesthesia, bleeding, infection, possible failed operation, possible need for additional surgery pending final pathologic assessment, lymphedema, sensation changes, and unappealing cosmetics were reviewed. All questions were answered and she agrees to proceed. Ms. Tiff Graf was met by me in the preoperative area.   The surgical site was identified. The patient's surgical site was marked. Consent was obtained. The appropriate breast imaging was reviewed. She was brought to the operating room and placed supine with her arms extended on boards. She was appropriately positioned and padded. Compression stockings were placed. Appropriate antibiotics were administered within 60 minutes of the incision. Breast imaging was available in the room. After induction of general anesthesia, the appropriate World Health Organization timeout procedure was performed. At 5175 0.5 millicuries of Lymphoseek technetium-99 sulfur colloid was injected intradermally in a periareolar location at 12:00, 3:00, 6:00, and 9:00. A neoprobe was then used to identify a hot spot. The location was marked. This initial count was 5. After this 5 mL of  isosulfuran blue dye  was injected intraparenchymally at 0846. A five minute massage was performed. The bilateral breast and axillary region, upper arm, and chest wall were prepped and draped in the normal sterile fashion. The skin and soft tissues over the proposed mastectomy incisions was infiltrated with local. Our attention first addressed the right chest wall skin lesion. An elliptical incision was made to encircle the lesion. Dissection was taken to subcutaneous fat. It was excised from the surrounding tissue and submitted to pathology. The wound was irrigated and closed in two layers with an interrupted 3-0 monocryl and a subcutaneous 4-0 monocryl suture. Skin glue was applied. An incision was made in an elliptical fashion to include the nipple areolar complex and the central breast skin. Dissection was carried through the subcutaneous tissues and hemostasis obtained. Flaps were then raised superiorly towards the level of the clavicle, medially to the sternum, laterally to the latissimus dorsi muscle, and inferiorly to the inframammary crease.   Perforating vessels were clipped or preserved as they were identified. After flaps are elevated and dissection carried to the chest wall, the breast and fascia are removed from the pectoralis muscle from superior/medial to inferior/lateral. Once freed, the lateral portion of the specimen is survielled with the neoprobe for sentinel lymph nodes. None were identified. The specimen is oriented with a short stitch superiorly and a long stitch laterally. Skin marks skin. Upon entering the axilla, the neoprobe was used to direct the identification of sentinel lymph nodes. Conover node 1 was noted to be at level 1 and excised from the surrounding subcutaneous tissues using blunt dissection and electrocautery. This was the targeted and prior positive node with the TAG and biopsy clip identified. Small lymphatics and vascular structures were identified and controlled with electrocautery and surgical clips. Ex-vivo counts of sentinel node 1 were 7, it was blue, and was palpable. There were 2 additional sentinel nodes identified and removed in a similar fashion. Conover node 2 was located at level 1, ex-vivo counts were 2, it was blue, and was palpable. Conover node 3 was located at level 1, ex-vivo counts were 19, it was blue, and was palpable. All nodes were placed into containers, labeled, and submitted to pathology for frozen followed by permanent section. Intraoperative frozen section revealed no apparent malignancy within the nodes. Therefore we did not proceed to axillary dissection and will await final results. Basin counts following removal of the final sentinel node were 0. The axilla was then assessed for other blue channels/nodes and palpably abnormal lymph nodes. Blue nodes, non colored nodes extending from dye filled channels, radioactive and palpably suspicious nodes were removed. The axilla was then assessed for hemostasis. Cautery, surgical clips, and hemostatic agents were utilized as necessary. The wound was irrigated. Exparel was utilized.  A 15 Kinyarwanda round drain was placed under the superior and inferior mastectomy flaps. It was secured to the skin and exit through a separate stab incision inferior-laterally. Hemostatic agent is placed over the chest wall and axillary space. The mastectomy incision was closed in two layers with an interrupted 3-0 monocryl and a subcutaneous 4-0 monocryl suture. Skin glue was applied. The contralateral breast was then removed in a similar fashion as the right. She was awakened from anesthesia and placed in a surgical binder. She was taken to the recovery room in stable condition and her family was made aware of intraoperative findings. All sentinel nodes, skin specimen and breast specimens were sent to pathology for review. Instrument, sponge, and needle counts were correct.     Electronically signed by Xiomara Foster MD on 2/1/22 at 8:29 AM EST

## 2022-02-01 NOTE — H&P
The most recent H&P, breast imaging and documentation have been reviewed. There have been no changes. All questions have been answered. Chris Calderon has been marked. Risks associated with coronavirus have been reviewed. We will proceed with bilateral mastectomies with right localized sentinel lymph node biopsy with possible axillary dissection.

## 2022-02-02 ENCOUNTER — VIRTUAL VISIT (OUTPATIENT)
Dept: SURGERY | Age: 77
End: 2022-02-02

## 2022-02-02 DIAGNOSIS — Z09 POSTOP CHECK: Primary | ICD-10-CM

## 2022-02-02 NOTE — PROGRESS NOTES
Subjective: There are no acute postoperative issues. Pain is well controlled. She is having no nausea/vomiting and tolerating liquids. Objective:   Wt Readings from Last 3 Encounters:   02/01/22 160 lb (72.6 kg)   01/06/22 158 lb (71.7 kg)   01/05/22 159 lb (72.1 kg)     Temp Readings from Last 3 Encounters:   02/01/22 95.7 °F (35.4 °C)   02/01/22 96.8 °F (36 °C) (Infrared)   11/14/21 98.6 °F (37 °C) (Oral)     BP Readings from Last 3 Encounters:   02/01/22 (!) 119/59   02/01/22 (!) 144/78   01/06/22 (!) 140/73     Pulse Readings from Last 3 Encounters:   02/01/22 90   01/06/22 106   12/13/21 108       [unfilled]  @[facility]@      Exam:  General: no acute distress  Breast: surgical sites are appropriately tender to palpation, incisions are C/D/I, no hematomas present, flaps viable, dressings/binder in place,  DENISE appropriate output ML appearing serosanguineous   Respiratory: respirations are non-labored and there is no audible distress  Cardiovascular: regular rate, extremities appear well perfused  Neurologic: alert, oriented    Assessment/Plan: 77 y.o. woman POD # 1 s/p bilateral total mastectomies with sentinel node biopsy for right breast cancer    Continue pain control  Diet as tolerated  Local wound care/surgical binder  ambulate  Pathology pending  Follow up at routine postop

## 2022-02-14 NOTE — PROGRESS NOTES
PCP:  Medical Oncology: Liban Henyr  Radiation:  Other:      [id number]    [id number] STAGE:  IIIA right breast cancer      Ms. Sheila Galaviz is a 77y.o.-year-old woman who is now s/p bilateral total mastectomy with sentinel lymph node biospy for right breast cancer. She underwent this procedure on 2/1/2022 and tolerated it well. She is doing quite well postoperatively and her pain is continuing to improve. INTERVAL HISTORY:  On 5/25/2021 she underwent bilateral breast imaging. The left breast is negative. Stable postoperative changes are noted. Within the right breast there is a high density mass with spiculated margins in the 9 to 10 o'clock position measuring 5 cm. It is associated with pleomorphic calcifications. Additionally there is a high density oval mass with lobulated contour which appears continuous and located in the 10 to 11 o'clock position, measuring 6 cm. The right nipple appears retracted. There are at least 6 abnormal appearing lymph nodes. Ultrasound correlate of the right breast 9:00 location identified a 5.2 x 2.7 x 4 cm mass. In the 11 o'clock position the lobulated mass measures 6.3 x 4.2 x 5 cm and appears continuous with the 9:00 mass. In the inferior aspect of the axillary tail there is a hypoechoic mass with indistinct margins and multiple surrounding abnormal lymph nodes. At least 9 abnormal lymph nodes are evident. BI-RADS 5.     On 6/3/2021 she underwent two site core needle biopsy of the right breast and axillary lymph node. [id number]      Pathology of the right breast 9 o'clock position identified high-grade invasive carcinoma with DCIS. Metaplastic carcinoma cannot be excluded. ER negative NE negative HER-2 positive.     Pathology of the right breast 11 o'clock position identified high-grade invasive carcinoma with DCIS. Metaplastic carcinoma cannot be excluded.   ER negative NE negative HER-2 positive.     Pathology of the right axillary lymph node identified metastatic carcinoma.     She was initiated neoadjuvant chemotherapy with carboplatin, Taxotere, Herceptin and Perjeta     On 2021 she underwent genetic testing which returned negative for pathogenic mutations. Accession #[id number].     On 2021 she completed neoadjuvant chemotherapy.     On 2022 she underwent interval right breast and axillary imaging. The known malignant mass in the right breast demonstrates continued improvement in decreasing size and bulk consistent with response to therapy. Residual asymmetry with calcifications measures 7 x 6 x 5 cm. The previously biopsied lymph node has normalized in appearance. There is an additional normal-appearing lymph node more superior to the biopsied node. BI-RADS 6. On 2022 she underwent bilateral mastectomy (left prophylactic) with right sentinel lymph node biopsy. Pathology identified 0.2 mm of residual invasive ductal carcinoma. ER negative TX negative HER-2 positive. Margins were negative. There were 0/3 lymph nodes involved with carcinoma. This included the previously biopsied lymph node. Her left breast was negative. Her right chest wall skin lesion was benign. [id number]       Pathology:    Department of Pathology   FINAL SURGICAL PATHOLOGY REPORT   Patient Name: Venus De La Cruz             Accession No:  [id number]    Age Sex:   1945    76 Y / F       Location:       NON   Account No:   [de-identified]                  Collected:     2022   Med Rec No:    [id number]                 Received:      2022   Attend Phys:   IAM HANNA             Completed:     2022   Perform Phys: Amanda HANNA             FINAL DIAGNOSIS:     A.  Right breast, mastectomy:   - Residual invasive ductal carcinoma.  See comment. - Margins negative. - See cancer protocol for additional information. Augusto Cough, right chest wall, excision:   - Seborrheic keratosis.      C. [place] lymph node, biopsy:   - One lymph node; negative for metastatic carcinoma (0/1). - Previous biopsy changes. D.  [place] lymph node, biopsy:   - One lymph node; negative for metastatic carcinoma (0/1). E. [place] lymph node, biopsy:   - One lymph node; negative for metastatic carcinoma (0/1). F. Left breast, mastectomy:   - No diagnostic abnormality. Protocol posting date: Nov. 2021   Procedure: Mastectomy   Specimen Laterality: Right   + Tumor Site: Not specified   Histologic Type     Invasive carcinoma of no special type (ductal)     Histologic Grade: Muna Histologic Score     Glandular (Acinar)/Tubular Differentiation; Score 3     Nuclear Pleomorphism: Score 3     Mitotic Rate: Score 1   Overall stGstrstastdstest:st st1st Tumor Size      0.2mm.  See comment. Ductal carcinoma in situ       Not identified   +Lobular carcinoma in situ (LCIS)       Not identified     Tumor extent:   Tumor Extent (required only if skin, nipple, or skeletal muscle are   present and involved) (select all that apply)        Not applicable (skin and nipple are uninvolved, and skeletal muscle   is absent)     + Lymph-Vascular Invasion:   Not identified   + Dermal Lymph-Vascular Invasion:  Not identified   +Microcalcifications (Note J): Not identified     Treatment Effect in the Breast (Note K)          Probable or definite response to presurgical therapy in the   invasive carcinoma     Treatment Effect in the Lymph Nodes          No lymph node metastases.  Fibrous scarring or histiocytic   aggregates, possibly related to prior lymph node metastases with   pathologic complete response     Margins   Margin status of invasive carcinoma     All margins negative for invasive carcinoma     Distance from closest margin: Greater than 1.5 mm, see comment   +Specify closest margin: Posterior     Regional Lymph Nodes       All regional lymph nodes negative for tumor         Total number of lymph nodes examined: 3         Number of sentinel nodes examined: 3 Pathologic Stage Classification (pTNM, AJCC 8th Edition)     TNM descriptors      y (posttreatment)     Primary Tumor (Invasive Carcinoma) (pT)      pT1mi:  Tumor <=1 mm in greatest dimension     Regional Lymph Nodes modifier (required only if applicable):      sn: only sentinel nodes evaluated (fewer than 6 nodes)     Regional Lymph Nodes      pN0   + Additional Pathologic Findings: Biopsy cavity with fibrosis. +Ancillary studies performed on previous biopsy ([id number]): ER and ND   negative; HER-2/cristiano positive     COMMENT:   There are a few small foci of residual invasive carcinoma   around the biopsy cavity. The longest contiguous span is 0.2 mm. One   small focus in close to posterior margin (block A9), though no ink is   present on the edge perpendicular to the tumor. Black ink is present in   the vicinity of the aforementioned edge. This focus is absent in the   deeper cuts.  It is at least a 1.5 mm from the posterior margin.       JAMIEJA/DONNY           Exam:  General: no acute distress  Breast: There is a well healing scar on the bilateral chest wall. There is no active signs of infection. There is no ecchymosis or hematomas. Her drains are in place. With the left putting out about 10 to 15 mL daily. She has good range of motion with her arm. Respiratory: respirations are non-labored and there is no audible distress  Cardiovascular: regular rate, extremities appear well perfused  Neurologic: alert, oriented      Assessment/Plan:  [id number]    [id number] STAGE:  IIIA right breast cancer  ER - ND- HER2 positive. S/p neoadjuvant chemotherapy  S/p bilateral mastectomy with SLNB    Her pathology results were reviewed with her in detail today. She was provided a copy of her pathology report for her records. We will remove her left sided drain today. She will notify us when the drainage decreases more notably on the right for removal.    She will continue to follow-up with medical oncology.   She is scheduled for an echocardiogram with plans to continue Herceptin to follow this. We discussed indications for radiation. We will refer her to radiation oncology for consideration given the original disease at diagnosis. At this time she can resume normal activity and wearing garments of her choice. She should be fully healed in another 6 weeks at which time she can begin massage with lotion to soften scar tissue. I encouraged her to continue self breast evaluation. Follow up surveillance was discussed. Our plan at this time is to follow up with surgical breast oncology office in 3 months. All of the patient's questions were answered at this time, but she was encouraged to call the office with any further inquiries.

## 2022-02-17 ENCOUNTER — OFFICE VISIT (OUTPATIENT)
Dept: SURGERY | Age: 77
End: 2022-02-17

## 2022-02-17 VITALS
WEIGHT: 152 LBS | HEIGHT: 66 IN | OXYGEN SATURATION: 97 % | RESPIRATION RATE: 18 BRPM | HEART RATE: 97 BPM | SYSTOLIC BLOOD PRESSURE: 150 MMHG | BODY MASS INDEX: 24.43 KG/M2 | DIASTOLIC BLOOD PRESSURE: 84 MMHG

## 2022-02-17 DIAGNOSIS — Z09 POSTOP CHECK: Primary | ICD-10-CM

## 2022-02-17 DIAGNOSIS — C50.911 PRIMARY BREAST MALIGNANCY, RIGHT (HCC): ICD-10-CM

## 2022-02-17 PROCEDURE — 99024 POSTOP FOLLOW-UP VISIT: CPT | Performed by: SURGERY

## 2022-02-21 ENCOUNTER — TELEPHONE (OUTPATIENT)
Dept: SURGERY | Age: 77
End: 2022-02-21

## 2022-02-21 NOTE — TELEPHONE ENCOUNTER
Patient states Right Breast Drain is putting out   15 ml or less, since Thursday 02/17/22. Elizabeth was unavailable/ with patients.     Please call patient to set up appointment -Drain removal  [phone] (N)

## 2022-02-22 ENCOUNTER — NURSE ONLY (OUTPATIENT)
Dept: SURGERY | Age: 77
End: 2022-02-22

## 2022-02-22 VITALS
WEIGHT: 152 LBS | HEIGHT: 66 IN | DIASTOLIC BLOOD PRESSURE: 90 MMHG | SYSTOLIC BLOOD PRESSURE: 147 MMHG | HEART RATE: 93 BPM | BODY MASS INDEX: 24.43 KG/M2 | RESPIRATION RATE: 18 BRPM | OXYGEN SATURATION: 98 %

## 2022-02-22 DIAGNOSIS — Z51.89 VISIT FOR WOUND CHECK: Primary | ICD-10-CM

## 2022-02-22 DIAGNOSIS — C50.911 PRIMARY BREAST MALIGNANCY, RIGHT (HCC): Primary | ICD-10-CM

## 2022-02-22 DIAGNOSIS — C50.911 INVASIVE DUCTAL CARCINOMA OF RIGHT BREAST, STAGE 3 (HCC): ICD-10-CM

## 2022-02-23 ENCOUNTER — HOSPITAL ENCOUNTER (OUTPATIENT)
Dept: NON INVASIVE DIAGNOSTICS | Age: 77
Discharge: HOME | End: 2022-02-23
Payer: MEDICARE

## 2022-02-23 DIAGNOSIS — Z51.11 ENCOUNTER FOR ANTINEOPLASTIC CHEMOTHERAPY: ICD-10-CM

## 2022-02-23 LAB
LV EF: 58 %
LVEF MODALITY: NORMAL

## 2022-02-23 PROCEDURE — 93306 TTE W/DOPPLER COMPLETE: CPT

## 2022-02-23 PROCEDURE — 93356 MYOCRD STRAIN IMG SPCKL TRCK: CPT

## 2022-02-23 NOTE — PROGRESS NOTES
Tyler Vegas (:  1945) is a 77 y.o. female,Established patient, here for evaluation of the following chief complaint(s): Wound Check      Patient in office for nurse visit to have drain removed from right breast. 15 ML output for the last 4 days. Drain removed with out complications. Dry dressing applied, encouraged to change daily. Wound should heal in approximately one week. If any concerns encouraged to call our office. An electronic signature was used to authenticate this note.     --Vivian De Luna LPN

## 2022-03-07 ENCOUNTER — HOSPITAL ENCOUNTER (OUTPATIENT)
Dept: PHYSICAL THERAPY | Age: 77
Setting detail: THERAPIES SERIES
Discharge: HOME | End: 2022-03-07
Payer: MEDICARE

## 2022-03-07 PROCEDURE — 97161 PT EVAL LOW COMPLEX 20 MIN: CPT

## 2022-03-07 PROCEDURE — 97530 THERAPEUTIC ACTIVITIES: CPT

## 2022-03-07 NOTE — FLOWSHEET NOTE
[facility]  Phone: [phone]   Fax: [phone]    Physical Therapy Daily ONCOLOGY Treatment Note    Date:  3/7/2022    Patient Name:  Jennifer Pritchett    :  1945  MRN: [id number]  Restrictions/Precautions:    Medical/Treatment Diagnosis Information:  Diagnosis: C50.911 (ICD-10-CM) - Primary breast malignancy, right ([address] C50.911 (ICD-10-CM) - Invasive ductal carcinoma of right breast, stage 3 (HCC)  Treatment Diagnosis: decreased R shoulder strength and ROM, fatigue with decreased endurance  Insurance/Certification information:  PT Insurance Information: Medicare and [address], no CP, no auth  Physician Information:  Referring Practitioner: Aniceto Leger MD  Plan of care signed (Y/N): []  Yes [x]  No     Date of Patient follow up with Physician: radiologist 3/11    Functional Outcomes:   Fatigue: 3-4/10    3/7/22  Quick Dash: COMPLETE AT NV     Progress Report: []  Yes  [x]  No     Date Range for reporting period:  Beginning 3/7/22  Ending    Progress report due (10 Rx/or 30 days whichever is less): visit #10 or 88 (date)     Recertification due (POC duration/ or 90 days whichever is less): visit #12 or 22 (date)     Visit # Insurance Allowable Auth required? Date Range    Med nec []  Yes  [x]  No         Latex Allergy:  [x]NO      []YES  Preferred Language for Healthcare:   [x]English       []other:      Pain level:  2-3/10     SUBJECTIVE:  See eval    OBJECTIVE: see eval   COMPLETE AT FUTURE VISIT - 6 min walk, 5x STS, TUG    RESTRICTIONS/PRECAUTIONS:     Exercises/Interventions:     Therapeutic Exercises (78803) Resistance / level Sets/sec Reps Notes   Arm bike?        Pulleys          Supine AAROM with ranger       SB roll outs        TB   - mid row  - high row  - LPD        Pec doorway stretch       Therapeutic Activities (72029)  (Dynamic activities such as compression, designed to improve functional performance)       Educated on s/s of infection and on risk factors for developing lymphedema (no IV, BP, or shots in RUE). Discussed POC for therapy. Provide handout for lymphedema prevention NV     Reviewed HEP from MD - table slides, supine AAROM shoulder flexion with cane, butterfly stretch, pec stretch (demo'd at doorway vs corner), scap squeeze  X 13 min                                                                               Home Management  (providing pt education on safety procedures/instructions)                                                        Neuromuscular Re-ed (90878)                                                        Manual Intervention (68400)       Manual stretching R shoulder **                                              Modalities:     OTHER:     Pt education:   3/7 Pt was educated on diagnosis; prognosis; PT POC including pathology and anatomy of etiology of lymphedema, condition precautions; lymphedema management/prevention of flare ups, role of exercise, HEP, expectations for rehab. All pt questions were answered. HEP instruction:  3/7  Reviewed HEP from MD. Questions answered about exercise and to complete in pain free range but still feeling a stretch. (see above for exercise)    Therapeutic Exercise and NMR EXR  [] (76740) Provided verbal/tactile cueing for activities related to strengthening, flexibility, endurance, ROM for improvements in  [] LE / Lumbar: LE, proximal hip, and core control with self care, mobility, lifting, ambulation.   [] UE / Cervical: cervical, postural, scapular, scapulothoracic and UE control with self care, reaching, carrying, lifting, house/yardwork, driving, computer work.  [] (11671) Provided verbal/tactile cueing for activities related to improving balance, coordination, kinesthetic sense, posture, motor skill, proprioception to assist with   [] LE / lumbar: LE, proximal hip, and core control in self care, mobility, lifting, ambulation and eccentric single leg control. [] UE / cervical: cervical, scapular, scapulothoracic and UE control with self care, reaching, carrying, lifting, house/yardwork, driving, computer work.   [] (28749) Therapist is in constant attendance of 2 or more patients providing skilled therapy interventions, but not providing any significant amount of measurable one-on-one time to either patient, for improvements in  [] LE / lumbar: LE, proximal hip, and core control in self care, mobility, lifting, ambulation and eccentric single leg control. [] UE / cervical: cervical, scapular, scapulothoracic and UE control with self care, reaching, carrying, lifting, house/yardwork, driving, computer work. NMR and Therapeutic Activities:    [x] (47759 or 85252) Provided verbal/tactile cueing for activities related to improving balance, coordination, kinesthetic sense, posture, motor skill, proprioception and motor activation to allow for proper function of   [] LE: / Lumbar core, proximal hip and LE with self care and ADLs  [x] UE / Cervical: cervical, postural, scapular, scapulothoracic and UE control with self care, carrying, lifting, driving, computer work.   [] (25984) Gait Re-education- Provided training and instruction to the patient for proper LE, core and proximal hip recruitment and positioning and eccentric body weight control with ambulation re-education including up and down stairs     Home Management Training / Self Care:  [] (19054) Provided self-care/home management training related to activities of daily living and compensatory training, and/or use of adaptive equipment for improvement with: ADLs and compensatory training, meal preparation, safety procedures and instruction in use of adaptive equipment, including bathing, grooming, dressing, personal hygiene, basic household cleaning and chores.      Home Exercise Program:    [x] (36865) Reviewed/Progressed HEP activities related to strengthening, flexibility, endurance, ROM of   [] LE / Lumbar: core, proximal hip and LE for functional self-care, mobility, lifting and ambulation/stair navigation   [] UE / Cervical: cervical, postural, scapular, scapulothoracic and UE control with self care, reaching, carrying, lifting, house/yardwork, driving, computer work  [] (40552)Reviewed/Progressed HEP activities related to improving balance, coordination, kinesthetic sense, posture, motor skill, proprioception of   [] LE: core, proximal hip and LE for self care, mobility, lifting, and ambulation/stair navigation    [] UE / Cervical: cervical, postural,  scapular, scapulothoracic and UE control with self care, reaching, carrying, lifting, house/yardwork, driving, computer work    Manual Treatments:  PROM / STM / Oscillations-Mobs:  G-I, II, III, IV (PA's, Inf., Post.)  [] (83834) Provided manual therapy to mobilize LE, proximal hip and/or LS spine soft tissue/joints for the purpose of modulating pain, promoting relaxation,  increasing ROM, reducing/eliminating soft tissue swelling/inflammation/restriction, improving soft tissue extensibility and allowing for proper ROM for normal function with   [] LE / lumbar: self care, mobility, lifting and ambulation. [] UE / Cervical: self care, reaching, carrying, lifting, house/yardwork, driving, computer work. Modalities:  [] (26141) Vasopneumatic compression: Utilized vasopneumatic compression to decrease edema / swelling for the purpose of improving mobility and quad tone / recruitment which will allow for increased overall function including but not limited to self-care, transfers, ambulation, and ascending / descending stairs.        Charges:  Timed Code Treatment Minutes: 13   Total Treatment Minutes: 43     [x] EVAL - LOW (31559)   [] EVAL - MOD (67634)  [] EVAL - HIGH (70170)  [] RE-EVAL (87507)  [] SI(82572) x       [] Ionto  [] NMR (20991) x       [] Vaso  [] Manual (01248) x       [] Ultrasound  [x] TA x  1      [] [facility] Traction (47914)  [] Aquatic Therapy x     [] ES (un) (97270):   [] Home Management Training x  [] ES(attended) (80880)   [] Dry Needling 1-2 muscles (48382):  [] Dry Needling 3+ muscles (645571  [] Group:      [] Other:     GOALS:   Patient stated goal: stretching chest mm and shoulder   []? Progressing: []? Met: []? Not Met: []? Adjusted     Therapist goals for Patient:   Short Term Goals: To be achieved in: 2 weeks  1. Independent in HEP and progression per patient tolerance, in order to prevent re-injury. []? Progressing: []? Met: []? Not Met: []? Adjusted  2. Patient will have a decrease in pain to facilitate improvement in movement, function, and ADLs as indicated by improvement with respect to Functional Deficits. []? Progressing: []? Met: []? Not Met: []? Adjusted     Long Term Goals: To be achieved in: 6    weeks  1. Disability index score of    % or less on the QuickDASH  to assist with reaching prior level of function. COMPLETE AFTER PATIENT COMPLETES [place] Bal  []? Progressing: []? Met: []? Not Met: []? Adjusted  2. Patient will demonstrate increased R shoulder AROM to 155 deg flexion and abd, to allow for proper joint functioning to allow pt to resume home management without increase in symptoms. []? Progressing: []? Met: []? Not Met: []? Adjusted  3. Patient will demonstrate increased R shoulder strength to 4+/5 in flexion and abd, to allow for proper joint functioning to allow pt to resume home management without increase in symptoms. []? Progressing: []? Met: []? Not Met: []? Adjusted  4. Pt will report ability to sleep 7/7 nights without waking up due to her RUE. []? Progressing: []? Met: []? Not Met: []? Adjusted    Overall Progression Towards Functional goals/ Treatment Progress Update:  [] Patient is progressing as expected towards functional goals listed. [] Progression is slowed due to complexities/Impairments listed. [] Progression has been slowed due to co-morbidities.   [x] Plan just implemented, too soon to assess goals progression <30days   [] Goals require adjustment due to lack of progress  [] Patient is not progressing as expected and requires additional follow up with physician  [] Other    Persisting Functional Limitations/Impairments:  [x]Sleeping []Sitting               []Standing []Transfers        []Walking []Kneeling               []Stairs []Squatting / bending   [x]ADLs [x]Reaching  []Lifting  [x]Housework  []Driving []Job related tasks  []Sports/Recreation []Other:        ASSESSMENT:  See eval  Treatment/Activity Tolerance:  [x] Patient able to complete tx [] Patient limited by fatigue  [] Patient limited by pain  [] Patient limited by other medical complications  [] Other:     Prognosis: [x] Good [] Fair  [] Poor    Patient Requires Follow-up: [x] Yes  [] No    Plan for next treatment session: See flowsheet    PLAN: See eval. PT 2x / week for 6 weeks. [] Continue per plan of care [] Alter current plan (see comments)  [x] Plan of care initiated [] Hold pending MD visit [] Discharge    Electronically signed by: Afshan Goodwin, PT, DPT    Note: If patient does not return for scheduled/ recommended follow up visits, this note will serve as a discharge from care along with most recent update on progress.

## 2022-03-07 NOTE — PLAN OF CARE
[address] [address], [facility] [address]  Phone: [phone]   Fax: [phone]                                                       Physical Therapy Certification    Dear Referring Practitioner: Breanne Stevenson MD,    We had the pleasure of evaluating the following patient for physical therapy services at [address] [place] [address]. A summary of our findings can be found in the initial assessment below. This includes our plan of care. If you have any questions or concerns regarding these findings, please do not hesitate to contact me at the office phone number checked above. Thank you for the referral.       Physician Signature:_______________________________Date:__________________  By signing above (or electronic signature), therapists plan is approved by physician      Patient: Lavonne Garcia   : 1945   MRN: [id number]  Referring Physician: Referring Practitioner: Breanne Stevenson MD      Evaluation Date: 3/7/2022      Medical Diagnosis Information:  Diagnosis: C50.911 (ICD-10-CM) - Primary breast malignancy, right ([address] C50.911 (ICD-10-CM) - Invasive ductal carcinoma of right breast, stage 3 ([address]   Treatment Diagnosis: decreased R shoulder strength and ROM, fatigue with decreased endurance                                         Insurance information: PT Insurance Information: Medicare and [facility] [employer] - med nec, no CP, no auth    Preferred Language for Healthcare:   [x]English       []Other:    C-SSRS Triggered by Intake questionnaire (Past 2 wk assessment ):   [x] No, Questionnaire did not trigger screening.   [] Yes, Patient intake triggered C-SSRS Screening     [] Completed, no further action required. [] Completed, PCP notified via Epic    SUBJECTIVE: Diagnosed with breast cancer May 2021. Started chemo in July but it was strong and she was dehydrated.  Currently on magnesium for her heart and states no heart damage. Finished chemo December. 2/1 - Pt had B mastectomy and 1 lymph node removed R axilla. Meets with radiologist Friday to determine need for radiation. Plans to get reconstruction. Doesn't have spacers currently. Lives with daughter. Current Level of Function: Difficulty raising RUE OH and putting cups away. Independent with ADLs. Occupation/School: retired      Functional Outcomes:   Fatigue: 3-4/10  Quick Dash: COMPLETE AT [place]     PAIN:  Pain Scale: 2-3/10 - doesn't rate but states it is more discomfort than pain   Easing factors: tylenol   Provocative factors: raising OH       Precautions/ Contra-indications: breast cancer diagnosed May 2022 - chemotherapy July- December, B mastectomy 1 lymph node removed R axilla, not decided on radiation. EDS, DM2, HTN (off medication since she was dehydrated. 124/80 mmHg at Dr. Lorena addison)  Latex Allergy:  [x]NO      []YES  Relevant Medical History:  [x] Patient history, allergies, meds reviewed. Medical chart reviewed. See intake form. Review Of Systems (ROS):  [x]Performed Review of systems (Integumentary, CardioPulmonary, Neurological) by intake and observation. Intake form has been scanned into medical record. Patient has been instructed to contact their primary care physician regarding ROS issues if not already being addressed at this time.       Co-morbidities/Complexities (which will affect course of rehabilitation):   []None        []Hx of COVID   Arthritic conditions   []Rheumatoid arthritis (M05.9)  [x]Osteoarthritis (M19.91)  []Gout   Cardiovascular conditions   [x]Hypertension (I10)  []Hyperlipidemia (E78.5)  []Angina pectoris (I20)  []Atherosclerosis (I70)  []Pacemaker  []Hx of CABG/stent/  cardiac surgeries   Musculoskeletal conditions   []Disc pathology   []Congenital spine pathologies   []Osteoporosis (M81.8)  []Osteopenia (M85.8)  []Scoliosis       Endocrine conditions []Hypothyroid (E03.9)  []Hyperthyroid Gastrointestinal conditions   []Constipation (K59.00)  [] Diarrhea   Metabolic conditions   []Morbid obesity (E66.01)  [x]Diabetes type 1(E10.65) or 2 (E11.65)   []Neuropathy (G60.9)     Cardio/Pulmonary conditions   []Asthma (J45)  []Coughing   []COPD (J44.9)  []CHF  []A-fib   Psychological Disorders  []Anxiety (F41.9)  []Depression (F32.9)   []Other:   Developmental Disorders  []Autism (F84.0)  []CP (G80)  []Down Syndrome (Q90.9)  []Developmental delay     Neurological conditions  []Prior Stroke (I69.30)  []Parkinson's (G20)  []Encephalopathy (G93.40)  []MS (G35)  []Post-polio (G14)  []SCI  []TBI  []ALS Other conditions  []Fibromyalgia (M79.7)  []Vertigo  []Syncope  []Kidney Failure  [x]Cancer      [x]currently undergoing                treatment  []Pregnancy  []Incontinence   Prior surgeries  []involved limb  []previous spinal surgery  [] section birth  []hysterectomy  []bowel / bladder surgery  [x]other relevant surgeries - hysterectomy, B mastectomy    []Other:                OBJECTIVE:     Observation:  Amb with decreased gait speed and B knee valgus     Upper Extremity ROM & Strength AROM  Right AROM  Left Strength Right Strength Left Comments   Shoulder flex 100 148 3+ 4+    Shoulder AB 80 140 3+ 4+              Assessment of Lymphedema:    Observe:    Pitting   [x] none [] slightly [] moderate [] severe [] brawny (does not indent)   Color    [x] normal [] dusky [] mottled [] red streaks [] other:  Skin Texture   [] rough  [] dry   [] moist  [x] normal  [] hyperkaratosis [] hyperplasia  [] hyperpigmentation [] Elephantiasis  [] papillomas  [] Skin breakdown with lymphorrhea (weeping)  Skin Temperature   [x] normal [] cool  [] uneven [] warm [] hot  Edema Rebound  [x] quick [] slow [] fibrotic tissue  *pressure applied x10 seconds    Signs of Constriction:    Condition of Nailbeds:  Normal   [] discolored [] red  [] white [] swollen  Skin Breakdown (indicate size, location and number) [] Yes [x] No  Comments:  Fistulas (an abnormal passageway) [] Yes  [x] No  Comments:  Tinea (fungus) [] Yes [x] No  Comments:  Papilloma (benign tumor arising from an epithelial layer)  [] Yes [x] No  Comments:   Fibrotic areas [] Yes [x] No  Comments:   Lymphorrhea (flow of lymph from a cut or ruptured lymph vessels): [] Yes [x] No  Comments:  Warts (a local growth of the outer layer of skin) [] Yes [x] No  Comments:  Ulcers  [] Yes [] No  Comments:    SCARS: B breast from mastectomy - no s/s of infection (pt has not looked at incision but states daughter checks it)      STEMMER SIGN: [] positive [x] negative    Stage of Lymphedema   [x] Latency stage/Lymphangiopathy (Stage 0 / Prestage / Subclinical stage):   · No swelling  · Reduced transport capacity (TC)  · \"Normal\" tissue consistency  [] Stage 1 (reversible stage):  · Edema is soft (pitting)  · No secondary tissue changes  · Elevation reduces swelling  [] Stage 2 (spontaneously irreversible stage)  · Lyphostatic fibrosis  · Hardening of the tissue (no pitting)  · Stemmer sign positive   · Frequent infections   [] Stage 3 (lymphostatic elephantiasis)  · Extreme increase in volume and tissue texture with typical skin changes (papillomas, deep skinfolds, etc.)  · Stemmer sign positive    GIRTH MEASUREMENT  (Tape on skin along anterior arm)    Upper Extremity Right (cm) Left  (cm)   Date 3/7 3/7   Thumb Proximal Phalanx 6.3 5.7   Distal Palmar Crease 18.0 17.6    20 Cm above distal end 3rd digit 15.0 14.5   30 Cm above distal end 3rd digit 20.1 19.5   40 Cm above distal end 3rd digit 23.2 23.4   50 Cm above distal end 3rd digit 30.3 31.2   60 Cm above distal end 3rd digit 30.8 31.6        Total Girth 143.7 cm  143.5 cm        Classification for Lymphedema  [x] Mild: < 3 cm differential between affected limb and unaffected limb  [] Moderate:  3 - 5 cm differential between affected limb and unaffected limb   [] Severe:  5+ cm differential between affected limb and unaffected limb        Barriers to/and or personal factors that will affect rehab potential:              [x]Age  []Sex    []Smoker              [x]Motivation                      [x]Co-Morbidities              []Cognitive Function, education/learning barriers              []Environmental, home barriers              []profession/work barriers  [x]past PT/medical experience  []other:    Falls Risk Assessment (30 days):   [x] Falls Risk assessed and no intervention required. [] Falls Risk assessed and Patient requires intervention due to being higher risk   TUG score (>12s at risk):     [] Falls education provided, including         ASSESSMENT: Pt presents s/p B mastectomy with 1 lymph node removed from R axilla. Pt received chemotherapy treatment July 2021-December 2021 and has appointment coming up to determine need for radiation. Pt currently has no edema but will benefit from education on lymphedema prevention as she is at an increased risk for developing lymphedema due to the lymph node removal. Pt with R shoulder decreased ROM and strength limiting her ability to complete home management. Pt will benefit from OP PT to decrease pain and increase strength and ROM to return to PLOF without pain or limitations. Pt education and HEP instruction:  The patient was educated regarding the benefits of early physical therapy post-intervention to regain normal ROM, strength, functional mobility. The patient was also educated regarding goals and expectations of physical therapy to restore normal function. Reviewed HEP from MD. Questions answered about exercise and to complete in pain free range but still feeling a stretch. Educated on s/s of infection with incisions and to call MD with concerns.      Functional Impairments:   [x] Impaired gait  [] Impaired functional mobility   [x] Decreased functional strength of R shoulder    [] Reduced balance/proprioceptive control    [x] Reduced functional ROM of R shoulder    [] Swelling of extremity   [x] Myofascial changes and pain at chest    [] Postural impairments:   [] Other:      Functional Activity Limitations (from functional questionnaire and intake)  [] Reduced ability to use affected limb for ADLs/IADLs due to swelling causing symptoms of heaviness, skin tightness, pain  [x] Reduced ability to perform lifting, reaching, carrying tasks  [] Reduced ability to wear his/her normal clothes/shoes due to swelling    [] Reduced ability to tolerate prolonged functional positions  [] Reduced ability or difficulty with changes of positions or transfers between positions  [] Reduced ability to maintain good posture and demonstrate good body mechanics with sitting, bending, and lifting   [x] Reduced ability to sleep   [] Reduced ability or tolerance with driving and/or computer work   [] Reduced ability to squat   [] Reduced ability to forward bend   [] Reduced ability to ambulate prolonged functional periods/distances/surfaces   [] Reduced ability to ascend/descend stairs    [] Reduced ability to tolerate any impact through UE or spine   [] Other:     Participation Restrictions   [] Reduced participation in self care activities   [x] Reduced participation in home management activities   [] Reduced participation in work activities   [] Reduced participation in social activities. [] Reduced participation in sport/recreational activities.     Prognosis/Rehab Potential:      [] Excellent   [x] Good    [] Fair   [] Poor    Tolerance of evaluation/treatment:    [] Excellent   [x] Good    [] Fair   [] Poor     Physical Therapy Evaluation Complexity Justification  [x] A history of present problem with:  [] no personal factors and/or comorbidities that impact the plan of care;  [] 1-2 personal factors and/or comorbidities that impact the plan of care  [x] 3 personal factors and/or comorbidities that impact the plan of care  [x] An examination of body systems using standardized tests and measures addressing any of the following: body structures and functions (impairments), activity limitations, and/or participation restrictions;  [x] a total of 1-2 or more elements   [] a total of 3 or more elements   [] a total of 4 or more elements   [x] A clinical presentation with:  [x] stable and/or uncomplicated characteristics   [] evolving clinical presentation with changing characteristics  [] unstable and unpredictable characteristics;   [x] Clinical decision making of [x] low, [] moderate, [] high complexity using standardized patient assessment instrument and/or measurable assessment of functional outcome. [x] EVAL (LOW) 00609 (typically 15 minutes face-to-face)  [] EVAL (MOD) 09626 (typically 30 minutes face-to-face)  [] EVAL (HIGH) 54279 (typically 45 minutes face-to-face)  [] RE-EVAL     PLAN:    [x] PT intervention to include ROM/strength/balance exercises, gait, NR re-ed, energy conservation, postural/body mechs ed, manual therapy, compression prn, lymph pump/heat/cold/ktape prn, HEP, education on cancer/lymphedema management, to restore PLOF. Frequency/Duration:  2 days per week for 6 Weeks:  Interventions:  [x]  Neuro re-ed to restore balance, posture, muscle balance  [x]  Manual therapy as indicated for R UE to include: manual lymph drainage, STM, ROM as appropriate. [x]  Modalities as needed that may include: lymphedema pump, thermal agents, kinesiotape as indicated  [x]  Patient education on symptom management, activity modification, progression of HEP. [x]  Therapeutic exercise including: strength/ROM/flexibility  [x]  NMR activation and proprioception  including postural re-education  [x]  AQUATIC EXERCISE  [x]  GAIT TRAINING    GOALS:  Patient stated goal: stretching chest mm and shoulder   [] Progressing: [] Met: [] Not Met: [] Adjusted    Therapist goals for Patient:   Short Term Goals: To be achieved in: 2 weeks  1.  Independent in HEP and progression per patient tolerance, in order to prevent re-injury. [] Progressing: [] Met: [] Not Met: [] Adjusted  2. Patient will have a decrease in pain to facilitate improvement in movement, function, and ADLs as indicated by improvement with respect to Functional Deficits. [] Progressing: [] Met: [] Not Met: [] Adjusted    Long Term Goals: To be achieved in: 6    weeks  1. Disability index score of    % or less on the QuickDASH  to assist with reaching prior level of function. COMPLETE AFTER PATIENT COMPLETES [employer]  [] Progressing: [] Met: [] Not Met: [] Adjusted  2. Patient will demonstrate increased R shoulder AROM to 155 deg flexion and abd, to allow for proper joint functioning to allow pt to resume home management without increase in symptoms. [] Progressing: [] Met: [] Not Met: [] Adjusted  3. Patient will demonstrate increased R shoulder strength to 4+/5 in flexion and abd, to allow for proper joint functioning to allow pt to resume home management without increase in symptoms. [] Progressing: [] Met: [] Not Met: [] Adjusted  4. Pt will report ability to sleep 7/7 nights without waking up due to her RUE.    [] Progressing: [] Met: [] Not Met: [] Adjusted    Electronically signed by:  Mirta Lyman, PT, DPT

## 2022-03-10 ENCOUNTER — HOSPITAL ENCOUNTER (OUTPATIENT)
Dept: PHYSICAL THERAPY | Age: 77
Setting detail: THERAPIES SERIES
Discharge: HOME | End: 2022-03-10
Payer: MEDICARE

## 2022-03-10 PROCEDURE — 97110 THERAPEUTIC EXERCISES: CPT

## 2022-03-10 PROCEDURE — 97140 MANUAL THERAPY 1/> REGIONS: CPT

## 2022-03-10 NOTE — FLOWSHEET NOTE
[address] [facility] Physical Therapy  Phone: [phone]   Fax: [phone]    Physical Therapy Daily ONCOLOGY Treatment Note    Date:  3/10/2022    Patient Name:  Gabo Fuchs    :  1945  MRN: [id number]  Restrictions/Precautions:    Medical/Treatment Diagnosis Information:  Diagnosis: C50.911 (ICD-10-CM) - Primary breast malignancy, right ([facility] [address] C50.911 (ICD-10-CM) - Invasive ductal carcinoma of right breast, stage 3 ([facility] [address]  Treatment Diagnosis: decreased R shoulder strength and ROM, fatigue with decreased endurance  Insurance/Certification information:  PT Insurance Information: Medicare and [id number], no CP, no auth  Physician Information:  Referring Practitioner: Alonzo Pace MD  Plan of care signed (Y/N): []  Yes [x]  No     Date of Patient follow up with Physician: radiologist 3/11    Functional Outcomes:   Fatigue: 3-4/10    3/7/22  Quick Dash:  21 20-39%   3/10/22    Progress Report: []  Yes  [x]  No     Date Range for reporting period:  Beginning 3/7/22  Ending    Progress report due (10 Rx/or 30 days whichever is less): visit #10 or 50 (date)     Recertification due (POC duration/ or 90 days whichever is less): visit #12 or 22 (date)     Visit # Insurance Allowable Auth required? Date Range    Med nec []  Yes  [x]  No         Latex Allergy:  [x]NO      []YES  Preferred Language for Healthcare:   [x]English       []other:      Pain level:  0/10     SUBJECTIVE:  Reports no pain this day. She reports doing the ex that the MD gave her at home. One of them is hard.  R arm pit     OBJECTIVE: Pt has good PROM but pulls in UT immediately in all motions, attempted stretching but sh jt with good mobility but poor mm control   COMPLETE AT FUTURE VISIT - 6 min walk, 5x STS, TUG    RESTRICTIONS/PRECAUTIONS:     EDS- very little stretching    Exercises/Interventions:     Therapeutic Exercises (24152) Resistance / level Sets/sec Reps Notes Arm bike   X 2 each way    Pulleys flex /scap     X 10    Supine AAROM with ranger       SB roll outs        TB   - mid row  - high row  - LPD  orange    X 10  X 10  X 10    Pec doorway stretch  2 X 30'    Therapeutic Activities (11501)  (Dynamic activities such as compression, designed to improve functional performance)       Educated on s/s of infection and on risk factors for developing lymphedema (no IV, BP, or shots in RUE). Discussed POC for therapy. Provide handout for lymphedema prevention NV     Reviewed HEP from MD - table slides, supine AAROM shoulder flexion with cane, butterfly stretch, pec stretch (demo'd at doorway vs corner), scap squeeze                                                                                 Home Management  (providing pt education on safety procedures/instructions)                                                        Neuromuscular Re-ed (26247)                                                        Manual Intervention (23988)       Manual stretching R shoulder    X 15 mins Has full passive ROM in jt. Difficulty relaxing UT to move shoudler                                          Modalities:     OTHER:     Pt education:   3/7 Pt was educated on diagnosis; prognosis; PT POC including pathology and anatomy of etiology of lymphedema, condition precautions; lymphedema management/prevention of flare ups, role of exercise, HEP, expectations for rehab. All pt questions were answered. HEP instruction:  3/7  Reviewed HEP from MD. Questions answered about exercise and to complete in pain free range but still feeling a stretch. (see above for exercise)    Therapeutic Exercise and NMR EXR  [x] (59549) Provided verbal/tactile cueing for activities related to strengthening, flexibility, endurance, ROM for improvements in  [] LE / Lumbar: LE, proximal hip, and core control with self care, mobility, lifting, ambulation.   [x] UE / Cervical: cervical, postural, scapular, scapulothoracic and UE control with self care, reaching, carrying, lifting, house/yardwork, driving, computer work.  [] (28497) Provided verbal/tactile cueing for activities related to improving balance, coordination, kinesthetic sense, posture, motor skill, proprioception to assist with   [] LE / lumbar: LE, proximal hip, and core control in self care, mobility, lifting, ambulation and eccentric single leg control. [] UE / cervical: cervical, scapular, scapulothoracic and UE control with self care, reaching, carrying, lifting, house/yardwork, driving, computer work.   [] (59084) Therapist is in constant attendance of 2 or more patients providing skilled therapy interventions, but not providing any significant amount of measurable one-on-one time to either patient, for improvements in  [] LE / lumbar: LE, proximal hip, and core control in self care, mobility, lifting, ambulation and eccentric single leg control. [] UE / cervical: cervical, scapular, scapulothoracic and UE control with self care, reaching, carrying, lifting, house/yardwork, driving, computer work.      NMR and Therapeutic Activities:    [x] (93324 or 91359) Provided verbal/tactile cueing for activities related to improving balance, coordination, kinesthetic sense, posture, motor skill, proprioception and motor activation to allow for proper function of   [] LE: / Lumbar core, proximal hip and LE with self care and ADLs  [x] UE / Cervical: cervical, postural, scapular, scapulothoracic and UE control with self care, carrying, lifting, driving, computer work.   [] (34353) Gait Re-education- Provided training and instruction to the patient for proper LE, core and proximal hip recruitment and positioning and eccentric body weight control with ambulation re-education including up and down stairs     Home Management Training / Self Care:  [] (97063) Provided self-care/home management training related to activities of daily living and compensatory training, and/or use of adaptive equipment for improvement with: ADLs and compensatory training, meal preparation, safety procedures and instruction in use of adaptive equipment, including bathing, grooming, dressing, personal hygiene, basic household cleaning and chores. Home Exercise Program:    [x] (07865) Reviewed/Progressed HEP activities related to strengthening, flexibility, endurance, ROM of   [] LE / Lumbar: core, proximal hip and LE for functional self-care, mobility, lifting and ambulation/stair navigation   [] UE / Cervical: cervical, postural, scapular, scapulothoracic and UE control with self care, reaching, carrying, lifting, house/yardwork, driving, computer work  [] (94188)Reviewed/Progressed HEP activities related to improving balance, coordination, kinesthetic sense, posture, motor skill, proprioception of   [] LE: core, proximal hip and LE for self care, mobility, lifting, and ambulation/stair navigation    [] UE / Cervical: cervical, postural,  scapular, scapulothoracic and UE control with self care, reaching, carrying, lifting, house/yardwork, driving, computer work    Manual Treatments:  PROM / STM / Oscillations-Mobs:  G-I, II, III, IV (PA's, Inf., Post.)  [] (63764) Provided manual therapy to mobilize LE, proximal hip and/or LS spine soft tissue/joints for the purpose of modulating pain, promoting relaxation,  increasing ROM, reducing/eliminating soft tissue swelling/inflammation/restriction, improving soft tissue extensibility and allowing for proper ROM for normal function with   [] LE / lumbar: self care, mobility, lifting and ambulation. [] UE / Cervical: self care, reaching, carrying, lifting, house/yardwork, driving, computer work.      Modalities:  [] (00210) Vasopneumatic compression: Utilized vasopneumatic compression to decrease edema / swelling for the purpose of improving mobility and quad tone / recruitment which will allow for increased overall function including but not limited to self-care, transfers, ambulation, and ascending / descending stairs. Charges:  Timed Code Treatment Minutes: 44   Total Treatment Minutes: 44     [x] EVAL - LOW (33477)   [] EVAL - MOD (71954)  [] EVAL - HIGH (53620)  [] RE-EVAL (61993)  [] UV(23007) x       [] Ionto  [] NMR (42347) x       [] Vaso  [] Manual (57146) x       [] Ultrasound  [x] TA x  1      [] Mech Traction (74880)  [] Aquatic Therapy x     [] ES (un) (93785):   [] Home Management Training x  [] ES(attended) (43489)   [] Dry Needling 1-2 muscles (85888):  [] Dry Needling 3+ muscles (024595  [] Group:      [] Other:     GOALS:   Patient stated goal: stretching chest mm and shoulder   []? Progressing: []? Met: []? Not Met: []? Adjusted     Therapist goals for Patient:   Short Term Goals: To be achieved in: 2 weeks  1. Independent in HEP and progression per patient tolerance, in order to prevent re-injury. []? Progressing: []? Met: []? Not Met: []? Adjusted  2. Patient will have a decrease in pain to facilitate improvement in movement, function, and ADLs as indicated by improvement with respect to Functional Deficits. []? Progressing: []? Met: []? Not Met: []? Adjusted     Long Term Goals: To be achieved in: 6    weeks  1. Disability index score of    % or less on the QuickDASH  to assist with reaching prior level of function. COMPLETE AFTER PATIENT COMPLETES Chad Mallorya  []? Progressing: []? Met: []? Not Met: []? Adjusted  2. Patient will demonstrate increased R shoulder AROM to 155 deg flexion and abd, to allow for proper joint functioning to allow pt to resume home management without increase in symptoms. []? Progressing: []? Met: []? Not Met: []? Adjusted  3. Patient will demonstrate increased R shoulder strength to 4+/5 in flexion and abd, to allow for proper joint functioning to allow pt to resume home management without increase in symptoms. []? Progressing: []? Met: []? Not Met: []? Adjusted  4.  Pt will report ability to sleep 7/7 nights without waking up due to her RUE. []? Progressing: []? Met: []? Not Met: []? Adjusted    Overall Progression Towards Functional goals/ Treatment Progress Update:  [] Patient is progressing as expected towards functional goals listed. [] Progression is slowed due to complexities/Impairments listed. [] Progression has been slowed due to co-morbidities. [x] Plan just implemented, too soon to assess goals progression <30days   [] Goals require adjustment due to lack of progress  [] Patient is not progressing as expected and requires additional follow up with physician  [] Other    Persisting Functional Limitations/Impairments:  [x]Sleeping []Sitting               []Standing []Transfers        []Walking []Kneeling               []Stairs []Squatting / bending   [x]ADLs [x]Reaching  []Lifting  [x]Housework  []Driving []Job related tasks  []Sports/Recreation []Other:        ASSESSMENT:  Pt has EDS and has full PROM in shoulder jt, pt unable to relax UT in sh motions limiting abiility of sh to elevate correctly. Pt will benefit from mm retraining to strengthen lower traps. Treatment/Activity Tolerance:  [x] Patient able to complete tx [] Patient limited by fatigue  [] Patient limited by pain  [] Patient limited by other medical complications  [] Other:     Prognosis: [x] Good [] Fair  [] Poor    Patient Requires Follow-up: [x] Yes  [] No    Plan for next treatment session: See flowsheet    PLAN: See loly. PT 2x / week for 6 weeks. [x] Continue per plan of care [] Alter current plan (see comments)  [] Plan of care initiated [] Hold pending MD visit [] Discharge    Electronically signed by: Ryan Acosta, PT, DPT    Note: If patient does not return for scheduled/ recommended follow up visits, this note will serve as a discharge from care along with most recent update on progress.

## 2022-03-14 ENCOUNTER — HOSPITAL ENCOUNTER (OUTPATIENT)
Dept: PHYSICAL THERAPY | Age: 77
Setting detail: THERAPIES SERIES
Discharge: HOME | End: 2022-03-14
Payer: MEDICARE

## 2022-03-14 PROCEDURE — 97530 THERAPEUTIC ACTIVITIES: CPT

## 2022-03-14 PROCEDURE — 97110 THERAPEUTIC EXERCISES: CPT

## 2022-03-14 NOTE — FLOWSHEET NOTE
[address] [facility] Physical Therapy  Phone: [phone]   Fax: [phone]    Physical Therapy Daily ONCOLOGY Treatment Note    Date:  3/14/2022    Patient Name:  Julia Adams    :  1945  MRN: [id number]  Restrictions/Precautions:    Medical/Treatment Diagnosis Information:  Diagnosis: C50.911 (ICD-10-CM) - Primary breast malignancy, right ([facility] [address] C50.911 (ICD-10-CM) - Invasive ductal carcinoma of right breast, stage 3 ([facility] [address]  Treatment Diagnosis: decreased R shoulder strength and ROM, fatigue with decreased endurance  Insurance/Certification information:  PT Insurance Information: Medicare and [id number], no CP, no auth  Physician Information:  Referring Practitioner: Mendy Weinstein MD  Plan of care signed (Y/N): []  Yes [x]  No     Date of Patient follow up with Physician: radiologist 3/11    Functional Outcomes:   Fatigue: 3-4/10    3/7/22  Quick Dash:  21 20-39%   3/10/22    Progress Report: []  Yes  [x]  No     Date Range for reporting period:  Beginning 3/7/22  Ending    Progress report due (10 Rx/or 30 days whichever is less): visit #10 or  (date)     Recertification due (POC duration/ or 90 days whichever is less): visit #12 or 22 (date)     Visit # Insurance Allowable Auth required? Date Range    Med nec []  Yes  [x]  No         Latex Allergy:  [x]NO      []YES  Preferred Language for Healthcare:   [x]English       []other:      Pain level:  0/10     SUBJECTIVE:  Pt reports she felt good and encouraged after LV. States she is doing well today. Meets with radiology at [facility] today to determine plan for radiation. OBJECTIVE:   3/14   - 6 min walk - 1480 ft (1.25 m/s) - amb with B knee valgus and B foot pronation.  Denies knee pain   - TUG - 8.37 sec    - 5 x STS - 11.79 sec     3/10 Pt has good PROM but pulls in UT immediately in all motions, attempted stretching but sh jt with good mobility but poor mm control RESTRICTIONS/PRECAUTIONS:     EDS- very little stretching    Exercises/Interventions:     Therapeutic Exercises (67007) Resistance / level Sets/sec Reps Notes   Arm bike   X 2 each way    Pulleys flex /scap     X 10 each    Supine AAROM with ranger       SB roll outs        TB   - mid row  - high row  - LPD  orange   2  2  2   X 10  X 10  X 10    Pec doorway stretch - 60 deg   2 X 30'    Therapeutic Activities (57057)  (Dynamic activities such as compression, designed to improve functional performance)       Educated on s/s of infection and on risk factors for developing lymphedema (no IV, BP, or shots in RUE). Discussed POC for therapy. Provide handout for lymphedema prevention NV     Reviewed HEP from MD - table slides, supine AAROM shoulder flexion with cane, butterfly stretch, pec stretch (demo'd at doorway vs corner), scap squeeze        FUNCTIONAL OBJECTIVE MEASURES (see above) - 6 min walk, 5x STS, TUG X 15 min                                                                        Home Management  (providing pt education on safety procedures/instructions)                                                        Neuromuscular Re-ed (43780)                                                        Manual Intervention (13292)       Manual stretching R shoulder     Has full passive ROM in jt. Difficulty relaxing UT to move shoudler                                          Modalities:     OTHER:     Pt education:   3/7 Pt was educated on diagnosis; prognosis; PT POC including pathology and anatomy of etiology of lymphedema, condition precautions; lymphedema management/prevention of flare ups, role of exercise, HEP, expectations for rehab. All pt questions were answered. HEP instruction:  3/14  Access Code: [email or web address]  URL: [email or web address]  Date: 03/14/2022  Prepared by: Joel Jean    Exercises  Doorway Pec Stretch at 60 Elevation - 1 x daily - 7 x weekly - 2 sets - 30 sec hold  Doorway Pec Stretch at 90 Degrees Abduction - 1 x daily - 7 x weekly - 2 sets - 30 sec hold    3/7  Reviewed HEP from MD. Questions answered about exercise and to complete in pain free range but still feeling a stretch. (see above for exercise)    Therapeutic Exercise and NMR EXR  [x] (04416) Provided verbal/tactile cueing for activities related to strengthening, flexibility, endurance, ROM for improvements in  [] LE / Lumbar: LE, proximal hip, and core control with self care, mobility, lifting, ambulation. [x] UE / Cervical: cervical, postural, scapular, scapulothoracic and UE control with self care, reaching, carrying, lifting, house/yardwork, driving, computer work.  [] (02420) Provided verbal/tactile cueing for activities related to improving balance, coordination, kinesthetic sense, posture, motor skill, proprioception to assist with   [] LE / lumbar: LE, proximal hip, and core control in self care, mobility, lifting, ambulation and eccentric single leg control. [] UE / cervical: cervical, scapular, scapulothoracic and UE control with self care, reaching, carrying, lifting, house/yardwork, driving, computer work.   [] (19534) Therapist is in constant attendance of 2 or more patients providing skilled therapy interventions, but not providing any significant amount of measurable one-on-one time to either patient, for improvements in  [] LE / lumbar: LE, proximal hip, and core control in self care, mobility, lifting, ambulation and eccentric single leg control. [] UE / cervical: cervical, scapular, scapulothoracic and UE control with self care, reaching, carrying, lifting, house/yardwork, driving, computer work.      NMR and Therapeutic Activities:    [x] (88310 or 03906) Provided verbal/tactile cueing for activities related to improving balance, coordination, kinesthetic sense, posture, motor skill, proprioception and motor activation to allow for proper function of   [] LE: / Lumbar core, proximal hip and LE with self care and ADLs  [x] UE / Cervical: cervical, postural, scapular, scapulothoracic and UE control with self care, carrying, lifting, driving, computer work.   [] (17216) Gait Re-education- Provided training and instruction to the patient for proper LE, core and proximal hip recruitment and positioning and eccentric body weight control with ambulation re-education including up and down stairs     Home Management Training / Self Care:  [] (46479) Provided self-care/home management training related to activities of daily living and compensatory training, and/or use of adaptive equipment for improvement with: ADLs and compensatory training, meal preparation, safety procedures and instruction in use of adaptive equipment, including bathing, grooming, dressing, personal hygiene, basic household cleaning and chores.      Home Exercise Program:    [x] (17275) Reviewed/Progressed HEP activities related to strengthening, flexibility, endurance, ROM of   [] LE / Lumbar: core, proximal hip and LE for functional self-care, mobility, lifting and ambulation/stair navigation   [] UE / Cervical: cervical, postural, scapular, scapulothoracic and UE control with self care, reaching, carrying, lifting, house/yardwork, driving, computer work  [] (45343)Reviewed/Progressed HEP activities related to improving balance, coordination, kinesthetic sense, posture, motor skill, proprioception of   [] LE: core, proximal hip and LE for self care, mobility, lifting, and ambulation/stair navigation    [] UE / Cervical: cervical, postural,  scapular, scapulothoracic and UE control with self care, reaching, carrying, lifting, house/yardwork, driving, computer work    Manual Treatments:  PROM / STM / Oscillations-Mobs:  G-I, II, III, IV (PA's, Inf., Post.)  [] (48650) Provided manual therapy to mobilize LE, proximal hip and/or LS spine soft tissue/joints for the purpose of modulating pain, promoting relaxation,  increasing ROM, reducing/eliminating soft demonstrate increased R shoulder AROM to 155 deg flexion and abd, to allow for proper joint functioning to allow pt to resume home management without increase in symptoms. -? Progressing: -? Met: -? Not Met: -? Adjusted  3. Patient will demonstrate increased R shoulder strength to 4+/5 in flexion and abd, to allow for proper joint functioning to allow pt to resume home management without increase in symptoms. []? Progressing: []? Met: []? Not Met: []? Adjusted  4. Pt will report ability to sleep 7/7 nights without waking up due to her RUE. []? Progressing: []? Met: []? Not Met: []? Adjusted    Overall Progression Towards Functional goals/ Treatment Progress Update:  [] Patient is progressing as expected towards functional goals listed. [] Progression is slowed due to complexities/Impairments listed. [] Progression has been slowed due to co-morbidities. [x] Plan just implemented, too soon to assess goals progression <30days   [] Goals require adjustment due to lack of progress  [] Patient is not progressing as expected and requires additional follow up with physician  [] Other    Persisting Functional Limitations/Impairments:  [x]Sleeping []Sitting               []Standing []Transfers        []Walking []Kneeling               []Stairs []Squatting / bending   [x]ADLs [x]Reaching  []Lifting  [x]Housework  []Driving []Job related tasks  []Sports/Recreation []Other:        ASSESSMENT:  Completed 5x STS, 6 min walk test, and TUG this date. Pt below cut off score for fall risk with TUG. Pt reports some soreness in R shoulder after TB exercises this date. Continue to progress R shoulder strengthening to decrease pain and improve mobility to return to PLOF without limitations.        Treatment/Activity Tolerance:  [x] Patient able to complete tx [] Patient limited by fatigue  [] Patient limited by pain  [] Patient limited by other medical complications  [] Other:     Prognosis: [x] Good [] Fair  [] Poor    Patient Requires Follow-up: [x] Yes  [] No    Plan for next treatment session: See flowsheet    PLAN: See eval. PT 2x / week for 6 weeks. [x] Continue per plan of care [] Alter current plan (see comments)  [] Plan of care initiated [] Hold pending MD visit [] Discharge    Electronically signed by: Robbie García, PT, DPT    Note: If patient does not return for scheduled/ recommended follow up visits, this note will serve as a discharge from care along with most recent update on progress.

## 2022-03-18 ENCOUNTER — HOSPITAL ENCOUNTER (OUTPATIENT)
Dept: PHYSICAL THERAPY | Age: 77
Setting detail: THERAPIES SERIES
Discharge: HOME | End: 2022-03-18
Payer: MEDICARE

## 2022-03-18 PROCEDURE — 97110 THERAPEUTIC EXERCISES: CPT

## 2022-03-18 PROCEDURE — 97530 THERAPEUTIC ACTIVITIES: CPT

## 2022-03-18 NOTE — FLOWSHEET NOTE
[address] [facility] Therapy  Phone: [phone]   Fax: [phone]    Physical Therapy Daily ONCOLOGY Treatment Note    Date:  3/18/2022    Patient Name:  Karly Duenas    :  1945  MRN: [id number]  Restrictions/Precautions:    Medical/Treatment Diagnosis Information:  Diagnosis: C50.911 (ICD-10-CM) - Primary breast malignancy, right ([facility] [address] C50.911 (ICD-10-CM) - Invasive ductal carcinoma of right breast, stage 3 ([facility] [address]  Treatment Diagnosis: decreased R shoulder strength and ROM, fatigue with decreased endurance  Insurance/Certification information:  PT Insurance Information: Medicare and [id number], no CP, no auth  Physician Information:  Referring Practitioner: Bety Sweeney MD  Plan of care signed (Y/N): [x]  Yes []  No     Date of Patient follow up with Physician: radiologist 3/11    Functional Outcomes:   Fatigue: 3-4/10    3/7/22  Quick Dash:  21 20-39%   3/10/22    Progress Report: []  Yes  [x]  No     Date Range for reporting period:  Beginning 3/7/22  Ending    Progress report due (10 Rx/or 30 days whichever is less): visit #10 or 96 (date)     Recertification due (POC duration/ or 90 days whichever is less): visit #12 or 22 (date)     Visit # Insurance Allowable Auth required? Date Range    Med nec []  Yes  [x]  No         Latex Allergy:  [x]NO      []YES  Preferred Language for Healthcare:   [x]English       []other:      Pain level:  0/10     SUBJECTIVE:  Pt reports she felt sore after LV. States she is starting radiation /5 and will get it every day for 5 weeks. OBJECTIVE:   3/14   - 6 min walk - 1480 ft (1.25 m/s) - amb with B knee valgus and B foot pronation.  Denies knee pain   - TUG - 8.37 sec    - 5 x STS - 11.79 sec     3/10 Pt has good PROM but pulls in UT immediately in all motions, attempted stretching but sh jt with good mobility but poor mm control       RESTRICTIONS/PRECAUTIONS:     EDS- very little stretching    Exercises/Interventions:     Therapeutic Exercises (92917) Resistance / level Sets/sec Reps Notes   Arm bike   X 2 each way    Pulleys flex /scap    5 sec hold  X 10 each    Supine AAROM with ranger       SB roll outs  Blue   Blue  Flexion   Horizontal abd  10  10 B      TB   - mid row  - high row  - LPD  orange   1  1  1   X 10  X 10  X 10    Pec doorway stretch - 60 deg      Therapeutic Activities (10503)  (Dynamic activities such as compression, designed to improve functional performance)       Educated on s/s of infection and on risk factors for developing lymphedema (no IV, BP, or shots in RUE). Discussed POC for therapy. Provide handout for lymphedema prevention NV     Reviewed HEP from MD - table slides, supine AAROM shoulder flexion with cane, butterfly stretch, pec stretch (demo'd at doorway vs corner), scap squeeze        FUNCTIONAL OBJECTIVE MEASURES (see above) - 6 min walk, 5x STS, TUG       Provided lymphedema prevention handout and reviewed. Answered all pt questions. Pt provided with M below the knee tensoshape to wear on car ride to [place] coming up. Educated on s/s of constriction and educated to take off if too tight. X 15 min                                                                  Home Management  (providing pt education on safety procedures/instructions)                                                        Neuromuscular Re-ed (91989)                                                        Manual Intervention ([id number])       Manual stretching R shoulder     Has full passive ROM in jt. Difficulty relaxing UT to move shoudler                                          Modalities:     OTHER:     Pt education:   3/18 Provided lymphedema prevention handout and reviewed. Answered all pt questions. Pt provided with M below the knee tensoshape to wear on car ride to [place] coming up. Educated on s/s of constriction and educated to take off if too tight.   3/7 Pt was educated on diagnosis; prognosis; PT POC including pathology and anatomy of etiology of lymphedema, condition precautions; lymphedema management/prevention of flare ups, role of exercise, HEP, expectations for rehab. All pt questions were answered. HEP instruction:  3/14  Access Code: [email or web address]  URL: [email or web address]  Date: 03/14/2022  Prepared by: Jordan Arnoldacy    Exercises  Doorway Pec Stretch at 60 Elevation - 1 x daily - 7 x weekly - 2 sets - 30 sec hold  Doorway Pec Stretch at 90 Degrees Abduction - 1 x daily - 7 x weekly - 2 sets - 30 sec hold    3/7  Reviewed HEP from MD. Questions answered about exercise and to complete in pain free range but still feeling a stretch. (see above for exercise)    Therapeutic Exercise and NMR EXR  [x] (85768) Provided verbal/tactile cueing for activities related to strengthening, flexibility, endurance, ROM for improvements in  [] LE / Lumbar: LE, proximal hip, and core control with self care, mobility, lifting, ambulation. [x] UE / Cervical: cervical, postural, scapular, scapulothoracic and UE control with self care, reaching, carrying, lifting, house/yardwork, driving, computer work.  [] (97829) Provided verbal/tactile cueing for activities related to improving balance, coordination, kinesthetic sense, posture, motor skill, proprioception to assist with   [] LE / lumbar: LE, proximal hip, and core control in self care, mobility, lifting, ambulation and eccentric single leg control.    [] UE / cervical: cervical, scapular, scapulothoracic and UE control with self care, reaching, carrying, lifting, house/yardwork, driving, computer work.   [] (67160) Therapist is in constant attendance of 2 or more patients providing skilled therapy interventions, but not providing any significant amount of measurable one-on-one time to either patient, for improvements in  [] LE / lumbar: LE, proximal hip, and core control in self care, mobility, lifting, ambulation and eccentric single leg control. [] UE / cervical: cervical, scapular, scapulothoracic and UE control with self care, reaching, carrying, lifting, house/yardwork, driving, computer work. NMR and Therapeutic Activities:    [x] (78777 or 58498) Provided verbal/tactile cueing for activities related to improving balance, coordination, kinesthetic sense, posture, motor skill, proprioception and motor activation to allow for proper function of   [] LE: / Lumbar core, proximal hip and LE with self care and ADLs  [x] UE / Cervical: cervical, postural, scapular, scapulothoracic and UE control with self care, carrying, lifting, driving, computer work.   [] (48628) Gait Re-education- Provided training and instruction to the patient for proper LE, core and proximal hip recruitment and positioning and eccentric body weight control with ambulation re-education including up and down stairs     Home Management Training / Self Care:  [] (74410) Provided self-care/home management training related to activities of daily living and compensatory training, and/or use of adaptive equipment for improvement with: ADLs and compensatory training, meal preparation, safety procedures and instruction in use of adaptive equipment, including bathing, grooming, dressing, personal hygiene, basic household cleaning and chores.      Home Exercise Program:    [x] (42958) Reviewed/Progressed HEP activities related to strengthening, flexibility, endurance, ROM of   [] LE / Lumbar: core, proximal hip and LE for functional self-care, mobility, lifting and ambulation/stair navigation   [] UE / Cervical: cervical, postural, scapular, scapulothoracic and UE control with self care, reaching, carrying, lifting, house/yardwork, driving, computer work  [] (98482)Reviewed/Progressed HEP activities related to improving balance, coordination, kinesthetic sense, posture, motor skill, proprioception of   [] LE: core, proximal hip and LE for self care, mobility, lifting, and ambulation/stair navigation    [] UE / Cervical: cervical, postural,  scapular, scapulothoracic and UE control with self care, reaching, carrying, lifting, house/yardwork, driving, computer work    Manual Treatments:  PROM / STM / Oscillations-Mobs:  G-I, II, III, IV (PA's, Inf., Post.)  [] (57888) Provided manual therapy to mobilize LE, proximal hip and/or LS spine soft tissue/joints for the purpose of modulating pain, promoting relaxation,  increasing ROM, reducing/eliminating soft tissue swelling/inflammation/restriction, improving soft tissue extensibility and allowing for proper ROM for normal function with   [] LE / lumbar: self care, mobility, lifting and ambulation. [] UE / Cervical: self care, reaching, carrying, lifting, house/yardwork, driving, computer work. Modalities:  [] (57701) Vasopneumatic compression: Utilized vasopneumatic compression to decrease edema / swelling for the purpose of improving mobility and quad tone / recruitment which will allow for increased overall function including but not limited to self-care, transfers, ambulation, and ascending / descending stairs. Charges:  Timed Code Treatment Minutes: 45   Total Treatment Minutes: 45     [x] EVAL - LOW (62256)   [] EVAL - MOD (10537)  [] EVAL - HIGH (04872)  [] RE-EVAL (21041)  [x] WS(41430) x  2     [] Ionto  [] NMR (71201) x       [] Vaso  [] Manual (03558) x       [] Ultrasound  [x] TA x  1      [] Mech Traction (98771)  [] Aquatic Therapy x     [] ES (un) (84299):   [] Home Management Training x  [] ES(attended) (11112)   [] Dry Needling 1-2 muscles (40292):  [] Dry Needling 3+ muscles (074405  [] Group:      [] Other:     GOALS:   Patient stated goal: stretching chest mm and shoulder   []? Progressing: []? Met: []? Not Met: []? Adjusted     Therapist goals for Patient:   Short Term Goals: To be achieved in: 2 weeks  1. Independent in HEP and progression per patient tolerance, in order to prevent re-injury. []? Progressing: []? Met: []? Not Met: []? Adjusted  2. Patient will have a decrease in pain to facilitate improvement in movement, function, and ADLs as indicated by improvement with respect to Functional Deficits. []? Progressing: []? Met: []? Not Met: []? Adjusted     Long Term Goals: To be achieved in: 6    weeks  1. Disability index score of  0% or less on the QuickDASH  to assist with reaching prior level of function. -? Progressing: -? Met: -? Not Met: -? Adjusted  2. Patient will demonstrate increased R shoulder AROM to 155 deg flexion and abd, to allow for proper joint functioning to allow pt to resume home management without increase in symptoms. -? Progressing: -? Met: -? Not Met: -? Adjusted  3. Patient will demonstrate increased R shoulder strength to 4+/5 in flexion and abd, to allow for proper joint functioning to allow pt to resume home management without increase in symptoms. []? Progressing: []? Met: []? Not Met: []? Adjusted  4. Pt will report ability to sleep 7/7 nights without waking up due to her RUE. []? Progressing: []? Met: []? Not Met: []? Adjusted    Overall Progression Towards Functional goals/ Treatment Progress Update:  [] Patient is progressing as expected towards functional goals listed. [] Progression is slowed due to complexities/Impairments listed. [] Progression has been slowed due to co-morbidities.   [x] Plan just implemented, too soon to assess goals progression <30days   [] Goals require adjustment due to lack of progress  [] Patient is not progressing as expected and requires additional follow up with physician  [] Other    Persisting Functional Limitations/Impairments:  [x]Sleeping []Sitting               []Standing []Transfers        []Walking []Kneeling               []Stairs []Squatting / bending   [x]ADLs [x]Reaching  []Lifting  [x]Housework  []Driving []Job related tasks  []Sports/Recreation []Other:        ASSESSMENT:  Pt provided with handout for lymphedema prevention and tensoshape for compression on car ride. Pt demonstrates improved R shoulder ROM with pulleys but continues to feel tight in anterior chest. Continue to progress strength and ROM to return to PLOF without limitations. Treatment/Activity Tolerance:  [x] Patient able to complete tx [] Patient limited by fatigue  [] Patient limited by pain  [] Patient limited by other medical complications  [] Other:     Prognosis: [x] Good [] Fair  [] Poor    Patient Requires Follow-up: [x] Yes  [] No    Plan for next treatment session: See flowsheet    PLAN: See eval. PT 2x / week for 6 weeks. [x] Continue per plan of care [] Alter current plan (see comments)  [] Plan of care initiated [] Hold pending MD visit [] Discharge    Electronically signed by: Courtney Conway, PT, DPT    Note: If patient does not return for scheduled/ recommended follow up visits, this note will serve as a discharge from care along with most recent update on progress.

## 2022-03-21 ENCOUNTER — HOSPITAL ENCOUNTER (OUTPATIENT)
Dept: PHYSICAL THERAPY | Age: 77
Setting detail: THERAPIES SERIES
Discharge: HOME | End: 2022-03-21
Payer: MEDICARE

## 2022-03-21 PROCEDURE — 97530 THERAPEUTIC ACTIVITIES: CPT

## 2022-03-21 PROCEDURE — 97110 THERAPEUTIC EXERCISES: CPT

## 2022-03-21 NOTE — FLOWSHEET NOTE
[address] [facility]  Phone: [phone]   Fax: [phone]    Physical Therapy Daily ONCOLOGY Treatment Note    Date:  3/21/2022    Patient Name:  Jennifer Pritchett    :  1945  MRN: [id number]  Restrictions/Precautions:    Medical/Treatment Diagnosis Information:  Diagnosis: C50.911 (ICD-10-CM) - Primary breast malignancy, right ([facility] [address] C50.911 (ICD-10-CM) - Invasive ductal carcinoma of right breast, stage 3 ([facility] [address]  Treatment Diagnosis: decreased R shoulder strength and ROM, fatigue with decreased endurance  Insurance/Certification information:  PT Insurance Information: Medicare and [id number], no CP, no auth  Physician Information:  Referring Practitioner: Aniceto Leger MD  Plan of care signed (Y/N): [x]  Yes []  No     Date of Patient follow up with Physician: radiologist 3/11    Functional Outcomes:   Fatigue: 3-4/10    3/7/22  Quick Dash:  21 20-39%   3/10/22    Progress Report: []  Yes  [x]  No     Date Range for reporting period:  Beginning 3/7/22  Ending    Progress report due (10 Rx/or 30 days whichever is less): visit #10 or 06 (date)     Recertification due (POC duration/ or 90 days whichever is less): visit #12 or 22 (date)     Visit # Insurance Allowable Auth required? Date Range    Med nec []  Yes  [x]  No         Latex Allergy:  [x]NO      []YES  Preferred Language for Healthcare:   [x]English       []other:      Pain level:  3/10     SUBJECTIVE:  Pt reports her L knee is giving way today and hurting some. Everything else is okay. OBJECTIVE:   3/14   - 6 min walk - 1480 ft (1.25 m/s) - amb with B knee valgus and B foot pronation.  Denies knee pain   - TUG - 8.37 sec    - 5 x STS - 11.79 sec     3/10 Pt has good PROM but pulls in UT immediately in all motions, attempted stretching but sh jt with good mobility but poor mm control       RESTRICTIONS/PRECAUTIONS:     EDS- very little stretching    Exercises/Interventions:     Therapeutic Exercises (47732) Resistance / level Sets/sec Reps Notes   Arm bike   X 2 each way    Pulleys flex /scap    5 sec hold  X 10 each    Supine AAROM with ranger       SB roll outs  Blue   Blue  Flexion   Horizontal abd  10  10 B      TB   - mid row  - high row  - LPD   Sh ext/add  ER orange   1  1  1   X 10  X 10  X 10  X 10           R very difficult   Pec doorway stretch - 60 deg      Therapeutic Activities (56418)  (Dynamic activities such as compression, designed to improve functional performance)       Educated on s/s of infection and on risk factors for developing lymphedema (no IV, BP, or shots in RUE). Discussed POC for therapy. Provide handout for lymphedema prevention NV     Reviewed HEP from MD - table slides, supine AAROM shoulder flexion with cane, butterfly stretch, pec stretch (demo'd at doorway vs corner), scap squeeze        FUNCTIONAL OBJECTIVE MEASURES (see above) - 6 min walk, 5x STS, TUG       Provided lymphedema prevention handout and reviewed. Answered all pt questions. Pt provided with M below the knee tensoshape to wear on car ride to [place] coming up. Educated on s/s of constriction and educated to take off if too tight.                scifit nu step   X 6 mins                                                Home Management  (providing pt education on safety procedures/instructions)                                                        Neuromuscular Re-ed (20737)                                                        Manual Intervention (49065)       Manual stretching R shoulder     Has full passive ROM in jt. Difficulty relaxing UT to move shoudler                                          Modalities:     OTHER:     Pt education:   3/18 Provided lymphedema prevention handout and reviewed. Answered all pt questions. Pt provided with M below the knee tensoshape to wear on car ride to [place] coming up.  Educated on s/s of constriction and educated to take off if too tight. 3/7 Pt was educated on diagnosis; prognosis; PT POC including pathology and anatomy of etiology of lymphedema, condition precautions; lymphedema management/prevention of flare ups, role of exercise, HEP, expectations for rehab. All pt questions were answered. HEP instruction:  Access Code: [email or web address]  URL: [email or web address]  Date: 03/21/2022  Prepared by: Wyatt Moon    Exercises  Standing Shoulder Row with Anchored Resistance - 1 x daily - 7 x weekly - 1 sets - 10 reps  Squatting High Shoulder Row with Resistance - 1 x daily - 7 x weekly - 1 sets - 10 reps  Seated Shoulder External Rotation with Resistance - 1 x daily - 7 x weekly - 1 sets - 10 reps  Standing Shoulder Adduction Reactive Isometrics with Resistance and Elbow Extended - 1 x daily - 7 x weekly - 1 sets - 10 reps  Single Arm Shoulder Extension with Resistance - 1 x daily - 7 x weekly - 1 sets - 10 reps    3/14  Access Code: [email or web address]  URL: [email or web address]  Date: 03/14/2022  Prepared by: Pooja Barry    Exercises  Doorway Pec Stretch at 60 Elevation - 1 x daily - 7 x weekly - 2 sets - 30 sec hold  Doorway Pec Stretch at 90 Degrees Abduction - 1 x daily - 7 x weekly - 2 sets - 30 sec hold    3/7  Reviewed HEP from MD. Questions answered about exercise and to complete in pain free range but still feeling a stretch. (see above for exercise)    Therapeutic Exercise and NMR EXR  [x] (82595) Provided verbal/tactile cueing for activities related to strengthening, flexibility, endurance, ROM for improvements in  [] LE / Lumbar: LE, proximal hip, and core control with self care, mobility, lifting, ambulation.   [x] UE / Cervical: cervical, postural, scapular, scapulothoracic and UE control with self care, reaching, carrying, lifting, house/yardwork, driving, computer work.  [] (68004) Provided verbal/tactile cueing for activities related to improving balance, coordination, kinesthetic sense, posture, motor skill, proprioception to assist with   [] LE / lumbar: LE, proximal hip, and core control in self care, mobility, lifting, ambulation and eccentric single leg control. [] UE / cervical: cervical, scapular, scapulothoracic and UE control with self care, reaching, carrying, lifting, house/yardwork, driving, computer work.   [] (31046) Therapist is in constant attendance of 2 or more patients providing skilled therapy interventions, but not providing any significant amount of measurable one-on-one time to either patient, for improvements in  [] LE / lumbar: LE, proximal hip, and core control in self care, mobility, lifting, ambulation and eccentric single leg control. [] UE / cervical: cervical, scapular, scapulothoracic and UE control with self care, reaching, carrying, lifting, house/yardwork, driving, computer work.      NMR and Therapeutic Activities:    [x] (63089 or 56669) Provided verbal/tactile cueing for activities related to improving balance, coordination, kinesthetic sense, posture, motor skill, proprioception and motor activation to allow for proper function of   [] LE: / Lumbar core, proximal hip and LE with self care and ADLs  [x] UE / Cervical: cervical, postural, scapular, scapulothoracic and UE control with self care, carrying, lifting, driving, computer work.   [] (92472) Gait Re-education- Provided training and instruction to the patient for proper LE, core and proximal hip recruitment and positioning and eccentric body weight control with ambulation re-education including up and down stairs     Home Management Training / Self Care:  [x] (79438) Provided self-care/home management training related to activities of daily living and compensatory training, and/or use of adaptive equipment for improvement with: ADLs and compensatory training, meal preparation, safety procedures and instruction in use of adaptive equipment, including bathing, grooming, dressing, personal hygiene, basic household cleaning and chores. Home Exercise Program:    [x] (11134) Reviewed/Progressed HEP activities related to strengthening, flexibility, endurance, ROM of   [] LE / Lumbar: core, proximal hip and LE for functional self-care, mobility, lifting and ambulation/stair navigation   [x] UE / Cervical: cervical, postural, scapular, scapulothoracic and UE control with self care, reaching, carrying, lifting, house/yardwork, driving, computer work  [] (44523)Reviewed/Progressed HEP activities related to improving balance, coordination, kinesthetic sense, posture, motor skill, proprioception of   [] LE: core, proximal hip and LE for self care, mobility, lifting, and ambulation/stair navigation    [] UE / Cervical: cervical, postural,  scapular, scapulothoracic and UE control with self care, reaching, carrying, lifting, house/yardwork, driving, computer work    Manual Treatments:  PROM / STM / Oscillations-Mobs:  G-I, II, III, IV (PA's, Inf., Post.)  [] (92028) Provided manual therapy to mobilize LE, proximal hip and/or LS spine soft tissue/joints for the purpose of modulating pain, promoting relaxation,  increasing ROM, reducing/eliminating soft tissue swelling/inflammation/restriction, improving soft tissue extensibility and allowing for proper ROM for normal function with   [] LE / lumbar: self care, mobility, lifting and ambulation. [] UE / Cervical: self care, reaching, carrying, lifting, house/yardwork, driving, computer work. Modalities:  [] (61645) Vasopneumatic compression: Utilized vasopneumatic compression to decrease edema / swelling for the purpose of improving mobility and quad tone / recruitment which will allow for increased overall function including but not limited to self-care, transfers, ambulation, and ascending / descending stairs.        Charges:  Timed Code Treatment Minutes: 42   Total Treatment Minutes: 42     [] EVAL - LOW (06912)   [] EVAL - MOD (20017)  [] EVAL - HIGH (01433)  [] RE-EVAL (67872)  [x] QV(80074) x  2     [] Ionto  [] NMR (29115) x       [] Vaso  [] Manual (43026) x       [] Ultrasound  [x] TA x  1      [] Mech Traction (99790)  [] Aquatic Therapy x     [] ES (un) (62256):   [] Home Management Training x  [] ES(attended) (90796)   [] Dry Needling 1-2 muscles (17058):  [] Dry Needling 3+ muscles (763539  [] Group:      [] Other:     GOALS:   Patient stated goal: stretching chest mm and shoulder   []? Progressing: []? Met: []? Not Met: []? Adjusted     Therapist goals for Patient:   Short Term Goals: To be achieved in: 2 weeks  1. Independent in HEP and progression per patient tolerance, in order to prevent re-injury. []? Progressing: []? Met: []? Not Met: []? Adjusted  2. Patient will have a decrease in pain to facilitate improvement in movement, function, and ADLs as indicated by improvement with respect to Functional Deficits. []? Progressing: []? Met: []? Not Met: []? Adjusted     Long Term Goals: To be achieved in: 6    weeks  1. Disability index score of  0% or less on the QuickDASH  to assist with reaching prior level of function. -? Progressing: -? Met: -? Not Met: -? Adjusted  2. Patient will demonstrate increased R shoulder AROM to 155 deg flexion and abd, to allow for proper joint functioning to allow pt to resume home management without increase in symptoms. -? Progressing: -? Met: -? Not Met: -? Adjusted  3. Patient will demonstrate increased R shoulder strength to 4+/5 in flexion and abd, to allow for proper joint functioning to allow pt to resume home management without increase in symptoms. []? Progressing: []? Met: []? Not Met: []? Adjusted  4. Pt will report ability to sleep 7/7 nights without waking up due to her RUE. []? Progressing: []? Met: []? Not Met: []? Adjusted    Overall Progression Towards Functional goals/ Treatment Progress Update:  [] Patient is progressing as expected towards functional goals listed.     [] Progression is slowed due to complexities/Impairments listed. [] Progression has been slowed due to co-morbidities. [x] Plan just implemented, too soon to assess goals progression <30days   [] Goals require adjustment due to lack of progress  [] Patient is not progressing as expected and requires additional follow up with physician  [] Other    Persisting Functional Limitations/Impairments:  [x]Sleeping []Sitting               []Standing []Transfers        []Walking []Kneeling               []Stairs []Squatting / bending   [x]ADLs [x]Reaching  []Lifting  [x]Housework  []Driving []Job related tasks  []Sports/Recreation []Other:        ASSESSMENT:  Pt provided with many cues to do exercises correctly. Pt havng much difficulty with ER on R. Pt demonstrates improved R shoulder ROM with pulleys but continues to feel tight in anterior chest. Continue to progress strength and ROM to return to PLOF without limitations. Treatment/Activity Tolerance:  [x] Patient able to complete tx [] Patient limited by fatigue  [] Patient limited by pain  [] Patient limited by other medical complications  [] Other:     Prognosis: [x] Good [] Fair  [] Poor    Patient Requires Follow-up: [x] Yes  [] No    Plan for next treatment session: See flowsheet    PLAN: See eval. PT 2x / week for 6 weeks. [x] Continue per plan of care [] Alter current plan (see comments)  [] Plan of care initiated [] Hold pending MD visit [] Discharge    Electronically signed by: Richardson Chapman PT, DPT    Note: If patient does not return for scheduled/ recommended follow up visits, this note will serve as a discharge from care along with most recent update on progress.

## 2022-03-25 ENCOUNTER — HOSPITAL ENCOUNTER (OUTPATIENT)
Dept: PHYSICAL THERAPY | Age: 77
Setting detail: THERAPIES SERIES
Discharge: HOME | End: 2022-03-25
Payer: MEDICARE

## 2022-03-25 PROCEDURE — 97140 MANUAL THERAPY 1/> REGIONS: CPT

## 2022-03-25 PROCEDURE — 97110 THERAPEUTIC EXERCISES: CPT

## 2022-03-25 NOTE — FLOWSHEET NOTE
[address] [facility]  Phone: [phone]   Fax: [phone]    Physical Therapy Daily ONCOLOGY Treatment Note    Date:  3/25/2022    Patient Name:  Licha Spivey    :  1945  MRN: [id number]  Restrictions/Precautions:    Medical/Treatment Diagnosis Information:  Diagnosis: C50.911 (ICD-10-CM) - Primary breast malignancy, right ([facility] [address] C50.911 (ICD-10-CM) - Invasive ductal carcinoma of right breast, stage 3 ([facility] [address]  Treatment Diagnosis: decreased R shoulder strength and ROM, fatigue with decreased endurance  Insurance/Certification information:  PT Insurance Information: Medicare and [address], no CP, no auth  Physician Information:  Referring Practitioner: Eris Munguia MD  Plan of care signed (Y/N): [x]  Yes []  No     Date of Patient follow up with Physician: radiologist 3/11    Functional Outcomes:   Fatigue: 3-4/10    3/7/22  Quick Dash:  21 20-39%   3/10/22    Progress Report: []  Yes  [x]  No     Date Range for reporting period:  Beginning 3/7/22  Ending    Progress report due (10 Rx/or 30 days whichever is less): visit #10 or 3/2/14 (date)     Recertification due (POC duration/ or 90 days whichever is less): visit #12 or 22 (date)     Visit # Insurance Allowable Auth required? Date Range    Med nec []  Yes  [x]  No         Latex Allergy:  [x]NO      []YES  Preferred Language for Healthcare:   [x]English       []other:      Pain level:  1/10     SUBJECTIVE:  Pt reports her knee is feeling okay. States she has some pain in her L shoulder. OBJECTIVE:   3/25   Chest measurement - 97.0 cm   Edema noted in R lateral chest wall   Increased tissue tension at R pec tendon and mild cording noted in R axilla   R pec tightness - 2 fingers from table compared to 1 finger on L     3/14   - 6 min walk - 1480 ft (1.25 m/s) - amb with B knee valgus and B foot pronation.  Denies knee pain   - TUG - 8.37 sec    - 5 x STS - 11.79 sec     3/10 Pt has good PROM but pulls in UT immediately in all motions, attempted stretching but sh jt with good mobility but poor mm control       RESTRICTIONS/PRECAUTIONS:     EDS- very little stretching    Exercises/Interventions:     Therapeutic Exercises (70013) Resistance / level Sets/sec Reps Notes   Arm bike    Nu step    6 min     Pulleys    Flexion   Scaption   1 min   1 min     Supine AAROM with ranger       SB roll outs  Blue   Blue  Flexion   Horizontal abd  10  10 B      TB   - mid row  - high row  - LPD   Sh ext/add  ER           R very difficult   Pec doorway stretch - 60 deg  Unilateral  5 X 10' B     Therapeutic Activities (35357)  (Dynamic activities such as compression, designed to improve functional performance)       Educated on s/s of infection and on risk factors for developing lymphedema (no IV, BP, or shots in RUE). Discussed POC for therapy. Provide handout for lymphedema prevention NV     Reviewed HEP from MD - table slides, supine AAROM shoulder flexion with cane, butterfly stretch, pec stretch (demo'd at doorway vs corner), scap squeeze        FUNCTIONAL OBJECTIVE MEASURES (see above) - 6 min walk, 5x STS, TUG       Provided lymphedema prevention handout and reviewed. Answered all pt questions. Pt provided with M below the knee tensoshape to wear on car ride to [facility]. Educated on s/s of constriction and educated to take off if too tight.                scifit nu step                                                   Home Management  (providing pt education on safety procedures/instructions)                                                        Neuromuscular Re-ed (74875)                                                        Manual Intervention (12663)       Manual stretching R shoulder     Has full passive ROM in jt.   Difficulty relaxing UT to move shoudler   STM to pec tendon/cording in axilla  Educated and demo'd with hand over hand for self STM at home  X 10 min Modalities:     OTHER:     Pt education:   3/25 educated on edema in R lateral chest. Discussed compression options including ember with build in bra or sports bra. Pt signed release of medical information form for [facility] to obtain home lymphedema pump. 3/18 Provided lymphedema prevention handout and reviewed. Answered all pt questions. Pt provided with M below the knee tensoshape to wear on car ride to [place] coming up. Educated on s/s of constriction and educated to take off if too tight. 3/7 Pt was educated on diagnosis; prognosis; PT POC including pathology and anatomy of etiology of lymphedema, condition precautions; lymphedema management/prevention of flare ups, role of exercise, HEP, expectations for rehab. All pt questions were answered. HEP instruction:  Access Code: [email or web address]  URL: [email or web address]  Date: 03/21/2022  Prepared by: Mallory Beck    Exercises  Standing Shoulder Row with Anchored Resistance - 1 x daily - 7 x weekly - 1 sets - 10 reps  Squatting High Shoulder Row with Resistance - 1 x daily - 7 x weekly - 1 sets - 10 reps  Seated Shoulder External Rotation with Resistance - 1 x daily - 7 x weekly - 1 sets - 10 reps  Standing Shoulder Adduction Reactive Isometrics with Resistance and Elbow Extended - 1 x daily - 7 x weekly - 1 sets - 10 reps  Single Arm Shoulder Extension with Resistance - 1 x daily - 7 x weekly - 1 sets - 10 reps    3/14  Access Code: [email or web address]  URL: [email or web address]  Date: 03/14/2022  Prepared by: Courtney Conway    Exercises  Doorway Pec Stretch at 60 Elevation - 1 x daily - 7 x weekly - 2 sets - 30 sec hold  Doorway Pec Stretch at 90 Degrees Abduction - 1 x daily - 7 x weekly - 2 sets - 30 sec hold    3/7  Reviewed HEP from MD. Questions answered about exercise and to complete in pain free range but still feeling a stretch.  (see above for exercise)    Therapeutic Exercise and NMR EXR  [x] (20170) Provided verbal/tactile cueing for activities related to strengthening, flexibility, endurance, ROM for improvements in  [] LE / Lumbar: LE, proximal hip, and core control with self care, mobility, lifting, ambulation. [x] UE / Cervical: cervical, postural, scapular, scapulothoracic and UE control with self care, reaching, carrying, lifting, house/yardwork, driving, computer work.  [] (86536) Provided verbal/tactile cueing for activities related to improving balance, coordination, kinesthetic sense, posture, motor skill, proprioception to assist with   [] LE / lumbar: LE, proximal hip, and core control in self care, mobility, lifting, ambulation and eccentric single leg control. [] UE / cervical: cervical, scapular, scapulothoracic and UE control with self care, reaching, carrying, lifting, house/yardwork, driving, computer work.   [] (53974) Therapist is in constant attendance of 2 or more patients providing skilled therapy interventions, but not providing any significant amount of measurable one-on-one time to either patient, for improvements in  [] LE / lumbar: LE, proximal hip, and core control in self care, mobility, lifting, ambulation and eccentric single leg control. [] UE / cervical: cervical, scapular, scapulothoracic and UE control with self care, reaching, carrying, lifting, house/yardwork, driving, computer work.      NMR and Therapeutic Activities:    [x] (35454 or 32927) Provided verbal/tactile cueing for activities related to improving balance, coordination, kinesthetic sense, posture, motor skill, proprioception and motor activation to allow for proper function of   [] LE: / Lumbar core, proximal hip and LE with self care and ADLs  [x] UE / Cervical: cervical, postural, scapular, scapulothoracic and UE control with self care, carrying, lifting, driving, computer work.   [] (14356) Gait Re-education- Provided training and instruction to the patient for proper LE, core and proximal hip recruitment and positioning and eccentric body weight control with ambulation re-education including up and down stairs     Home Management Training / Self Care:  [x] (05902) Provided self-care/home management training related to activities of daily living and compensatory training, and/or use of adaptive equipment for improvement with: ADLs and compensatory training, meal preparation, safety procedures and instruction in use of adaptive equipment, including bathing, grooming, dressing, personal hygiene, basic household cleaning and chores. Home Exercise Program:    [x] (88473) Reviewed/Progressed HEP activities related to strengthening, flexibility, endurance, ROM of   [] LE / Lumbar: core, proximal hip and LE for functional self-care, mobility, lifting and ambulation/stair navigation   [x] UE / Cervical: cervical, postural, scapular, scapulothoracic and UE control with self care, reaching, carrying, lifting, house/yardwork, driving, computer work  [] (74287)Reviewed/Progressed HEP activities related to improving balance, coordination, kinesthetic sense, posture, motor skill, proprioception of   [] LE: core, proximal hip and LE for self care, mobility, lifting, and ambulation/stair navigation    [] UE / Cervical: cervical, postural,  scapular, scapulothoracic and UE control with self care, reaching, carrying, lifting, house/yardwork, driving, computer work    Manual Treatments:  PROM / STM / Oscillations-Mobs:  G-I, II, III, IV (PA's, Inf., Post.)  [] (22773) Provided manual therapy to mobilize LE, proximal hip and/or LS spine soft tissue/joints for the purpose of modulating pain, promoting relaxation,  increasing ROM, reducing/eliminating soft tissue swelling/inflammation/restriction, improving soft tissue extensibility and allowing for proper ROM for normal function with   [] LE / lumbar: self care, mobility, lifting and ambulation.     [] UE / Cervical: self care, reaching, carrying, lifting, house/yardwork, driving, 4+/5 in flexion and abd, to allow for proper joint functioning to allow pt to resume home management without increase in symptoms. []? Progressing: []? Met: []? Not Met: []? Adjusted  4. Pt will report ability to sleep 7/7 nights without waking up due to her RUE. []? Progressing: []? Met: []? Not Met: []? Adjusted    Overall Progression Towards Functional goals/ Treatment Progress Update:  [] Patient is progressing as expected towards functional goals listed. [] Progression is slowed due to complexities/Impairments listed. [] Progression has been slowed due to co-morbidities. [x] Plan just implemented, too soon to assess goals progression <30days   [] Goals require adjustment due to lack of progress  [] Patient is not progressing as expected and requires additional follow up with physician  [] Other    Persisting Functional Limitations/Impairments:  [x]Sleeping []Sitting               []Standing []Transfers        []Walking []Kneeling               []Stairs []Squatting / bending   [x]ADLs [x]Reaching  []Lifting  [x]Housework  []Driving []Job related tasks  []Sports/Recreation []Other:        ASSESSMENT:  Pt with tightness in R anterior chest with mild cording noted anteriorly and in axilla. Manual therapy initiated to decrease tissue tension. Hand over hand demonstration for self STM. Pt signed release of medical information form for [facility] to initiate process for obtaining home lymphedema pump as pt with edema noted in R lateral chest wall. Treatment/Activity Tolerance:  [x] Patient able to complete tx [] Patient limited by fatigue  [] Patient limited by pain  [] Patient limited by other medical complications  [] Other:     Prognosis: [x] Good [] Fair  [] Poor    Patient Requires Follow-up: [x] Yes  [] No    Plan for next treatment session: See flowsheet    PLAN: See eval. PT 2x / week for 6 weeks.    [x] Continue per plan of care [] Alter current plan (see comments)  [] Plan of care initiated [] Hold pending MD visit [] Discharge    Electronically signed by: Jamey Cai, PT, DPT    Note: If patient does not return for scheduled/ recommended follow up visits, this note will serve as a discharge from care along with most recent update on progress.

## 2022-03-28 ENCOUNTER — HOSPITAL ENCOUNTER (OUTPATIENT)
Dept: PHYSICAL THERAPY | Age: 77
Setting detail: THERAPIES SERIES
Discharge: HOME | End: 2022-03-28
Payer: MEDICARE

## 2022-03-28 PROCEDURE — 97140 MANUAL THERAPY 1/> REGIONS: CPT

## 2022-03-28 PROCEDURE — 97110 THERAPEUTIC EXERCISES: CPT

## 2022-03-28 NOTE — FLOWSHEET NOTE
1480 ft (1.25 m/s) - amb with B knee valgus and B foot pronation. Denies knee pain   - TUG - 8.37 sec    - 5 x STS - 11.79 sec     3/10 Pt has good PROM but pulls in UT immediately in all motions, attempted stretching but sh jt with good mobility but poor mm control       RESTRICTIONS/PRECAUTIONS:     EDS- very little stretching    Exercises/Interventions:     Therapeutic Exercises (43012) Resistance / level Sets/sec Reps Notes   Arm bike    Nu step    5 min     Pulleys    Flexion   Scaption   1 min   1 min     Supine AAROM with ranger       SB roll outsI, T, Y's Blue   Blue   Over green ball Flexion   Horizontal abd  10  10 B  X 10      TB   - mid row  - high row  - LPD   Sh ext/add  ER orange   1  1  1   X 10  X 10    X 10  X 10           R very difficult   Pec doorway stretch - 60 deg  Unilateral  5 X 10' B     Therapeutic Activities (44575)  (Dynamic activities such as compression, designed to improve functional performance)       Educated on s/s of infection and on risk factors for developing lymphedema (no IV, BP, or shots in RUE). Discussed POC for therapy. Provide handout for lymphedema prevention NV     Reviewed HEP from MD - table slides, supine AAROM shoulder flexion with cane, butterfly stretch, pec stretch (demo'd at doorway vs corner), scap squeeze        FUNCTIONAL OBJECTIVE MEASURES (see above) - 6 min walk, 5x STS, TUG       Provided lymphedema prevention handout and reviewed. Answered all pt questions. Pt provided with M below the knee tensoshape to wear on car ride to [place] coming up.  Educated on s/s of constriction and educated to take off if too tight.                scifit nu step                                                   Home Management  (providing pt education on safety procedures/instructions)                                                        Neuromuscular Re-ed (85039)                                                        Manual Intervention ([id number])       Manual stretching R shoulder     Has full passive ROM in jt. Difficulty relaxing UT to move shoudler   STM to pec tendon/cording in axilla  Educated and demo'd with hand over hand for self STM at home  X 10 min                                       Modalities:     OTHER:     Pt education:   3/25 educated on edema in R lateral chest. Discussed compression options including ember with build in bra or sports bra. Pt signed release of medical information form for [employer] to obtain home lymphedema pump. 3/18 Provided lymphedema prevention handout and reviewed. Answered all pt questions. Pt provided with M below the knee tensoshape to wear on car ride to [place] coming up. Educated on s/s of constriction and educated to take off if too tight. 3/7 Pt was educated on diagnosis; prognosis; PT POC including pathology and anatomy of etiology of lymphedema, condition precautions; lymphedema management/prevention of flare ups, role of exercise, HEP, expectations for rehab. All pt questions were answered. HEP instruction:  Access Code: [email or web address]  URL: [employer]/  Date: 03/21/2022  Prepared by: Barry Merida    Exercises  Standing Shoulder Row with Anchored Resistance - 1 x daily - 7 x weekly - 1 sets - 10 reps  Squatting High Shoulder Row with Resistance - 1 x daily - 7 x weekly - 1 sets - 10 reps  Seated Shoulder External Rotation with Resistance - 1 x daily - 7 x weekly - 1 sets - 10 reps  Standing Shoulder Adduction Reactive Isometrics with Resistance and Elbow Extended - 1 x daily - 7 x weekly - 1 sets - 10 reps  Single Arm Shoulder Extension with Resistance - 1 x daily - 7 x weekly - 1 sets - 10 reps    3/14  Access Code: [email or web address]  URL: [employer]/  Date: 03/14/2022  Prepared by: Neil Lindsay    Exercises  Doorway Pec Stretch at 60 Elevation - 1 x daily - 7 x weekly - 2 sets - 30 sec hold  Doorway Pec Stretch at 90 Degrees Abduction - 1 x daily - 7 x weekly - 2 sets - 30 sec hold    3/7  Reviewed HEP from MD. Questions answered about exercise and to complete in pain free range but still feeling a stretch. (see above for exercise)    Therapeutic Exercise and NMR EXR  [x] (34491) Provided verbal/tactile cueing for activities related to strengthening, flexibility, endurance, ROM for improvements in  [] LE / Lumbar: LE, proximal hip, and core control with self care, mobility, lifting, ambulation. [x] UE / Cervical: cervical, postural, scapular, scapulothoracic and UE control with self care, reaching, carrying, lifting, house/yardwork, driving, computer work.  [] (17087) Provided verbal/tactile cueing for activities related to improving balance, coordination, kinesthetic sense, posture, motor skill, proprioception to assist with   [] LE / lumbar: LE, proximal hip, and core control in self care, mobility, lifting, ambulation and eccentric single leg control. [] UE / cervical: cervical, scapular, scapulothoracic and UE control with self care, reaching, carrying, lifting, house/yardwork, driving, computer work.   [] (30485) Therapist is in constant attendance of 2 or more patients providing skilled therapy interventions, but not providing any significant amount of measurable one-on-one time to either patient, for improvements in  [] LE / lumbar: LE, proximal hip, and core control in self care, mobility, lifting, ambulation and eccentric single leg control. [] UE / cervical: cervical, scapular, scapulothoracic and UE control with self care, reaching, carrying, lifting, house/yardwork, driving, computer work.      NMR and Therapeutic Activities:    [x] (14280 or 43021) Provided verbal/tactile cueing for activities related to improving balance, coordination, kinesthetic sense, posture, motor skill, proprioception and motor activation to allow for proper function of   [] LE: / Lumbar core, proximal hip and LE with self care and ADLs  [x] UE / Cervical: cervical, postural, scapular, scapulothoracic and UE control with self care, carrying, lifting, driving, computer work.   [] (20034) Gait Re-education- Provided training and instruction to the patient for proper LE, core and proximal hip recruitment and positioning and eccentric body weight control with ambulation re-education including up and down stairs     Home Management Training / Self Care:  [x] (04395) Provided self-care/home management training related to activities of daily living and compensatory training, and/or use of adaptive equipment for improvement with: ADLs and compensatory training, meal preparation, safety procedures and instruction in use of adaptive equipment, including bathing, grooming, dressing, personal hygiene, basic household cleaning and chores.      Home Exercise Program:    [x] (23601) Reviewed/Progressed HEP activities related to strengthening, flexibility, endurance, ROM of   [] LE / Lumbar: core, proximal hip and LE for functional self-care, mobility, lifting and ambulation/stair navigation   [x] UE / Cervical: cervical, postural, scapular, scapulothoracic and UE control with self care, reaching, carrying, lifting, house/yardwork, driving, computer work  [] (11912)Reviewed/Progressed HEP activities related to improving balance, coordination, kinesthetic sense, posture, motor skill, proprioception of   [] LE: core, proximal hip and LE for self care, mobility, lifting, and ambulation/stair navigation    [] UE / Cervical: cervical, postural,  scapular, scapulothoracic and UE control with self care, reaching, carrying, lifting, house/yardwork, driving, computer work    Manual Treatments:  PROM / STM / Oscillations-Mobs:  G-I, II, III, IV (PA's, Inf., Post.)  [] (00429) Provided manual therapy to mobilize LE, proximal hip and/or LS spine soft tissue/joints for the purpose of modulating pain, promoting relaxation,  increasing ROM, reducing/eliminating soft tissue swelling/inflammation/restriction, improving soft tissue extensibility and allowing for proper ROM for normal function with   [] LE / lumbar: self care, mobility, lifting and ambulation. [] UE / Cervical: self care, reaching, carrying, lifting, house/yardwork, driving, computer work. Modalities:  [] (26579) Vasopneumatic compression: Utilized vasopneumatic compression to decrease edema / swelling for the purpose of improving mobility and quad tone / recruitment which will allow for increased overall function including but not limited to self-care, transfers, ambulation, and ascending / descending stairs. Charges:  Timed Code Treatment Minutes: 43   Total Treatment Minutes: 43     [] EVAL - LOW (56985)   [] EVAL - MOD (03336)  [] EVAL - HIGH (91057)  [] RE-EVAL (00767)  [x] UI(12670) x  2     [] Ionto  [] NMR (23494) x       [] Vaso  [x] Manual (10496) x 1       [] Ultrasound  [] TA x  1      [] Mech Traction (71937)  [] Aquatic Therapy x     [] ES (un) (93645):   [] Home Management Training x  [] ES(attended) (14987)   [] Dry Needling 1-2 muscles (43504):  [] Dry Needling 3+ muscles (129271  [] Group:      [] Other:     GOALS:   Patient stated goal: stretching chest mm and shoulder   []? Progressing: []? Met: []? Not Met: []? Adjusted     Therapist goals for Patient:   Short Term Goals: To be achieved in: 2 weeks  1. Independent in HEP and progression per patient tolerance, in order to prevent re-injury. []? Progressing: []? Met: []? Not Met: []? Adjusted  2. Patient will have a decrease in pain to facilitate improvement in movement, function, and ADLs as indicated by improvement with respect to Functional Deficits. []? Progressing: []? Met: []? Not Met: []? Adjusted     Long Term Goals: To be achieved in: 6    weeks  1. Disability index score of  0% or less on the QuickDASH  to assist with reaching prior level of function. -? Progressing: -? Met: -? Not Met: -? Adjusted  2.  Patient will demonstrate increased R shoulder AROM to 155 deg flexion and abd, to allow for proper joint functioning to allow pt to resume home management without increase in symptoms. -? Progressing: -? Met: -? Not Met: -? Adjusted  3. Patient will demonstrate increased R shoulder strength to 4+/5 in flexion and abd, to allow for proper joint functioning to allow pt to resume home management without increase in symptoms. []? Progressing: []? Met: []? Not Met: []? Adjusted  4. Pt will report ability to sleep 7/7 nights without waking up due to her RUE. []? Progressing: []? Met: []? Not Met: []? Adjusted    Overall Progression Towards Functional goals/ Treatment Progress Update:  [] Patient is progressing as expected towards functional goals listed. [] Progression is slowed due to complexities/Impairments listed. [] Progression has been slowed due to co-morbidities. [x] Plan just implemented, too soon to assess goals progression <30days   [] Goals require adjustment due to lack of progress  [] Patient is not progressing as expected and requires additional follow up with physician  [] Other    Persisting Functional Limitations/Impairments:  [x]Sleeping []Sitting               []Standing []Transfers        []Walking []Kneeling               []Stairs []Squatting / bending   [x]ADLs [x]Reaching  []Lifting  [x]Housework  []Driving []Job related tasks  []Sports/Recreation []Other:        ASSESSMENT: progressed ball ex for shoulder, pt requires many cues to perform ex correctly. Pt with tightness in R anterior chest with mild cording noted anteriorly and in axilla. Manual therapy to decrease tissue tension. Treatment/Activity Tolerance:  [x] Patient able to complete tx [] Patient limited by fatigue  [] Patient limited by pain  [] Patient limited by other medical complications  [] Other:     Prognosis: [x] Good [] Fair  [] Poor    Patient Requires Follow-up: [x] Yes  [] No    Plan for next treatment session: See flowsheet    PLAN: See loly. PT 2x / week for 6 weeks.    [x] Continue per plan of care [] Alter current plan (see comments)  [] Plan of care initiated [] Hold pending MD visit [] Discharge    Electronically signed by: Tama Goldberg, PT, DPT    Note: If patient does not return for scheduled/ recommended follow up visits, this note will serve as a discharge from care along with most recent update on progress.

## 2022-03-30 ENCOUNTER — HOSPITAL ENCOUNTER (OUTPATIENT)
Dept: PHYSICAL THERAPY | Age: 77
Setting detail: THERAPIES SERIES
Discharge: HOME | End: 2022-03-30
Payer: MEDICARE

## 2022-03-30 PROCEDURE — 97530 THERAPEUTIC ACTIVITIES: CPT

## 2022-03-30 PROCEDURE — 97110 THERAPEUTIC EXERCISES: CPT

## 2022-03-30 PROCEDURE — 97140 MANUAL THERAPY 1/> REGIONS: CPT

## 2022-03-30 NOTE — FLOWSHEET NOTE
[facility]  Phone: [phone]   Fax: [phone]    Physical Therapy Daily ONCOLOGY Treatment Note    Date:  3/30/2022    Patient Name:  Jose M Obando    :  1945  MRN: [id number]  Restrictions/Precautions:    Medical/Treatment Diagnosis Information:  Diagnosis: C50.911 (ICD-10-CM) - Primary breast malignancy, right ([facility] [address] C50.911 (ICD-10-CM) - Invasive ductal carcinoma of right breast, stage 3 ([facility] [address]  Treatment Diagnosis: decreased R shoulder strength and ROM, fatigue with decreased endurance  Insurance/Certification information:  PT Insurance Information: Medicare and [address] [place], [place] [id number] Jacinto, shawanda CP, no auth  Physician Information:  Referring Practitioner: Osman Lee MD  Plan of care signed (Y/N): [x]  Yes []  No     Date of Patient follow up with Physician: radiologist 3/11    Functional Outcomes:   Fatigue: 3-4/10    3/7/22  Quick Dash:  21 20-39%   3/10/22    Progress Report: []  Yes  [x]  No     Date Range for reporting period:  Beginning 3/7/22  Ending    Progress report due (10 Rx/or 30 days whichever is less): visit #10 or 67 (date)     Recertification due (POC duration/ or 90 days whichever is less): visit #12 or 22 (date)     Visit # Insurance Allowable Auth required? Date Range    Med nec []  Yes  [x]  No         Latex Allergy:  [x]NO      []YES  Preferred Language for Healthcare:   [x]English       []other:      Pain level:  1/10     SUBJECTIVE:  States she had a little bit of pain in her L shoulder during the night but none during the day.  Reports she cleaned her patio and did a load of laundry this am.       OBJECTIVE:   3/25   Chest measurement - 97.0 cm   Edema noted in R lateral chest wall   Increased tissue tension at R pec tendon and mild cording noted in R axilla   R pec tightness - 2 fingers from table compared to 1 finger on L     3/14   - 6 min walk - 1480 ft (1.25 m/s) - amb with B knee valgus and B foot pronation. Denies knee pain   - TUG - 8.37 sec    - 5 x STS - 11.79 sec     3/10 Pt has good PROM but pulls in UT immediately in all motions, attempted stretching but sh jt with good mobility but poor mm control       RESTRICTIONS/PRECAUTIONS:     EDS- very little stretching    Exercises/Interventions:     Therapeutic Exercises (81625) Resistance / level Sets/sec Reps Notes   Arm bike    Nu step    5 min     Pulleys    Flexion   Scaption   1 min   1 min     Supine AAROM with ranger       SB roll outsI, T, Y's Blue   Blue   Over green ball Flexion   Horizontal abd  10  10 B  X 10      TB   - mid row  - high row  - LPD   Sh ext/add  ER orange   1  1  1   X 10  X 10    X 10  X 10           R very difficult   Pec doorway stretch - 60 deg  Unilateral  5 X 10' B     Therapeutic Activities (42735)  (Dynamic activities such as compression, designed to improve functional performance)       Educated on s/s of infection and on risk factors for developing lymphedema (no IV, BP, or shots in RUE). Discussed POC for therapy. Provide handout for lymphedema prevention NV     Reviewed HEP from MD - table slides, supine AAROM shoulder flexion with cane, butterfly stretch, pec stretch (demo'd at doorway vs corner), scap squeeze        FUNCTIONAL OBJECTIVE MEASURES (see above) - 6 min walk, 5x STS, TUG       Provided lymphedema prevention handout and reviewed. Answered all pt questions. Pt provided with M below the knee tensoshape to wear on car ride to [facility].  Educated on s/s of constriction and educated to take off if too tight.                scifit nu step                Arm circles B   X 10    Shoulder protraction  B   X 10                         Home Management  (providing pt education on safety procedures/instructions)                                                        Neuromuscular Re-ed (26737)                                                        Manual Intervention (99781)       Manual stretching R shoulder     Has full passive ROM in jt. Difficulty relaxing UT to move shoudler   STM to pec tendon/cording in axilla   demo'd with hand over hand for self STM at home  X 15 min                                       Modalities:     OTHER:     Pt education:   3/25 educated on edema in R lateral chest. Discussed compression options including ember with build in bra or sports bra. Pt signed release of medical information form for [employer] to obtain home lymphedema pump. 3/18 Provided lymphedema prevention handout and reviewed. Answered all pt questions. Pt provided with M below the knee tensoshape to wear on car ride to [place] coming up. Educated on s/s of constriction and educated to take off if too tight. 3/7 Pt was educated on diagnosis; prognosis; PT POC including pathology and anatomy of etiology of lymphedema, condition precautions; lymphedema management/prevention of flare ups, role of exercise, HEP, expectations for rehab. All pt questions were answered. HEP instruction:  Access Code: [email or web address]  URL: [email or web address]  Date: 03/21/2022  Prepared by: Zulma Keys    Exercises  Standing Shoulder Row with Anchored Resistance - 1 x daily - 7 x weekly - 1 sets - 10 reps  Squatting High Shoulder Row with Resistance - 1 x daily - 7 x weekly - 1 sets - 10 reps  Seated Shoulder External Rotation with Resistance - 1 x daily - 7 x weekly - 1 sets - 10 reps  Standing Shoulder Adduction Reactive Isometrics with Resistance and Elbow Extended - 1 x daily - 7 x weekly - 1 sets - 10 reps  Single Arm Shoulder Extension with Resistance - 1 x daily - 7 x weekly - 1 sets - 10 reps    3/14  Access Code: [email or web address]  URL: [email or web address]  Date: 03/14/2022  Prepared by: Arpan Pennington    Exercises  Doorway Pec Stretch at 60 Elevation - 1 x daily - 7 x weekly - 2 sets - 30 sec hold  Doorway Pec Stretch at 90 Degrees Abduction - 1 x daily - 7 x weekly - 2 sets - 30 sec hold    3/7  Reviewed HEP from MD. Questions answered about exercise and to complete in pain free range but still feeling a stretch. (see above for exercise)    Therapeutic Exercise and NMR EXR  [x] (47237) Provided verbal/tactile cueing for activities related to strengthening, flexibility, endurance, ROM for improvements in  [] LE / Lumbar: LE, proximal hip, and core control with self care, mobility, lifting, ambulation. [x] UE / Cervical: cervical, postural, scapular, scapulothoracic and UE control with self care, reaching, carrying, lifting, house/yardwork, driving, computer work.  [] (63458) Provided verbal/tactile cueing for activities related to improving balance, coordination, kinesthetic sense, posture, motor skill, proprioception to assist with   [] LE / lumbar: LE, proximal hip, and core control in self care, mobility, lifting, ambulation and eccentric single leg control. [] UE / cervical: cervical, scapular, scapulothoracic and UE control with self care, reaching, carrying, lifting, house/yardwork, driving, computer work.   [] (17756) Therapist is in constant attendance of 2 or more patients providing skilled therapy interventions, but not providing any significant amount of measurable one-on-one time to either patient, for improvements in  [] LE / lumbar: LE, proximal hip, and core control in self care, mobility, lifting, ambulation and eccentric single leg control. [] UE / cervical: cervical, scapular, scapulothoracic and UE control with self care, reaching, carrying, lifting, house/yardwork, driving, computer work.      NMR and Therapeutic Activities:    [x] (80859 or 99477) Provided verbal/tactile cueing for activities related to improving balance, coordination, kinesthetic sense, posture, motor skill, proprioception and motor activation to allow for proper function of   [] LE: / Lumbar core, proximal hip and LE with self care and ADLs  [x] UE / Cervical: cervical, postural, scapular, scapulothoracic and UE control with self care, carrying, lifting, driving, computer work.   [] (41091) Gait Re-education- Provided training and instruction to the patient for proper LE, core and proximal hip recruitment and positioning and eccentric body weight control with ambulation re-education including up and down stairs     Home Management Training / Self Care:  [x] (37898) Provided self-care/home management training related to activities of daily living and compensatory training, and/or use of adaptive equipment for improvement with: ADLs and compensatory training, meal preparation, safety procedures and instruction in use of adaptive equipment, including bathing, grooming, dressing, personal hygiene, basic household cleaning and chores.      Home Exercise Program:    [x] (47273) Reviewed/Progressed HEP activities related to strengthening, flexibility, endurance, ROM of   [] LE / Lumbar: core, proximal hip and LE for functional self-care, mobility, lifting and ambulation/stair navigation   [x] UE / Cervical: cervical, postural, scapular, scapulothoracic and UE control with self care, reaching, carrying, lifting, house/yardwork, driving, computer work  [] (60901)Reviewed/Progressed HEP activities related to improving balance, coordination, kinesthetic sense, posture, motor skill, proprioception of   [] LE: core, proximal hip and LE for self care, mobility, lifting, and ambulation/stair navigation    [] UE / Cervical: cervical, postural,  scapular, scapulothoracic and UE control with self care, reaching, carrying, lifting, house/yardwork, driving, computer work    Manual Treatments:  PROM / STM / Oscillations-Mobs:  G-I, II, III, IV (PA's, Inf., Post.)  [] (20293) Provided manual therapy to mobilize LE, proximal hip and/or LS spine soft tissue/joints for the purpose of modulating pain, promoting relaxation,  increasing ROM, reducing/eliminating soft tissue swelling/inflammation/restriction, improving soft tissue extensibility and allowing for proper ROM for normal function with   [] LE / lumbar: self care, mobility, lifting and ambulation. [] UE / Cervical: self care, reaching, carrying, lifting, house/yardwork, driving, computer work. Modalities:  [] (24442) Vasopneumatic compression: Utilized vasopneumatic compression to decrease edema / swelling for the purpose of improving mobility and quad tone / recruitment which will allow for increased overall function including but not limited to self-care, transfers, ambulation, and ascending / descending stairs. Charges:  Timed Code Treatment Minutes: 45   Total Treatment Minutes: 45     [] EVAL - LOW (39068)   [] EVAL - MOD (55797)  [] EVAL - HIGH (46532)  [] RE-EVAL (16149)  [x] WX(63832) x  1     [] Ionto  [] NMR (34005) x       [] Vaso  [x] Manual (85543) x 1       [] Ultrasound  [x] TA x  1      [] Mech Traction (74894)  [] Aquatic Therapy x     [] ES (un) (83183):   [] Home Management Training x  [] ES(attended) (71999)   [] Dry Needling 1-2 muscles (42104):  [] Dry Needling 3+ muscles (069437  [] Group:      [] Other:     GOALS:   Patient stated goal: stretching chest mm and shoulder   []? Progressing: []? Met: []? Not Met: []? Adjusted     Therapist goals for Patient:   Short Term Goals: To be achieved in: 2 weeks  1. Independent in HEP and progression per patient tolerance, in order to prevent re-injury. []? Progressing: []? Met: []? Not Met: []? Adjusted  2. Patient will have a decrease in pain to facilitate improvement in movement, function, and ADLs as indicated by improvement with respect to Functional Deficits. []? Progressing: []? Met: []? Not Met: []? Adjusted     Long Term Goals: To be achieved in: 6    weeks  1. Disability index score of  0% or less on the QuickDASH  to assist with reaching prior level of function. -? Progressing: -? Met: -? Not Met: -? Adjusted  2.  Patient will demonstrate increased R shoulder AROM to 155 deg flexion and abd, to allow for proper joint functioning to allow pt to resume home management without increase in symptoms. -? Progressing: -? Met: -? Not Met: -? Adjusted  3. Patient will demonstrate increased R shoulder strength to 4+/5 in flexion and abd, to allow for proper joint functioning to allow pt to resume home management without increase in symptoms. []? Progressing: []? Met: []? Not Met: []? Adjusted  4. Pt will report ability to sleep 7/7 nights without waking up due to her RUE. []? Progressing: []? Met: []? Not Met: []? Adjusted    Overall Progression Towards Functional goals/ Treatment Progress Update:  [] Patient is progressing as expected towards functional goals listed. [] Progression is slowed due to complexities/Impairments listed. [] Progression has been slowed due to co-morbidities. [x] Plan just implemented, too soon to assess goals progression <30days   [] Goals require adjustment due to lack of progress  [] Patient is not progressing as expected and requires additional follow up with physician  [] Other    Persisting Functional Limitations/Impairments:  [x]Sleeping []Sitting               []Standing []Transfers        []Walking []Kneeling               []Stairs []Squatting / bending   [x]ADLs [x]Reaching  []Lifting  [x]Housework  []Driving []Job related tasks  []Sports/Recreation []Other:        ASSESSMENT: progressed ball ex for shoulder, pt requires many cues to perform ex correctly. Pt with tightness in R anterior chest with mild cording noted anteriorly and in axilla. Manual therapy to decrease tissue tension. Treatment/Activity Tolerance:  [x] Patient able to complete tx [] Patient limited by fatigue  [] Patient limited by pain  [] Patient limited by other medical complications  [] Other:     Prognosis: [x] Good [] Fair  [] Poor    Patient Requires Follow-up: [x] Yes  [] No    Plan for next treatment session: See flowsheet    PLAN: See eval. PT 2x / week for 6 weeks.    [x] Continue per plan of care [] Virgilio Matthews current plan (see comments)  [] Plan of care initiated [] Hold pending MD visit [] Discharge    Electronically signed by: Ryan Acosta, PT, DPT    Note: If patient does not return for scheduled/ recommended follow up visits, this note will serve as a discharge from care along with most recent update on progress.

## 2022-04-05 ENCOUNTER — HOSPITAL ENCOUNTER (OUTPATIENT)
Dept: PHYSICAL THERAPY | Age: 77
Setting detail: THERAPIES SERIES
Discharge: HOME | End: 2022-04-05
Payer: MEDICARE

## 2022-04-05 PROCEDURE — 97110 THERAPEUTIC EXERCISES: CPT

## 2022-04-05 PROCEDURE — 97530 THERAPEUTIC ACTIVITIES: CPT

## 2022-04-05 NOTE — PLAN OF CARE
[address] [facility]  Phone: [phone]   Fax: [phone]    Physical Therapy Re-Certification Plan of Care    Dear Dr. Jefferson Gowers,    We had the pleasure of treating the following patient for physical therapy services at [facility]. A summary of our findings can be found in the updated assessment below. This includes our plan of care. If you have any questions or concerns regarding these findings, please do not hesitate to contact me at the office phone number checked above. Thank you for the referral.     Physician Signature:________________________________Date:__________________  By signing above (or electronic signature), therapist's plan is approved by physician      Functional Outcome:   Fatigue: 3-4/10      3/7/22  Quick Dash:  21 20-39%     3/10/22  Fatigue: 3-4/10      4/5/22  QuickDASH: total score: 20, 20.45% disability   4/5/22    Overall Response to Treatment:   [x]Patient is responding well to treatment and improvement is noted with regards  to goals   []Patient should continue to improve in reasonable time if they continue HEP   []Patient has plateaued and is no longer responding to skilled PT intervention    []Patient is getting worse and would benefit from return to referring MD   []Patient unable to adhere to initial POC   [x]Other: Pt demonstrates improved R shoulder flexion and abd AROM, but continues to be limited in B shoulder AROM and strength. Pt continues with some limited function including sleeping and carrying. Pt with improved 5xSTS and TUG time this date. Was limited with 6 min walk due to fatigue with traveling this weekend and L foot pain. Amb with significant B knee valgus and foot pronation. Continue to progress strength and ROM to decrease pain to return to PLOF without limitations.      Date range of Visits: 3/7/22 - 4/5/22  Total Visits: 9    Recommendation:    [x]Continue PT 2x / wk for 4 weeks. []Hold PT, pending MD visit    Physical Therapy Daily ONCOLOGY Treatment Note    Date:  2022    Patient Name:  Katie Gray    :  1945  MRN: [id number]  Restrictions/Precautions:    Medical/Treatment Diagnosis Information:  Diagnosis: C50.911 (ICD-10-CM) - Primary breast malignancy, right ([facility] [address] C50.911 (ICD-10-CM) - Invasive ductal carcinoma of right breast, stage 3 ([facility] [address]  Treatment Diagnosis: decreased R shoulder strength and ROM, fatigue with decreased endurance  Insurance/Certification information:  PT Insurance Information: Medicare and [id number], no CP, no auth  Physician Information:  Referring Practitioner: Tao Shultz MD  Plan of care signed (Y/N): [x]  Yes []  No     Date of Patient follow up with Physician: radiologist 3/11    Functional Outcomes:   Fatigue: 3-4/10      3/7/22  Quick Dash:  21 20-39%     3/10/22  Fatigue: 3-4/10      4/5/22  QuickDASH: total score: 20, 20.45% disability   22    Progress Report: []  Yes  [x]  No     Date Range for reporting period:  Beginning 3/7/22  POC: 22  Ending    Progress report due (10 Rx/or 30 days whichever is less): visit #10 or 89 (date)     Recertification due (POC duration/ or 90 days whichever is less): visit #12 or 22 (date)     Visit # Insurance Allowable Auth required? Date Range    + 0/8 Med nec []  Yes  [x]  No         Latex Allergy:  [x]NO      []YES  Preferred Language for Healthcare:   [x]English       []other:      Pain level:  1/10     SUBJECTIVE:  Pt reports no pain today. States the tensoshape helped a lot for her drive. Has her first radiation treatment today. Pt reports she feels 80% of \"normal\" with her RUE. OBJECTIVE:     Upper Extremity ROM & Strength AROM  Right AROM  Left Strength Right Strength Left Comments   Shoulder flex 111 140 3+ 5     Shoulder  140 3+ 5      - 6 min walk - 1333 ft - amb with B knee valgus and B foot pronation.  Reports L foot pain   - TUG - 7.06 sec    - 5 x STS - 11.63 sec     3/25   Chest measurement - 97.0 cm   Edema noted in R lateral chest wall   Increased tissue tension at R pec tendon and mild cording noted in R axilla   R pec tightness - 2 fingers from table compared to 1 finger on L     3/14   - 6 min walk - 1480 ft (1.25 m/s) - amb with B knee valgus and B foot pronation. Denies knee pain   - TUG - 8.37 sec    - 5 x STS - 11.79 sec     3/10 Pt has good PROM but pulls in UT immediately in all motions, attempted stretching but sh jt with good mobility but poor mm control       RESTRICTIONS/PRECAUTIONS:     EDS- very little stretching, previous frozen shoulder L     Exercises/Interventions:     Therapeutic Exercises (47444) Resistance / level Sets/sec Reps Notes   Arm bike    Nu step    X 2 each way     Pulleys    Flexion   Scaption  5 sec  5 sec 10   10     Supine AAROM with ranger       SB roll outsI, T, Y's    TB   - mid row  - high row  - LPD   Sh ext/add  ER           R very difficult   Pec doorway stretch - 60 deg     Therapeutic Activities (35363)  (Dynamic activities such as compression, designed to improve functional performance)       Educated on s/s of infection and on risk factors for developing lymphedema (no IV, BP, or shots in RUE). Discussed POC for therapy. Provide handout for lymphedema prevention NV     Reviewed HEP from MD - table slides, supine AAROM shoulder flexion with cane, butterfly stretch, pec stretch (demo'd at doorway vs corner), scap squeeze        POC completed this date. Objective measures taken. FUNCTIONAL OBJECTIVE MEASURES (see above) - 6 min walk, 5x STS, TUG. Discussed progress with therapy and continued limitations in strength and mobility. X 25 min      Provided lymphedema prevention handout and reviewed. Answered all pt questions. Pt provided with M below the knee tensoshape to wear on car ride to [place] coming up.  Educated on s/s of constriction and educated to take off if too tight.                scifit nu step                Arm circles B      Shoulder protraction  B                           Home Management  (providing pt education on safety procedures/instructions)                                                        Neuromuscular Re-ed (38917)                                                        Manual Intervention (39426)       Manual stretching R shoulder     Has full passive ROM in jt. Difficulty relaxing UT to move shoudler   STM to pec tendon/cording in axilla   demo'd with hand over hand for self STM at home                                         Modalities:     OTHER:     Pt education:   3/25 educated on edema in R lateral chest. Discussed compression options including ember with build in bra or sports bra. Pt signed release of medical information form for [facility] to obtain home lymphedema pump. 3/18 Provided lymphedema prevention handout and reviewed. Answered all pt questions. Pt provided with M below the knee tensoshape to wear on car ride to [place] coming up. Educated on s/s of constriction and educated to take off if too tight. 3/7 Pt was educated on diagnosis; prognosis; PT POC including pathology and anatomy of etiology of lymphedema, condition precautions; lymphedema management/prevention of flare ups, role of exercise, HEP, expectations for rehab. All pt questions were answered. HEP instruction:  Access Code: [email or web address]  URL: [email or web address]  Date: 03/21/2022  Prepared by: Paul Dean    Exercises  Standing Shoulder Row with Anchored Resistance - 1 x daily - 7 x weekly - 1 sets - 10 reps  Squatting High Shoulder Row with Resistance - 1 x daily - 7 x weekly - 1 sets - 10 reps  Seated Shoulder External Rotation with Resistance - 1 x daily - 7 x weekly - 1 sets - 10 reps  Standing Shoulder Adduction Reactive Isometrics with Resistance and Elbow Extended - 1 x daily - 7 x weekly - 1 sets - 10 reps  Single Arm Shoulder Extension with Resistance - 1 x daily - 7 x weekly - 1 sets - 10 reps    3/14  Access Code: [email or web address]  URL: [email or web address]  Date: 03/14/2022  Prepared by: Melody Mahajan    Exercises  Doorway Pec Stretch at 60 Elevation - 1 x daily - 7 x weekly - 2 sets - 30 sec hold  Doorway Pec Stretch at 90 Degrees Abduction - 1 x daily - 7 x weekly - 2 sets - 30 sec hold    3/7  Reviewed HEP from MD. Questions answered about exercise and to complete in pain free range but still feeling a stretch. (see above for exercise)    Therapeutic Exercise and NMR EXR  [x] (59486) Provided verbal/tactile cueing for activities related to strengthening, flexibility, endurance, ROM for improvements in  [] LE / Lumbar: LE, proximal hip, and core control with self care, mobility, lifting, ambulation. [x] UE / Cervical: cervical, postural, scapular, scapulothoracic and UE control with self care, reaching, carrying, lifting, house/yardwork, driving, computer work.  [] (70938) Provided verbal/tactile cueing for activities related to improving balance, coordination, kinesthetic sense, posture, motor skill, proprioception to assist with   [] LE / lumbar: LE, proximal hip, and core control in self care, mobility, lifting, ambulation and eccentric single leg control. [] UE / cervical: cervical, scapular, scapulothoracic and UE control with self care, reaching, carrying, lifting, house/yardwork, driving, computer work.   [] (98953) Therapist is in constant attendance of 2 or more patients providing skilled therapy interventions, but not providing any significant amount of measurable one-on-one time to either patient, for improvements in  [] LE / lumbar: LE, proximal hip, and core control in self care, mobility, lifting, ambulation and eccentric single leg control. [] UE / cervical: cervical, scapular, scapulothoracic and UE control with self care, reaching, carrying, lifting, house/yardwork, driving, computer work.      NMR and Therapeutic Activities:    [x] (62692 or 58066) Provided verbal/tactile cueing for activities related to improving balance, coordination, kinesthetic sense, posture, motor skill, proprioception and motor activation to allow for proper function of   [] LE: / Lumbar core, proximal hip and LE with self care and ADLs  [x] UE / Cervical: cervical, postural, scapular, scapulothoracic and UE control with self care, carrying, lifting, driving, computer work.   [] (25021) Gait Re-education- Provided training and instruction to the patient for proper LE, core and proximal hip recruitment and positioning and eccentric body weight control with ambulation re-education including up and down stairs     Home Management Training / Self Care:  [x] (09503) Provided self-care/home management training related to activities of daily living and compensatory training, and/or use of adaptive equipment for improvement with: ADLs and compensatory training, meal preparation, safety procedures and instruction in use of adaptive equipment, including bathing, grooming, dressing, personal hygiene, basic household cleaning and chores.      Home Exercise Program:    [x] (10820) Reviewed/Progressed HEP activities related to strengthening, flexibility, endurance, ROM of   [] LE / Lumbar: core, proximal hip and LE for functional self-care, mobility, lifting and ambulation/stair navigation   [x] UE / Cervical: cervical, postural, scapular, scapulothoracic and UE control with self care, reaching, carrying, lifting, house/yardwork, driving, computer work  [] (81522)Reviewed/Progressed HEP activities related to improving balance, coordination, kinesthetic sense, posture, motor skill, proprioception of   [] LE: core, proximal hip and LE for self care, mobility, lifting, and ambulation/stair navigation    [] UE / Cervical: cervical, postural,  scapular, scapulothoracic and UE control with self care, reaching, carrying, lifting, house/yardwork, driving, computer work    Manual Treatments:  PROM / STM / Oscillations-Mobs:  G-I, II, III, IV (PA's, Inf., Post.)  [] (32357) Provided manual therapy to mobilize LE, proximal hip and/or LS spine soft tissue/joints for the purpose of modulating pain, promoting relaxation,  increasing ROM, reducing/eliminating soft tissue swelling/inflammation/restriction, improving soft tissue extensibility and allowing for proper ROM for normal function with   [] LE / lumbar: self care, mobility, lifting and ambulation. [] UE / Cervical: self care, reaching, carrying, lifting, house/yardwork, driving, computer work. Modalities:  [] (42008) Vasopneumatic compression: Utilized vasopneumatic compression to decrease edema / swelling for the purpose of improving mobility and quad tone / recruitment which will allow for increased overall function including but not limited to self-care, transfers, ambulation, and ascending / descending stairs. Charges:  Timed Code Treatment Minutes: 45   Total Treatment Minutes: 45     [] EVAL - LOW (21942)   [] EVAL - MOD (67578)  [] EVAL - HIGH (00280)  [] RE-EVAL (69151)  [x] NH(92207) x  1     [] Ionto  [] NMR (53686) x       [] Vaso  [] Manual (27471) x 1       [] Ultrasound  [x] TA x  2      [] Mech Traction (72239)  [] Aquatic Therapy x     [] ES (un) (17075):   [] Home Management Training x  [] ES(attended) (50283)   [] Dry Needling 1-2 muscles (99519):  [] Dry Needling 3+ muscles (876896  [] Group:      [] Other:     GOALS:   Patient stated goal: stretching chest mm and shoulder   [x]? Progressing: []? Met: []? Not Met: []? Adjusted     Therapist goals for Patient:   Short Term Goals: To be achieved in: 2 weeks  1. Independent in HEP and progression per patient tolerance, in order to prevent re-injury. []? Progressing: [x]? Met: []? Not Met: []? Adjusted  2.  Patient will have a decrease in pain to facilitate improvement in movement, function, and ADLs as indicated by improvement with respect to Functional Deficits. []? Progressing: [x]? Met: []? Not Met: []? Adjusted     Long Term Goals: To be achieved in: 6    weeks  1. Disability index score of  0% or less on the QuickDASH  to assist with reaching prior level of function. -? Progressing: -? Met: -? Not Met: -? Adjusted  2. Patient will demonstrate increased R shoulder AROM to 155 deg flexion and abd, to allow for proper joint functioning to allow pt to resume home management without increase in symptoms. -? Progressing: -? Met: -? Not Met: -? Adjusted  3. Patient will demonstrate increased R shoulder strength to 4+/5 in flexion and abd, to allow for proper joint functioning to allow pt to resume home management without increase in symptoms. [x]? Progressing: []? Met: []? Not Met: []? Adjusted  4. Pt will report ability to sleep 7/7 nights without waking up due to her RUE. [x]? Progressing: []? Met: []? Not Met: []? Adjusted    Overall Progression Towards Functional goals/ Treatment Progress Update:  [] Patient is progressing as expected towards functional goals listed. [] Progression is slowed due to complexities/Impairments listed. [] Progression has been slowed due to co-morbidities. [x] Plan just implemented, too soon to assess goals progression <30days   [] Goals require adjustment due to lack of progress  [] Patient is not progressing as expected and requires additional follow up with physician  [] Other    Persisting Functional Limitations/Impairments:  [x]Sleeping []Sitting               []Standing []Transfers        []Walking []Kneeling               []Stairs []Squatting / bending   [x]ADLs [x]Reaching  []Lifting  [x]Housework  []Driving []Job related tasks  []Sports/Recreation []Other:        ASSESSMENT: Pt demonstrates improved R shoulder flexion and abd AROM, but continues to be limited in B shoulder AROM and strength. Pt continues with some limited function including sleeping and carrying.  Pt with improved 5xSTS and TUG time this date. Was limited with 6 min walk due to fatigue with traveling this weekend and L foot pain. Amb with significant B knee valgus and foot pronation. Continue to progress strength and ROM to decrease pain to return to PLOF without limitations. Treatment/Activity Tolerance:  [x] Patient able to complete tx [] Patient limited by fatigue  [] Patient limited by pain  [] Patient limited by other medical complications  [] Other:     Prognosis: [x] Good [] Fair  [] Poor    Patient Requires Follow-up: [x] Yes  [] No    Plan for next treatment session: See flowsheet    PLAN: See eval. PT 2x / week for 6 weeks. [x] Continue per plan of care [] Alter current plan (see comments)  [] Plan of care initiated [] Hold pending MD visit [] Discharge    Electronically signed by: Klaus Cruz PT, DPT    Note: If patient does not return for scheduled/ recommended follow up visits, this note will serve as a discharge from care along with most recent update on progress.

## 2022-04-08 ENCOUNTER — HOSPITAL ENCOUNTER (OUTPATIENT)
Dept: PHYSICAL THERAPY | Age: 77
Setting detail: THERAPIES SERIES
Discharge: HOME | End: 2022-04-08
Payer: MEDICARE

## 2022-04-08 PROCEDURE — 97110 THERAPEUTIC EXERCISES: CPT

## 2022-04-08 NOTE — PLAN OF CARE
[address] [facility] Physical Therapy  Phone: [phone]   Fax: [phone]    Physical Therapy Daily ONCOLOGY Treatment Note    Date:  2022    Patient Name:  Miguel A Hillman    :  1945  MRN: [id number]  Restrictions/Precautions:    Medical/Treatment Diagnosis Information:  Diagnosis: C50.911 (ICD-10-CM) - Primary breast malignancy, right ([facility] [address] C50.911 (ICD-10-CM) - Invasive ductal carcinoma of right breast, stage 3 ([facility] [address]  Treatment Diagnosis: decreased R shoulder strength and ROM, fatigue with decreased endurance  Insurance/Certification information:  PT Insurance Information: Medicare and [id number], no CP, no auth  Physician Information:  Referring Practitioner: Subhash Kemp MD  Plan of care signed (Y/N): [x]  Yes []  No     Date of Patient follow up with Physician: radiologist 3/11    Functional Outcomes:   Fatigue: 3-4/10      3/7/22  Quick Dash:  21 20-39%     3/10/22  Fatigue: 3-4/10      4/5/22  QuickDASH: total score: 20, 20.45% disability   22    Progress Report: []  Yes  [x]  No     Date Range for reporting period:  Beginning 3/7/22  POC: 22  Ending    Progress report due (10 Rx/or 30 days whichever is less): visit #10 or 39 (date)     Recertification due (POC duration/ or 90 days whichever is less): visit #12 or 22 (date)     Visit # Insurance Allowable Auth required? Date Range   10/12 + 0/8 Med nec []  Yes  [x]  No         Latex Allergy:  [x]NO      []YES  Preferred Language for Healthcare:   [x]English       []other:      Pain level:  1/10     SUBJECTIVE:  Had first 3 sessions of radiation this week. Reports first was very uncomfortable but the second wasn't as bad. Sister is present for visit today. Pt 7 min late.      OBJECTIVE:   4/5  Upper Extremity ROM & Strength AROM  Right AROM  Left Strength Right Strength Left Comments   Shoulder flex 111 140 3+ 5     Shoulder  140 3+ 5      - 6 min walk - 1333 ft - amb with B knee valgus and B foot pronation. Reports L foot pain   - TUG - 7.06 sec    - 5 x STS - 11.63 sec     3/25   Chest measurement - 97.0 cm   Edema noted in R lateral chest wall   Increased tissue tension at R pec tendon and mild cording noted in R axilla   R pec tightness - 2 fingers from table compared to 1 finger on L     3/14   - 6 min walk - 1480 ft (1.25 m/s) - amb with B knee valgus and B foot pronation. Denies knee pain   - TUG - 8.37 sec    - 5 x STS - 11.79 sec     3/10 Pt has good PROM but pulls in UT immediately in all motions, attempted stretching but sh jt with good mobility but poor mm control       RESTRICTIONS/PRECAUTIONS:     EDS- very little stretching, previous frozen shoulder L     Exercises/Interventions:     Therapeutic Exercises (27507) Resistance / level Sets/sec Reps Notes   Arm bike    Nu step    X 2 each way     Pulleys    Flexion   Scaption  5 sec  5 sec 10   10     Supine AAROM with ranger       SB roll outsI, T, Y's Over green ballX 10     TB   - mid row  - high row  - LPD   Sh ext/add  ER orange   1  1  1   X 10  X 10  X 10    X 10 4/8 progress to green NV         R very difficult   Pec doorway stretch - 60 deg     Therapeutic Activities (46378)  (Dynamic activities such as compression, designed to improve functional performance)       Educated on s/s of infection and on risk factors for developing lymphedema (no IV, BP, or shots in RUE). Discussed POC for therapy. Provide handout for lymphedema prevention NV     Reviewed HEP from MD - table slides, supine AAROM shoulder flexion with cane, butterfly stretch, pec stretch (demo'd at doorway vs corner), scap squeeze        POC completed this date. Objective measures taken. FUNCTIONAL OBJECTIVE MEASURES (see above) - 6 min walk, 5x STS, TUG. Discussed progress with therapy and continued limitations in strength and mobility. Provided lymphedema prevention handout and reviewed. Answered all pt questions.    Pt provided with Gabriela Robison below the knee tensoshape to wear on car ride to [place] coming up. Educated on s/s of constriction and educated to take off if too tight.                scifit nu step                Arm circles B      Shoulder protraction  B                           Home Management  (providing pt education on safety procedures/instructions)                                                        Neuromuscular Re-ed (98178)                                                        Manual Intervention (56950)       Manual stretching R shoulder     Has full passive ROM in jt. Difficulty relaxing UT to move shoudler   STM to pec tendon/cording in axilla   demo'd with hand over hand for self STM at home                                         Modalities:     OTHER:     Pt education:   3/25 educated on edema in R lateral chest. Discussed compression options including ember with build in bra or sports bra. Pt signed release of medical information form for [facility] to obtain home lymphedema pump. 3/18 Provided lymphedema prevention handout and reviewed. Answered all pt questions. Pt provided with M below the knee tensoshape to wear on car ride to [place] coming up. Educated on s/s of constriction and educated to take off if too tight. 3/7 Pt was educated on diagnosis; prognosis; PT POC including pathology and anatomy of etiology of lymphedema, condition precautions; lymphedema management/prevention of flare ups, role of exercise, HEP, expectations for rehab. All pt questions were answered. HEP instruction:  Access Code: [email or web address]  URL: [email or web address]  Date: 03/21/2022  Prepared by: Jerel Han    Exercises  Standing Shoulder Row with Anchored Resistance - 1 x daily - 7 x weekly - 1 sets - 10 reps  Squatting High Shoulder Row with Resistance - 1 x daily - 7 x weekly - 1 sets - 10 reps  Seated Shoulder External Rotation with Resistance - 1 x daily - 7 x weekly - 1 sets - 10 reps  Standing Shoulder Adduction Reactive Isometrics with Resistance and Elbow Extended - 1 x daily - 7 x weekly - 1 sets - 10 reps  Single Arm Shoulder Extension with Resistance - 1 x daily - 7 x weekly - 1 sets - 10 reps    3/14  Access Code: [email or web address]  URL: [email or web address]  Date: 03/14/2022  Prepared by: Janey Das    Exercises  Doorway Pec Stretch at 60 Elevation - 1 x daily - 7 x weekly - 2 sets - 30 sec hold  Doorway Pec Stretch at 90 Degrees Abduction - 1 x daily - 7 x weekly - 2 sets - 30 sec hold    3/7  Reviewed HEP from MD. Questions answered about exercise and to complete in pain free range but still feeling a stretch. (see above for exercise)    Therapeutic Exercise and NMR EXR  [x] (13592) Provided verbal/tactile cueing for activities related to strengthening, flexibility, endurance, ROM for improvements in  [] LE / Lumbar: LE, proximal hip, and core control with self care, mobility, lifting, ambulation. [x] UE / Cervical: cervical, postural, scapular, scapulothoracic and UE control with self care, reaching, carrying, lifting, house/yardwork, driving, computer work.  [] (95227) Provided verbal/tactile cueing for activities related to improving balance, coordination, kinesthetic sense, posture, motor skill, proprioception to assist with   [] LE / lumbar: LE, proximal hip, and core control in self care, mobility, lifting, ambulation and eccentric single leg control. [] UE / cervical: cervical, scapular, scapulothoracic and UE control with self care, reaching, carrying, lifting, house/yardwork, driving, computer work.   [] (63542) Therapist is in constant attendance of 2 or more patients providing skilled therapy interventions, but not providing any significant amount of measurable one-on-one time to either patient, for improvements in  [] LE / lumbar: LE, proximal hip, and core control in self care, mobility, lifting, ambulation and eccentric single leg control.    [] UE / cervical: cervical, scapular, scapulothoracic and UE control with self care, reaching, carrying, lifting, house/yardwork, driving, computer work. NMR and Therapeutic Activities:    [x] (13377 or 36974) Provided verbal/tactile cueing for activities related to improving balance, coordination, kinesthetic sense, posture, motor skill, proprioception and motor activation to allow for proper function of   [] LE: / Lumbar core, proximal hip and LE with self care and ADLs  [x] UE / Cervical: cervical, postural, scapular, scapulothoracic and UE control with self care, carrying, lifting, driving, computer work.   [] (10378) Gait Re-education- Provided training and instruction to the patient for proper LE, core and proximal hip recruitment and positioning and eccentric body weight control with ambulation re-education including up and down stairs     Home Management Training / Self Care:  [x] (15888) Provided self-care/home management training related to activities of daily living and compensatory training, and/or use of adaptive equipment for improvement with: ADLs and compensatory training, meal preparation, safety procedures and instruction in use of adaptive equipment, including bathing, grooming, dressing, personal hygiene, basic household cleaning and chores.      Home Exercise Program:    [x] (37072) Reviewed/Progressed HEP activities related to strengthening, flexibility, endurance, ROM of   [] LE / Lumbar: core, proximal hip and LE for functional self-care, mobility, lifting and ambulation/stair navigation   [x] UE / Cervical: cervical, postural, scapular, scapulothoracic and UE control with self care, reaching, carrying, lifting, house/yardwork, driving, computer work  [] (69551)Reviewed/Progressed HEP activities related to improving balance, coordination, kinesthetic sense, posture, motor skill, proprioception of   [] LE: core, proximal hip and LE for self care, mobility, lifting, and ambulation/stair navigation    [] UE / Cervical: cervical, postural,  scapular, scapulothoracic and UE control with self care, reaching, carrying, lifting, house/yardwork, driving, computer work    Manual Treatments:  PROM / STM / Oscillations-Mobs:  G-I, II, III, IV (PA's, Inf., Post.)  [] (47334) Provided manual therapy to mobilize LE, proximal hip and/or LS spine soft tissue/joints for the purpose of modulating pain, promoting relaxation,  increasing ROM, reducing/eliminating soft tissue swelling/inflammation/restriction, improving soft tissue extensibility and allowing for proper ROM for normal function with   [] LE / lumbar: self care, mobility, lifting and ambulation. [] UE / Cervical: self care, reaching, carrying, lifting, house/yardwork, driving, computer work. Modalities:  [] (73218) Vasopneumatic compression: Utilized vasopneumatic compression to decrease edema / swelling for the purpose of improving mobility and quad tone / recruitment which will allow for increased overall function including but not limited to self-care, transfers, ambulation, and ascending / descending stairs. Charges:  Timed Code Treatment Minutes: 35   Total Treatment Minutes: 35     [] EVAL - LOW (95976)   [] EVAL - MOD (76938)  [] EVAL - HIGH (91156)  [] RE-EVAL (86420)  [x] DX(89696) x  2     [] Ionto  [] NMR (77967) x       [] Vaso  [] Manual (68620) x 1       [] Ultrasound  [] TA x  2      [] Mech Traction (01173)  [] Aquatic Therapy x     [] ES (un) (55307):   [] Home Management Training x  [] ES(attended) (95731)   [] Dry Needling 1-2 muscles (68140):  [] Dry Needling 3+ muscles (389161  [] Group:      [] Other:     GOALS:   Patient stated goal: stretching chest mm and shoulder   [x]? Progressing: []? Met: []? Not Met: []? Adjusted     Therapist goals for Patient:   Short Term Goals: To be achieved in: 2 weeks  1. Independent in HEP and progression per patient tolerance, in order to prevent re-injury. []? Progressing: [x]? Met: []? Not Met: []? Adjusted  2.  Patient will have a decrease in pain to facilitate improvement in movement, function, and ADLs as indicated by improvement with respect to Functional Deficits. []? Progressing: [x]? Met: []? Not Met: []? Adjusted     Long Term Goals: To be achieved in: 6    weeks  1. Disability index score of  0% or less on the QuickDASH  to assist with reaching prior level of function. -? Progressing: -? Met: -? Not Met: -? Adjusted  2. Patient will demonstrate increased R shoulder AROM to 155 deg flexion and abd, to allow for proper joint functioning to allow pt to resume home management without increase in symptoms. -? Progressing: -? Met: -? Not Met: -? Adjusted  3. Patient will demonstrate increased R shoulder strength to 4+/5 in flexion and abd, to allow for proper joint functioning to allow pt to resume home management without increase in symptoms. [x]? Progressing: []? Met: []? Not Met: []? Adjusted  4. Pt will report ability to sleep 7/7 nights without waking up due to her RUE. [x]? Progressing: []? Met: []? Not Met: []? Adjusted    Overall Progression Towards Functional goals/ Treatment Progress Update:  [] Patient is progressing as expected towards functional goals listed. [] Progression is slowed due to complexities/Impairments listed. [] Progression has been slowed due to co-morbidities. [x] Plan just implemented, too soon to assess goals progression <30days   [] Goals require adjustment due to lack of progress  [] Patient is not progressing as expected and requires additional follow up with physician  [] Other    Persisting Functional Limitations/Impairments:  [x]Sleeping []Sitting               []Standing []Transfers        []Walking []Kneeling               []Stairs []Squatting / bending   [x]ADLs [x]Reaching  []Lifting  [x]Housework  []Driving []Job related tasks  []Sports/Recreation []Other:        ASSESSMENT: Pt demonstrates improved posture with TB exercises and scapular strengthening over SB.  Required VC for

## 2022-04-11 ENCOUNTER — HOSPITAL ENCOUNTER (OUTPATIENT)
Dept: PHYSICAL THERAPY | Age: 77
Setting detail: THERAPIES SERIES
Discharge: HOME | End: 2022-04-11
Payer: MEDICARE

## 2022-04-11 PROCEDURE — 97110 THERAPEUTIC EXERCISES: CPT

## 2022-04-11 PROCEDURE — 97112 NEUROMUSCULAR REEDUCATION: CPT

## 2022-04-11 NOTE — FLOWSHEET NOTE
[facility]  Phone: [phone]   Fax: [phone]    Physical Therapy Daily ONCOLOGY Treatment Note    Date:  2022    Patient Name:  Cameron Mallory    :  1945  MRN: [id number]  Restrictions/Precautions:    Medical/Treatment Diagnosis Information:  Diagnosis: C50.911 (ICD-10-CM) - Primary breast malignancy, right ([facility] [address] C50.911 (ICD-10-CM) - Invasive ductal carcinoma of right breast, stage 3 ([facility] [address]  Treatment Diagnosis: decreased R shoulder strength and ROM, fatigue with decreased endurance  Insurance/Certification information:  PT Insurance Information: Medicare and [id number], no CP, no auth  Physician Information:  Referring Practitioner: Dashawn Dimas MD  Plan of care signed (Y/N): [x]  Yes []  No     Date of Patient follow up with Physician: radiologist 3/11    Functional Outcomes:   Fatigue: 3-4/10      3/7/22  Quick Dash:  21 20-39%     3/10/22  Fatigue: 3-4/10      4/5/22  QuickDASH: total score: 20, 20.45% disability   22    Progress Report: []  Yes  [x]  No     Date Range for reporting period:  Beginning 3/7/22  POC: 22  Ending    Progress report due (10 Rx/or 30 days whichever is less): visit #10 or 0/3/16 (date)     Recertification due (POC duration/ or 90 days whichever is less): visit #12 or 22 (date)     Visit # Insurance Allowable Auth required? Date Range    + 0/8 Med nec []  Yes  [x]  No         Latex Allergy:  [x]NO      []YES  Preferred Language for Healthcare:   [x]English       []other:      Pain level:  1/10     SUBJECTIVE:  Reports some pain today in her R shoulder. States she is tired because she didn't rest well. States she took advil prior to radiation Friday and it helped.      OBJECTIVE:     Upper Extremity ROM & Strength AROM  Right AROM  Left Strength Right Strength Left Comments   Shoulder flex 111 140 3+ 5     Shoulder  140 3+ 5      - 6 min walk - 1333 ft - amb with B knee valgus and B foot pronation. Reports L foot pain   - TUG - 7.06 sec    - 5 x STS - 11.63 sec     3/25   Chest measurement - 97.0 cm   Edema noted in R lateral chest wall   Increased tissue tension at R pec tendon and mild cording noted in R axilla   R pec tightness - 2 fingers from table compared to 1 finger on L     3/14   - 6 min walk - 1480 ft (1.25 m/s) - amb with B knee valgus and B foot pronation. Denies knee pain   - TUG - 8.37 sec    - 5 x STS - 11.79 sec     3/10 Pt has good PROM but pulls in UT immediately in all motions, attempted stretching but sh jt with good mobility but poor mm control       RESTRICTIONS/PRECAUTIONS:     EDS- very little stretching, previous frozen shoulder L     Exercises/Interventions:     Therapeutic Exercises (32416) Resistance / level Sets/sec Reps Notes   Arm bike    Nu step    5 min     Pulleys    Flexion   Scaption  5 sec  5 sec 10   10     Supine AAROM with ranger       SB roll outsI, T, Y's Blue Over green ballFlexion 5 sec, 10     TB   - mid row  - high row  - LPD   Sh ext/add  ER GREEN   1  1  1   X 10  X 10  X 10     4/8 progress to green NV         R very difficult   Pec doorway stretch - 60 deg     B shoulder ER - seated     UT stretch   Levator stretch  [place] 2    30 sec   30 sec 10    2 B   2 B   Therapeutic Activities (68273)  (Dynamic activities such as compression, designed to improve functional performance)       Educated on s/s of infection and on risk factors for developing lymphedema (no IV, BP, or shots in RUE). Discussed POC for therapy. Provide handout for lymphedema prevention NV     Reviewed HEP from MD - table slides, supine AAROM shoulder flexion with cane, butterfly stretch, pec stretch (demo'd at doorway vs corner), scap squeeze        POC completed this date. Objective measures taken. FUNCTIONAL OBJECTIVE MEASURES (see above) - 6 min walk, 5x STS, TUG. Discussed progress with therapy and continued limitations in strength and mobility. Provided lymphedema prevention handout and reviewed. Answered all pt questions. Pt provided with M below the knee tensoshape to wear on car ride to [place] coming up. Educated on s/s of constriction and educated to take off if too tight.                scifit nu step                Arm circles B      Shoulder protraction  B                           Home Management  (providing pt education on safety procedures/instructions)                                                        Neuromuscular Re-ed (76729)       SB roll up wall - LT (focus on relaxing UT)   10    Scap retract   3 sec 10                                        Manual Intervention (55830)       Manual stretching R shoulder     Has full passive ROM in jt. Difficulty relaxing UT to move shoudler   STM to pec tendon/cording in axilla   demo'd with hand over hand for self STM at home      4/11 - hold until pt finishes radiation (mid May)                                    Modalities:     OTHER:     Pt education:   3/25 educated on edema in R lateral chest. Discussed compression options including ember with build in bra or sports bra. Pt signed release of medical information form for [facility] to obtain home lymphedema pump. 3/18 Provided lymphedema prevention handout and reviewed. Answered all pt questions. Pt provided with M below the knee tensoshape to wear on car ride to [place] coming up. Educated on s/s of constriction and educated to take off if too tight. 3/7 Pt was educated on diagnosis; prognosis; PT POC including pathology and anatomy of etiology of lymphedema, condition precautions; lymphedema management/prevention of flare ups, role of exercise, HEP, expectations for rehab. All pt questions were answered. HEP instruction:  Access Code: [email or web address]  URL: [email or web address]. [email or web address]/  Date: 03/21/2022  Prepared by: Rosa Medina    Exercises  Standing Shoulder Row with Anchored Resistance - 1 x daily - 7 x weekly - 1 sets - 10 reps  Squatting High Shoulder Row with Resistance - 1 x daily - 7 x weekly - 1 sets - 10 reps  Seated Shoulder External Rotation with Resistance - 1 x daily - 7 x weekly - 1 sets - 10 reps  Standing Shoulder Adduction Reactive Isometrics with Resistance and Elbow Extended - 1 x daily - 7 x weekly - 1 sets - 10 reps  Single Arm Shoulder Extension with Resistance - 1 x daily - 7 x weekly - 1 sets - 10 reps    3/14  Access Code: [email or web address]  URL: [email or web address]  Date: 03/14/2022  Prepared by: Mauri Vigil    Exercises  Doorway Pec Stretch at 60 Elevation - 1 x daily - 7 x weekly - 2 sets - 30 sec hold  Doorway Pec Stretch at 90 Degrees Abduction - 1 x daily - 7 x weekly - 2 sets - 30 sec hold    3/7  Reviewed HEP from MD. Questions answered about exercise and to complete in pain free range but still feeling a stretch. (see above for exercise)    Therapeutic Exercise and NMR EXR  [x] (88731) Provided verbal/tactile cueing for activities related to strengthening, flexibility, endurance, ROM for improvements in  [] LE / Lumbar: LE, proximal hip, and core control with self care, mobility, lifting, ambulation. [x] UE / Cervical: cervical, postural, scapular, scapulothoracic and UE control with self care, reaching, carrying, lifting, house/yardwork, driving, computer work.  [] (05952) Provided verbal/tactile cueing for activities related to improving balance, coordination, kinesthetic sense, posture, motor skill, proprioception to assist with   [] LE / lumbar: LE, proximal hip, and core control in self care, mobility, lifting, ambulation and eccentric single leg control.    [] UE / cervical: cervical, scapular, scapulothoracic and UE control with self care, reaching, carrying, lifting, house/yardwork, driving, computer work.   [] (58034) Therapist is in constant attendance of 2 or more patients providing skilled therapy interventions, but not providing any significant amount of measurable one-on-one time to either patient, for improvements in  [] LE / lumbar: LE, proximal hip, and core control in self care, mobility, lifting, ambulation and eccentric single leg control. [] UE / cervical: cervical, scapular, scapulothoracic and UE control with self care, reaching, carrying, lifting, house/yardwork, driving, computer work. NMR and Therapeutic Activities:    [x] (08647 or 57280) Provided verbal/tactile cueing for activities related to improving balance, coordination, kinesthetic sense, posture, motor skill, proprioception and motor activation to allow for proper function of   [] LE: / Lumbar core, proximal hip and LE with self care and ADLs  [x] UE / Cervical: cervical, postural, scapular, scapulothoracic and UE control with self care, carrying, lifting, driving, computer work.   [] (25223) Gait Re-education- Provided training and instruction to the patient for proper LE, core and proximal hip recruitment and positioning and eccentric body weight control with ambulation re-education including up and down stairs     Home Management Training / Self Care:  [x] (46498) Provided self-care/home management training related to activities of daily living and compensatory training, and/or use of adaptive equipment for improvement with: ADLs and compensatory training, meal preparation, safety procedures and instruction in use of adaptive equipment, including bathing, grooming, dressing, personal hygiene, basic household cleaning and chores.      Home Exercise Program:    [x] (19407) Reviewed/Progressed HEP activities related to strengthening, flexibility, endurance, ROM of   [] LE / Lumbar: core, proximal hip and LE for functional self-care, mobility, lifting and ambulation/stair navigation   [x] UE / Cervical: cervical, postural, scapular, scapulothoracic and UE control with self care, reaching, carrying, lifting, house/yardwork, driving, computer work  [] (90751)Reviewed/Progressed HEP activities related to improving balance, coordination, kinesthetic sense, posture, motor skill, proprioception of   [] LE: core, proximal hip and LE for self care, mobility, lifting, and ambulation/stair navigation    [] UE / Cervical: cervical, postural,  scapular, scapulothoracic and UE control with self care, reaching, carrying, lifting, house/yardwork, driving, computer work    Manual Treatments:  PROM / STM / Oscillations-Mobs:  G-I, II, III, IV (PA's, Inf., Post.)  [] (17892) Provided manual therapy to mobilize LE, proximal hip and/or LS spine soft tissue/joints for the purpose of modulating pain, promoting relaxation,  increasing ROM, reducing/eliminating soft tissue swelling/inflammation/restriction, improving soft tissue extensibility and allowing for proper ROM for normal function with   [] LE / lumbar: self care, mobility, lifting and ambulation. [] UE / Cervical: self care, reaching, carrying, lifting, house/yardwork, driving, computer work. Modalities:  [] (19326) Vasopneumatic compression: Utilized vasopneumatic compression to decrease edema / swelling for the purpose of improving mobility and quad tone / recruitment which will allow for increased overall function including but not limited to self-care, transfers, ambulation, and ascending / descending stairs. Charges:  Timed Code Treatment Minutes: 40   Total Treatment Minutes: 40     [] EVAL - LOW (70718)   [] EVAL - MOD (89438)  [] EVAL - HIGH (02020)  [] RE-EVAL (02668)  [x] QN(35884) x  2     [] Ionto  [x] NMR (22725) x  1     [] Vaso  [] Manual (10237) x 1       [] Ultrasound  [] TA x  2      [] Mech Traction (99805)  [] Aquatic Therapy x     [] ES (un) (46020):   [] Home Management Training x  [] ES(attended) (38828)   [] Dry Needling 1-2 muscles (94989):  [] Dry Needling 3+ muscles (221260  [] Group:      [] Other:     GOALS:   Patient stated goal: stretching chest mm and shoulder   [x]? Progressing: []? Met: []? Not Met: []?  Adjusted     Therapist goals for Patient: Short Term Goals: To be achieved in: 2 weeks  1. Independent in HEP and progression per patient tolerance, in order to prevent re-injury. []? Progressing: [x]? Met: []? Not Met: []? Adjusted  2. Patient will have a decrease in pain to facilitate improvement in movement, function, and ADLs as indicated by improvement with respect to Functional Deficits. []? Progressing: [x]? Met: []? Not Met: []? Adjusted     Long Term Goals: To be achieved in: 6    weeks  1. Disability index score of  0% or less on the QuickDASH  to assist with reaching prior level of function. -? Progressing: -? Met: -? Not Met: -? Adjusted  2. Patient will demonstrate increased R shoulder AROM to 155 deg flexion and abd, to allow for proper joint functioning to allow pt to resume home management without increase in symptoms. -? Progressing: -? Met: -? Not Met: -? Adjusted  3. Patient will demonstrate increased R shoulder strength to 4+/5 in flexion and abd, to allow for proper joint functioning to allow pt to resume home management without increase in symptoms. [x]? Progressing: []? Met: []? Not Met: []? Adjusted  4. Pt will report ability to sleep 7/7 nights without waking up due to her RUE. [x]? Progressing: []? Met: []? Not Met: []? Adjusted    Overall Progression Towards Functional goals/ Treatment Progress Update:  [] Patient is progressing as expected towards functional goals listed. [] Progression is slowed due to complexities/Impairments listed. [] Progression has been slowed due to co-morbidities.   [x] Plan just implemented, too soon to assess goals progression <30days   [] Goals require adjustment due to lack of progress  [] Patient is not progressing as expected and requires additional follow up with physician  [] Other    Persisting Functional Limitations/Impairments:  [x]Sleeping []Sitting               []Standing []Transfers        []Walking []Kneeling               []Stairs []Squatting / bending   [x]ADLs [x]Reaching  []Lifting  [x]Housework  []Driving []Job related tasks  []Sports/Recreation []Other:        ASSESSMENT: Initiated LT SB roll up, B shoulder ER, levator and UT stretch this date. Pt reports some swelling in R axilla, but unable to wear compression currently or complete manual therapy due to current radiation and some burning. Pt reports therapy is helping with tolerance of hands behind head shoulder ER position with radiation. Continue with stretching and strengthening to decrease pain and return to PLOF without limitations. Treatment/Activity Tolerance:  [x] Patient able to complete tx [] Patient limited by fatigue  [] Patient limited by pain  [] Patient limited by other medical complications  [] Other:     Prognosis: [x] Good [] Fair  [] Poor    Patient Requires Follow-up: [x] Yes  [] No    Plan for next treatment session: See flowsheet    PLAN: See loly. PT 2x / week for 6 weeks. [x] Continue per plan of care [] Alter current plan (see comments)  [] Plan of care initiated [] Hold pending MD visit [] Discharge    Electronically signed by: Rhina Westfall, PT, DPT    Note: If patient does not return for scheduled/ recommended follow up visits, this note will serve as a discharge from care along with most recent update on progress.

## 2022-04-15 ENCOUNTER — HOSPITAL ENCOUNTER (OUTPATIENT)
Dept: PHYSICAL THERAPY | Age: 77
Setting detail: THERAPIES SERIES
Discharge: HOME | End: 2022-04-15
Payer: MEDICARE

## 2022-04-15 PROCEDURE — 97110 THERAPEUTIC EXERCISES: CPT
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
## 2022-04-18 ENCOUNTER — HOSPITAL ENCOUNTER (OUTPATIENT)
Dept: PHYSICAL THERAPY | Age: 77
Setting detail: THERAPIES SERIES
Discharge: HOME | End: 2022-04-18
Payer: MEDICARE

## 2022-04-18 PROCEDURE — 97530 THERAPEUTIC ACTIVITIES: CPT

## 2022-04-18 PROCEDURE — 97110 THERAPEUTIC EXERCISES: CPT

## 2022-04-18 NOTE — FLOWSHEET NOTE
[address] [facility]  Phone: [phone]   Fax: [phone]    Physical Therapy Daily ONCOLOGY Treatment Note    Date:  2022    Patient Name:  Katie Gray    :  1945  MRN: [id number]  Restrictions/Precautions:    Medical/Treatment Diagnosis Information:  Diagnosis: C50.911 (ICD-10-CM) - Primary breast malignancy, right ([facility] [address] C50.911 (ICD-10-CM) - Invasive ductal carcinoma of right breast, stage 3 ([facility] [address]  Treatment Diagnosis: decreased R shoulder strength and ROM, fatigue with decreased endurance  Insurance/Certification information:  PT Insurance Information: Medicare and [id number], no CP, no auth  Physician Information:  Referring Practitioner: Tao Shultz MD  Plan of care signed (Y/N): [x]  Yes []  No     Date of Patient follow up with Physician: radiologist 3/11    Functional Outcomes:   Fatigue: 3-4/10      3/7/22  Quick Dash:  21 20-39%     3/10/22  Fatigue: 3-4/10      4/5/22  QuickDASH: total score: 20, 20.45% disability   22    Progress Report: []  Yes  [x]  No     Date Range for reporting period:  Beginning 3/7/22  POC: 22  Ending    Progress report due (10 Rx/or 30 days whichever is less): visit #10 or 77 (date)     Recertification due (POC duration/ or 90 days whichever is less): visit #12 or 22 (date)     Visit # Insurance Allowable Auth required? Date Range    +  Med nec []  Yes  [x]  No         Latex Allergy:  [x]NO      []YES  Preferred Language for Healthcare:   [x]English       []other:      Pain level:  1/10     SUBJECTIVE:  Pt reports she had some trouble sleeping last night again, had a little pain in her chest on Saturday night but it was gone last night. Is starting her 3rd bout of radiation. Is working on stretching her chest. Knees are popping now.        OBJECTIVE:     Upper Extremity ROM & Strength AROM  Right AROM  Left Strength Right Strength Left Comments   Shoulder flex 111 140 3+ 5     Shoulder  140 3+ 5      - 6 min walk - 1333 ft - amb with B knee valgus and B foot pronation. Reports L foot pain   - TUG - 7.06 sec    - 5 x STS - 11.63 sec     3/25   Chest measurement - 97.0 cm   Edema noted in R lateral chest wall   Increased tissue tension at R pec tendon and mild cording noted in R axilla   R pec tightness - 2 fingers from table compared to 1 finger on L     3/14   - 6 min walk - 1480 ft (1.25 m/s) - amb with B knee valgus and B foot pronation. Denies knee pain   - TUG - 8.37 sec    - 5 x STS - 11.79 sec     3/10 Pt has good PROM but pulls in UT immediately in all motions, attempted stretching but sh jt with good mobility but poor mm control       RESTRICTIONS/PRECAUTIONS:     EDS- very little stretching, previous frozen shoulder L     Exercises/Interventions:     Therapeutic Exercises (63769) Resistance / level Sets/sec Reps Notes   Arm bike    Nu step    5 min     Pulleys    Flexion   Scaption  5 sec  5 sec 10 B   10 B    Supine AAROM with ranger       SB roll outsI, T, Y's Blue lFlexion 5 sec, 10     TB   - mid row  - high row  - LPD   Sh ext/add  ER GREEN   1  1  1   X 10  X 10  X 10     4/8 progress to green NV         R very difficult   Pec doorway stretch - 90 deg     B shoulder ER - seated     UT stretch   Levator stretch  Unilateral Herron   102     30 sec    X 10' B 10    2 B     Therapeutic Activities (99740)  (Dynamic activities such as compression, designed to improve functional performance)       Educated on s/s of infection and on risk factors for developing lymphedema (no IV, BP, or shots in RUE). Discussed POC for therapy. Provide handout for lymphedema prevention NV     Reviewed HEP from MD - table slides, supine AAROM shoulder flexion with cane, butterfly stretch, pec stretch (demo'd at doorway vs corner), scap squeeze        POC completed this date. Objective measures taken. FUNCTIONAL OBJECTIVE MEASURES (see above) - 6 min walk, 5x STS, TUG. Discussed progress with therapy and continued limitations in strength and mobility. Provided lymphedema prevention handout and reviewed. Answered all pt questions. Pt provided with M below the knee tensoshape to wear on car ride to [place] coming up. Educated on s/s of constriction and educated to take off if too tight.                scifit nu step                Arm circles B      Shoulder protraction  B      Wall push ups    X 10                  Home Management  (providing pt education on safety procedures/instructions)                                                        Neuromuscular Re-ed (85290)       SB roll up wall - LT (focus on relaxing UT)     Scap retract                                         Manual Intervention ([id number])       Manual stretching R shoulder     Has full passive ROM in jt. Difficulty relaxing UT to move shoudler   STM to pec tendon/cording in axilla   demo'd with hand over hand for self STM at home      4/11 - hold until pt finishes radiation (mid May)                                    Modalities:     OTHER:     Pt education:   3/25 educated on edema in R lateral chest. Discussed compression options including ember with build in bra or sports bra. Pt signed release of medical information form for [facility] to obtain home lymphedema pump. 3/18 Provided lymphedema prevention handout and reviewed. Answered all pt questions. Pt provided with M below the knee tensoshape to wear on car ride to [place] coming up. Educated on s/s of constriction and educated to take off if too tight. 3/7 Pt was educated on diagnosis; prognosis; PT POC including pathology and anatomy of etiology of lymphedema, condition precautions; lymphedema management/prevention of flare ups, role of exercise, HEP, expectations for rehab. All pt questions were answered. HEP instruction:  4/15  Access Code: [email or web address]  URL: [email or web address]  Date: 04/15/2022  Prepared by: Buck Mckenzie and core control in self care, mobility, lifting, ambulation and eccentric single leg control. [] UE / cervical: cervical, scapular, scapulothoracic and UE control with self care, reaching, carrying, lifting, house/yardwork, driving, computer work.   [] (42825) Therapist is in constant attendance of 2 or more patients providing skilled therapy interventions, but not providing any significant amount of measurable one-on-one time to either patient, for improvements in  [] LE / lumbar: LE, proximal hip, and core control in self care, mobility, lifting, ambulation and eccentric single leg control. [] UE / cervical: cervical, scapular, scapulothoracic and UE control with self care, reaching, carrying, lifting, house/yardwork, driving, computer work. NMR and Therapeutic Activities:    [x] (05121 or 07329) Provided verbal/tactile cueing for activities related to improving balance, coordination, kinesthetic sense, posture, motor skill, proprioception and motor activation to allow for proper function of   [] LE: / Lumbar core, proximal hip and LE with self care and ADLs  [x] UE / Cervical: cervical, postural, scapular, scapulothoracic and UE control with self care, carrying, lifting, driving, computer work.   [] (75500) Gait Re-education- Provided training and instruction to the patient for proper LE, core and proximal hip recruitment and positioning and eccentric body weight control with ambulation re-education including up and down stairs     Home Management Training / Self Care:  [x] (00649) Provided self-care/home management training related to activities of daily living and compensatory training, and/or use of adaptive equipment for improvement with: ADLs and compensatory training, meal preparation, safety procedures and instruction in use of adaptive equipment, including bathing, grooming, dressing, personal hygiene, basic household cleaning and chores.      Home Exercise Program:    [x] (40437) Reviewed/Progressed HEP activities related to strengthening, flexibility, endurance, ROM of   [] LE / Lumbar: core, proximal hip and LE for functional self-care, mobility, lifting and ambulation/stair navigation   [x] UE / Cervical: cervical, postural, scapular, scapulothoracic and UE control with self care, reaching, carrying, lifting, house/yardwork, driving, computer work  [] (30614)Reviewed/Progressed HEP activities related to improving balance, coordination, kinesthetic sense, posture, motor skill, proprioception of   [] LE: core, proximal hip and LE for self care, mobility, lifting, and ambulation/stair navigation    [] UE / Cervical: cervical, postural,  scapular, scapulothoracic and UE control with self care, reaching, carrying, lifting, house/yardwork, driving, computer work    Manual Treatments:  PROM / STM / Oscillations-Mobs:  G-I, II, III, IV (PA's, Inf., Post.)  [] (41555) Provided manual therapy to mobilize LE, proximal hip and/or LS spine soft tissue/joints for the purpose of modulating pain, promoting relaxation,  increasing ROM, reducing/eliminating soft tissue swelling/inflammation/restriction, improving soft tissue extensibility and allowing for proper ROM for normal function with   [] LE / lumbar: self care, mobility, lifting and ambulation. [] UE / Cervical: self care, reaching, carrying, lifting, house/yardwork, driving, computer work. Modalities:  [] (72676) Vasopneumatic compression: Utilized vasopneumatic compression to decrease edema / swelling for the purpose of improving mobility and quad tone / recruitment which will allow for increased overall function including but not limited to self-care, transfers, ambulation, and ascending / descending stairs.        Charges:  Timed Code Treatment Minutes: 40   Total Treatment Minutes: 40     [] EVAL - LOW (84315)   [] EVAL - MOD (55301)  [] EVAL - HIGH (16380)  [] RE-EVAL (74594)  [x] DK(62410) x  2     [] Ionto  [] NMR (52725) x  1     [] Vaso  [] Manual (79508) x 1       [] Ultrasound  [x] TA x        [] Mech Traction (77638)  [] Aquatic Therapy x     [] ES (un) (78378):   [] Home Management Training x  [] ES(attended) (12409)   [] Dry Needling 1-2 muscles (14284):  [] Dry Needling 3+ muscles (982829  [] Group:      [] Other:     GOALS:   Patient stated goal: stretching chest mm and shoulder   [x]? Progressing: []? Met: []? Not Met: []? Adjusted     Therapist goals for Patient:   Short Term Goals: To be achieved in: 2 weeks  1. Independent in HEP and progression per patient tolerance, in order to prevent re-injury. []? Progressing: [x]? Met: []? Not Met: []? Adjusted  2. Patient will have a decrease in pain to facilitate improvement in movement, function, and ADLs as indicated by improvement with respect to Functional Deficits. []? Progressing: [x]? Met: []? Not Met: []? Adjusted     Long Term Goals: To be achieved in: 6    weeks  1. Disability index score of  0% or less on the QuickDASH  to assist with reaching prior level of function. -? Progressing: -? Met: -? Not Met: -? Adjusted  2. Patient will demonstrate increased R shoulder AROM to 155 deg flexion and abd, to allow for proper joint functioning to allow pt to resume home management without increase in symptoms. -? Progressing: -? Met: -? Not Met: -? Adjusted  3. Patient will demonstrate increased R shoulder strength to 4+/5 in flexion and abd, to allow for proper joint functioning to allow pt to resume home management without increase in symptoms. [x]? Progressing: []? Met: []? Not Met: []? Adjusted  4. Pt will report ability to sleep 7/7 nights without waking up due to her RUE. [x]? Progressing: []? Met: []? Not Met: []? Adjusted    Overall Progression Towards Functional goals/ Treatment Progress Update:  [] Patient is progressing as expected towards functional goals listed. [] Progression is slowed due to complexities/Impairments listed.   [] Progression has been slowed due to co-morbidities. [x] Plan just implemented, too soon to assess goals progression <30days   [] Goals require adjustment due to lack of progress  [] Patient is not progressing as expected and requires additional follow up with physician  [] Other    Persisting Functional Limitations/Impairments:  [x]Sleeping []Sitting               []Standing []Transfers        []Walking []Kneeling               []Stairs []Squatting / bending   [x]ADLs [x]Reaching  []Lifting  [x]Housework  []Driving []Job related tasks  []Sports/Recreation []Other:        ASSESSMENT: Pt reports feeling that she can do more than most even with her cancer and knees. Pt feels she lifts her shoulders all the time and is working on dropping them during exercise. Pt performing all exercises well in department. Continue to progress strength and ROM to decrease pain and return to PLOF without limitations. Treatment/Activity Tolerance:  [x] Patient able to complete tx [] Patient limited by fatigue  [] Patient limited by pain  [] Patient limited by other medical complications  [] Other:     Prognosis: [x] Good [] Fair  [] Poor    Patient Requires Follow-up: [x] Yes  [] No    Plan for next treatment session: See flowsheet    PLAN: See loly. PT 2x / week for 6 weeks. [x] Continue per plan of care [] Alter current plan (see comments)  [] Plan of care initiated [] Hold pending MD visit [] Discharge    Electronically signed by: Krzysztof Campbell, PT, DPT    Note: If patient does not return for scheduled/ recommended follow up visits, this note will serve as a discharge from care along with most recent update on progress.

## 2022-04-22 ENCOUNTER — HOSPITAL ENCOUNTER (OUTPATIENT)
Dept: PHYSICAL THERAPY | Age: 77
Setting detail: THERAPIES SERIES
Discharge: HOME | End: 2022-04-22
Payer: MEDICARE

## 2022-04-22 PROCEDURE — 97530 THERAPEUTIC ACTIVITIES: CPT

## 2022-04-22 PROCEDURE — 97110 THERAPEUTIC EXERCISES: CPT

## 2022-04-22 NOTE — FLOWSHEET NOTE
[address] [facility]  Phone: [phone]   Fax: [phone]    Physical Therapy Daily ONCOLOGY Treatment Note    Date:  2022    Patient Name:  Niko Parks    :  1945  MRN: [id number]  Restrictions/Precautions:    Medical/Treatment Diagnosis Information:  Diagnosis: C50.911 (ICD-10-CM) - Primary breast malignancy, right ([facility] [address] C50.911 (ICD-10-CM) - Invasive ductal carcinoma of right breast, stage 3 ([facility] [address]  Treatment Diagnosis: decreased R shoulder strength and ROM, fatigue with decreased endurance  Insurance/Certification information:  PT Insurance Information: Medicare and [id number], no CP, no auth  Physician Information:  Referring Practitioner: Ramila Bright MD  Plan of care signed (Y/N): [x]  Yes []  No     Date of Patient follow up with Physician: radiologist 3/11    Functional Outcomes:   Fatigue: 3-4/10      3/7/22  Quick Dash:  21 20-39%     3/10/22  Fatigue: 3-4/10      4/5/22  QuickDASH: total score: 20, 20.45% disability   22    Progress Report: []  Yes  [x]  No     Date Range for reporting period:  Beginning 3/7/22  POC: 22  Ending    Progress report due (10 Rx/or 30 days whichever is less): visit #10 or  (date)     Recertification due (POC duration/ or 90 days whichever is less): visit #12 or 22 (date)     Visit # Insurance Allowable Auth required? Date Range    +  Med nec []  Yes  [x]  No         Latex Allergy:  [x]NO      []YES  Preferred Language for Healthcare:   [x]English       []other:      Pain level:  1/10     SUBJECTIVE:  Reports her whole body is achy today. States she thinks it could be attributed to where she is in her radiation. Denies CP, HA, dizziness.          OBJECTIVE:   /5  Upper Extremity ROM & Strength AROM  Right AROM  Left Strength Right Strength Left Comments   Shoulder flex 111 140 3+ 5     Shoulder  140 3+ 5      - 6 min walk - 1333 ft - amb with B knee valgus and B foot pronation. Reports L foot pain   - TUG - 7.06 sec    - 5 x STS - 11.63 sec     3/25   Chest measurement - 97.0 cm   Edema noted in R lateral chest wall   Increased tissue tension at R pec tendon and mild cording noted in R axilla   R pec tightness - 2 fingers from table compared to 1 finger on L     3/14   - 6 min walk - 1480 ft (1.25 m/s) - amb with B knee valgus and B foot pronation. Denies knee pain   - TUG - 8.37 sec    - 5 x STS - 11.79 sec     3/10 Pt has good PROM but pulls in UT immediately in all motions, attempted stretching but sh jt with good mobility but poor mm control       RESTRICTIONS/PRECAUTIONS:     EDS- very little stretching, previous frozen shoulder L     Exercises/Interventions:     Therapeutic Exercises (21125) Resistance / level Sets/sec Reps Notes   Arm bike    Nu step         Pulleys    Flexion   Scaption  5 sec  5 sec 10 B   10 B    Supine AAROM with ranger       SB roll outsI, T, Y's Blue lFlexion 5 sec, 10     TB   - mid row  - high row  - LPD   Sh ext/add  ER 4/8 progress to green NV         R very difficult   Pec doorway stretch - 90 deg     B shoulder ER - seated     UT stretch   Levator stretch  Unilateral Stryker   22     30 sec    X 30' B 10    2 B     Therapeutic Activities (13238)  (Dynamic activities such as compression, designed to improve functional performance)       Educated on s/s of infection and on risk factors for developing lymphedema (no IV, BP, or shots in RUE). Discussed POC for therapy. Provide handout for lymphedema prevention NV     Reviewed HEP from MD - table slides, supine AAROM shoulder flexion with cane, butterfly stretch, pec stretch (demo'd at doorway vs corner), scap squeeze        POC completed this date. Objective measures taken. FUNCTIONAL OBJECTIVE MEASURES (see above) - 6 min walk, 5x STS, TUG. Discussed progress with therapy and continued limitations in strength and mobility. Provided lymphedema prevention handout and reviewed. Answered all pt questions. Pt provided with M below the knee tensoshape to wear on car ride to [place] coming up. Educated on s/s of constriction and educated to take off if too tight.                scifit nu step   X 5 mins             Arm circles B      Shoulder protraction  B      Wall push ups   2 X 10    LT wall slide    X 10            Home Management  (providing pt education on safety procedures/instructions)                                                        Neuromuscular Re-ed (06554)       SB roll up wall - LT (focus on relaxing UT)     Scap retract                                         Manual Intervention ([id number])       Manual stretching R shoulder     Has full passive ROM in jt. Difficulty relaxing UT to move shoudler   STM to pec tendon/cording in axilla   demo'd with hand over hand for self STM at home      4/11 - hold until pt finishes radiation (mid May)                                    Modalities:     OTHER:     Pt education:   3/25 educated on edema in R lateral chest. Discussed compression options including ember with build in bra or sports bra. Pt signed release of medical information form for [facility] to obtain home lymphedema pump. 3/18 Provided lymphedema prevention handout and reviewed. Answered all pt questions. Pt provided with M below the knee tensoshape to wear on car ride to [place] coming up. Educated on s/s of constriction and educated to take off if too tight. 3/7 Pt was educated on diagnosis; prognosis; PT POC including pathology and anatomy of etiology of lymphedema, condition precautions; lymphedema management/prevention of flare ups, role of exercise, HEP, expectations for rehab. All pt questions were answered. HEP instruction:  4/15  Access Code: [email or web address]  URL: [email or web address]  Date: 04/15/2022  Prepared by: Shiela Mcclelland    Exercises  Single Arm Doorway Pec Stretch at 90 Degrees Abduction - 1 x daily - 7 x weekly - 10 reps - 10 sec hold  Shoulder External Rotation and Scapular Retraction with Resistance - 1 x daily - 7 x weekly - 2 sets - 10 reps      Access Code: [email or web address]  URL: [email or web address]  Date: 03/21/2022  Prepared by: Lul Riding    Exercises  Standing Shoulder Row with Anchored Resistance - 1 x daily - 7 x weekly - 1 sets - 10 reps  Squatting High Shoulder Row with Resistance - 1 x daily - 7 x weekly - 1 sets - 10 reps  Seated Shoulder External Rotation with Resistance - 1 x daily - 7 x weekly - 1 sets - 10 reps  Standing Shoulder Adduction Reactive Isometrics with Resistance and Elbow Extended - 1 x daily - 7 x weekly - 1 sets - 10 reps  Single Arm Shoulder Extension with Resistance - 1 x daily - 7 x weekly - 1 sets - 10 reps    3/14  Access Code: [email or web address]  URL: [email or web address]  Date: 03/14/2022  Prepared by: Samia Dole    Exercises  Doorway Pec Stretch at 60 Elevation - 1 x daily - 7 x weekly - 2 sets - 30 sec hold  Doorway Pec Stretch at 90 Degrees Abduction - 1 x daily - 7 x weekly - 2 sets - 30 sec hold    3/7  Reviewed HEP from MD. Questions answered about exercise and to complete in pain free range but still feeling a stretch. (see above for exercise)    Therapeutic Exercise and NMR EXR  [x] (79346) Provided verbal/tactile cueing for activities related to strengthening, flexibility, endurance, ROM for improvements in  [] LE / Lumbar: LE, proximal hip, and core control with self care, mobility, lifting, ambulation. [x] UE / Cervical: cervical, postural, scapular, scapulothoracic and UE control with self care, reaching, carrying, lifting, house/yardwork, driving, computer work.  [] (91843) Provided verbal/tactile cueing for activities related to improving balance, coordination, kinesthetic sense, posture, motor skill, proprioception to assist with   [] LE / lumbar: LE, proximal hip, and core control in self care, mobility, lifting, ambulation and eccentric single leg control.    [] UE / cervical: cervical, scapular, scapulothoracic and UE control with self care, reaching, carrying, lifting, house/yardwork, driving, computer work.   [] (91407) Therapist is in constant attendance of 2 or more patients providing skilled therapy interventions, but not providing any significant amount of measurable one-on-one time to either patient, for improvements in  [] LE / lumbar: LE, proximal hip, and core control in self care, mobility, lifting, ambulation and eccentric single leg control. [] UE / cervical: cervical, scapular, scapulothoracic and UE control with self care, reaching, carrying, lifting, house/yardwork, driving, computer work. NMR and Therapeutic Activities:    [x] (20536 or 72803) Provided verbal/tactile cueing for activities related to improving balance, coordination, kinesthetic sense, posture, motor skill, proprioception and motor activation to allow for proper function of   [] LE: / Lumbar core, proximal hip and LE with self care and ADLs  [x] UE / Cervical: cervical, postural, scapular, scapulothoracic and UE control with self care, carrying, lifting, driving, computer work.   [] (09849) Gait Re-education- Provided training and instruction to the patient for proper LE, core and proximal hip recruitment and positioning and eccentric body weight control with ambulation re-education including up and down stairs     Home Management Training / Self Care:  [x] (41144) Provided self-care/home management training related to activities of daily living and compensatory training, and/or use of adaptive equipment for improvement with: ADLs and compensatory training, meal preparation, safety procedures and instruction in use of adaptive equipment, including bathing, grooming, dressing, personal hygiene, basic household cleaning and chores.      Home Exercise Program:    [x] (16449) Reviewed/Progressed HEP activities related to strengthening, flexibility, endurance, ROM of   [] LE / Lumbar: core, proximal hip and LE for functional self-care, mobility, lifting and ambulation/stair navigation   [x] UE / Cervical: cervical, postural, scapular, scapulothoracic and UE control with self care, reaching, carrying, lifting, house/yardwork, driving, computer work  [] (67360)Reviewed/Progressed HEP activities related to improving balance, coordination, kinesthetic sense, posture, motor skill, proprioception of   [] LE: core, proximal hip and LE for self care, mobility, lifting, and ambulation/stair navigation    [] UE / Cervical: cervical, postural,  scapular, scapulothoracic and UE control with self care, reaching, carrying, lifting, house/yardwork, driving, computer work    Manual Treatments:  PROM / STM / Oscillations-Mobs:  G-I, II, III, IV (PA's, Inf., Post.)  [] (71745) Provided manual therapy to mobilize LE, proximal hip and/or LS spine soft tissue/joints for the purpose of modulating pain, promoting relaxation,  increasing ROM, reducing/eliminating soft tissue swelling/inflammation/restriction, improving soft tissue extensibility and allowing for proper ROM for normal function with   [] LE / lumbar: self care, mobility, lifting and ambulation. [] UE / Cervical: self care, reaching, carrying, lifting, house/yardwork, driving, computer work. Modalities:  [] (20573) Vasopneumatic compression: Utilized vasopneumatic compression to decrease edema / swelling for the purpose of improving mobility and quad tone / recruitment which will allow for increased overall function including but not limited to self-care, transfers, ambulation, and ascending / descending stairs.        Charges:  Timed Code Treatment Minutes: 40   Total Treatment Minutes: 40     [] EVAL - LOW (72010)   [] EVAL - MOD (93742)  [] EVAL - HIGH (48620)  [] RE-EVAL (88997)  [x] WX(75417) x  2     [] Ionto  [] NMR (72344) x  1     [] Vaso  [] Manual (46812) x 1       [] Ultrasound  [x] TA x   1     [] Mech Traction (74884)  [] Aquatic Therapy x     [] ES (un) (73191):   [] Home Management Training x  [] ES(attended) (08157)   [] Dry Needling 1-2 muscles (25123):  [] Dry Needling 3+ muscles (568400  [] Group:      [] Other:     GOALS:   Patient stated goal: stretching chest mm and shoulder   [x]? Progressing: []? Met: []? Not Met: []? Adjusted     Therapist goals for Patient:   Short Term Goals: To be achieved in: 2 weeks  1. Independent in HEP and progression per patient tolerance, in order to prevent re-injury. []? Progressing: [x]? Met: []? Not Met: []? Adjusted  2. Patient will have a decrease in pain to facilitate improvement in movement, function, and ADLs as indicated by improvement with respect to Functional Deficits. []? Progressing: [x]? Met: []? Not Met: []? Adjusted     Long Term Goals: To be achieved in: 6    weeks  1. Disability index score of  0% or less on the QuickDASH  to assist with reaching prior level of function. -? Progressing: -? Met: -? Not Met: -? Adjusted  2. Patient will demonstrate increased R shoulder AROM to 155 deg flexion and abd, to allow for proper joint functioning to allow pt to resume home management without increase in symptoms. -? Progressing: -? Met: -? Not Met: -? Adjusted  3. Patient will demonstrate increased R shoulder strength to 4+/5 in flexion and abd, to allow for proper joint functioning to allow pt to resume home management without increase in symptoms. [x]? Progressing: []? Met: []? Not Met: []? Adjusted  4. Pt will report ability to sleep 7/7 nights without waking up due to her RUE. [x]? Progressing: []? Met: []? Not Met: []? Adjusted    Overall Progression Towards Functional goals/ Treatment Progress Update:  [] Patient is progressing as expected towards functional goals listed. [] Progression is slowed due to complexities/Impairments listed. [] Progression has been slowed due to co-morbidities.   [x] Plan just implemented, too soon to assess goals progression <30days   [] Goals require adjustment due to lack of progress  [] Patient is not progressing as expected and requires additional follow up with physician  [] Other    Persisting Functional Limitations/Impairments:  [x]Sleeping []Sitting               []Standing []Transfers        []Walking []Kneeling               []Stairs []Squatting / bending   [x]ADLs [x]Reaching  []Lifting  [x]Housework  []Driving []Job related tasks  []Sports/Recreation []Other:        ASSESSMENT: Pt reports some pain in R UT with LT wall slides. Pain decreased with UT stretch. Continues to report decreased pain during radiation after completing therapy to stretch for behind the head ER position. Some edema in R lateral chest wall, plan to initiate manual and compression once radiation is complete. Treatment/Activity Tolerance:  [x] Patient able to complete tx [] Patient limited by fatigue  [] Patient limited by pain  [] Patient limited by other medical complications  [] Other:     Prognosis: [x] Good [] Fair  [] Poor    Patient Requires Follow-up: [x] Yes  [] No    Plan for next treatment session: See flowsheet    PLAN: See loly. PT 2x / week for 6 weeks. [x] Continue per plan of care [] Alter current plan (see comments)  [] Plan of care initiated [] Hold pending MD visit [] Discharge    Electronically signed by: Refugio Cleary, PT, DPT    Note: If patient does not return for scheduled/ recommended follow up visits, this note will serve as a discharge from care along with most recent update on progress.

## 2022-04-25 ENCOUNTER — HOSPITAL ENCOUNTER (OUTPATIENT)
Dept: PHYSICAL THERAPY | Age: 77
Setting detail: THERAPIES SERIES
Discharge: HOME | End: 2022-04-25
Payer: MEDICARE

## 2022-04-25 PROCEDURE — 97530 THERAPEUTIC ACTIVITIES: CPT

## 2022-04-25 PROCEDURE — 97110 THERAPEUTIC EXERCISES: CPT

## 2022-04-25 PROCEDURE — 97140 MANUAL THERAPY 1/> REGIONS: CPT

## 2022-04-25 NOTE — FLOWSHEET NOTE
[address] [facility]  Phone: [phone]   Fax: [phone]    Physical Therapy Daily ONCOLOGY Treatment Note    Date:  2022    Patient Name:  Mykel Ko    :  1945  MRN: [id number]  Restrictions/Precautions:    Medical/Treatment Diagnosis Information:  Diagnosis: C50.911 (ICD-10-CM) - Primary breast malignancy, right ([facility] [address] C50.911 (ICD-10-CM) - Invasive ductal carcinoma of right breast, stage 3 ([facility] [address]  Treatment Diagnosis: decreased R shoulder strength and ROM, fatigue with decreased endurance  Insurance/Certification information:  PT Insurance Information: Medicare and [id number], no CP, no auth  Physician Information:  Referring Practitioner: Christine Richmond MD  Plan of care signed (Y/N): [x]  Yes []  No     Date of Patient follow up with Physician: radiologist 3/11    Functional Outcomes:   Fatigue: 3-4/10      3/7/22  Quick Dash:  21 20-39%     3/10/22  Fatigue: 3-4/10      4/5/22  QuickDASH: total score: 20, 20.45% disability   22    Progress Report: []  Yes  [x]  No     Date Range for reporting period:  Beginning 3/7/22  POC: 22  Ending    Progress report due (10 Rx/or 30 days whichever is less): visit #10 or 07 (date)     Recertification due (POC duration/ or 90 days whichever is less): visit #12 or 22 (date)     Visit # Insurance Allowable Auth required? Date Range    + 3/8 Med nec []  Yes  [x]  No         Latex Allergy:  [x]NO      []YES  Preferred Language for Healthcare:   [x]English       []other:      Pain level:  1/10     SUBJECTIVE:  Reports her whole body is very achy today and woke her up several times. States she thinks it could be attributed to where she is in her radiation and has more burned places. More pain in her legs and butt today. States she has red spots all over her body. Is worried about the shoulder potentially being swollen. Would like to have shoulder looked at and worked on. OBJECTIVE:   4/5  Upper Extremity ROM & Strength AROM  Right AROM  Left Strength Right Strength Left Comments   Shoulder flex 111 140 3+ 5     Shoulder  140 3+ 5      - 6 min walk - 1333 ft - amb with B knee valgus and B foot pronation. Reports L foot pain   - TUG - 7.06 sec    - 5 x STS - 11.63 sec     3/25   Chest measurement - 97.0 cm   Edema noted in R lateral chest wall   Increased tissue tension at R pec tendon and mild cording noted in R axilla   R pec tightness - 2 fingers from table compared to 1 finger on L     3/14   - 6 min walk - 1480 ft (1.25 m/s) - amb with B knee valgus and B foot pronation. Denies knee pain   - TUG - 8.37 sec    - 5 x STS - 11.79 sec     3/10 Pt has good PROM but pulls in UT immediately in all motions, attempted stretching but sh jt with good mobility but poor mm control       RESTRICTIONS/PRECAUTIONS:     EDS- very little stretching, previous frozen shoulder L     Exercises/Interventions:     Therapeutic Exercises (66614) Resistance / level Sets/sec Reps Notes   Arm bike    Nu step         Pulleys    Flexion   Scaption  5 sec  5 sec 10 B   10 B    Supine AAROM with ranger       SB roll outsI, T, Y's Blue lFlexion 5 sec, 10     TB   - mid row  - high row  - LPD   Sh ext/add  ER 4/8 progress to green NV         R very difficult   Pec doorway stretch - 90 deg     B shoulder ER - seated     UT stretch   Levator stretch  Unilateral [place]   22     30 sec    X 30' B 10    2 B     Therapeutic Activities (52888)  (Dynamic activities such as compression, designed to improve functional performance)       Educated on s/s of infection and on risk factors for developing lymphedema (no IV, BP, or shots in RUE). Discussed POC for therapy. Provide handout for lymphedema prevention NV     Reviewed HEP from MD - table slides, supine AAROM shoulder flexion with cane, butterfly stretch, pec stretch (demo'd at doorway vs corner), scap squeeze        POC completed this date.  Objective measures taken. FUNCTIONAL OBJECTIVE MEASURES (see above) - 6 min walk, 5x STS, TUG. Discussed progress with therapy and continued limitations in strength and mobility. Provided lymphedema prevention handout and reviewed. Answered all pt questions. Pt provided with M below the knee tensoshape to wear on car ride to [place] coming up. Educated on s/s of constriction and educated to take off if too tight.                scifit nu step   X 5 mins             Arm circles B      Shoulder protraction  B      Wall push ups   2 X 10    LT wall slide    X 10            Home Management  (providing pt education on safety procedures/instructions)                                                        Neuromuscular Re-ed (03247)       SB roll up wall - LT (focus on relaxing UT)     Scap retract                                         Manual Intervention ([id number])       Manual stretching R shoulder     Has full passive ROM in jt. Difficulty relaxing UT to move shoudler   STM to pec tendon/cording in axilla   demo'd with hand over hand for self STM at home  X 15 min    4/11 - hold until pt finishes radiation (mid May)                                    Modalities:     OTHER:     Pt education:   3/25 educated on edema in R lateral chest. Discussed compression options including ember with build in bra or sports bra. Pt signed release of medical information form for [facility] to obtain home lymphedema pump. 3/18 Provided lymphedema prevention handout and reviewed. Answered all pt questions. Pt provided with M below the knee tensoshape to wear on car ride to [place] coming up. Educated on s/s of constriction and educated to take off if too tight. 3/7 Pt was educated on diagnosis; prognosis; PT POC including pathology and anatomy of etiology of lymphedema, condition precautions; lymphedema management/prevention of flare ups, role of exercise, HEP, expectations for rehab. All pt questions were answered. HEP instruction:  4/15  Access Code: [email or web address]  URL: [email or web address]  Date: 04/15/2022  Prepared by: Cindy Farmer    Exercises  Single Arm Doorway Pec Stretch at 90 Degrees Abduction - 1 x daily - 7 x weekly - 10 reps - 10 sec hold  Shoulder External Rotation and Scapular Retraction with Resistance - 1 x daily - 7 x weekly - 2 sets - 10 reps      Access Code: [email or web address]  URL: [email or web address]  Date: 03/21/2022  Prepared by: Elva Gonzalez    Exercises  Standing Shoulder Row with Anchored Resistance - 1 x daily - 7 x weekly - 1 sets - 10 reps  Squatting High Shoulder Row with Resistance - 1 x daily - 7 x weekly - 1 sets - 10 reps  Seated Shoulder External Rotation with Resistance - 1 x daily - 7 x weekly - 1 sets - 10 reps  Standing Shoulder Adduction Reactive Isometrics with Resistance and Elbow Extended - 1 x daily - 7 x weekly - 1 sets - 10 reps  Single Arm Shoulder Extension with Resistance - 1 x daily - 7 x weekly - 1 sets - 10 reps    3/14  Access Code: [email or web address]  URL: [email or web address]  Date: 03/14/2022  Prepared by: Cindy Farmer    Exercises  Doorway Pec Stretch at 60 Elevation - 1 x daily - 7 x weekly - 2 sets - 30 sec hold  Doorway Pec Stretch at 90 Degrees Abduction - 1 x daily - 7 x weekly - 2 sets - 30 sec hold    3/7  Reviewed HEP from MD. Questions answered about exercise and to complete in pain free range but still feeling a stretch. (see above for exercise)    Therapeutic Exercise and NMR EXR  [x] (78586) Provided verbal/tactile cueing for activities related to strengthening, flexibility, endurance, ROM for improvements in  [] LE / Lumbar: LE, proximal hip, and core control with self care, mobility, lifting, ambulation.   [x] UE / Cervical: cervical, postural, scapular, scapulothoracic and UE control with self care, reaching, carrying, lifting, house/yardwork, driving, computer work.  [] (78661) Provided verbal/tactile cueing for activities related to improving balance, coordination, kinesthetic sense, posture, motor skill, proprioception to assist with   [] LE / lumbar: LE, proximal hip, and core control in self care, mobility, lifting, ambulation and eccentric single leg control. [] UE / cervical: cervical, scapular, scapulothoracic and UE control with self care, reaching, carrying, lifting, house/yardwork, driving, computer work.   [] (66330) Therapist is in constant attendance of 2 or more patients providing skilled therapy interventions, but not providing any significant amount of measurable one-on-one time to either patient, for improvements in  [] LE / lumbar: LE, proximal hip, and core control in self care, mobility, lifting, ambulation and eccentric single leg control. [] UE / cervical: cervical, scapular, scapulothoracic and UE control with self care, reaching, carrying, lifting, house/yardwork, driving, computer work.      NMR and Therapeutic Activities:    [x] (72136 or 81007) Provided verbal/tactile cueing for activities related to improving balance, coordination, kinesthetic sense, posture, motor skill, proprioception and motor activation to allow for proper function of   [] LE: / Lumbar core, proximal hip and LE with self care and ADLs  [x] UE / Cervical: cervical, postural, scapular, scapulothoracic and UE control with self care, carrying, lifting, driving, computer work.   [] (97012) Gait Re-education- Provided training and instruction to the patient for proper LE, core and proximal hip recruitment and positioning and eccentric body weight control with ambulation re-education including up and down stairs     Home Management Training / Self Care:  [x] (65503) Provided self-care/home management training related to activities of daily living and compensatory training, and/or use of adaptive equipment for improvement with: ADLs and compensatory training, meal preparation, safety procedures and instruction in use of adaptive equipment, including bathing, grooming, dressing, personal hygiene, basic household cleaning and chores. Home Exercise Program:    [x] (28790) Reviewed/Progressed HEP activities related to strengthening, flexibility, endurance, ROM of   [] LE / Lumbar: core, proximal hip and LE for functional self-care, mobility, lifting and ambulation/stair navigation   [x] UE / Cervical: cervical, postural, scapular, scapulothoracic and UE control with self care, reaching, carrying, lifting, house/yardwork, driving, computer work  [] (36269)Reviewed/Progressed HEP activities related to improving balance, coordination, kinesthetic sense, posture, motor skill, proprioception of   [] LE: core, proximal hip and LE for self care, mobility, lifting, and ambulation/stair navigation    [] UE / Cervical: cervical, postural,  scapular, scapulothoracic and UE control with self care, reaching, carrying, lifting, house/yardwork, driving, computer work    Manual Treatments:  PROM / STM / Oscillations-Mobs:  G-I, II, III, IV (PA's, Inf., Post.)  [x] (34661) Provided manual therapy to mobilize LE, proximal hip and/or LS spine soft tissue/joints for the purpose of modulating pain, promoting relaxation,  increasing ROM, reducing/eliminating soft tissue swelling/inflammation/restriction, improving soft tissue extensibility and allowing for proper ROM for normal function with   [] LE / lumbar: self care, mobility, lifting and ambulation. [x] UE / Cervical: self care, reaching, carrying, lifting, house/yardwork, driving, computer work. Modalities:  [] (92042) Vasopneumatic compression: Utilized vasopneumatic compression to decrease edema / swelling for the purpose of improving mobility and quad tone / recruitment which will allow for increased overall function including but not limited to self-care, transfers, ambulation, and ascending / descending stairs.        Charges:  Timed Code Treatment Minutes: 40   Total Treatment Minutes: 40     [] EVAL - LOW (85032)   [] EVAL - MOD (21402)  [] EVAL - HIGH (36568)  [] RE-EVAL (53933)  [x] IR(66278) x       [] Ionto  [] NMR (81985) x  1     [] Vaso  [x] Manual (76867) x 1       [] Ultrasound  [x] TA x   1     [] Mech Traction (10766)  [] Aquatic Therapy x     [] ES (un) (22609):   [] Home Management Training x  [] ES(attended) (70260)   [] Dry Needling 1-2 muscles (44066):  [] Dry Needling 3+ muscles (796405  [] Group:      [] Other:     GOALS:   Patient stated goal: stretching chest mm and shoulder   [x]? Progressing: []? Met: []? Not Met: []? Adjusted     Therapist goals for Patient:   Short Term Goals: To be achieved in: 2 weeks  1. Independent in HEP and progression per patient tolerance, in order to prevent re-injury. []? Progressing: [x]? Met: []? Not Met: []? Adjusted  2. Patient will have a decrease in pain to facilitate improvement in movement, function, and ADLs as indicated by improvement with respect to Functional Deficits. []? Progressing: [x]? Met: []? Not Met: []? Adjusted     Long Term Goals: To be achieved in: 6    weeks  1. Disability index score of  0% or less on the QuickDASH  to assist with reaching prior level of function. -? Progressing: -? Met: -? Not Met: -? Adjusted  2. Patient will demonstrate increased R shoulder AROM to 155 deg flexion and abd, to allow for proper joint functioning to allow pt to resume home management without increase in symptoms. -? Progressing: -? Met: -? Not Met: -? Adjusted  3. Patient will demonstrate increased R shoulder strength to 4+/5 in flexion and abd, to allow for proper joint functioning to allow pt to resume home management without increase in symptoms. [x]? Progressing: []? Met: []? Not Met: []? Adjusted  4. Pt will report ability to sleep 7/7 nights without waking up due to her RUE. [x]? Progressing: []? Met: []? Not Met: []?  Adjusted    Overall Progression Towards Functional goals/ Treatment Progress Update:  [] Patient is progressing as expected towards functional goals listed. [] Progression is slowed due to complexities/Impairments listed. [] Progression has been slowed due to co-morbidities. [x] Plan just implemented, too soon to assess goals progression <30days   [] Goals require adjustment due to lack of progress  [] Patient is not progressing as expected and requires additional follow up with physician  [] Other    Persisting Functional Limitations/Impairments:  [x]Sleeping []Sitting               []Standing []Transfers        []Walking []Kneeling               []Stairs []Squatting / bending   [x]ADLs [x]Reaching  []Lifting  [x]Housework  []Driving []Job related tasks  []Sports/Recreation []Other:        ASSESSMENT: Pt reports some pain in R shoulder, maybe due to swelling. Pt retaught self MLD>  Some edema in R lateral chest wall, plan to initiate manual and compression once radiation is complete. Treatment/Activity Tolerance:  [x] Patient able to complete tx [] Patient limited by fatigue  [] Patient limited by pain  [] Patient limited by other medical complications  [] Other:     Prognosis: [x] Good [] Fair  [] Poor    Patient Requires Follow-up: [x] Yes  [] No    Plan for next treatment session: See flowsheet    PLAN: See eval. PT 2x / week for 6 weeks. [x] Continue per plan of care [] Alter current plan (see comments)  [] Plan of care initiated [] Hold pending MD visit [] Discharge    Electronically signed by: Najma Torres, PT, DPT    Note: If patient does not return for scheduled/ recommended follow up visits, this note will serve as a discharge from care along with most recent update on progress.

## 2022-04-29 ENCOUNTER — HOSPITAL ENCOUNTER (OUTPATIENT)
Dept: PHYSICAL THERAPY | Age: 77
Setting detail: THERAPIES SERIES
Discharge: HOME | End: 2022-04-29
Payer: MEDICARE

## 2022-04-29 PROCEDURE — 97110 THERAPEUTIC EXERCISES: CPT

## 2022-05-03 ENCOUNTER — HOSPITAL ENCOUNTER (EMERGENCY)
Age: 77
Discharge: HOME | End: 2022-05-03
Attending: EMERGENCY MEDICINE
Payer: MEDICARE

## 2022-05-03 ENCOUNTER — HOSPITAL ENCOUNTER (OUTPATIENT)
Dept: PHYSICAL THERAPY | Age: 77
Setting detail: THERAPIES SERIES
Discharge: HOME | End: 2022-05-03
Payer: MEDICARE

## 2022-05-03 ENCOUNTER — APPOINTMENT (OUTPATIENT)
Dept: CT IMAGING | Age: 77
End: 2022-05-03
Payer: MEDICARE

## 2022-05-03 VITALS
BODY MASS INDEX: 25.23 KG/M2 | TEMPERATURE: 98.2 F | RESPIRATION RATE: 16 BRPM | HEIGHT: 66 IN | WEIGHT: 157 LBS | DIASTOLIC BLOOD PRESSURE: 73 MMHG | SYSTOLIC BLOOD PRESSURE: 150 MMHG | OXYGEN SATURATION: 96 % | HEART RATE: 107 BPM

## 2022-05-03 DIAGNOSIS — R20.2 PARESTHESIAS: Primary | ICD-10-CM

## 2022-05-03 LAB
A/G RATIO: 1.3 (ref 1.1–2.2)
ALBUMIN SERPL-MCNC: 4.2 G/DL (ref 3.4–5)
ALP BLD-CCNC: 127 U/L (ref 40–129)
ALT SERPL-CCNC: 28 U/L (ref 10–40)
ANION GAP SERPL CALCULATED.3IONS-SCNC: 12 MMOL/L (ref 3–16)
AST SERPL-CCNC: 34 U/L (ref 15–37)
BASOPHILS ABSOLUTE: 0 K/UL (ref 0–0.2)
BASOPHILS RELATIVE PERCENT: 0.4 %
BILIRUB SERPL-MCNC: <0.2 MG/DL (ref 0–1)
BUN BLDV-MCNC: 24 MG/DL (ref 7–20)
C-REACTIVE PROTEIN: <3 MG/L (ref 0–5.1)
CALCIUM SERPL-MCNC: 10.2 MG/DL (ref 8.3–10.6)
CHLORIDE BLD-SCNC: 101 MMOL/L (ref 99–110)
CO2: 25 MMOL/L (ref 21–32)
CREAT SERPL-MCNC: 0.9 MG/DL (ref 0.6–1.2)
EOSINOPHILS ABSOLUTE: 0 K/UL (ref 0–0.6)
EOSINOPHILS RELATIVE PERCENT: 1.2 %
GFR AFRICAN AMERICAN: >60
GFR NON-AFRICAN AMERICAN: >60
GLUCOSE BLD-MCNC: 256 MG/DL (ref 70–99)
HCT VFR BLD CALC: 34.5 % (ref 36–48)
HEMOGLOBIN: 11.6 G/DL (ref 12–16)
LYMPHOCYTES ABSOLUTE: 0.7 K/UL (ref 1–5.1)
LYMPHOCYTES RELATIVE PERCENT: 17.5 %
MCH RBC QN AUTO: 32.1 PG (ref 26–34)
MCHC RBC AUTO-ENTMCNC: 33.7 G/DL (ref 31–36)
MCV RBC AUTO: 95.1 FL (ref 80–100)
MONOCYTES ABSOLUTE: 0.6 K/UL (ref 0–1.3)
MONOCYTES RELATIVE PERCENT: 14.3 %
NEUTROPHILS ABSOLUTE: 2.6 K/UL (ref 1.7–7.7)
NEUTROPHILS RELATIVE PERCENT: 66.6 %
PDW BLD-RTO: 15 % (ref 12.4–15.4)
PLATELET # BLD: 282 K/UL (ref 135–450)
PMV BLD AUTO: 7.5 FL (ref 5–10.5)
POTASSIUM REFLEX MAGNESIUM: 4.3 MMOL/L (ref 3.5–5.1)
RBC # BLD: 3.63 M/UL (ref 4–5.2)
SEDIMENTATION RATE, ERYTHROCYTE: 32 MM/HR (ref 0–30)
SODIUM BLD-SCNC: 138 MMOL/L (ref 136–145)
TOTAL PROTEIN: 7.4 G/DL (ref 6.4–8.2)
WBC # BLD: 3.9 K/UL (ref 4–11)

## 2022-05-03 PROCEDURE — 99284 EMERGENCY DEPT VISIT MOD MDM: CPT

## 2022-05-03 PROCEDURE — 85025 COMPLETE CBC W/AUTO DIFF WBC: CPT

## 2022-05-03 PROCEDURE — 86140 C-REACTIVE PROTEIN: CPT

## 2022-05-03 PROCEDURE — 97140 MANUAL THERAPY 1/> REGIONS: CPT

## 2022-05-03 PROCEDURE — 70450 CT HEAD/BRAIN W/O DYE: CPT

## 2022-05-03 PROCEDURE — 80053 COMPREHEN METABOLIC PANEL: CPT

## 2022-05-03 PROCEDURE — 85652 RBC SED RATE AUTOMATED: CPT

## 2022-05-03 PROCEDURE — 97110 THERAPEUTIC EXERCISES: CPT

## 2022-05-03 PROCEDURE — 93005 ELECTROCARDIOGRAM TRACING: CPT | Performed by: EMERGENCY MEDICINE

## 2022-05-03 ASSESSMENT — PAIN - FUNCTIONAL ASSESSMENT: PAIN_FUNCTIONAL_ASSESSMENT: NONE - DENIES PAIN

## 2022-05-03 NOTE — FLOWSHEET NOTE
[address] [facility] Physical Therapy  Phone: [phone]   Fax: [phone]    Physical Therapy Daily ONCOLOGY Treatment Note    Date:  5/3/2022    Patient Name:  Sol Espinal    :  1945  MRN: [id number]  Restrictions/Precautions:    Medical/Treatment Diagnosis Information:  Diagnosis: C50.911 (ICD-10-CM) - Primary breast malignancy, right ([facility] [address] C50.911 (ICD-10-CM) - Invasive ductal carcinoma of right breast, stage 3 ([facility] [address]  Treatment Diagnosis: decreased R shoulder strength and ROM, fatigue with decreased endurance  Insurance/Certification information:  PT Insurance Information: Medicare and [id number], no CP, no auth  Physician Information:  Referring Practitioner: Loraine Vigil MD  Plan of care signed (Y/N): [x]  Yes []  No     Date of Patient follow up with Physician: radiologist 3/11    Functional Outcomes:   Fatigue: 3-4/10      3/7/22  Quick Dash:  21 20-39%     3/10/22  Fatigue: 3-4/10      4/5/22  QuickDASH: total score: 20, 20.45% disability   22    Progress Report: []  Yes  [x]  No     Date Range for reporting period:  Beginning 3/7/22  POC: 22  Ending    Progress report due (10 Rx/or 30 days whichever is less): visit #10 or 60 (date)     Recertification due (POC duration/ or 90 days whichever is less): visit #12 or 22 (date)     Visit # Insurance Allowable Auth required? Date Range    +  Med nec []  Yes  [x]  No         Latex Allergy:  [x]NO      []YES  Preferred Language for Healthcare:   [x]English       []other:      Pain level:  0/10     SUBJECTIVE:  Pt reports she didn't sleep well but only has 4 days left of radiation. Not feeling too bad today. Having some sciatic nerve pain una at night.  Reviewed HEP     OBJECTIVE:   4/5  Upper Extremity ROM & Strength AROM  Right AROM  Left Strength Right Strength Left Comments   Shoulder flex 111 140 3+ 5     Shoulder  140 3+ 5      - 6 min walk - 1333 ft - amb with B knee valgus and B foot pronation. Reports L foot pain   - TUG - 7.06 sec    - 5 x STS - 11.63 sec     3/25   Chest measurement - 97.0 cm   Edema noted in R lateral chest wall   Increased tissue tension at R pec tendon and mild cording noted in R axilla   R pec tightness - 2 fingers from table compared to 1 finger on L     3/14   - 6 min walk - 1480 ft (1.25 m/s) - amb with B knee valgus and B foot pronation. Denies knee pain   - TUG - 8.37 sec    - 5 x STS - 11.79 sec     3/10 Pt has good PROM but pulls in UT immediately in all motions, attempted stretching but sh jt with good mobility but poor mm control       RESTRICTIONS/PRECAUTIONS:     EDS- very little stretching, previous frozen shoulder L     Exercises/Interventions:     Therapeutic Exercises (43928) Resistance / level Sets/sec Reps Notes   Arm bike    Nu step    5 min     Pulleys       Supine AAROM with ranger       SB roll outsI, T, Y's Blue lFlexion 10 sec, 10     TB   - mid row  - high row  - LPD   Sh ext/add  ER 4/8 progress to green NV         R very difficult   Pec doorway stretch - 90 deg     B shoulder ER - seated     UT stretch   Levator stretch       Supine piriformis stretch - push away  Supine piriformis stretch - knee to shoulder     Doorway flexion with cane   2             30 sec  30 sec    10 secX 30' B       2 B   2 B     10                4/29 decreased radicular symptoms    Therapeutic Activities (67810)  (Dynamic activities such as compression, designed to improve functional performance)       Educated on s/s of infection and on risk factors for developing lymphedema (no IV, BP, or shots in RUE). Discussed POC for therapy. Provide handout for lymphedema prevention NV     Reviewed HEP from MD - table slides, supine AAROM shoulder flexion with cane, butterfly stretch, pec stretch (demo'd at doorway vs corner), scap squeeze        POC completed this date. Objective measures taken.  FUNCTIONAL OBJECTIVE MEASURES (see above) - 6 min walk, 5x STS, TUG. Discussed progress with therapy and continued limitations in strength and mobility. Provided lymphedema prevention handout and reviewed. Answered all pt questions. Pt provided with M below the knee tensoshape to wear on car ride to [place] coming up. Educated on s/s of constriction and educated to take off if too tight.                scifit nu step                Arm circles B      Shoulder protraction  B      Wall push ups   2 X 10    LT wall slide    X 10            Home Management  (providing pt education on safety procedures/instructions)       pt taught body mechanics for sidelying and pillows   X 5 mins                                              Neuromuscular Re-ed (61211)       SB roll up wall - LT (focus on relaxing UT)     Scap retract                                         Manual Intervention (62748)       Manual stretching R shoulder     Has full passive ROM in jt. Difficulty relaxing UT to move shoudler   STM to pec tendon/cording in axilla   demo'd with hand over hand for self STM at home     4/11 - hold until pt finishes radiation (mid May)    In sidelying  STM to R glut med/ piriformis   X 14 mins    In supine STM to R cording    X 10                      Modalities:     OTHER:     Pt education:   3/25 educated on edema in R lateral chest. Discussed compression options including ember with build in bra or sports bra. Pt signed release of medical information form for [facility] to obtain home lymphedema pump. 3/18 Provided lymphedema prevention handout and reviewed. Answered all pt questions. Pt provided with M below the knee tensoshape to wear on car ride to [place] coming up. Educated on s/s of constriction and educated to take off if too tight.   3/7 Pt was educated on diagnosis; prognosis; PT POC including pathology and anatomy of etiology of lymphedema, condition precautions; lymphedema management/prevention of flare ups, role of exercise, HEP, expectations for rehab. All pt questions were answered. HEP instruction:  4/29  Access Code: [email or web address]  URL: [email or web address]  Date: 04/29/2022  Prepared by: Paul Matta    Exercises  Supine Piriformis Stretch with Foot on Ground - 1 x daily - 7 x weekly - 2 sets - 30 sec hold  Supine Figure 4 Piriformis Stretch - 1 x daily - 7 x weekly - 2 sets - 30 sec hold  Supine Chest Stretch on Foam Roll - 1 x daily - 7 x weekly      4/15  Access Code: [email or web address]  URL: [email or web address]  Date: 04/15/2022  Prepared by: Paul Matta    Exercises  Single Arm Doorway Pec Stretch at 90 Degrees Abduction - 1 x daily - 7 x weekly - 10 reps - 10 sec hold  Shoulder External Rotation and Scapular Retraction with Resistance - 1 x daily - 7 x weekly - 2 sets - 10 reps      Access Code: [email or web address]  URL: [email or web address]  Date: 03/21/2022  Prepared by: Julian Heard    Exercises  Standing Shoulder Row with Anchored Resistance - 1 x daily - 7 x weekly - 1 sets - 10 reps  Squatting High Shoulder Row with Resistance - 1 x daily - 7 x weekly - 1 sets - 10 reps  Seated Shoulder External Rotation with Resistance - 1 x daily - 7 x weekly - 1 sets - 10 reps  Standing Shoulder Adduction Reactive Isometrics with Resistance and Elbow Extended - 1 x daily - 7 x weekly - 1 sets - 10 reps  Single Arm Shoulder Extension with Resistance - 1 x daily - 7 x weekly - 1 sets - 10 reps    3/14  Access Code: [email or web address]  URL: [email or web address]  Date: 03/14/2022  Prepared by: Paul Matta    Exercises  Doorway Pec Stretch at 60 Elevation - 1 x daily - 7 x weekly - 2 sets - 30 sec hold  Doorway Pec Stretch at 90 Degrees Abduction - 1 x daily - 7 x weekly - 2 sets - 30 sec hold    3/7  Reviewed HEP from MD. Questions answered about exercise and to complete in pain free range but still feeling a stretch.  (see above for exercise)    Therapeutic Exercise and NMR EXR  [x] (98441) Provided verbal/tactile cueing for activities related to strengthening, flexibility, endurance, ROM for improvements in  [] LE / Lumbar: LE, proximal hip, and core control with self care, mobility, lifting, ambulation. [x] UE / Cervical: cervical, postural, scapular, scapulothoracic and UE control with self care, reaching, carrying, lifting, house/yardwork, driving, computer work.  [] (74202) Provided verbal/tactile cueing for activities related to improving balance, coordination, kinesthetic sense, posture, motor skill, proprioception to assist with   [] LE / lumbar: LE, proximal hip, and core control in self care, mobility, lifting, ambulation and eccentric single leg control. [] UE / cervical: cervical, scapular, scapulothoracic and UE control with self care, reaching, carrying, lifting, house/yardwork, driving, computer work.   [] (37879) Therapist is in constant attendance of 2 or more patients providing skilled therapy interventions, but not providing any significant amount of measurable one-on-one time to either patient, for improvements in  [] LE / lumbar: LE, proximal hip, and core control in self care, mobility, lifting, ambulation and eccentric single leg control. [] UE / cervical: cervical, scapular, scapulothoracic and UE control with self care, reaching, carrying, lifting, house/yardwork, driving, computer work.      NMR and Therapeutic Activities:    [x] (93504 or 29811) Provided verbal/tactile cueing for activities related to improving balance, coordination, kinesthetic sense, posture, motor skill, proprioception and motor activation to allow for proper function of   [] LE: / Lumbar core, proximal hip and LE with self care and ADLs  [x] UE / Cervical: cervical, postural, scapular, scapulothoracic and UE control with self care, carrying, lifting, driving, computer work.   [] (83912) Gait Re-education- Provided training and instruction to the patient for proper LE, core and proximal hip recruitment and positioning and eccentric body weight control with ambulation re-education including up and down stairs     Home Management Training / Self Care:  [x] (36115) Provided self-care/home management training related to activities of daily living and compensatory training, and/or use of adaptive equipment for improvement with: ADLs and compensatory training, meal preparation, safety procedures and instruction in use of adaptive equipment, including bathing, grooming, dressing, personal hygiene, basic household cleaning and chores. Home Exercise Program:    [x] (83863) Reviewed/Progressed HEP activities related to strengthening, flexibility, endurance, ROM of   [] LE / Lumbar: core, proximal hip and LE for functional self-care, mobility, lifting and ambulation/stair navigation   [x] UE / Cervical: cervical, postural, scapular, scapulothoracic and UE control with self care, reaching, carrying, lifting, house/yardwork, driving, computer work  [] (72127)Reviewed/Progressed HEP activities related to improving balance, coordination, kinesthetic sense, posture, motor skill, proprioception of   [] LE: core, proximal hip and LE for self care, mobility, lifting, and ambulation/stair navigation    [] UE / Cervical: cervical, postural,  scapular, scapulothoracic and UE control with self care, reaching, carrying, lifting, house/yardwork, driving, computer work    Manual Treatments:  PROM / STM / Oscillations-Mobs:  G-I, II, III, IV (PA's, Inf., Post.)  [] (11488) Provided manual therapy to mobilize LE, proximal hip and/or LS spine soft tissue/joints for the purpose of modulating pain, promoting relaxation,  increasing ROM, reducing/eliminating soft tissue swelling/inflammation/restriction, improving soft tissue extensibility and allowing for proper ROM for normal function with   [] LE / lumbar: self care, mobility, lifting and ambulation. [] UE / Cervical: self care, reaching, carrying, lifting, house/yardwork, driving, computer work.      Modalities:  [] (71404) Vasopneumatic compression: Utilized vasopneumatic compression to decrease edema / swelling for the purpose of improving mobility and quad tone / recruitment which will allow for increased overall function including but not limited to self-care, transfers, ambulation, and ascending / descending stairs. Charges:  Timed Code Treatment Minutes: 40   Total Treatment Minutes: 40     [] EVAL - LOW (09754)   [] EVAL - MOD (87160)  [] EVAL - HIGH (89941)  [] RE-EVAL (24959)  [x] VY(72984) x  3     [] Ionto  [] NMR (55421) x  1     [] Vaso  [] Manual (28349) x 1       [] Ultrasound  [] TA x   1     [] Mech Traction (98527)  [] Aquatic Therapy x     [] ES (un) (65289):   [] Home Management Training x  [] ES(attended) (89184)   [] Dry Needling 1-2 muscles (96956):  [] Dry Needling 3+ muscles (448399  [] Group:      [] Other:     GOALS:   Patient stated goal: stretching chest mm and shoulder   [x]? Progressing: []? Met: []? Not Met: []? Adjusted     Therapist goals for Patient:   Short Term Goals: To be achieved in: 2 weeks  1. Independent in HEP and progression per patient tolerance, in order to prevent re-injury. []? Progressing: [x]? Met: []? Not Met: []? Adjusted  2. Patient will have a decrease in pain to facilitate improvement in movement, function, and ADLs as indicated by improvement with respect to Functional Deficits. []? Progressing: [x]? Met: []? Not Met: []? Adjusted     Long Term Goals: To be achieved in: 6    weeks  1. Disability index score of  0% or less on the QuickDASH  to assist with reaching prior level of function. -? Progressing: -? Met: -? Not Met: -? Adjusted  2. Patient will demonstrate increased R shoulder AROM to 155 deg flexion and abd, to allow for proper joint functioning to allow pt to resume home management without increase in symptoms. -? Progressing: -? Met: -? Not Met: -? Adjusted  3.  Patient will demonstrate increased R shoulder strength to 4+/5 in flexion and abd, to allow for proper joint functioning to allow pt to resume home management without increase in symptoms. [x]? Progressing: []? Met: []? Not Met: []? Adjusted  4. Pt will report ability to sleep 7/7 nights without waking up due to her RUE. [x]? Progressing: []? Met: []? Not Met: []? Adjusted    Overall Progression Towards Functional goals/ Treatment Progress Update:  [] Patient is progressing as expected towards functional goals listed. [] Progression is slowed due to complexities/Impairments listed. [] Progression has been slowed due to co-morbidities. [x] Plan just implemented, too soon to assess goals progression <30days   [] Goals require adjustment due to lack of progress  [] Patient is not progressing as expected and requires additional follow up with physician  [] Other    Persisting Functional Limitations/Impairments:  [x]Sleeping []Sitting               []Standing []Transfers        []Walking []Kneeling               []Stairs []Squatting / bending   [x]ADLs [x]Reaching  []Lifting  [x]Housework  []Driving []Job related tasks  []Sports/Recreation []Other:        ASSESSMENT: Pt continues with tightness in R shoulder and axilla. Pt reports she feels most of her limitations are due to decreased flexibility. Initiated doorway flexion with cane this date. Continue to progress stretching and strengthening to improve function and mobility. Treatment/Activity Tolerance:  [x] Patient able to complete tx [] Patient limited by fatigue  [] Patient limited by pain  [] Patient limited by other medical complications  [] Other:     Prognosis: [x] Good [] Fair  [] Poor    Patient Requires Follow-up: [x] Yes  [] No    Plan for next treatment session: See flowsheet    PLAN: See eval. PT 2x / week for 6 weeks.    [x] Continue per plan of care [] Alter current plan (see comments)  [] Plan of care initiated [] Hold pending MD visit [] Discharge    Electronically signed by: Rey Oglesby, PT, DPT    Note: If patient does not return for scheduled/ recommended follow up visits, this note will serve as a discharge from care along with most recent update on progress. Admitting MD/Consultant

## 2022-05-03 NOTE — ED PROVIDER NOTES
[address] [facility]        Pt Name: Katie Gray  MRN: [id number]  [place] 1945  Date of evaluation: 5/3/2022  Provider: Ted Mo MD  PCP: Bhavik Choi MD    This patient was seen and evaluated by the attending physician Ted Mo MD.      [address] [place] [address]       Chief Complaint   Patient presents with    Numbness     Pt had numbness and tingling in 3 fingers on the R hand. The numbness only lasted a few seconds. Pt is currently not having any symptoms. 0 NIH. HISTORY OF PRESENT ILLNESS   (Location/Symptom, Timing/Onset, Context/Setting, Quality, Duration, Modifying Factors, Severity)  Note limiting factors. Katie Gray is a 77 y.o. female today with a chief complaint of transient paresthesias of her right thumb index and middle finger now resolved. She has a history of bilateral mastectomies and is receiving adjuvant chemotherapy I believe is now been done and has had multiple treatments of radiation therapy as well to the chest wall. No paresthesias at present no headache tingling numbness weakness no nausea or diarrhea prior CVA CVA or TIA. Nursing Notes were all reviewed and agreed with or any disagreements were addressed  in the HPI. REVIEW OF SYSTEMS    (2-9 systems for level 4, 10 or more for level 5)     Review of Systems    Positives and Pertinent negatives as per HPI. Except as noted abovein the ROS, all other systems were reviewed and negative.        PAST MEDICAL HISTORY     Past Medical History:   Diagnosis Date    Anesthesia     sensitive, doesnt take much    Asthma     resolved    Breast cancer ([address]     Cancer ([address] 06/2021    right Breast    Diabetes mellitus ([address]     Diverticulitis     History of chemotherapy 12/29/2021    Dr [facility] concluded    Hx antineoplastic chemo     Hypertension     no meds due to chemo    Sleep apnea     Mild - wears no mask SURGICAL HISTORY     Past Surgical History:   Procedure Laterality Date    BREAST LUMPECTOMY Left     HYSTERECTOMY      MASTECTOMY Bilateral 2/1/2022    BILATERAL TOTAL MASTECTOMY, RIGHT LOCALIZED SENTINEL LYMPH NODE BIOPSY-FROZEN SECTIONS-, TECHNETIUM NINETY-NINE AND INJECTABLE BLUE DYE IN THE OPERATING ROOM, EXCISION OF RIGHT BREAST SKIN LESION performed by Jourdan Torres MD at [address] [place] [address] Dr N/MARIANO 6/23/2021    PLACEMENT OF POWER PORT A CATHETER performed by Satnam Robles MD at [address] [place] [address] Right 6/3/2021    US BREAST BIOPSY NEEDLE ADDITIONAL RIGHT 6/3/2021 [facility] ULTRASOUND    US BREAST NEEDLE BIOPSY RIGHT Right 6/3/2021    US BREAST NEEDLE BIOPSY RIGHT 6/3/2021 [facility] ULTRASOUND    US GUIDED NEEDLE LOC OF RIGHT BREAST Right 1/27/2022    US GUIDED NEEDLE LOC OF RIGHT BREAST 1/27/2022 [facility] ULTRASOUND    US LYMPH NODE BIOPSY  6/3/2021    US LYMPH NODE BIOPSY 6/3/2021 [facility] ULTRASOUND         CURRENTMEDICATIONS       Previous Medications    AMLODIPINE (NORVASC) 5 MG TABLET    Take 5 mg by mouth daily    ATORVASTATIN (LIPITOR) 20 MG TABLET    TAKE 1 TABLET BY MOUTH AT BEDTIME AS DIRECTED.     CHOLECALCIFEROL (VITAMIN D3) 125 MCG (5000 UT) TABS    Take by mouth    CYANOCOBALAMIN (VITAMIN B 12 PO)    Take by mouth    INSULIN ASPART, W/NIACINAMIDE, (FIASP FLEXTOUCH) 100 UNIT/ML SOPN    Inject into the skin Sliding scale 2 units bid    INSULIN GLARGINE, 1 UNIT DIAL, (TOUJEO SOLOSTAR) 300 UNIT/ML SOPN    Inject 2 Units into the skin every morning    LISINOPRIL (PRINIVIL;ZESTRIL) 10 MG TABLET    Take 10 mg by mouth daily     MAGNESIUM 400 MG CAPS    Take 400 mg by mouth 2 times daily    METFORMIN (GLUCOPHAGE) 1000 MG TABLET    TAKE 1 TABLET BY MOUTH TWO TIMES A DAY    ZAFIRLUKAST (ACCOLATE) 10 MG TABLET    TK 1 T PO QD         ALLERGIES     Other and Erythromycin    FAMILYHISTORY       Family History   Problem Relation Age of Onset    High Blood Pressure Mother     Stroke Mother     High Blood Pressure Father     Breast Cancer Maternal Aunt           SOCIAL HISTORY       Social History     Socioeconomic History    Marital status:      Spouse name: Not on file    Number of children: Not on file    Years of education: Not on file    Highest education level: Not on file   Occupational History    Not on file   Tobacco Use    Smoking status: Never Smoker    Smokeless tobacco: Never Used   Vaping Use    Vaping Use: Never used   Substance and Sexual Activity    Alcohol use: No    Drug use: No    Sexual activity: Not on file   Other Topics Concern    Not on file   Social History Narrative    Not on file     Social Determinants of Health     Financial Resource Strain:     Difficulty of Paying Living Expenses: Not on file   Food Insecurity:     Worried About Running Out of Food in the Last Year: Not on file    [place] of Food in the Last Year: Not on file   Transportation Needs:     Lack of Transportation (Medical): Not on file    Lack of Transportation (Non-Medical):  Not on file   Physical Activity:     Days of Exercise per Week: Not on file    Minutes of Exercise per Session: Not on file   Stress:     Feeling of Stress : Not on file   Social Connections:     Frequency of Communication with Friends and Family: Not on file    Frequency of Social Gatherings with Friends and Family: Not on file    Attends Confucianist Services: Not on file    Active Member of Clubs or Organizations: Not on file    Attends Club or Organization Meetings: Not on file    Marital Status: Not on file   Intimate Partner Violence:     Fear of Current or Ex-Partner: Not on file    Emotionally Abused: Not on file    Physically Abused: Not on file    Sexually Abused: Not on file   Housing Stability:     Unable to Pay for Housing in the Last Year: Not on file    Number of [place] in the Last Year: Not on file    Unstable Housing in the Last Year: Not on file       SCREENINGS    [place] Coma Scale  Eye Opening: Spontaneous  Best Verbal Response: Oriented  Best Motor Response: Obeys commands  [place] Coma Scale Score: 15        PHYSICAL EXAM    (up to 7 for level 4, 8 or more for level 5)     ED Triage Vitals   BP Temp Temp src Pulse Resp SpO2 Height Weight   -- -- -- -- -- -- -- --       Physical Exam     General Appearance:  Alert, cooperative, no distress, appears stated age. Head:  Normocephalic, without obvious abnormality, atraumatic. Eyes:  conjunctiva/corneas clear, EOM's intact. Sclera anicteric. ENT: Mucous membranes moist.   Neck: Supple, symmetrical, trachea midline, no adenopathy. No jugular venous distention. Lungs:   No Respiratory Distress. Chest Wall:  Bilateral mastectomy   Heart:  RRR   Abdomen:   Soft, NT, ND   Extremities:  Full range of motion. Pulses: Symmetric x4   Skin:  No rashes or lesions to exposed skin. Neurologic: Alert and oriented X 3. Motor grossly normal.  Speech clear. DIAGNOSTIC RESULTS   LABS:    Labs Reviewed   CBC WITH AUTO DIFFERENTIAL - Abnormal; Notable for the following components:       Result Value    WBC 3.9 (*)     RBC 3.63 (*)     Hemoglobin 11.6 (*)     Hematocrit 34.5 (*)     Lymphocytes Absolute 0.7 (*)     All other components within normal limits   COMPREHENSIVE METABOLIC PANEL W/ REFLEX TO MG FOR LOW K - Abnormal; Notable for the following components:    Glucose 256 (*)     BUN 24 (*)     All other components within normal limits   SEDIMENTATION RATE - Abnormal; Notable for the following components:    Sed Rate 32 (*)     All other components within normal limits   C-REACTIVE PROTEIN       All other labs were within normal range or not returned as of this dictation. EKG: All EKG's are interpreted by the Emergency Department Physician in the absence of a cardiologist.  Please see their note for interpretation of EKG. EKG is reviewed myself.   Dated today at 1954.  Rate 99 sinus rhythm LVH pattern. No change from prior    RADIOLOGY:   Non-plain film images such as CT, Ultrasound and MRI are read by the radiologist. Ronit Jacobo radiographic images are visualized andpreliminarily interpreted by the  ED Provider with the below findings:        Interpretation miladys Radiologist below, if available at the time of this note:    CT Head WO Contrast   Final Result   No acute intracranial abnormality. No results found. PROCEDURES   Unless otherwise noted below, none     Procedures    CRITICAL CARE TIME   N/A    CONSULTS:  None      EMERGENCY DEPARTMENT COURSE and DIFFERENTIAL DIAGNOSIS/MDM:   Vitals:    Vitals:    05/03/22 1925   BP: (!) 150/73   Pulse: 107   Resp: 16   Temp: 98.2 °F (36.8 °C)   TempSrc: Oral   SpO2: 96%   Weight: 157 lb (71.2 kg)   Height: 5' 6\" (1.676 m)       Patient was given thefollowing medications:  Medications - No data to display    45-year-old female with right arm paresthesias now resolved. Given the transient nature of this and her history of localized chest wall radiation therapy as well as chemotherapeutics which are known to cause neuropathy I have a low suspicion for this being a CVA or TIA. Given history of breast cancer obtain a head CT most to exclude obvious large tumor mass. Checking baseline labs as well to evaluate for any electrolyte anomalies can cause paresthesias. If benign she can follow-up as an outpatient. Labs and CT benign  DC home. FINAL IMPRESSION      1.  Paresthesias          DISPOSITION/PLAN   DISPOSITION Decision To Discharge 05/03/2022 09:22:42 PM      PATIENT REFERREDTO:  Shadia Doyle MD  [address] [place] [address] Road  [phone]            DISCHARGE MEDICATIONS:  New Prescriptions    No medications on file       DISCONTINUED MEDICATIONS:  Discontinued Medications    No medications on file              (Please note that portions ofthis note were completed with a voice recognition program.  Efforts were made to edit the dictations but occasionally words are mis-transcribed.)    Uday Chapman MD (electronically signed)           Uday Chapman MD  05/03/22 2122

## 2022-05-04 LAB
EKG ATRIAL RATE: 99 BPM
EKG DIAGNOSIS: NORMAL
EKG P AXIS: 65 DEGREES
EKG P-R INTERVAL: 146 MS
EKG Q-T INTERVAL: 342 MS
EKG QRS DURATION: 92 MS
EKG QTC CALCULATION (BAZETT): 438 MS
EKG R AXIS: -22 DEGREES
EKG T AXIS: 69 DEGREES
EKG VENTRICULAR RATE: 99 BPM

## 2022-05-04 PROCEDURE — 93010 ELECTROCARDIOGRAM REPORT: CPT | Performed by: INTERNAL MEDICINE

## 2022-05-06 ENCOUNTER — HOSPITAL ENCOUNTER (OUTPATIENT)
Dept: PHYSICAL THERAPY | Age: 77
Setting detail: THERAPIES SERIES
Discharge: HOME | End: 2022-05-06
Payer: MEDICARE

## 2022-05-06 PROCEDURE — 97530 THERAPEUTIC ACTIVITIES: CPT

## 2022-05-10 ENCOUNTER — HOSPITAL ENCOUNTER (OUTPATIENT)
Dept: PHYSICAL THERAPY | Age: 77
Setting detail: THERAPIES SERIES
Discharge: HOME | End: 2022-05-10
Payer: MEDICARE

## 2022-05-10 PROCEDURE — 97110 THERAPEUTIC EXERCISES: CPT

## 2022-05-10 NOTE — FLOWSHEET NOTE
[facility]  Phone: [phone]   Fax: [phone]    Physical Therapy Daily ONCOLOGY Treatment Note    Date:  5/10/2022    Patient Name:  Martha Fajardo    :  1945  MRN: [id number]  Restrictions/Precautions:    Medical/Treatment Diagnosis Information:  Diagnosis: C50.911 (ICD-10-CM) - Primary breast malignancy, right ([facility] [address] C50.911 (ICD-10-CM) - Invasive ductal carcinoma of right breast, stage 3 ([facility] [address]  Treatment Diagnosis: decreased R shoulder strength and ROM, fatigue with decreased endurance  Insurance/Certification information:  PT Insurance Information: Medicare and [id number], no CP, no auth  Physician Information:  Referring Practitioner: Leticia Gomez MD  Plan of care signed (Y/N): [x]  Yes []  No     Date of Patient follow up with Physician: radiologist 3/11    Functional Outcomes:   Fatigue: 3-4/10      3/7/22  Quick Dash:  21 20-39%     3/10/22  Fatigue: 3-4/10      4/5/22  QuickDASH: total score: 20, 20.45% disability   22  Fatigue: 2/10       5/6/22  QuickDASH: total score: 16, 11.36% disability   22    Progress Report: []  Yes  [x]  No     Date Range for reporting period:  Beginning 3/7/22  POC: 22  PN:   Ending    Progress report due (10 Rx/or 30 days whichever is less): visit #10 or 98 (date)     Recertification due (POC duration/ or 90 days whichever is less): visit #12 or 22 (date)     Visit # Insurance Allowable Auth required? Date Range    +  Med nec []  Yes  [x]  No         Latex Allergy:  [x]NO      []YES  Preferred Language for Healthcare:   [x]English       []other:      Pain level:  0/10     SUBJECTIVE:  Pt reports she is doing well today. Finished her radiation yesterday.       OBJECTIVE:   57  Upper Extremity ROM & Strength AROM  Right AROM  Left Strength Right Comments   Shoulder flex 115 142 3+     Shoulder AB 80 135 3 R abd - compensates with trunk lean      - 6 min walk - 1446 ft - amb with B knee valgus and B foot pronation. Reports L knee pain  - 5 x STS - 12.87 sec - VC for more through LEs and less momentum      4/5  Upper Extremity ROM & Strength AROM  Right AROM  Left Strength Right Strength Left Comments   Shoulder flex 111 140 3+ 5     Shoulder  140 3+ 5      - 6 min walk - 1333 ft - amb with B knee valgus and B foot pronation. Reports L foot pain   - TUG - 7.06 sec    - 5 x STS - 11.63 sec     3/25   Chest measurement - 97.0 cm   Edema noted in R lateral chest wall   Increased tissue tension at R pec tendon and mild cording noted in R axilla   R pec tightness - 2 fingers from table compared to 1 finger on L     3/14   - 6 min walk - 1480 ft (1.25 m/s) - amb with B knee valgus and B foot pronation.  Denies knee pain   - TUG - 8.37 sec    - 5 x STS - 11.79 sec     3/10 Pt has good PROM but pulls in UT immediately in all motions, attempted stretching but sh jt with good mobility but poor mm control       RESTRICTIONS/PRECAUTIONS:     EDS- very little stretching, previous frozen shoulder L     Exercises/Interventions:     Therapeutic Exercises (14028) Resistance / level Sets/sec Reps Notes   Arm bike    Nu step    5 min     Pulleys    Flexion   Scaption  5 sec  5 sec 10 B   10 B    Standing shoulder AROM   - flexion   - ABD     2  2   10   10     SB roll outsI, T, Y's Blue Flexion 10 sec, 10   TB   - mid row  - high row  - LPD   Sh ext/add  ER [place] 2X 104/8 progress to green NV         R very difficult   Pec doorway stretch - 90 deg     B shoulder ER - seated     UT stretch   Levator stretch       Supine piriformis stretch - push away  Supine piriformis stretch - knee to shoulder     Doorway flexion with cane                   4/29 decreased radicular symptoms    Therapeutic Activities (06714)  (Dynamic activities such as compression, designed to improve functional performance)       Educated on s/s of infection and on risk factors for developing lymphedema (no IV, BP, or shots in RUE). Discussed POC for therapy. Provide handout for lymphedema prevention NV     Reviewed HEP from MD - table slides, supine AAROM shoulder flexion with cane, butterfly stretch, pec stretch (demo'd at doorway vs corner), scap squeeze        PN completed this date. Objective measures taken. FUNCTIONAL OBJECTIVE MEASURES (see above) - 6 min walk, 5x STS Discussed progress with therapy and continued limitations in strength and mobility. Discussed POC including completing this POC then taking a break from PT to allow skin to heal from radiation. Then returning to PT with new referral to initiate manual therapy to decrease cording. Reviewed HEP and plan to update HEP in remaining 2 visits. Provided lymphedema prevention handout and reviewed. Answered all pt questions. Pt provided with M below the knee tensoshape to wear on car ride to [place] coming up. Educated on s/s of constriction and educated to take off if too tight.                scifit nu step                Arm circles B      Shoulder protraction  B      Wall push ups   2 X 10    LT wall slide             Home Management  (providing pt education on safety procedures/instructions)       pt taught body mechanics for sidelying and pillows                                                 Neuromuscular Re-ed (89611)       SB roll up wall - LT (focus on relaxing UT)     Scap retract                                         Manual Intervention ([id number])       Manual stretching R shoulder     Has full passive ROM in jt.   Difficulty relaxing UT to move shoudler   STM to pec tendon/cording in axilla   demo'd with hand over hand for self STM at home     4/11 - hold until pt finishes radiation (mid May)    In sidelying  STM to R glut med/ piriformis      In supine STM to R cording                         Modalities:     OTHER:     Pt education:   3/25 educated on edema in R lateral chest. Discussed compression options including ember with build in bra or sports bra. Pt signed release of medical information form for [facility] to obtain home lymphedema pump. 3/18 Provided lymphedema prevention handout and reviewed. Answered all pt questions. Pt provided with M below the knee tensoshape to wear on car ride to [place] coming up. Educated on s/s of constriction and educated to take off if too tight. 3/7 Pt was educated on diagnosis; prognosis; PT POC including pathology and anatomy of etiology of lymphedema, condition precautions; lymphedema management/prevention of flare ups, role of exercise, HEP, expectations for rehab. All pt questions were answered. HEP instruction:  5/10  Access Code: [email or web address]  URL: [email or web address]  Date: 05/10/2022  Prepared by: Latvian Allis    Exercises  Supine Shoulder Flexion Extension Full Range AROM - 1 x daily - 7 x weekly - 2 sets - 10 reps  Standing Shoulder Flexion to 90 Degrees - 1 x daily - 7 x weekly - 2 sets - 10 reps  Shoulder Abduction - Thumbs Up - 1 x daily - 7 x weekly - 2 sets - 10 reps  Shoulder Extension with Resistance - 1 x daily - 7 x weekly - 2 sets - 10 reps  Standing High Row with Resistance - 1 x daily - 7 x weekly - 2 sets - 10 reps  Standing Shoulder Row with Anchored Resistance - 1 x daily - 7 x weekly - 2 sets - 10 reps  Doorway Pec Stretch at 60 Elevation - 1 x daily - 7 x weekly - 2 sets - 10 reps  Shoulder External Rotation with Anchored Resistance - 1 x daily - 7 x weekly - 3 sets - 10 reps    4/29  Access Code: [email or web address]  URL: [email or web address]  Date: 04/29/2022  Prepared by: Latvian Allis    Exercises  Supine Piriformis Stretch with Foot on Ground - 1 x daily - 7 x weekly - 2 sets - 30 sec hold  Supine Figure 4 Piriformis Stretch - 1 x daily - 7 x weekly - 2 sets - 30 sec hold  Supine Chest Stretch on Foam Roll - 1 x daily - 7 x weekly      4/15  Access Code: [email or web address]  URL: [email or web address]  Date: 04/15/2022  Prepared by: Mar Mendoza [place]    Exercises  Single Arm Doorway Pec Stretch at 90 Degrees Abduction - 1 x daily - 7 x weekly - 10 reps - 10 sec hold  Shoulder External Rotation and Scapular Retraction with Resistance - 1 x daily - 7 x weekly - 2 sets - 10 reps      Access Code: [email or web address]  URL: [employer]/  Date: 03/21/2022  Prepared by: Ronnell Penaloza    Exercises  Standing Shoulder Row with Anchored Resistance - 1 x daily - 7 x weekly - 1 sets - 10 reps  Squatting High Shoulder Row with Resistance - 1 x daily - 7 x weekly - 1 sets - 10 reps  Seated Shoulder External Rotation with Resistance - 1 x daily - 7 x weekly - 1 sets - 10 reps  Standing Shoulder Adduction Reactive Isometrics with Resistance and Elbow Extended - 1 x daily - 7 x weekly - 1 sets - 10 reps  Single Arm Shoulder Extension with Resistance - 1 x daily - 7 x weekly - 1 sets - 10 reps    3/14  Access Code: [email or web address]  URL: [email or web address]  Date: 03/14/2022  Prepared by: Gabriela Choi    Exercises  Doorway Pec Stretch at 60 Elevation - 1 x daily - 7 x weekly - 2 sets - 30 sec hold  Doorway Pec Stretch at 90 Degrees Abduction - 1 x daily - 7 x weekly - 2 sets - 30 sec hold    3/7  Reviewed HEP from MD. Questions answered about exercise and to complete in pain free range but still feeling a stretch. (see above for exercise)    Therapeutic Exercise and NMR EXR  [x] (79358) Provided verbal/tactile cueing for activities related to strengthening, flexibility, endurance, ROM for improvements in  [] LE / Lumbar: LE, proximal hip, and core control with self care, mobility, lifting, ambulation.   [x] UE / Cervical: cervical, postural, scapular, scapulothoracic and UE control with self care, reaching, carrying, lifting, house/yardwork, driving, computer work.  [] (58700) Provided verbal/tactile cueing for activities related to improving balance, coordination, kinesthetic sense, posture, motor skill, proprioception to assist with   [] LE / lumbar: LE, proximal hip, and core control in self care, mobility, lifting, ambulation and eccentric single leg control. [] UE / cervical: cervical, scapular, scapulothoracic and UE control with self care, reaching, carrying, lifting, house/yardwork, driving, computer work.   [] (59010) Therapist is in constant attendance of 2 or more patients providing skilled therapy interventions, but not providing any significant amount of measurable one-on-one time to either patient, for improvements in  [] LE / lumbar: LE, proximal hip, and core control in self care, mobility, lifting, ambulation and eccentric single leg control. [] UE / cervical: cervical, scapular, scapulothoracic and UE control with self care, reaching, carrying, lifting, house/yardwork, driving, computer work. NMR and Therapeutic Activities:    [x] (81057 or 44705) Provided verbal/tactile cueing for activities related to improving balance, coordination, kinesthetic sense, posture, motor skill, proprioception and motor activation to allow for proper function of   [] LE: / Lumbar core, proximal hip and LE with self care and ADLs  [x] UE / Cervical: cervical, postural, scapular, scapulothoracic and UE control with self care, carrying, lifting, driving, computer work.   [] (34176) Gait Re-education- Provided training and instruction to the patient for proper LE, core and proximal hip recruitment and positioning and eccentric body weight control with ambulation re-education including up and down stairs     Home Management Training / Self Care:  [] (57369) Provided self-care/home management training related to activities of daily living and compensatory training, and/or use of adaptive equipment for improvement with: ADLs and compensatory training, meal preparation, safety procedures and instruction in use of adaptive equipment, including bathing, grooming, dressing, personal hygiene, basic household cleaning and chores.      Home Exercise Program:    [x] (70566) Reviewed/Progressed HEP activities related to strengthening, flexibility, endurance, ROM of   [] LE / Lumbar: core, proximal hip and LE for functional self-care, mobility, lifting and ambulation/stair navigation   [x] UE / Cervical: cervical, postural, scapular, scapulothoracic and UE control with self care, reaching, carrying, lifting, house/yardwork, driving, computer work  [] (87682)Reviewed/Progressed HEP activities related to improving balance, coordination, kinesthetic sense, posture, motor skill, proprioception of   [] LE: core, proximal hip and LE for self care, mobility, lifting, and ambulation/stair navigation    [] UE / Cervical: cervical, postural,  scapular, scapulothoracic and UE control with self care, reaching, carrying, lifting, house/yardwork, driving, computer work    Manual Treatments:  PROM / STM / Oscillations-Mobs:  G-I, II, III, IV (PA's, Inf., Post.)  [] (44188) Provided manual therapy to mobilize LE, proximal hip and/or LS spine soft tissue/joints for the purpose of modulating pain, promoting relaxation,  increasing ROM, reducing/eliminating soft tissue swelling/inflammation/restriction, improving soft tissue extensibility and allowing for proper ROM for normal function with   [] LE / lumbar: self care, mobility, lifting and ambulation. [] UE / Cervical: self care, reaching, carrying, lifting, house/yardwork, driving, computer work. Modalities:  [] (06813) Vasopneumatic compression: Utilized vasopneumatic compression to decrease edema / swelling for the purpose of improving mobility and quad tone / recruitment which will allow for increased overall function including but not limited to self-care, transfers, ambulation, and ascending / descending stairs.        Charges:  Timed Code Treatment Minutes: 40   Total Treatment Minutes: 40     [] EVAL - LOW (79325)   [] EVAL - MOD (56520)  [] EVAL - HIGH (55504)  [] RE-EVAL (27783)  [x] QV(37795) x  3     [] Ionto  [] NMR (62321) x  1     [] Vaso  [] Manual (38038) x 1       [] Ultrasound  [] TA x   3     [] Mech Traction (07740)  [] Aquatic Therapy x     [] ES (un) (77149):   [] Home Management Training x  [] ES(attended) (12980)   [] Dry Needling 1-2 muscles (55070):  [] Dry Needling 3+ muscles (820714  [] Group:      [] Other:     GOALS:   Patient stated goal: stretching chest mm and shoulder   [x]? Progressing: []? Met: []? Not Met: []? Adjusted     Therapist goals for Patient:   Short Term Goals: To be achieved in: 2 weeks  1. Independent in HEP and progression per patient tolerance, in order to prevent re-injury. []? Progressing: [x]? Met: []? Not Met: []? Adjusted  2. Patient will have a decrease in pain to facilitate improvement in movement, function, and ADLs as indicated by improvement with respect to Functional Deficits. []? Progressing: [x]? Met: []? Not Met: []? Adjusted     Long Term Goals: To be achieved in: 6    weeks  1. Disability index score of  0% or less on the QuickDASH  to assist with reaching prior level of function. -? Progressing: -? Met: -? Not Met: -? Adjusted  2. Patient will demonstrate increased R shoulder AROM to 155 deg flexion and abd, to allow for proper joint functioning to allow pt to resume home management without increase in symptoms. -? Progressing: -? Met: -? Not Met: -? Adjusted  3. Patient will demonstrate increased R shoulder strength to 4+/5 in flexion and abd, to allow for proper joint functioning to allow pt to resume home management without increase in symptoms. [x]? Progressing: []? Met: []? Not Met: []? Adjusted  4. Pt will report ability to sleep 7/7 nights without waking up due to her RUE. [x]? Progressing: []? Met: []? Not Met: []? Adjusted    Overall Progression Towards Functional goals/ Treatment Progress Update:  [x] Patient is progressing as expected towards functional goals listed. [] Progression is slowed due to complexities/Impairments listed.   [] Progression has been slowed due to co-morbidities. [] Plan just implemented, too soon to assess goals progression <30days   [] Goals require adjustment due to lack of progress  [] Patient is not progressing as expected and requires additional follow up with physician  [] Other    Persisting Functional Limitations/Impairments:  [x]Sleeping []Sitting               []Standing []Transfers        []Walking []Kneeling               []Stairs []Squatting / bending   [x]ADLs [x]Reaching  []Lifting  [x]Housework  []Driving []Job related tasks  []Sports/Recreation []Other:        ASSESSMENT: Pt continues with difficulty with R shoulder ER with TB. Tactile cues to keep elbow by side. Initiated R shoulder AROM with focus on not activating UT for appropriate mechanics. Pt continues with limited R shoulder AROM. Developed HEP following therapy to be provided at d/c. Treatment/Activity Tolerance:  [x] Patient able to complete tx [] Patient limited by fatigue  [] Patient limited by pain  [] Patient limited by other medical complications  [] Other:     Prognosis: [x] Good [] Fair  [] Poor    Patient Requires Follow-up: [x] Yes  [] No    Plan for next treatment session: See flowsheet    PLAN: See eval. PT 2x / week for 6 weeks. [x] Continue per plan of care [] Alter current plan (see comments)  [] Plan of care initiated [] Hold pending MD visit [] Discharge    Electronically signed by: Mauri Vigil, PT, DPT    Note: If patient does not return for scheduled/ recommended follow up visits, this note will serve as a discharge from care along with most recent update on progress. Normal vision: sees adequately in most situations; can see medication labels, newsprint

## 2022-05-12 ENCOUNTER — HOSPITAL ENCOUNTER (OUTPATIENT)
Dept: PHYSICAL THERAPY | Age: 77
Setting detail: THERAPIES SERIES
Discharge: HOME | End: 2022-05-12
Payer: MEDICARE

## 2022-05-12 PROCEDURE — 97110 THERAPEUTIC EXERCISES: CPT

## 2022-05-12 PROCEDURE — 97112 NEUROMUSCULAR REEDUCATION: CPT

## 2022-05-12 NOTE — FLOWSHEET NOTE
[facility]  Phone: [phone]   Fax: [phone]   Physical Therapy Discharge Summary    Dear   Ramila Bright MD,    We had the pleasure of treating the following patient for physical therapy services at [facility]. A summary of our findings can be found in the discharge summary below. If you have any questions or concerns regarding these findings, please do not hesitate to contact me at the office phone number checked above. Thank you for the referral.     Physician Signature:________________________________Date:__________________  By signing above (or electronic signature), therapists plan is approved by physician      Functional Outcome:        QuickDASH Total Score: 21  QUICKDASH Disability Index: 20-39%        Overall Response to Treatment:   [x]Patient has responded well to treatment and improvement is noted with regards to goals   []Patient should continue to improve in reasonable time if they continue HEP   []Patient has plateaued and is no longer responding to skilled PT intervention    []Patient is getting worse and would benefit from return to referring MD   []Patient unable to adhere to initial POC   []Other:     Date range of Visits: 3/7/22-22  Total Visits: 20    Recommendation:    [x] Discharge to HEP. Follow up with PT or MD PRN.            Physical Therapy Daily ONCOLOGY Treatment Note    Date:  2022    Patient Name:  Niko Parks    :  1945  MRN: [id number]  Restrictions/Precautions:    Medical/Treatment Diagnosis Information:  Diagnosis: C50.911 (ICD-10-CM) - Primary breast malignancy, right ([address] C50.911 (ICD-10-CM) - Invasive ductal carcinoma of right breast, stage 3 ([address]  Treatment Diagnosis: decreased R shoulder strength and ROM, fatigue with decreased endurance  Insurance/Certification information:  PT Insurance Information: Medicare and [employer] - med Amos Pulido, no CP, no auth  Physician Information:  Referring Practitioner: Jourdan Torres MD  Plan of care signed (Y/N): [x]  Yes []  No     Date of Patient follow up with Physician: radiologist 3/11    Functional Outcomes:   Fatigue: 3-4/10      3/7/22  Quick Dash:  21 20-39%     3/10/22  Fatigue: 3-4/10      4/5/22  QuickDASH: total score: 20, 20.45% disability   4/5/22  Fatigue: 2/10       5/6/22  QuickDASH: total score: 16, 11.36% disability   5/6/22  Fatigue: 2/10  QuickDASH: total score: 21   22.73%     Progress Report: []  Yes  [x]  No     Date Range for reporting period:  Beginning 3/7/22  POC: 4/5/22  PN: 5/6  Ending    Progress report due (10 Rx/or 30 days whichever is less): visit #10 or 6/5/86 (date)     Recertification due (POC duration/ or 90 days whichever is less): visit #12 or 6/7/22 (date)     Visit # Insurance Allowable Auth required? Date Range   12/12 + 8/8 Med nec []  Yes  [x]  No         Latex Allergy:  [x]NO      []YES  Preferred Language for Healthcare:   [x]English       []other:      Pain level:  0/10     SUBJECTIVE:  Pt reports she her legs have been hurting for the last few hours and maybe did too much at last visi. It's just sore. Is ready for D/C, went to put something on a top shelf in the kitchen and it was easy. OBJECTIVE:   5/6  Upper Extremity ROM & Strength AROM  Right AROM  Left Strength Right AROM  Right AROM  left Comments   Shoulder flex 115 142 3+  118 153   Shoulder AB 80 135 3 80 140 R abd - compensates with trunk lean      - 6 min walk - 1446 ft - amb with B knee valgus and B foot pronation. Reports L knee pain  - 5 x STS - 12.87 sec - VC for more through LEs and less momentum      4/5  Upper Extremity ROM & Strength AROM  Right AROM  Left Strength Right Strength Left Comments   Shoulder flex 111 140 3+ 5     Shoulder  140 3+ 5      - 6 min walk - 1333 ft - amb with B knee valgus and B foot pronation.  Reports L foot pain   - TUG - 7.06 sec    - 5 x STS - 11.63 sec     3/25   Chest measurement - 97.0 cm   Edema noted in R lateral chest wall   Increased tissue tension at R pec tendon and mild cording noted in R axilla   R pec tightness - 2 fingers from table compared to 1 finger on L     3/14   - 6 min walk - 1480 ft (1.25 m/s) - amb with B knee valgus and B foot pronation. Denies knee pain   - TUG - 8.37 sec    - 5 x STS - 11.79 sec     3/10 Pt has good PROM but pulls in UT immediately in all motions, attempted stretching but sh jt with good mobility but poor mm control       RESTRICTIONS/PRECAUTIONS:     EDS- very little stretching, previous frozen shoulder L     Exercises/Interventions:     Therapeutic Exercises (58470) Resistance / level Sets/sec Reps Notes   Arm bike    Nu step    5 min     Pulleys    Flexion   Scaption  5 sec  5 sec 10 B   10 B    Standing shoulder AROM   - flexion   - ABD     2  2   10   10     SB roll outsI, T, Y's Blue Blue Flexion [id number] sec, 1010 B   TB   - mid row  - high row  - LPD   Sh ext/add  ER Stow 2X 104/8 progress to green NV         R very difficult   Pec doorway stretch - 90 deg     B shoulder ER - seated     UT stretch   Levator stretch       Supine piriformis stretch - push away  Supine piriformis stretch - knee to shoulder     Doorway flexion with cane                   4/29 decreased radicular symptoms    Therapeutic Activities (97146)  (Dynamic activities such as compression, designed to improve functional performance)       Educated on s/s of infection and on risk factors for developing lymphedema (no IV, BP, or shots in RUE). Discussed POC for therapy. Provide handout for lymphedema prevention NV     Reviewed HEP from MD - table slides, supine AAROM shoulder flexion with cane, butterfly stretch, pec stretch (demo'd at doorway vs corner), scap squeeze        PN completed this date. Objective measures taken.  FUNCTIONAL OBJECTIVE MEASURES (see above) - 6 min walk, 5x STS Discussed progress with therapy and continued limitations in strength and mobility. Discussed POC including completing this POC then taking a break from PT to allow skin to heal from radiation. Then returning to PT with new referral to initiate manual therapy to decrease cording. Reviewed HEP and plan to update HEP in remaining 2 visits. Provided lymphedema prevention handout and reviewed. Answered all pt questions. Pt provided with M below the knee tensoshape to wear on car ride to [place] coming up. Educated on s/s of constriction and educated to take off if too tight.                scifit nu step                Arm circles B      Shoulder protraction  B      Wall push ups   2 X 10    LT wall slide             Home Management  (providing pt education on safety procedures/instructions)       pt taught body mechanics for sidelying and pillows                                                 Neuromuscular Re-ed (95631)       SB roll up wall - LT (focus on relaxing UT)     Scap retract                                         Manual Intervention ([phone])       Manual stretching R shoulder     Has full passive ROM in jt. Difficulty relaxing UT to move shoudler   STM to pec tendon/cording in axilla   demo'd with hand over hand for self STM at home     4/11 - hold until pt finishes radiation (mid May)    In sidelying  STM to R glut med/ piriformis      In supine STM to R cording                         Modalities:     OTHER:     Pt education:   3/25 educated on edema in R lateral chest. Discussed compression options including ember with build in bra or sports bra. Pt signed release of medical information form for [facility] to obtain home lymphedema pump. 3/18 Provided lymphedema prevention handout and reviewed. Answered all pt questions. Pt provided with M below the knee tensoshape to wear on car ride to [place] coming up. Educated on s/s of constriction and educated to take off if too tight.   3/7 Pt was educated on diagnosis; prognosis; PT POC including pathology and anatomy of etiology of lymphedema, condition precautions; lymphedema management/prevention of flare ups, role of exercise, HEP, expectations for rehab. All pt questions were answered. HEP instruction:  5/10  Access Code: [email or web address]  URL: [email or web address]  Date: 05/10/2022  Prepared by: Stacey Silva    Exercises  Supine Shoulder Flexion Extension Full Range AROM - 1 x daily - 7 x weekly - 2 sets - 10 reps  Standing Shoulder Flexion to 90 Degrees - 1 x daily - 7 x weekly - 2 sets - 10 reps  Shoulder Abduction - Thumbs Up - 1 x daily - 7 x weekly - 2 sets - 10 reps  Shoulder Extension with Resistance - 1 x daily - 7 x weekly - 2 sets - 10 reps  Standing High Row with Resistance - 1 x daily - 7 x weekly - 2 sets - 10 reps  Standing Shoulder Row with Anchored Resistance - 1 x daily - 7 x weekly - 2 sets - 10 reps  Doorway Pec Stretch at 60 Elevation - 1 x daily - 7 x weekly - 2 sets - 10 reps  Shoulder External Rotation with Anchored Resistance - 1 x daily - 7 x weekly - 3 sets - 10 reps    4/29  Access Code: [email or web address]  URL: [employer]/  Date: 04/29/2022  Prepared by: Stacey Silva    Exercises  Supine Piriformis Stretch with Foot on Ground - 1 x daily - 7 x weekly - 2 sets - 30 sec hold  Supine Figure 4 Piriformis Stretch - 1 x daily - 7 x weekly - 2 sets - 30 sec hold  Supine Chest Stretch on Foam Roll - 1 x daily - 7 x weekly      4/15  Access Code: [email or web address]  URL: [email or web address]  Date: 04/15/2022  Prepared by: Stacey Silva    Exercises  Single Arm Doorway Pec Stretch at 90 Degrees Abduction - 1 x daily - 7 x weekly - 10 reps - 10 sec hold  Shoulder External Rotation and Scapular Retraction with Resistance - 1 x daily - 7 x weekly - 2 sets - 10 reps      Access Code: [email or web address]  URL: [employer]/  Date: 03/21/2022  Prepared by: Rey Serginger    Exercises  Standing Shoulder Row with Anchored Resistance - 1 x daily - 7 x weekly - 1 sets - 10 reps  Squatting High Shoulder Row with Resistance - 1 x daily - 7 x weekly - 1 sets - 10 reps  Seated Shoulder External Rotation with Resistance - 1 x daily - 7 x weekly - 1 sets - 10 reps  Standing Shoulder Adduction Reactive Isometrics with Resistance and Elbow Extended - 1 x daily - 7 x weekly - 1 sets - 10 reps  Single Arm Shoulder Extension with Resistance - 1 x daily - 7 x weekly - 1 sets - 10 reps    3/14  Access Code: [email or web address]  URL: [employer]/  Date: 03/14/2022  Prepared by: Justa Jones    Exercises  Doorway Pec Stretch at 60 Elevation - 1 x daily - 7 x weekly - 2 sets - 30 sec hold  Doorway Pec Stretch at 90 Degrees Abduction - 1 x daily - 7 x weekly - 2 sets - 30 sec hold    3/7  Reviewed HEP from MD. Questions answered about exercise and to complete in pain free range but still feeling a stretch. (see above for exercise)    Therapeutic Exercise and NMR EXR  [x] (29862) Provided verbal/tactile cueing for activities related to strengthening, flexibility, endurance, ROM for improvements in  [] LE / Lumbar: LE, proximal hip, and core control with self care, mobility, lifting, ambulation. [x] UE / Cervical: cervical, postural, scapular, scapulothoracic and UE control with self care, reaching, carrying, lifting, house/yardwork, driving, computer work.  [] (50424) Provided verbal/tactile cueing for activities related to improving balance, coordination, kinesthetic sense, posture, motor skill, proprioception to assist with   [] LE / lumbar: LE, proximal hip, and core control in self care, mobility, lifting, ambulation and eccentric single leg control.    [] UE / cervical: cervical, scapular, scapulothoracic and UE control with self care, reaching, carrying, lifting, house/yardwork, driving, computer work.   [] (71120) Therapist is in constant attendance of 2 or more patients providing skilled therapy interventions, but not providing any significant amount of measurable one-on-one time to either patient, for improvements in  [] LE / lumbar: LE, proximal hip, and core control in self care, mobility, lifting, ambulation and eccentric single leg control. [] UE / cervical: cervical, scapular, scapulothoracic and UE control with self care, reaching, carrying, lifting, house/yardwork, driving, computer work. NMR and Therapeutic Activities:    [x] (55198 or 60846) Provided verbal/tactile cueing for activities related to improving balance, coordination, kinesthetic sense, posture, motor skill, proprioception and motor activation to allow for proper function of   [] LE: / Lumbar core, proximal hip and LE with self care and ADLs  [x] UE / Cervical: cervical, postural, scapular, scapulothoracic and UE control with self care, carrying, lifting, driving, computer work.   [] (77023) Gait Re-education- Provided training and instruction to the patient for proper LE, core and proximal hip recruitment and positioning and eccentric body weight control with ambulation re-education including up and down stairs     Home Management Training / Self Care:  [] (28113) Provided self-care/home management training related to activities of daily living and compensatory training, and/or use of adaptive equipment for improvement with: ADLs and compensatory training, meal preparation, safety procedures and instruction in use of adaptive equipment, including bathing, grooming, dressing, personal hygiene, basic household cleaning and chores.      Home Exercise Program:    [x] (97632) Reviewed/Progressed HEP activities related to strengthening, flexibility, endurance, ROM of   [] LE / Lumbar: core, proximal hip and LE for functional self-care, mobility, lifting and ambulation/stair navigation   [x] UE / Cervical: cervical, postural, scapular, scapulothoracic and UE control with self care, reaching, carrying, lifting, house/yardwork, driving, computer work  [] (02697)Reviewed/Progressed HEP activities related to improving balance, coordination, kinesthetic sense, posture, motor skill, proprioception of   [] LE: core, proximal hip and LE for self care, mobility, lifting, and ambulation/stair navigation    [] UE / Cervical: cervical, postural,  scapular, scapulothoracic and UE control with self care, reaching, carrying, lifting, house/yardwork, driving, computer work    Manual Treatments:  PROM / STM / Oscillations-Mobs:  G-I, II, III, IV (PA's, Inf., Post.)  [] (00730) Provided manual therapy to mobilize LE, proximal hip and/or LS spine soft tissue/joints for the purpose of modulating pain, promoting relaxation,  increasing ROM, reducing/eliminating soft tissue swelling/inflammation/restriction, improving soft tissue extensibility and allowing for proper ROM for normal function with   [] LE / lumbar: self care, mobility, lifting and ambulation. [] UE / Cervical: self care, reaching, carrying, lifting, house/yardwork, driving, computer work. Modalities:  [] (52682) Vasopneumatic compression: Utilized vasopneumatic compression to decrease edema / swelling for the purpose of improving mobility and quad tone / recruitment which will allow for increased overall function including but not limited to self-care, transfers, ambulation, and ascending / descending stairs. Charges:  Timed Code Treatment Minutes: 45   Total Treatment Minutes: 45     [] EVAL - LOW (65242)   [] EVAL - MOD (07019)  [] EVAL - HIGH (09572)  [] RE-EVAL (35797)  [x] RO(48421) x  2     [] Ionto  [x] NMR (97285) x  1     [] Vaso  [] Manual (03316) x 1       [] Ultrasound  [] TA x   3     [] Mech Traction (38875)  [] Aquatic Therapy x     [] ES (un) (22499):   [] Home Management Training x  [] ES(attended) (12903)   [] Dry Needling 1-2 muscles (86295):  [] Dry Needling 3+ muscles (650166  [] Group:      [] Other:     GOALS:   Patient stated goal: stretching chest mm and shoulder   [x]? Progressing: []? Met: []? Not Met: []?  Adjusted     Therapist goals for Patient: Short Term Goals: To be achieved in: 2 weeks  1. Independent in HEP and progression per patient tolerance, in order to prevent re-injury. []? Progressing: [x]? Met: []? Not Met: []? Adjusted  2. Patient will have a decrease in pain to facilitate improvement in movement, function, and ADLs as indicated by improvement with respect to Functional Deficits. []? Progressing: [x]? Met: []? Not Met: []? Adjusted     Long Term Goals: To be achieved in: 6    weeks  1. Disability index score of  0% or less on the QuickDASH  to assist with reaching prior level of function. -? Progressing: -? Met: -? Not Met: -? Adjusted  2. Patient will demonstrate increased R shoulder AROM to 155 deg flexion and abd, to allow for proper joint functioning to allow pt to resume home management without increase in symptoms. -? Progressing: -? Met: -? Not Met: -? Adjusted  3. Patient will demonstrate increased R shoulder strength to 4+/5 in flexion and abd, to allow for proper joint functioning to allow pt to resume home management without increase in symptoms. [x]? Progressing: []? Met: [x]? Not Met: []? Adjusted  4. Pt will report ability to sleep 7/7 nights without waking up due to her RUE. []? Progressing: [x]? Met: []? Not Met: []? Adjusted    Overall Progression Towards Functional goals/ Treatment Progress Update:  [x] Patient is progressing as expected towards functional goals listed. [] Progression is slowed due to complexities/Impairments listed. [] Progression has been slowed due to co-morbidities.   [] Plan just implemented, too soon to assess goals progression <30days   [] Goals require adjustment due to lack of progress  [] Patient is not progressing as expected and requires additional follow up with physician  [] Other    Persisting Functional Limitations/Impairments:  [x]Sleeping []Sitting               []Standing []Transfers        []Walking []Kneeling               []Stairs []Squatting / bending   [x]ADLs [x]Reaching  []Lifting  [x]Housework  []Driving []Job related tasks  []Sports/Recreation []Other:        ASSESSMENT: Pt has met a few goals and will take a break to allow her skin to heal prior to stretching out her shoulder. Pt plans to return to resume Pt for her shoulder when her skin has healed from radiation. Treatment/Activity Tolerance:  [x] Patient able to complete tx [] Patient limited by fatigue  [] Patient limited by pain  [] Patient limited by other medical complications  [] Other:     Prognosis: [x] Good [] Fair  [] Poor    Patient Requires Follow-up: [] Yes  [x] No    Plan for next treatment session:     PLAN: .   [] Continue per plan of care [] Alter current plan (see comments)  [] Plan of care initiated [] Hold pending MD visit [x] Discharge    Electronically signed by: Krzysztof Cambpell PT, DPT    Note: If patient does not return for scheduled/ recommended follow up visits, this note will serve as a discharge from care along with most recent update on progress.

## 2022-05-13 ENCOUNTER — HOSPITAL ENCOUNTER (OUTPATIENT)
Age: 77
Discharge: HOME | End: 2022-05-13
Payer: MEDICARE

## 2022-05-13 ENCOUNTER — HOSPITAL ENCOUNTER (OUTPATIENT)
Dept: GENERAL RADIOLOGY | Age: 77
Discharge: HOME | End: 2022-05-13
Payer: MEDICARE

## 2022-05-13 DIAGNOSIS — M54.40 LOW BACK PAIN WITH SCIATICA, SCIATICA LATERALITY UNSPECIFIED, UNSPECIFIED BACK PAIN LATERALITY, UNSPECIFIED CHRONICITY: ICD-10-CM

## 2022-05-13 PROCEDURE — 72100 X-RAY EXAM L-S SPINE 2/3 VWS: CPT

## 2022-05-17 ENCOUNTER — APPOINTMENT (OUTPATIENT)
Dept: PHYSICAL THERAPY | Age: 77
End: 2022-05-17
Payer: MEDICARE

## 2022-05-24 ENCOUNTER — OFFICE VISIT (OUTPATIENT)
Dept: SURGERY | Age: 77
End: 2022-05-24
Payer: MEDICARE

## 2022-05-24 VITALS
WEIGHT: 157 LBS | HEART RATE: 90 BPM | DIASTOLIC BLOOD PRESSURE: 64 MMHG | HEIGHT: 66 IN | BODY MASS INDEX: 25.23 KG/M2 | OXYGEN SATURATION: 96 % | SYSTOLIC BLOOD PRESSURE: 119 MMHG

## 2022-05-24 DIAGNOSIS — Z08 ENCOUNTER FOR FOLLOW-UP SURVEILLANCE OF BREAST CANCER: ICD-10-CM

## 2022-05-24 DIAGNOSIS — Z12.39 ENCOUNTER FOR SCREENING BREAST EXAMINATION: Primary | ICD-10-CM

## 2022-05-24 DIAGNOSIS — Z90.13 S/P BILATERAL MASTECTOMY: ICD-10-CM

## 2022-05-24 DIAGNOSIS — Z85.3 ENCOUNTER FOR FOLLOW-UP SURVEILLANCE OF BREAST CANCER: ICD-10-CM

## 2022-05-24 DIAGNOSIS — Z85.3 HISTORY OF BREAST CANCER: ICD-10-CM

## 2022-05-24 PROCEDURE — 99214 OFFICE O/P EST MOD 30 MIN: CPT | Performed by: NURSE PRACTITIONER

## 2022-05-24 PROCEDURE — G8427 DOCREV CUR MEDS BY ELIG CLIN: HCPCS | Performed by: NURSE PRACTITIONER

## 2022-05-24 PROCEDURE — G8417 CALC BMI ABV UP PARAM F/U: HCPCS | Performed by: NURSE PRACTITIONER

## 2022-05-24 PROCEDURE — 1036F TOBACCO NON-USER: CPT | Performed by: NURSE PRACTITIONER

## 2022-05-24 PROCEDURE — 1090F PRES/ABSN URINE INCON ASSESS: CPT | Performed by: NURSE PRACTITIONER

## 2022-05-24 PROCEDURE — 3017F COLORECTAL CA SCREEN DOC REV: CPT | Performed by: NURSE PRACTITIONER

## 2022-05-24 PROCEDURE — 1123F ACP DISCUSS/DSCN MKR DOCD: CPT | Performed by: NURSE PRACTITIONER

## 2022-05-24 PROCEDURE — G8400 PT W/DXA NO RESULTS DOC: HCPCS | Performed by: NURSE PRACTITIONER

## 2022-05-24 NOTE — PROGRESS NOTES
[facility]  Surgical Breast Oncology      Medical Oncologist: Dr. Klever Slaughter Oncologist: Dr. Jasper Rivera       CC: 3 month breast cancer follow-up    [id number]    [id number] STAGE:  IIIA right breast cancer     HPI: Yan Granados is a 77 y.o. woman here for 3-month follow up for breast/chest wall check secondary to personal history of right breast cancer. She is s/p bilateral total mastectomy with SLNB for right breast cancer on 2/1/2022. S/p neoadjuvant chemotherapy and adjuvant radiation therapy. She has no chest wall related concerns today. She states that she has not preformed a self evaluation since surgery, she has not looked at her chest.  Her daughter has been applying lotion nightly for her. Denies masses, skin changes, color changes. She has been going to PT to decrease right axillary lymphadenopathy and improve ROM, good benefit, would like to keep going, needs new orders.      INTERVAL HX:  On 5/25/2021 she underwent bilateral breast imaging.  The left breast is negative.  Stable postoperative changes are noted.  Within the right breast there is a high density mass with spiculated margins in the 9 to 10 o'clock position measuring 5 cm.  It is associated with pleomorphic calcifications.  Additionally there is a high density oval mass with lobulated contour which appears continuous and located in the 10 to 11 o'clock position, measuring 6 cm.  The right nipple appears retracted.  There are at least 6 abnormal appearing lymph nodes.  Ultrasound correlate of the right breast 9:00 location identified a 5.2 x 2.7 x 4 cm mass.  In the 11 o'clock position the lobulated mass measures 6.3 x 4.2 x 5 cm and appears continuous with the 9:00 mass.  In the inferior aspect of the axillary tail there is a hypoechoic mass with indistinct margins and multiple surrounding abnormal lymph nodes.  At least 9 abnormal lymph nodes are evident.  BI-RADS 5.     On 6/3/2021 she underwent two site core needle biopsy of the right breast and axillary lymph node. [id number]      Pathology of the right breast 9 o'clock position identified high-grade invasive carcinoma with DCIS.  Metaplastic carcinoma cannot be excluded.  ER negative ME negative HER-2 positive.     Pathology of the right breast 11 o'clock position identified high-grade invasive carcinoma with DCIS.  Metaplastic carcinoma cannot be excluded.  ER negative ME negative HER-2 positive.     Pathology of the right axillary lymph node identified metastatic carcinoma.     She was initiated neoadjuvant chemotherapy with carboplatin, Taxotere, Herceptin and Perjeta     On 8/26/2021 she underwent genetic testing which returned negative for pathogenic mutations.  Accession #[id number].     On 12/29/2021 she completed neoadjuvant chemotherapy.     On 1/5/2022 she underwent interval right breast and axillary imaging.  The known malignant mass in the right breast demonstrates continued improvement in decreasing size and bulk consistent with response to therapy.  Residual asymmetry with calcifications measures 7 x 6 x 5 cm.  The previously biopsied lymph node has normalized in appearance.  There is an additional normal-appearing lymph node more superior to the biopsied node.  BI-RADS 6.     On 2/1/2022 she underwent bilateral mastectomy (left prophylactic) with right sentinel lymph node biopsy. Pathology identified 0.2 mm of residual invasive ductal carcinoma. ER negative ME negative HER-2 positive. Margins were negative. There were 0/3 lymph nodes involved with carcinoma. This included the previously biopsied lymph node. Her left breast was negative. Her right chest wall skin lesion was benign. [id number]      On 5/2022 she completed adjuvant radiation therapy.        Past Medical History:   Diagnosis Date    Anesthesia     sensitive, doesnt take much    Asthma     resolved    [facility]     Cancer ([facility] 06/2021    right Breast    [facility]     Diverticulitis     History of chemotherapy 12/29/2021    Dr Blanca Ramsay [facility], treament concluded    Hx antineoplastic chemo     Hypertension     no meds due to chemo    Sleep apnea     Mild - wears no mask       Past Surgical History:   Procedure Laterality Date    BREAST LUMPECTOMY Left     HYSTERECTOMY      MASTECTOMY Bilateral 2/1/2022    BILATERAL TOTAL MASTECTOMY, RIGHT LOCALIZED SENTINEL LYMPH NODE BIOPSY-FROZEN SECTIONS-, TECHNETIUM NINETY-NINE AND INJECTABLE BLUE DYE IN THE OPERATING ROOM, EXCISION OF RIGHT BREAST SKIN LESION performed by Christine Richmond MD at [address] [place] [address] Dr N/A 6/23/2021    PLACEMENT OF POWER PORT A CATHETER performed by Katerine Chung MD at [address] [place] [address] Right 6/3/2021    US BREAST BIOPSY NEEDLE ADDITIONAL RIGHT 6/3/2021 [facility] ULTRASOUND    US BREAST NEEDLE BIOPSY RIGHT Right 6/3/2021    US BREAST NEEDLE BIOPSY RIGHT 6/3/2021 [facility] ULTRASOUND    US GUIDED NEEDLE LOC OF RIGHT BREAST Right 1/27/2022    US GUIDED NEEDLE LOC OF RIGHT BREAST 1/27/2022 [facility] ULTRASOUND    US LYMPH NODE BIOPSY  6/3/2021    US LYMPH NODE BIOPSY 6/3/2021 [facility] ULTRASOUND       Family History   Problem Relation Age of Onset    High Blood Pressure Mother     Stroke Mother     High Blood Pressure Father     Breast Cancer Maternal Aunt        Allergies as of 05/24/2022 - Fully Reviewed 05/24/2022   Allergen Reaction Noted    Other  12/26/2010    Erythromycin Nausea Only 01/29/2019       Social History     Tobacco Use    Smoking status: Never Smoker    Smokeless tobacco: Never Used   Vaping Use    Vaping Use: Never used   Substance Use Topics    Alcohol use: No    Drug use: No       Current Outpatient Medications on File Prior to Visit   Medication Sig Dispense Refill    Insulin Aspart, w/Niacinamide, (FIASP FLEXTOUCH) 100 UNIT/ML SOPN Inject into the skin Sliding scale 2 units bid      Insulin Glargine, 1 Unit Dial, (TOUJEO SOLOSTAR) 300 UNIT/ML SOPN Inject 2 Units into the skin every morning      Magnesium 400 MG CAPS Take 400 mg by mouth 2 times daily 60 capsule 0    Cyanocobalamin (VITAMIN B 12 PO) Take by mouth      Cholecalciferol (VITAMIN D3) 125 MCG (5000 UT) TABS Take by mouth      atorvastatin (LIPITOR) 20 MG tablet TAKE 1 TABLET BY MOUTH AT BEDTIME AS DIRECTED.  lisinopril (PRINIVIL;ZESTRIL) 10 MG tablet Take 10 mg by mouth daily  (Patient not taking: Reported on 1/6/2022)      amLODIPine (NORVASC) 5 MG tablet Take 5 mg by mouth daily (Patient not taking: Reported on 1/6/2022)       No current facility-administered medications on file prior to visit. Medications: documentation has been reviewed in the electronic medical record and patient office intake form. REVIEW OF SYSTEMS:  Constitutional: Negative for fever  HENT: Negative for sore throat  Eyes: Negative for redness   Respiratory: Negative for dyspnea, cough  Cardiovascular: Negative for chest pain  Gastrointestinal: Negative for vomiting, diarrhea   Genitourinary: Negative for hematuria   Musculoskeletal: Negative for arthralgias   Skin: Negative for rash  Neurological: Negative for syncope  Hematological: Negative for adenopathy  Psychiatric/Behavorial: Negative for anxiety         PHYSICAL EXAM:  Ht 5' 6\" (1.676 m)   Wt 157 lb (71.2 kg)   BMI 25.34 kg/m²   Constitutional: She appearswell-nourished. No apparent distress. Breast: The patient was examined in the upright and supine position. Bilateral breasts have been surgically removed. Well-healed mastectomy scars. Expected postsurgical and radiation related changes. No masses or skin changes. No concerning palpable findings. No axillary lymphadenopathy palpated bilaterally. Good range of motion with right left arm. Head: Normocephalic and atraumatic:   Eyes: EOM are normal. Pupils are equal, round, and reactive to light. Neck: Neck supple. No tracheal deviation present. No obvious mass. Lymphatics: No palpable supraclavicular, cervical, or axillary lymphadenopathy  Skin: No rash noted. No erythema. Neurologic: alert and oriented. Extremities: appear well perfused. ASSESSMENT:  - Screening Breast Examination - stable chest wall examination. No concerning findings suggestive of malignancy or recurrence at this time. - Personal History of Breast Cancer - s/p bilateral total mastectomy with SLNB for right breast cancer on 2/1/2022. S/p neoadjuvant chemotherapy and adjuvant radiation therapy. - Encounter for follow-up surveillance of breast cancer      PLAN:   · Referral for PT   · Recommend clinical exam in 6 months with Dr. Milton Gurrola   · Incisional/scar care provided, cleaned scar with alcohol swabs and removed dried tissue/exudate build up. Reviewed shower hygiene.  Signs/symptoms of recurrence were reviewed. She verbalizes understanding that she should notify the office if she identifies any abnormalities on self evaluation.  Follow up cancer surveillance discussed    Discussed the importance of breast awareness including the importance and technique of self breast exams   Healthy Lifestyle Recommendations: healthy diet (decrease consumption of red meat, increase fresh fruits and vegetables), decreased alcohol consumption (less than 4 drinks/week), adequate sleep (goal 6-8 hours), routine exercise (goal 150 minutes/week or greater), weight control. DARNELL Vasquez-CNP  [facility])   Surgical Breast Oncology   [phone]    All of the patient's questions were answered at this time however, she was encouraged to call the office with any further inquiries. Approximately 30 minutes of time were spent in preparation, direct patient contact, counseling, care coordination, documentation and activities otherwise related to this encounter.

## 2022-05-24 NOTE — PATIENT INSTRUCTIONS
Healthy Lifestyle Recommendations: healthy diet (decrease consumption of red meat, increase fresh fruits and vegetables), decreased alcohol consumption (less than 4 drinks/week), adequate sleep (goal 6-8 hours), routine exercise (goal 150 minutes/week or greater), weight control.   Patient Education

## 2022-06-24 ENCOUNTER — HOSPITAL ENCOUNTER (OUTPATIENT)
Dept: NON INVASIVE DIAGNOSTICS | Age: 77
Discharge: HOME | End: 2022-06-24
Payer: MEDICARE

## 2022-06-24 DIAGNOSIS — Z51.11 ENCOUNTER FOR ANTINEOPLASTIC CHEMOTHERAPY: ICD-10-CM

## 2022-06-24 LAB
LV EF: 58 %
LVEF MODALITY: NORMAL

## 2022-06-24 PROCEDURE — 93356 MYOCRD STRAIN IMG SPCKL TRCK: CPT

## 2022-06-24 PROCEDURE — 93306 TTE W/DOPPLER COMPLETE: CPT

## 2022-07-26 NOTE — H&P
[facility] At-Home Study End Note    Study Description:   [employer] Sub-Study. The purpose of this research study is to collect data related to health for the development of mobile technologies. This data will include physiological signal recordings from medical devices and smart watch data collection software. This study is not to provide any treatment. This study will only collect information for research and  purposes.     Adverse Events & Con Med Assessment Performed?   [x]    Did the Subject Complete the At-Home Session? Yes    If no, Termination Reason: N/A    Study Termination/Completion Date: 26-JUL-2022    Subject returned devices today and this completes this study for the subject.    Paulo Cuevas     [facility]  History and Physical Update      CV-RRR    I have reviewed the history and physical and examined the patient and find no relevant changes. I have reviewed with the patient and/or family the risks, benefits, and alternatives to the procedure.     BP (!) 148/91   Pulse 103   Temp 97.9 °F (36.6 °C) (Temporal)   Resp 16   Ht 5' 7\" (1.702 m)   Wt 178 lb 8 oz (81 kg)   SpO2 96%   BMI 27.96 kg/m²     Delfino Orellana MD  6/23/2021

## 2022-09-13 NOTE — PLAN OF CARE
[address] [address], [address]  Phone: [phone]   Fax: [phone]                                                       Physical Therapy Certification    Dear DARNELL Ribera - *  ,    We had the pleasure of evaluating the following patient for physical therapy services at [address] [place] [address]. A summary of our findings can be found in the initial assessment below. This includes our plan of care. If you have any questions or concerns regarding these findings, please do not hesitate to contact me at the office phone number checked above. Thank you for the referral.       Physician Signature:_______________________________Date:__________________  By signing above (or electronic signature), therapists plan is approved by physician      Patient: Minor Snellen   : 1945   MRN: [id number]  Referring Physician: DARNELL Ribera - *        Evaluation Date: 2022      Medical Diagnosis Information:  Diagnosis: T20.302 (ICD-10-CM) - Malignant neoplasm of upper-outer quadrant of right female breast  M62.81 (ICD-10-CM) - Muscle weakness (generalized)    Treatment diagnosis: balance issues due to neuropathy                                       Insurance information: PT Insurance Information: Medicare and BCBS     Preferred Language for Healthcare:   [x]English       []Other:    C-SSRS Triggered by Intake questionnaire (Past 2 wk assessment ):   [x] No, Questionnaire did not trigger screening.   [] Yes, Patient intake triggered C-SSRS Screening     [] Completed, no further action required.    [] Completed, PCP notified via Epic      Functional Scale:                                                                                                      Date assessed:  FOTO physical FS primary measure score = 98; risk adjusted = 52 9/15/22    SUBJECTIVE:Pt reports balance issues since chem, just got back from [place] and was delayed in [place]. Reports that as she is turning she loses her balance. Has bars in house that she can use. Amb with cane to stayed balanced. Turning makes her dizzy and she stumbles. R Knee renan sometimes. Diagnosed with breast cancer May 2021. Started chemo in July but it was strong and she was dehydrated. Currently on magnesium for her heart and states no heart damage. Finished chemo December. 2/1 - Pt had B mastectomy and 1 lymph node removed R axilla  ONSET: May 2021. Does pt have primary lymphedema / lymphedema tarda? Current Level of Function:amb with cane, wearing a brace on R knee because knee is buckling   Prior Level of Function: Prior to this injury / incident, pt was independent with ADLs and IADLs     Living Status: daughter lives with her  Occupation/School: retired     PAIN:  Pain Scale: 3/10 knee  Easing factors:   Provocative factors:      Functional Outcome: LLIS:  taken at initial eval    Precautions/ Contra-indications:   Latex Allergy:  [x]NO      []YES  Relevant Medical History:  [x] Patient history, allergies, meds reviewed. Medical chart reviewed. See intake form. Review Of Systems (ROS):  [x]Performed Review of systems (Integumentary, CardioPulmonary, Neurological) by intake and observation. Intake form has been scanned into medical record. Patient has been instructed to contact their primary care physician regarding ROS issues if not already being addressed at this time.       Co-morbidities/Complexities (which will affect course of rehabilitation):   []None        []Hx of COVID   Arthritic conditions   []Rheumatoid arthritis (M05.9)  [x]Osteoarthritis (M19.91)  []Gout   Cardiovascular conditions   [x]Hypertension (I10)  []Hyperlipidemia (E78.5)  []Angina pectoris (I20)  []Atherosclerosis (I70)  []Pacemaker  []Hx of CABG/stent/  cardiac surgeries   Musculoskeletal conditions   []Disc pathology   []Congenital spine pathologies   []Osteoporosis (M81.8)  []Osteopenia (M85.8)  []Scoliosis       Endocrine conditions   [x]Hypothyroid (E03.9)  []Hyperthyroid Gastrointestinal conditions   []Constipation (N48.48)   Metabolic conditions   []Morbid obesity (E66.01)  [x]Diabetes type 1(E10.65) or 2 (E11.65)   []Neuropathy (G60.9)     Cardio/Pulmonary conditions   []Asthma (J45)  []Coughing   []COPD (J44.9)  []CHF  []A-fib   Psychological Disorders  []Anxiety (F41.9)  []Depression (F32.9)   []Other:   Developmental Disorders  []Autism (F84.0)  []CP (G80)  []Down Syndrome (Q90.9)  []Developmental delay     Neurological conditions  []Prior Stroke (I69.30)  []Parkinson's (G20)  []Encephalopathy (G93.40)  []MS (G35)  []Post-polio (G14)  []SCI  []TBI  []ALS Other conditions  []Fibromyalgia (M79.7)  []Vertigo  []Syncope  []Kidney Failure  [x]Cancer      []currently undergoing                treatment  []Pregnancy  []Incontinence   Prior surgeries  []involved limb  []previous spinal surgery  [] section birth  [x]hysterectomy  []bowel / bladder surgery  []other relevant surgeries   []Other:                OBJECTIVE:   Posture: knee flex with knee valgus, trunk flex     Functional Mobility/TransfersGAIT: amb with feet in WBOS, amb with cane      ROM Right Left Comments         LE knee in flex   11 degrees 18 degrees                UE        Shoulder flex      Shoulder AB      Shoulder ER      Shoulder IR                Strength / Myotomes Right Left Comments   LE      Hip Flexors (L1-2) 4 4    Hip Internal Rotators 4 4+    Hip External Rotators 4 4    Quads (L2-4) 3 3    Hamstrings  3 3    Ankle Dorsiflexion (L4-5) 4 4    Hip abd 4 4    Hip add 4 4                      TUG                                   Barriers to/and or personal factors that will affect rehab potential:              [x]Age  []Sex    []Smoker              []Motivation/Lack of Motivation [x]Co-Morbidities              []Cognitive Function, education/learning barriers              []Environmental, home barriers              []profession/work barriers  []past PT/medical experience  []other:    Falls Risk Assessment (30 days):   [x] Falls Risk assessed and no intervention required. [] Falls Risk assessed and Patient requires intervention due to being higher risk   TUG score (>12s at risk):     [] Falls education provided, including         ASSESSMENT: Pt presents with balance difficulties that cause pt to feel dizzy at times. SHe has weakness of B LE and hips. Pt will benefit from skilled physical therapy to return to PLOF.   Functional Impairments:   [] Noted increased girth of UE  [] Noted decreased health of skin at UE and fibrotic tissue   [x]Decreased functional strength of   [x]Reduced balance/proprioceptive control    []other:  reduced functional ROM of    [x]other:  reduce functional strength of    []other: myofascial changes and pain at    [x] Postural impairments:   []other:      Functional Activity Limitations (from functional questionnaire and intake)  []Reduced ability to use affected limb for ADLs/IADLs due to swelling causing symptoms of heaviness, skin tightness, pain  [x]Reduced ability to perform lifting, reaching, carrying tasks  []Reduced ability to wear his/her normal clothes/shoes due to swelling    [x]Reduced ability to tolerate prolonged functional positions  [x]Reduced ability or difficulty with changes of positions or transfers between positions  []Reduced ability to maintain good posture and demonstrate good body mechanics with sitting, bending, and lifting   []Reduced ability to sleep   [] Reduced ability or tolerance with driving and/or computer work   [x]Reduced ability to squat   [x]Reduced ability to forward bend   [x]Reduced ability to ambulate prolonged functional periods/distances/surfaces   []Reduced ability to ascend/descend stairs    []Reduced ability to tolerate any impact through UE or spine   [x]other:impaired balance        Participation Restrictions   [x]Reduced participation in self care activities   [x]Reduced participation in home management activities   []Reduced participation in work activities   [x]Reduced participation in social activities. []Reduced participation in sport/recreational activities. Prognosis/Rehab Potential:      []Excellent   [x]Good    []Fair   []Poor    Tolerance of evaluation/treatment:    [x]Excellent   []Good    []Fair   []Poor     Physical Therapy Evaluation Complexity Justification  [x] A history of present problem with:  [] no personal factors and/or comorbidities that impact the plan of care;  []1-2 personal factors and/or comorbidities that impact the plan of care  []3 personal factors and/or comorbidities that impact the plan of care  [x] An examination of body systems using standardized tests and measures addressing any of the following: body structures and functions (impairments), activity limitations, and/or participation restrictions;:  [] a total of 1-2 or more elements   [] a total of 3 or more elements   [] a total of 4 or more elements   [x] A clinical presentation with:  [] stable and/or uncomplicated characteristics   [] evolving clinical presentation with changing characteristics  [] unstable and unpredictable characteristics;   [x] Clinical decision making of [] low, [] moderate, [] high complexity using standardized patient assessment instrument and/or measurable assessment of functional outcome.     [] EVAL (LOW) 11060 (typically 15 minutes face-to-face)  [] EVAL (MOD) 93003 (typically 30 minutes face-to-face)  [] EVAL (HIGH) 06335 (typically 45 minutes face-to-face)  [] RE-EVAL     PLAN:  manual lymph drainage to UE; compression, HEP, education on lymphedema management, ROM/strength exercises to restore PLOF    Frequency/Duration:  2 days per week for 6 Weeks:  Interventions:  [x]  Compression to include multilayer compression bandaging and/or compression garments as appropriate  [x]  Manual therapy as indicated for UE to include: manual lymph drainage, STM, ROM as appropriate. [x]  Modalities as needed that may include: lymphedema pump, thermal agents, as indicated  [x]  Patient education on lymphedema management, compression, activity modification, progression of HEP. [x]  Therapeutic exercise including: strength/ROM/flexibility  [x]  NMR activation and proprioception  including postural re-education         AQUATIC EXERCISE      HEP instruction: Written HEP instructions provided and reviewed:    GOALS:  Patient stated goal: learn how to balance  [] Progressing: [] Met: [] Not Met: [] Adjusted    Therapist goals for Patient:   Short Term Goals: To be achieved in: 2 weeks  1. Independent in HEP and progression per patient tolerance, in order to prevent return of swelling   [] Progressing: [] Met: [] Not Met: [] Adjusted  2. Patient will have a decrease in swelling/pain to facilitate improvement in movement, function, and ADLs as indicated by improvement with LLIS. [] Progressing: [] Met: [] Not Met: [] Adjusted    Long Term Goals: To be achieved in: 6 weeks  1. FOTO score of at least 102 to assist with reaching prior level of function. [] Progressing: [] Met: [] Not Met: [] Adjusted  2. Patient will demonstrate increased AROM/strength of 4+ to [facility] so that pt can resume ambulation without increase in symptoms. [] Progressing: [] Met: [] Not Met: [] Adjusted  3. Pt will be able to ambulate without fear of falling for short distances.   [] Progressing: [] Met: [] Not Met: [] Adjusted        Electronically signed by:  Gianluca Pineda, PT

## 2022-09-15 ENCOUNTER — HOSPITAL ENCOUNTER (OUTPATIENT)
Dept: PHYSICAL THERAPY | Age: 77
Setting detail: THERAPIES SERIES
Discharge: HOME | End: 2022-09-15
Payer: MEDICARE

## 2022-09-15 PROCEDURE — 97110 THERAPEUTIC EXERCISES: CPT

## 2022-09-15 PROCEDURE — 97161 PT EVAL LOW COMPLEX 20 MIN: CPT

## 2022-09-15 NOTE — FLOWSHEET NOTE
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]  Muscle weakness (generalized) [M62.81]  Treatment Diagnosis: balance dysfunction due to neuropathy  Insurance/Certification information:  PT Insurance Information: Medicare and BCBS  Physician Information:  DARNELL Morillo - *    Plan of care signed (Y/N): []  Yes [x]  No     Date of Patient follow up with Physician:      Progress Report: []  Yes  [x]  No     Date Range for reporting period:  Beginnin/15/2022  Ending:     Progress report due (10 Rx/or 30 days whichever is less): visit #10 or  (date)     Recertification due (POC duration/ or 90 days whichever is less): visit #8 or 12/15/22 (date)     Visit # Insurance Allowable Auth required?  Date Range   1+ 0/10  []  Yes  []  No            Latex Allergy:  [x]NO      []YES  Preferred Language for Healthcare:   [x]English       []other:    Functional Scale:           Date assessed:  TO physical FS primary measure score = 98; risk adjusted = 52  9/15/22    Pain level:  3/10 knees    SUBJECTIVE:  See eval    OBJECTIVE: See eval      RESTRICTIONS/PRECAUTIONS:     Exercises/Interventions:     Therapeutic Exercises (05514) Resistance / level Sets/sec Reps Notes                                                                  Therapeutic Activities (25680)                                          Neuromuscular Re-ed (01503)                                                 Manual Intervention (05764)                                                     Modalities:     Pt. Education:  09/15/2022  -patient educated on diagnosis, prognosis and expectations for rehab  -all patient questions were answered    Home Exercise Program:  9/15: SLS at counter 2 x 10'      Therapeutic Exercise and NMR EXR  [] (12451) Provided verbal/tactile cueing for activities related to strengthening, flexibility, endurance, ROM for improvements in  [] LE / Lumbar: LE, proximal hip, and core control with self care, mobility, lifting, ambulation. [] UE / Cervical: cervical, postural, scapular, scapulothoracic and UE control with self care, reaching, carrying, lifting, house/yardwork, driving, computer work.  [] (38546) Provided verbal/tactile cueing for activities related to improving balance, coordination, kinesthetic sense, posture, motor skill, proprioception to assist with   [] LE / lumbar: LE, proximal hip, and core control in self care, mobility, lifting, ambulation and eccentric single leg control. [] UE / cervical: cervical, scapular, scapulothoracic and UE control with self care, reaching, carrying, lifting, house/yardwork, driving, computer work.   [] (15712) Therapist is in constant attendance of 2 or more patients providing skilled therapy interventions, but not providing any significant amount of measurable one-on-one time to either patient, for improvements in  [] LE / lumbar: LE, proximal hip, and core control in self care, mobility, lifting, ambulation and eccentric single leg control. [] UE / cervical: cervical, scapular, scapulothoracic and UE control with self care, reaching, carrying, lifting, house/yardwork, driving, computer work.      NMR and Therapeutic Activities:    [] (62540 or 20443) Provided verbal/tactile cueing for activities related to improving balance, coordination, kinesthetic sense, posture, motor skill, proprioception and motor activation to allow for proper function of   [] LE: / Lumbar core, proximal hip and LE with self care and ADLs  [] UE / Cervical: cervical, postural, scapular, scapulothoracic and UE control with self care, carrying, lifting, driving, computer work.   [] (19799) Gait Re-education- Provided training and instruction to the patient for proper LE, core and proximal hip recruitment and positioning and eccentric body weight control with ambulation re-education including up and down stairs     Home Management Training / Self Care:  [] (09393) Provided self-care/home management training related to activities of daily living and compensatory training, and/or use of adaptive equipment for improvement with: ADLs and compensatory training, meal preparation, safety procedures and instruction in use of adaptive equipment, including bathing, grooming, dressing, personal hygiene, basic household cleaning and chores. Home Exercise Program:    [x] (56485) Reviewed/Progressed HEP activities related to strengthening, flexibility, endurance, ROM of   [x] LE / Lumbar: core, proximal hip and LE for functional self-care, mobility, lifting and ambulation/stair navigation   [] UE / Cervical: cervical, postural, scapular, scapulothoracic and UE control with self care, reaching, carrying, lifting, house/yardwork, driving, computer work  [] (77178)Reviewed/Progressed HEP activities related to improving balance, coordination, kinesthetic sense, posture, motor skill, proprioception of   [] LE: core, proximal hip and LE for self care, mobility, lifting, and ambulation/stair navigation    [] UE / Cervical: cervical, postural,  scapular, scapulothoracic and UE control with self care, reaching, carrying, lifting, house/yardwork, driving, computer work    Manual Treatments:  PROM / STM / Oscillations-Mobs:  G-I, II, III, IV (PA's, Inf., Post.)  [] (95258) Provided manual therapy to mobilize LE, proximal hip and/or LS spine soft tissue/joints for the purpose of modulating pain, promoting relaxation,  increasing ROM, reducing/eliminating soft tissue swelling/inflammation/restriction, improving soft tissue extensibility and allowing for proper ROM for normal function with   [] LE / lumbar: self care, mobility, lifting and ambulation. [] UE / Cervical: self care, reaching, carrying, lifting, house/yardwork, driving, computer work. Modalities:  [] (31096) Vasopneumatic compression: Utilized vasopneumatic compression to decrease edema / swelling for the purpose of improving mobility and quad tone / recruitment which will allow for increased overall function including but not limited to self-care, transfers, ambulation, and ascending / descending stairs. Charges:  Timed Code Treatment Minutes: 10   Total Treatment Minutes: 45     [x] EVAL - LOW (16157)   [] EVAL - MOD (98409)  [] EVAL - HIGH (18920)  [] RE-EVAL (17450)  [x] DI(31178) x       [] Ionto  [] NMR (39547) x       [] Vaso  [] Manual (27132) x       [] Ultrasound  [] TA x        [] Mech Traction (81505)  [] Aquatic Therapy x     [] ES (un) (48081):   [] Home Management Training x  [] ES(attended) (64427)   [] Dry Needling 1-2 muscles (89857):  [] Dry Needling 3+ muscles (643295)  [] Group:      [] Other:     GOALS:   Patient stated goal: learn how to balance  [] Progressing: [] Met: [] Not Met: [] Adjusted     Therapist goals for Patient:   Short Term Goals: To be achieved in: 2 weeks  1. Independent in HEP and progression per patient tolerance, in order to prevent return of swelling   [] Progressing: [] Met: [] Not Met: [] Adjusted  2. Patient will have a decrease in swelling/pain to facilitate improvement in movement, function, and ADLs as indicated by improvement with LLIS. [] Progressing: [] Met: [] Not Met: [] Adjusted     Long Term Goals: To be achieved in: 6 weeks  1. FOTO score of at least 102 to assist with reaching prior level of function. [] Progressing: [] Met: [] Not Met: [] Adjusted  2. Patient will demonstrate increased AROM/strength of 4+ to [facility] so that pt can resume ambulation without increase in symptoms. [] Progressing: [] Met: [] Not Met: [] Adjusted  3. Pt will be able to ambulate without fear of falling for short distances.   [] Progressing: [] Met: [] Not Met: [] Adjusted     Overall Progression Towards Functional goals/ Treatment Progress Update:  [] Patient is progressing as expected towards functional goals listed. [] Progression is slowed due to complexities/Impairments listed. [] Progression has been slowed due to co-morbidities. [x] Plan just implemented, too soon to assess goals progression <30days   [] Goals require adjustment due to lack of progress  [] Patient is not progressing as expected and requires additional follow up with physician  [] Other    Persisting Functional Limitations/Impairments:  []Sleeping []Sitting               [x]Standing [x]Transfers        [x]Walking []Kneeling               [x]Stairs []Squatting / bending   [x]ADLs [x]Reaching  [x]Lifting  []Housework  []Driving []Job related tasks  []Sports/Recreation []Other:        ASSESSMENT:  See eval  Treatment/Activity Tolerance:  [x] Patient able to complete tx [] Patient limited by fatigue  [] Patient limited by pain  [] Patient limited by other medical complications  [] Other:     Prognosis: [x] Good [] Fair  [] Poor    Patient Requires Follow-up: [x] Yes  [] No    Plan for next treatment session:    PLAN: See eval. PT 2x / week for 4 weeks. [] Continue per plan of care [] Alter current plan (see comments)  [x] Plan of care initiated [] Hold pending MD visit [] Discharge    Electronically signed by: Vu Hollis, PT PT, DPT    Note: If patient does not return for scheduled/ recommended follow up visits, this note will serve as a discharge from care along with most recent update on progress.

## 2022-09-19 ENCOUNTER — HOSPITAL ENCOUNTER (OUTPATIENT)
Dept: PHYSICAL THERAPY | Age: 77
Setting detail: THERAPIES SERIES
Discharge: HOME | End: 2022-09-19
Payer: MEDICARE

## 2022-09-19 PROCEDURE — 97112 NEUROMUSCULAR REEDUCATION: CPT

## 2022-09-19 NOTE — FLOWSHEET NOTE
[address] 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  Physician Information:  DARNELL Sousa CNP  Plan of care signed (Y/N): []  Yes [x]  No     Date of Patient follow up with Physician:      Progress Report: []  Yes  [x]  No     Date Range for reporting period:  Beginnin/15/2022  Ending:     Progress report due (10 Rx/or 30 days whichever is less): visit #10 or  (date)     Recertification due (POC duration/ or 90 days whichever is less): visit #8 or 12/15/22 (date)     Visit # Insurance Allowable Auth required? Date Range   2/10  []  Yes  []  No      Latex Allergy:  [x]NO      []YES  Preferred Language for Healthcare:   [x]English       []other:    Functional Scale:           Date assessed:  [facility] physical FS primary measure score = 98; risk adjusted = 52  9/15/22    Pain level:  3/10 knees    SUBJECTIVE:  The patient reports she is losing balance to the right more than the left. OBJECTIVE:    - Smooth pursuits = small adjustments V>H; Saccades = Diff w/ H; Convergence (L eye deviate) = abnormal; VOR (H/V) = WNL;  Romberg = 10 sec, Romberg EC = 10 sec; Tandem L = 9sec; Tandem R = 15sec, Airex Romberg = 15sec;  Airex EC Romberg = 7sec  TUG = 10sec (no AD)    RESTRICTIONS/PRECAUTIONS:     Exercises/Interventions:     Therapeutic Exercises (42146) Resistance / level Sets/sec Reps Notes                                                                  Therapeutic Activities (24584) Neuromuscular Re-ed (28434)       Vestibular Screening, Balance & Mobility testing   22'    Balance education (vision, proprioception, vestibular)   5'    Alt Step Taps 6'' 1 10 R/L    Balance:  1 foot on step  1 foot on step w/ head turns/pitches   6''  6''   1  1   15'' ea  10x R/L ea    Twist Turns on Airex (w/ number finding) airex 2 8 R/L    Saccades: V/H  1 15 ea           Manual Intervention (00949)                                                     Modalities:     Pt. Education:  09/19/2022  -patient educated on diagnosis, prognosis and expectations for rehab  -all patient questions were answered    Home Exercise Program:  9/15: SLS at counter 2 x 10'      Therapeutic Exercise and NMR EXR  [] (20176) Provided verbal/tactile cueing for activities related to strengthening, flexibility, endurance, ROM for improvements in  [] LE / Lumbar: LE, proximal hip, and core control with self care, mobility, lifting, ambulation. [] UE / Cervical: cervical, postural, scapular, scapulothoracic and UE control with self care, reaching, carrying, lifting, house/yardwork, driving, computer work. [x] (68768) Provided verbal/tactile cueing for activities related to improving balance, coordination, kinesthetic sense, posture, motor skill, proprioception to assist with   [x] LE / lumbar: LE, proximal hip, and core control in self care, mobility, lifting, ambulation and eccentric single leg control. [] UE / cervical: cervical, scapular, scapulothoracic and UE control with self care, reaching, carrying, lifting, house/yardwork, driving, computer work.   [] (42834) Therapist is in constant attendance of 2 or more patients providing skilled therapy interventions, but not providing any significant amount of measurable one-on-one time to either patient, for improvements in  [] LE / lumbar: LE, proximal hip, and core control in self care, mobility, lifting, ambulation and eccentric single leg control.    [] UE / cervical: cervical, scapular, scapulothoracic and UE control with self care, reaching, carrying, lifting, house/yardwork, driving, computer work. NMR and Therapeutic Activities:    [x] (08792 or 69322) Provided verbal/tactile cueing for activities related to improving balance, coordination, kinesthetic sense, posture, motor skill, proprioception and motor activation to allow for proper function of   [x] LE: / Lumbar core, proximal hip and LE with self care and ADLs  [] UE / Cervical: cervical, postural, scapular, scapulothoracic and UE control with self care, carrying, lifting, driving, computer work.   [] (01845) Gait Re-education- Provided training and instruction to the patient for proper LE, core and proximal hip recruitment and positioning and eccentric body weight control with ambulation re-education including up and down stairs     Home Management Training / Self Care:  [] (54698) Provided self-care/home management training related to activities of daily living and compensatory training, and/or use of adaptive equipment for improvement with: ADLs and compensatory training, meal preparation, safety procedures and instruction in use of adaptive equipment, including bathing, grooming, dressing, personal hygiene, basic household cleaning and chores.      Home Exercise Program:    [] (65420) Reviewed/Progressed HEP activities related to strengthening, flexibility, endurance, ROM of   [] LE / Lumbar: core, proximal hip and LE for functional self-care, mobility, lifting and ambulation/stair navigation   [] UE / Cervical: cervical, postural, scapular, scapulothoracic and UE control with self care, reaching, carrying, lifting, house/yardwork, driving, computer work  [] (92648)Reviewed/Progressed HEP activities related to improving balance, coordination, kinesthetic sense, posture, motor skill, proprioception of   [] LE: core, proximal hip and LE for self care, mobility, lifting, and ambulation/stair navigation    [] UE / Cervical: cervical, postural,  scapular, scapulothoracic and UE control with self care, reaching, carrying, lifting, house/yardwork, driving, computer work    Manual Treatments:  PROM / STM / Oscillations-Mobs:  G-I, II, III, IV (PA's, Inf., Post.)  [] (69957) Provided manual therapy to mobilize LE, proximal hip and/or LS spine soft tissue/joints for the purpose of modulating pain, promoting relaxation,  increasing ROM, reducing/eliminating soft tissue swelling/inflammation/restriction, improving soft tissue extensibility and allowing for proper ROM for normal function with   [] LE / lumbar: self care, mobility, lifting and ambulation. [] UE / Cervical: self care, reaching, carrying, lifting, house/yardwork, driving, computer work. Modalities:  [] (51078) Vasopneumatic compression: Utilized vasopneumatic compression to decrease edema / swelling for the purpose of improving mobility and quad tone / recruitment which will allow for increased overall function including but not limited to self-care, transfers, ambulation, and ascending / descending stairs. Charges:  Timed Code Treatment Minutes: 43   Total Treatment Minutes: 43     [] EVAL - LOW (38987)   [] EVAL - MOD (15289)  [] EVAL - HIGH (15585)  [] RE-EVAL (50232)  [] IS(03227) x       [] Ionto  [x] NMR (52028) x 3       [] Vaso  [] Manual (19815) x       [] Ultrasound  [] TA x        [] Mech Traction (65449)  [] Aquatic Therapy x     [] ES (un) (52944):   [] Home Management Training x  [] ES(attended) (23657)   [] Dry Needling 1-2 muscles (75816):  [] Dry Needling 3+ muscles (623204)  [] Group:      [] Other:     GOALS:   Patient stated goal: learn how to balance  [] Progressing: [] Met: [] Not Met: [] Adjusted     Therapist goals for Patient:   Short Term Goals: To be achieved in: 2 weeks  1. Independent in HEP and progression per patient tolerance, in order to prevent return of swelling   [] Progressing: [] Met: [] Not Met: [] Adjusted  2.  Patient will have a decrease in swelling/pain to facilitate improvement in movement, function, and ADLs as indicated by improvement with LLIS. [] Progressing: [] Met: [] Not Met: [] Adjusted     Long Term Goals: To be achieved in: 6 weeks  1. FOTO score of at least 102 to assist with reaching prior level of function. [] Progressing: [] Met: [] Not Met: [] Adjusted  2. Patient will demonstrate increased AROM/strength of 4+ to [facility] so that pt can resume ambulation without increase in symptoms. [] Progressing: [] Met: [] Not Met: [] Adjusted  3. Pt will be able to ambulate without fear of falling for short distances. [] Progressing: [] Met: [] Not Met: [] Adjusted     Overall Progression Towards Functional goals/ Treatment Progress Update:  [] Patient is progressing as expected towards functional goals listed. [] Progression is slowed due to complexities/Impairments listed. [] Progression has been slowed due to co-morbidities. [x] Plan just implemented, too soon to assess goals progression <30days   [] Goals require adjustment due to lack of progress  [] Patient is not progressing as expected and requires additional follow up with physician  [] Other    Persisting Functional Limitations/Impairments:  []Sleeping []Sitting               [x]Standing [x]Transfers        [x]Walking []Kneeling               [x]Stairs []Squatting / bending   [x]ADLs [x]Reaching  [x]Lifting  []Housework  []Driving []Job related tasks  []Sports/Recreation []Other:      ASSESSMENT: The patient presents with signs of central imbalance, as well as proprioceptive deficits (neuropathy) that contribute to her mobility limitations. The session assessed the patient's vestibular and balance capabilities, and further treatment is indicated for coordination, balance, proprioception and vestibular function.  She has more difficulty when she speeds up and goes too fast.    Treatment/Activity Tolerance:  [x] Patient able to complete tx [] Patient limited by fatigue  [] Patient limited by pain  [] Patient limited by other medical complications  [] Other:     Prognosis: [x] Good [] Fair  [] Poor    Patient Requires Follow-up: [x] Yes  [] No    Plan for next treatment session:    PLAN: See eval. PT 2x / week for 4 weeks. [] Continue per plan of care [] Alter current plan (see comments)  [x] Plan of care initiated [] Hold pending MD visit [] Discharge    Electronically signed by: Olya Etienne PT PT, DPT    Note: If patient does not return for scheduled/ recommended follow up visits, this note will serve as a discharge from care along with most recent update on progress.

## 2022-09-21 ENCOUNTER — HOSPITAL ENCOUNTER (OUTPATIENT)
Dept: NON INVASIVE DIAGNOSTICS | Age: 77
Discharge: HOME | End: 2022-09-21
Payer: MEDICARE

## 2022-09-21 DIAGNOSIS — Z51.11 ENCOUNTER FOR ANTINEOPLASTIC CHEMOTHERAPY: ICD-10-CM

## 2022-09-21 LAB
LV EF: 63 %
LVEF MODALITY: NORMAL

## 2022-09-21 PROCEDURE — 93306 TTE W/DOPPLER COMPLETE: CPT

## 2022-09-23 ENCOUNTER — HOSPITAL ENCOUNTER (OUTPATIENT)
Dept: PHYSICAL THERAPY | Age: 77
Setting detail: THERAPIES SERIES
Discharge: HOME | End: 2022-09-23
Payer: MEDICARE

## 2022-09-23 PROCEDURE — 97112 NEUROMUSCULAR REEDUCATION: CPT

## 2022-09-23 NOTE — FLOWSHEET NOTE
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]  Muscle weakness (generalized) [M62.81]  Treatment Diagnosis: balance dysfunction due to neuropathy  Insurance/Certification information:  PT Insurance Information: Medicare and [employer]  Physician Information:  DARNELL Baig CNP  Plan of care signed (Y/N): []  Yes [x]  No     Date of Patient follow up with Physician:      Progress Report: []  Yes  [x]  No     Date Range for reporting period:  Beginnin/15/2022  Ending:     Progress report due (10 Rx/or 30 days whichever is less): visit #10 or  (date)     Recertification due (POC duration/ or 90 days whichever is less): visit #8 or 12/15/22 (date)     Visit # Insurance Allowable Auth required? Date Range   3/10  []  Yes  []  No      Latex Allergy:  [x]NO      []YES  Preferred Language for Healthcare:   [x]English       []other:    Functional Scale:           Date assessed:  [facility] physical FS primary measure score = 98; risk adjusted = 52  9/15/22    Pain level:  0/10 knees    SUBJECTIVE:  The patient ambulates into clinic without AD this date and denies dizziness or imbalance. OBJECTIVE:    - Smooth pursuits = small adjustments V>H; Saccades = Diff w/ H; Convergence (L eye deviate) = abnormal; VOR (H/V) = WNL;  Romberg = 10 sec, Romberg EC = 10 sec; Tandem L = 9sec; Tandem R = 15sec, Airex Romberg = 15sec;  Airex EC Romberg = 7sec  TUG = 10sec (no AD)    RESTRICTIONS/PRECAUTIONS:     Exercises/Interventions:     Therapeutic Exercises (45522) Resistance / level Sets/sec Reps Notes                                                                  Therapeutic Activities (83950) in constant attendance of 2 or more patients providing skilled therapy interventions, but not providing any significant amount of measurable one-on-one time to either patient, for improvements in  [] LE / lumbar: LE, proximal hip, and core control in self care, mobility, lifting, ambulation and eccentric single leg control. [] UE / cervical: cervical, scapular, scapulothoracic and UE control with self care, reaching, carrying, lifting, house/yardwork, driving, computer work. NMR and Therapeutic Activities:    [x] (65246 or 85181) Provided verbal/tactile cueing for activities related to improving balance, coordination, kinesthetic sense, posture, motor skill, proprioception and motor activation to allow for proper function of   [x] LE: / Lumbar core, proximal hip and LE with self care and ADLs  [] UE / Cervical: cervical, postural, scapular, scapulothoracic and UE control with self care, carrying, lifting, driving, computer work.   [] (01272) Gait Re-education- Provided training and instruction to the patient for proper LE, core and proximal hip recruitment and positioning and eccentric body weight control with ambulation re-education including up and down stairs     Home Management Training / Self Care:  [] (02414) Provided self-care/home management training related to activities of daily living and compensatory training, and/or use of adaptive equipment for improvement with: ADLs and compensatory training, meal preparation, safety procedures and instruction in use of adaptive equipment, including bathing, grooming, dressing, personal hygiene, basic household cleaning and chores.      Home Exercise Program:    [] (11398) Reviewed/Progressed HEP activities related to strengthening, flexibility, endurance, ROM of   [] LE / Lumbar: core, proximal hip and LE for functional self-care, mobility, lifting and ambulation/stair navigation   [] UE / Cervical: cervical, postural, scapular, scapulothoracic and UE control with self care, reaching, carrying, lifting, house/yardwork, driving, computer work  [] (86887)Reviewed/Progressed HEP activities related to improving balance, coordination, kinesthetic sense, posture, motor skill, proprioception of   [] LE: core, proximal hip and LE for self care, mobility, lifting, and ambulation/stair navigation    [] UE / Cervical: cervical, postural,  scapular, scapulothoracic and UE control with self care, reaching, carrying, lifting, house/yardwork, driving, computer work    Manual Treatments:  PROM / STM / Oscillations-Mobs:  G-I, II, III, IV (PA's, Inf., Post.)  [] (76672) Provided manual therapy to mobilize LE, proximal hip and/or LS spine soft tissue/joints for the purpose of modulating pain, promoting relaxation,  increasing ROM, reducing/eliminating soft tissue swelling/inflammation/restriction, improving soft tissue extensibility and allowing for proper ROM for normal function with   [] LE / lumbar: self care, mobility, lifting and ambulation. [] UE / Cervical: self care, reaching, carrying, lifting, house/yardwork, driving, computer work. Modalities:  [] (02607) Vasopneumatic compression: Utilized vasopneumatic compression to decrease edema / swelling for the purpose of improving mobility and quad tone / recruitment which will allow for increased overall function including but not limited to self-care, transfers, ambulation, and ascending / descending stairs.      Charges:  Timed Code Treatment Minutes: 48   Total Treatment Minutes: 48     [] EVAL - LOW (18186)   [] EVAL - MOD (08198)  [] EVAL - HIGH (91204)  [] RE-EVAL (28166)  [] ML(07248) x       [] Ionto  [x] NMR (57159) x 3      [] Vaso  [] Manual (46966) x       [] Ultrasound  [] TA x        [] Mech Traction (29285)  [] Aquatic Therapy x     [] ES (un) (18003):   [] Home Management Training x  [] ES(attended) (31922)   [] Dry Needling 1-2 muscles (48479):  [] Dry Needling 3+ muscles (957420)  [] Group:      [] Other: GOALS:   Patient stated goal: learn how to balance  [] Progressing: [] Met: [] Not Met: [] Adjusted     Therapist goals for Patient:   Short Term Goals: To be achieved in: 2 weeks  1. Independent in HEP and progression per patient tolerance, in order to prevent return of swelling   [] Progressing: [] Met: [] Not Met: [] Adjusted  2. Patient will have a decrease in swelling/pain to facilitate improvement in movement, function, and ADLs as indicated by improvement with LLIS. [] Progressing: [] Met: [] Not Met: [] Adjusted     Long Term Goals: To be achieved in: 6 weeks  1. FOTO score of at least 102 to assist with reaching prior level of function. [] Progressing: [] Met: [] Not Met: [] Adjusted  2. Patient will demonstrate increased AROM/strength of 4+ to [facility] so that pt can resume ambulation without increase in symptoms. [] Progressing: [] Met: [] Not Met: [] Adjusted  3. Pt will be able to ambulate without fear of falling for short distances. [] Progressing: [] Met: [] Not Met: [] Adjusted     Overall Progression Towards Functional goals/ Treatment Progress Update:  [] Patient is progressing as expected towards functional goals listed. [] Progression is slowed due to complexities/Impairments listed. [] Progression has been slowed due to co-morbidities. [x] Plan just implemented, too soon to assess goals progression <30days   [] Goals require adjustment due to lack of progress  [] Patient is not progressing as expected and requires additional follow up with physician  [] Other    Persisting Functional Limitations/Impairments:  []Sleeping []Sitting               [x]Standing [x]Transfers        [x]Walking []Kneeling               [x]Stairs []Squatting / bending   [x]ADLs [x]Reaching  [x]Lifting  []Housework  []Driving []Job related tasks  []Sports/Recreation []Other:      ASSESSMENT: Great performance this date with vestibular habituation balance tasks, marked by excellent effort and focus.  She made mistakes indicative of hypofunction and showed some signs of central involvement, especially with smooth pursuits - but she recognized this and discussed with PT to further her understanding/knowledge. The patient will benefit from ongoing VRT to improve balance, safety, coordination and vestibular function. Treatment/Activity Tolerance:  [x] Patient able to complete tx [] Patient limited by fatigue  [] Patient limited by pain  [] Patient limited by other medical complications  [] Other:     Prognosis: [x] Good [] Fair  [] Poor    Patient Requires Follow-up: [x] Yes  [] No    Plan for next treatment session:    PLAN: See eval. PT 2x / week for 4 weeks. [] Continue per plan of care [] Alter current plan (see comments)  [x] Plan of care initiated [] Hold pending MD visit [] Discharge    Electronically signed by: Jerome Hess, PT PT, DPT    Note: If patient does not return for scheduled/ recommended follow up visits, this note will serve as a discharge from care along with most recent update on progress.

## 2022-09-26 ENCOUNTER — HOSPITAL ENCOUNTER (OUTPATIENT)
Dept: PHYSICAL THERAPY | Age: 77
Setting detail: THERAPIES SERIES
Discharge: HOME | End: 2022-09-26
Payer: MEDICARE

## 2022-09-26 PROCEDURE — 97112 NEUROMUSCULAR REEDUCATION: CPT

## 2022-09-26 NOTE — FLOWSHEET NOTE
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]  Muscle weakness (generalized) [M62.81]  Treatment Diagnosis: balance dysfunction due to neuropathy  Insurance/Certification information:  PT Insurance Information: Medicare and [employer]  Physician Information:  DARNELL Marie CNP  Plan of care signed (Y/N): []  Yes [x]  No     Date of Patient follow up with Physician:      Progress Report: []  Yes  [x]  No     Date Range for reporting period:  Beginnin/15/2022  Ending:     Progress report due (10 Rx/or 30 days whichever is less): visit #10 or  (date)     Recertification due (POC duration/ or 90 days whichever is less): visit #8 or 12/15/22 (date)     Visit # Insurance Allowable Auth required? Date Range   4/10  []  Yes  []  No      Latex Allergy:  [x]NO      []YES  Preferred Language for Healthcare:   [x]English       []other:    Functional Scale:           Date assessed:  [facility] physical FS primary measure score = 98; risk adjusted = 52  9/15/22    Pain level:  0/10 knees    SUBJECTIVE:  The patient reports some knee pain and knees giving at [employer] so used her cane there. OBJECTIVE:    - Smooth pursuits = small adjustments V>H; Saccades = Diff w/ H; Convergence (L eye deviate) = abnormal; VOR (H/V) = WNL;  Romberg = 10 sec, Romberg EC = 10 sec; Tandem L = 9sec; Tandem R = 15sec, Airex Romberg = 15sec;  Airex EC Romberg = 7sec  TUG = 10sec (no AD)    RESTRICTIONS/PRECAUTIONS:     Exercises/Interventions:     Therapeutic Exercises (90511) Resistance / level Sets/sec Reps Notes   Nu step   X 5 mins                                                            Therapeutic Activities (49539) Neuromuscular Re-ed (34077)       Vestibular Screening, Balance & Mobility testing    Balance education (vision, proprioception, vestibular)    Alt Step Taps    Balance:  1 foot on step  1 foot on step w/ head turns/pitches    Twist Turns (w/ number finding)  2 10 R/L    VOR: seated 2ft / 8ft  3 30'' 9/23 - 2 reps at 8ft   Saccades: V/H  2 30'' ea    Smooth Pursuits: V/H   20x ea 9/23 - great diff with vertical, especially to the sides   Gaze Stabilization w/ Gait  25ft 2x ea, V/H 9/23   Head/Body Turn   2 circles R/L 9/23   Standing Folding: EO/EC   10x, ea 9/23   Static Balance: w/ head turns/pitches R/L U/D     Normal OLLIE    Staggered R/L       Airex    airex        60'' ea    60'' ea R/L 9/23   Manual Intervention (18641)                                                   Modalities:     Pt. Education:  09/19/2022  -patient educated on diagnosis, prognosis and expectations for rehab  -all patient questions were answered    Home Exercise Program:  9/23 - VOR elle sheet provided. 9/15: SLS at counter 2 x 10'    Therapeutic Exercise and NMR EXR  [] (98804) Provided verbal/tactile cueing for activities related to strengthening, flexibility, endurance, ROM for improvements in  [] LE / Lumbar: LE, proximal hip, and core control with self care, mobility, lifting, ambulation. [] UE / Cervical: cervical, postural, scapular, scapulothoracic and UE control with self care, reaching, carrying, lifting, house/yardwork, driving, computer work. [x] (43727) Provided verbal/tactile cueing for activities related to improving balance, coordination, kinesthetic sense, posture, motor skill, proprioception to assist with   [x] LE / lumbar: LE, proximal hip, and core control in self care, mobility, lifting, ambulation and eccentric single leg control.    [] UE / cervical: cervical, scapular, scapulothoracic and UE control with self care, reaching, carrying, lifting, house/yardwork, driving, computer work.   [] (42059) Therapist is in constant attendance of 2 or more patients providing skilled therapy interventions, but not providing any significant amount of measurable one-on-one time to either patient, for improvements in  [] LE / lumbar: LE, proximal hip, and core control in self care, mobility, lifting, ambulation and eccentric single leg control. [] UE / cervical: cervical, scapular, scapulothoracic and UE control with self care, reaching, carrying, lifting, house/yardwork, driving, computer work. NMR and Therapeutic Activities:    [x] (05193 or 61521) Provided verbal/tactile cueing for activities related to improving balance, coordination, kinesthetic sense, posture, motor skill, proprioception and motor activation to allow for proper function of   [x] LE: / Lumbar core, proximal hip and LE with self care and ADLs  [] UE / Cervical: cervical, postural, scapular, scapulothoracic and UE control with self care, carrying, lifting, driving, computer work.   [] (02585) Gait Re-education- Provided training and instruction to the patient for proper LE, core and proximal hip recruitment and positioning and eccentric body weight control with ambulation re-education including up and down stairs     Home Management Training / Self Care:  [] (59048) Provided self-care/home management training related to activities of daily living and compensatory training, and/or use of adaptive equipment for improvement with: ADLs and compensatory training, meal preparation, safety procedures and instruction in use of adaptive equipment, including bathing, grooming, dressing, personal hygiene, basic household cleaning and chores.      Home Exercise Program:    [] (47431) Reviewed/Progressed HEP activities related to strengthening, flexibility, endurance, ROM of   [] LE / Lumbar: core, proximal hip and LE for functional self-care, mobility, lifting and ambulation/stair navigation   [] UE / Cervical: cervical, postural, scapular, scapulothoracic and UE control with self care, reaching, carrying, lifting, house/yardwork, driving, computer work  [] (11980)Reviewed/Progressed HEP activities related to improving balance, coordination, kinesthetic sense, posture, motor skill, proprioception of   [] LE: core, proximal hip and LE for self care, mobility, lifting, and ambulation/stair navigation    [] UE / Cervical: cervical, postural,  scapular, scapulothoracic and UE control with self care, reaching, carrying, lifting, house/yardwork, driving, computer work    Manual Treatments:  PROM / STM / Oscillations-Mobs:  G-I, II, III, IV (PA's, Inf., Post.)  [] (28726) Provided manual therapy to mobilize LE, proximal hip and/or LS spine soft tissue/joints for the purpose of modulating pain, promoting relaxation,  increasing ROM, reducing/eliminating soft tissue swelling/inflammation/restriction, improving soft tissue extensibility and allowing for proper ROM for normal function with   [] LE / lumbar: self care, mobility, lifting and ambulation. [] UE / Cervical: self care, reaching, carrying, lifting, house/yardwork, driving, computer work. Modalities:  [] (97286) Vasopneumatic compression: Utilized vasopneumatic compression to decrease edema / swelling for the purpose of improving mobility and quad tone / recruitment which will allow for increased overall function including but not limited to self-care, transfers, ambulation, and ascending / descending stairs.      Charges:  Timed Code Treatment Minutes: 42   Total Treatment Minutes: 42     [] EVAL - LOW (76791)   [] EVAL - MOD (82316)  [] EVAL - HIGH (66997)  [] RE-EVAL (15838)  [] AV(60707) x       [] Ionto  [x] NMR (50560) x 3      [] Vaso  [] Manual (12219) x       [] Ultrasound  [] TA x        [] Mech Traction (32409)  [] Aquatic Therapy x     [] ES (un) (11177):   [] Home Management Training x  [] ES(attended) (74377)   [] Dry Needling 1-2 muscles (54985):  [] Dry Needling 3+ muscles (825207)  [] Group:      [] Other:     GOALS:   Patient stated goal: learn how to balance  [] Progressing: [] Met: [] Not Met: [] Adjusted     Therapist goals for Patient:   Short Term Goals: To be achieved in: 2 weeks  1. Independent in HEP and progression per patient tolerance, in order to prevent return of swelling   [] Progressing: [] Met: [] Not Met: [] Adjusted  2. Patient will have a decrease in swelling/pain to facilitate improvement in movement, function, and ADLs as indicated by improvement with LLIS. [] Progressing: [] Met: [] Not Met: [] Adjusted     Long Term Goals: To be achieved in: 6 weeks  1. FOTO score of at least 102 to assist with reaching prior level of function. [] Progressing: [] Met: [] Not Met: [] Adjusted  2. Patient will demonstrate increased AROM/strength of 4+ to [facility] so that pt can resume ambulation without increase in symptoms. [] Progressing: [] Met: [] Not Met: [] Adjusted  3. Pt will be able to ambulate without fear of falling for short distances. [] Progressing: [] Met: [] Not Met: [] Adjusted     Overall Progression Towards Functional goals/ Treatment Progress Update:  [] Patient is progressing as expected towards functional goals listed. [] Progression is slowed due to complexities/Impairments listed. [] Progression has been slowed due to co-morbidities. [x] Plan just implemented, too soon to assess goals progression <30days   [] Goals require adjustment due to lack of progress  [] Patient is not progressing as expected and requires additional follow up with physician  [] Other    Persisting Functional Limitations/Impairments:  []Sleeping []Sitting               [x]Standing [x]Transfers        [x]Walking []Kneeling               [x]Stairs []Squatting / bending   [x]ADLs [x]Reaching  [x]Lifting  []Housework  []Driving []Job related tasks  []Sports/Recreation []Other:      ASSESSMENT: Great performance this date with vestibular habituation balance tasks, marked by excellent effort and focus.  Pt

## 2022-09-30 ENCOUNTER — HOSPITAL ENCOUNTER (OUTPATIENT)
Dept: PHYSICAL THERAPY | Age: 77
Setting detail: THERAPIES SERIES
Discharge: HOME | End: 2022-09-30
Payer: MEDICARE

## 2022-09-30 PROCEDURE — 97112 NEUROMUSCULAR REEDUCATION: CPT

## 2022-09-30 NOTE — FLOWSHEET NOTE
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]  Muscle weakness (generalized) [M62.81]  Treatment Diagnosis: balance dysfunction due to neuropathy  Insurance/Certification information:  PT Insurance Information: Medicare and [employer]  Physician Information:  DARNELL Sharma - CNP  Plan of care signed (Y/N): []  Yes [x]  No     Date of Patient follow up with Physician:      Progress Report: []  Yes  [x]  No     Date Range for reporting period:  Beginnin/15/2022  Ending:     Progress report due (10 Rx/or 30 days whichever is less): visit #10 or  (date)     Recertification due (POC duration/ or 90 days whichever is less): visit #8 or 12/15/22 (date)     Visit # Insurance Allowable Auth required? Date Range   5/10  []  Yes  []  No      Latex Allergy:  [x]NO      []YES  Preferred Language for Healthcare:   [x]English       []other:    Functional Scale:           Date assessed:  [facility] physical FS primary measure score = 98; risk adjusted = 52  9/15/22    Pain level:  0/10 knees    SUBJECTIVE: Feeling good this date. No other complaints. OBJECTIVE:    - several errors between oculomotor and vestibular accuracy on target   - Smooth pursuits = small adjustments V>H; Saccades = Diff w/ H; Convergence (L eye deviate) = abnormal; VOR (H/V) = WNL;  Romberg = 10 sec, Romberg EC = 10 sec; Tandem L = 9sec; Tandem R = 15sec, Airex Romberg = 15sec;  Airex EC Romberg = 7sec  TUG = 10sec (no AD)    RESTRICTIONS/PRECAUTIONS:     Exercises/Interventions:     Therapeutic Exercises (36577) Resistance / level Sets/sec Reps Notes   Nu step                                                              Therapeutic Activities (45558) Neuromuscular Re-ed (29071)       Vestibular Screening, Balance & Mobility testing    Balance education (vision, proprioception, vestibular)    Alt Step Taps    Balance:  1 foot on step  1 foot on step w/ head turns/pitches    Twist Turns (w/ letter finding)  2 10 R/L 9/30 - improved setup   VOR: seated 2ft / 8ft - V/H  1 45'' ea 9/30 - increase time   Saccades: V/H  Smooth Pursuits: V/H  Gaze Stabilization w/ Gait  Head/Body Turn  Standing Folding: EC  1  1 5x  10x 9/30 - cues to stand up tall   Static Balance: w/ head turns/pitches R/L U/D     Normal OLLIE EO/EC    Staggered R/L airex      Airex     10x, ea      30'', ea    9/30   Oculomotor Training   6 min 42sec 9/30 - youtube visual tracking exercises   VORcancellation: V/H  2 15x ea 9/30 - greater diff V>H; eyes move vertically ahead of target   EC Head Shaking/Pitches  1 20 ea 9/30 - standing   Standing Marches airex 2 10 9/30          Manual Intervention (53938)                                                   Modalities:     Pt. Education:  09/19/2022  -patient educated on diagnosis, prognosis and expectations for rehab  -all patient questions were answered    Home Exercise Program:  9/23 - VOR elle sheet provided. 9/15: SLS at counter 2 x 10'    Therapeutic Exercise and NMR EXR  [] (17018) Provided verbal/tactile cueing for activities related to strengthening, flexibility, endurance, ROM for improvements in  [] LE / Lumbar: LE, proximal hip, and core control with self care, mobility, lifting, ambulation. [] UE / Cervical: cervical, postural, scapular, scapulothoracic and UE control with self care, reaching, carrying, lifting, house/yardwork, driving, computer work.   [x] (47986) Provided verbal/tactile cueing for activities related to improving balance, coordination, kinesthetic sense, posture, motor skill, proprioception to assist with   [x] LE / lumbar: LE, proximal hip, and core control in self care, mobility, lifting, ambulation and eccentric single leg control. [] UE / cervical: cervical, scapular, scapulothoracic and UE control with self care, reaching, carrying, lifting, house/yardwork, driving, computer work.   [] (38869) Therapist is in constant attendance of 2 or more patients providing skilled therapy interventions, but not providing any significant amount of measurable one-on-one time to either patient, for improvements in  [] LE / lumbar: LE, proximal hip, and core control in self care, mobility, lifting, ambulation and eccentric single leg control. [] UE / cervical: cervical, scapular, scapulothoracic and UE control with self care, reaching, carrying, lifting, house/yardwork, driving, computer work. NMR and Therapeutic Activities:    [x] (68420 or 48299) Provided verbal/tactile cueing for activities related to improving balance, coordination, kinesthetic sense, posture, motor skill, proprioception and motor activation to allow for proper function of   [x] LE: / Lumbar core, proximal hip and LE with self care and ADLs  [] UE / Cervical: cervical, postural, scapular, scapulothoracic and UE control with self care, carrying, lifting, driving, computer work.   [] (56897) Gait Re-education- Provided training and instruction to the patient for proper LE, core and proximal hip recruitment and positioning and eccentric body weight control with ambulation re-education including up and down stairs     Home Management Training / Self Care:  [] (88261) Provided self-care/home management training related to activities of daily living and compensatory training, and/or use of adaptive equipment for improvement with: ADLs and compensatory training, meal preparation, safety procedures and instruction in use of adaptive equipment, including bathing, grooming, dressing, personal hygiene, basic household cleaning and chores.      Home Exercise Program:    [] (69007) Reviewed/Progressed HEP activities related to strengthening, flexibility, endurance, ROM of   [] LE / Lumbar: core, proximal hip and LE for functional self-care, mobility, lifting and ambulation/stair navigation   [] UE / Cervical: cervical, postural, scapular, scapulothoracic and UE control with self care, reaching, carrying, lifting, house/yardwork, driving, computer work  [] (79420)Reviewed/Progressed HEP activities related to improving balance, coordination, kinesthetic sense, posture, motor skill, proprioception of   [] LE: core, proximal hip and LE for self care, mobility, lifting, and ambulation/stair navigation    [] UE / Cervical: cervical, postural,  scapular, scapulothoracic and UE control with self care, reaching, carrying, lifting, house/yardwork, driving, computer work    Manual Treatments:  PROM / STM / Oscillations-Mobs:  G-I, II, III, IV (PA's, Inf., Post.)  [] (95138) Provided manual therapy to mobilize LE, proximal hip and/or LS spine soft tissue/joints for the purpose of modulating pain, promoting relaxation,  increasing ROM, reducing/eliminating soft tissue swelling/inflammation/restriction, improving soft tissue extensibility and allowing for proper ROM for normal function with   [] LE / lumbar: self care, mobility, lifting and ambulation. [] UE / Cervical: self care, reaching, carrying, lifting, house/yardwork, driving, computer work. Modalities:  [] (18059) Vasopneumatic compression: Utilized vasopneumatic compression to decrease edema / swelling for the purpose of improving mobility and quad tone / recruitment which will allow for increased overall function including but not limited to self-care, transfers, ambulation, and ascending / descending stairs.      Charges:  Timed Code Treatment Minutes: 39   Total Treatment Minutes: 39     [] EVAL - LOW (15898)   [] EVAL - MOD (74408)  [] EVAL - HIGH (42584)  [] RE-EVAL (30407)  [] SZ(39357) x       [] Ionto  [x] NMR (88747) x 3      [] Vaso  [] Manual (44952) x       [] Ultrasound  [] TA x        [] [facility] Traction (43391)  [] Aquatic Therapy x     [] ES (un) (47793):   [] Home Management Training x  [] ES(attended) (33888)   [] Dry Needling 1-2 muscles (11572):  [] Dry Needling 3+ muscles (023701)  [] Group:      [] Other:     GOALS:   Patient stated goal: learn how to balance  [] Progressing: [] Met: [] Not Met: [] Adjusted     Therapist goals for Patient:   Short Term Goals: To be achieved in: 2 weeks  1. Independent in HEP and progression per patient tolerance, in order to prevent return of swelling   [] Progressing: [] Met: [] Not Met: [] Adjusted  2. Patient will have a decrease in swelling/pain to facilitate improvement in movement, function, and ADLs as indicated by improvement with LLIS. [] Progressing: [] Met: [] Not Met: [] Adjusted     Long Term Goals: To be achieved in: 6 weeks  1. FOTO score of at least 102 to assist with reaching prior level of function. [] Progressing: [] Met: [] Not Met: [] Adjusted  2. Patient will demonstrate increased AROM/strength of 4+ to [facility] so that pt can resume ambulation without increase in symptoms. [] Progressing: [] Met: [] Not Met: [] Adjusted  3. Pt will be able to ambulate without fear of falling for short distances. [] Progressing: [] Met: [] Not Met: [] Adjusted     Overall Progression Towards Functional goals/ Treatment Progress Update:  [] Patient is progressing as expected towards functional goals listed. [] Progression is slowed due to complexities/Impairments listed. [] Progression has been slowed due to co-morbidities.   [x] Plan just implemented, too soon to assess goals progression <30days   [] Goals require adjustment due to lack of progress  [] Patient is not progressing as expected and requires additional follow up with physician  [] Other    Persisting Functional Limitations/Impairments:  []Sleeping []Sitting               [x]Standing [x]Transfers        [x]Walking []Kneeling               [x]Stairs []Squatting / bending   [x]ADLs [x]Reaching  [x]Lifting  []Housework  []Driving []Job related tasks  []Sports/Recreation []Other:      ASSESSMENT: Several errors noted with oculomotor and vestibular exercises this date, but the patient is learning and understanding when these errors are made. They are indicative of central and peripheral vestibular weakness. The patient should continue with skilled VRT to improve balance, coordination, proprioception, vestibular and oculomotor performance in order to improve safety and independence with ADLs/iADLs. Treatment/Activity Tolerance:  [x] Patient able to complete tx [] Patient limited by fatigue  [] Patient limited by pain  [] Patient limited by other medical complications  [] Other:     Prognosis: [x] Good [] Fair  [] Poor    Patient Requires Follow-up: [x] Yes  [] No    Plan for next treatment session:    PLAN: See eval. PT 2x / week for 4 weeks. [x] Continue per plan of care [] Alter current plan (see comments)  [] Plan of care initiated [] Hold pending MD visit [] Discharge    Electronically signed by: Billy Preciado, PT PT, DPT    Note: If patient does not return for scheduled/ recommended follow up visits, this note will serve as a discharge from care along with most recent update on progress.

## 2022-10-03 ENCOUNTER — HOSPITAL ENCOUNTER (OUTPATIENT)
Dept: PHYSICAL THERAPY | Age: 77
Setting detail: THERAPIES SERIES
Discharge: HOME | End: 2022-10-03
Payer: MEDICARE

## 2022-10-03 NOTE — FLOWSHEET NOTE
[address]     Physical Therapy  Cancellation/No-show Note  Patient Name:  Anai Nguyen  :  1945   Date:  10/3/2022  Cancelled visits to date: 1  No-shows to date: 0    Patient status for today's appointment patient:  [x]  Cancelled  10/3/22  []  Rescheduled appointment  []  No-show     Reason given by patient:  []  Patient ill  [x]  Conflicting appointment getting blood work  []  No transportation    []  Conflict with work  []  No reason given  []  Other:     Comments:      Phone call information:   []  Phone call made today to patient at _ time at number provided:      []  Patient answered, conversation as follows:    []  Patient did not answer, message left as follows:  []  Phone call not made today  [x]  Phone call not needed - pt contacted us to cancel and provided reason for cancellation.      Electronically signed by:  Aria Meza PT

## 2022-10-07 ENCOUNTER — APPOINTMENT (OUTPATIENT)
Dept: PHYSICAL THERAPY | Age: 77
End: 2022-10-07
Payer: MEDICARE

## 2022-10-10 ENCOUNTER — HOSPITAL ENCOUNTER (OUTPATIENT)
Dept: PHYSICAL THERAPY | Age: 77
Setting detail: THERAPIES SERIES
Discharge: HOME | End: 2022-10-10
Payer: MEDICARE

## 2022-10-10 PROCEDURE — 97530 THERAPEUTIC ACTIVITIES: CPT

## 2022-10-10 PROCEDURE — 97110 THERAPEUTIC EXERCISES: CPT

## 2022-10-10 NOTE — FLOWSHEET NOTE
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]  Muscle weakness (generalized) [M62.81]  Treatment Diagnosis: balance dysfunction due to neuropathy  Insurance/Certification information:  PT Insurance Information: Medicare and [employer]  Physician Information:  DARNELL Junior CNP  Plan of care signed (Y/N): []  Yes [x]  No     Date of Patient follow up with Physician:      Progress Report: []  Yes  [x]  No     Date Range for reporting period:  Beginnin/15/2022  Ending:     Progress report due (10 Rx/or 30 days whichever is less): visit #10 or  (date)     Recertification due (POC duration/ or 90 days whichever is less): visit #8 or 12/15/22 (date)     Visit # Insurance Allowable Auth required? Date Range   6/10  []  Yes  []  No      Latex Allergy:  [x]NO      []YES  Preferred Language for Healthcare:   [x]English       []other:    Functional Scale:           Date assessed:  [facility] physical FS primary measure score = 98; risk adjusted = 52  9/15/22    Pain level:  0/10 knees    SUBJECTIVE: reports just losing her friend of 47 years last week. So had a very busy weekend with the . She sang for the .    OBJECTIVE:    - several errors between oculomotor and vestibular accuracy on target   - Smooth pursuits = small adjustments V>H; Saccades = Diff w/ H; Convergence (L eye deviate) = abnormal; VOR (H/V) = WNL;  Romberg = 10 sec, Romberg EC = 10 sec; Tandem L = 9sec; Tandem R = 15sec, Airex Romberg = 15sec;  Airex EC Romberg = 7sec  TUG = 10sec (no AD)    RESTRICTIONS/PRECAUTIONS:     Exercises/Interventions:     Therapeutic Exercises (51433) Resistance / level Sets/sec Reps Notes   Nu step   X 6 mins Step ups 6' fwd/lat B   X 10 Many cues   In // bars hip abd/ext B   X 10 Many cues                                             Therapeutic Activities (12863)       Bidex [place]   Wt shift 3 rotations   X 4 mins  X 5 mins    Rotating targets                               Neuromuscular Re-ed (35218)       Vestibular Screening, Balance & Mobility testing    Balance education (vision, proprioception, vestibular)    Alt Step Taps    Balance:  1 foot on step  1 foot on step w/ head turns/pitches    Twist Turns (w/ letter finding)  9/30 - improved setup   VOR: seated 2ft / 8ft - V/H  9/30 - increase time   Saccades: V/H  Smooth Pursuits: V/H  Gaze Stabilization w/ Gait  Head/Body Turn  Standing Folding: EC  9/30 - cues to stand up tall   Static Balance: w/ head turns/pitches R/L U/D     Normal OLLIE EO/EC    Staggered R/L airex      Airex    9/30   Oculomotor Training  9/30 - youtube visual tracking exercises   VORcancellation: V/H  9/30 - greater diff V>H; eyes move vertically ahead of target   EC Head Shaking/Pitches  9/30 - standing   Standing Marches airex 9/30          Manual Intervention (97640)                                                   Modalities:     Pt. Education:  09/19/2022  -patient educated on diagnosis, prognosis and expectations for rehab  -all patient questions were answered    Home Exercise Program:  9/23 - VOR elle sheet provided. 9/15: SLS at counter 2 x 10'    Therapeutic Exercise and NMR EXR  [] (92687) Provided verbal/tactile cueing for activities related to strengthening, flexibility, endurance, ROM for improvements in  [] LE / Lumbar: LE, proximal hip, and core control with self care, mobility, lifting, ambulation. [] UE / Cervical: cervical, postural, scapular, scapulothoracic and UE control with self care, reaching, carrying, lifting, house/yardwork, driving, computer work.   [x] (20763) Provided verbal/tactile cueing for activities related to improving balance, coordination, kinesthetic sense, posture, motor skill, proprioception to assist with   [x] LE / lumbar: LE, proximal hip, and core control in self care, mobility, lifting, ambulation and eccentric single leg control. [] UE / cervical: cervical, scapular, scapulothoracic and UE control with self care, reaching, carrying, lifting, house/yardwork, driving, computer work.   [] (02917) Therapist is in constant attendance of 2 or more patients providing skilled therapy interventions, but not providing any significant amount of measurable one-on-one time to either patient, for improvements in  [] LE / lumbar: LE, proximal hip, and core control in self care, mobility, lifting, ambulation and eccentric single leg control. [] UE / cervical: cervical, scapular, scapulothoracic and UE control with self care, reaching, carrying, lifting, house/yardwork, driving, computer work.      NMR and Therapeutic Activities:    [x] (34667 or 02897) Provided verbal/tactile cueing for activities related to improving balance, coordination, kinesthetic sense, posture, motor skill, proprioception and motor activation to allow for proper function of   [x] LE: / Lumbar core, proximal hip and LE with self care and ADLs  [] UE / Cervical: cervical, postural, scapular, scapulothoracic and UE control with self care, carrying, lifting, driving, computer work.   [] (85307) Gait Re-education- Provided training and instruction to the patient for proper LE, core and proximal hip recruitment and positioning and eccentric body weight control with ambulation re-education including up and down stairs     Home Management Training / Self Care:  [] (02455) Provided self-care/home management training related to activities of daily living and compensatory training, and/or use of adaptive equipment for improvement with: ADLs and compensatory training, meal preparation, safety procedures and instruction in use of adaptive equipment, including bathing, grooming, dressing, personal hygiene, basic household cleaning and chores. Home Exercise Program:    [x] (24020) Reviewed/Progressed HEP activities related to strengthening, flexibility, endurance, ROM of   [] LE / Lumbar: core, proximal hip and LE for functional self-care, mobility, lifting and ambulation/stair navigation   [] UE / Cervical: cervical, postural, scapular, scapulothoracic and UE control with self care, reaching, carrying, lifting, house/yardwork, driving, computer work  [] (72521)Reviewed/Progressed HEP activities related to improving balance, coordination, kinesthetic sense, posture, motor skill, proprioception of   [] LE: core, proximal hip and LE for self care, mobility, lifting, and ambulation/stair navigation    [] UE / Cervical: cervical, postural,  scapular, scapulothoracic and UE control with self care, reaching, carrying, lifting, house/yardwork, driving, computer work    Manual Treatments:  PROM / STM / Oscillations-Mobs:  G-I, II, III, IV (PA's, Inf., Post.)  [] (76421) Provided manual therapy to mobilize LE, proximal hip and/or LS spine soft tissue/joints for the purpose of modulating pain, promoting relaxation,  increasing ROM, reducing/eliminating soft tissue swelling/inflammation/restriction, improving soft tissue extensibility and allowing for proper ROM for normal function with   [] LE / lumbar: self care, mobility, lifting and ambulation. [] UE / Cervical: self care, reaching, carrying, lifting, house/yardwork, driving, computer work. Modalities:  [] (78519) Vasopneumatic compression: Utilized vasopneumatic compression to decrease edema / swelling for the purpose of improving mobility and quad tone / recruitment which will allow for increased overall function including but not limited to self-care, transfers, ambulation, and ascending / descending stairs.      Charges:  Timed Code Treatment Minutes: 43   Total Treatment Minutes: 43     [] EVAL - LOW (84825)   [] EVAL - MOD (28288)  [] EVAL - HIGH (78440)  [] RE-EVAL (74504)  [x] DD(57950) x   2    [] Ionto  [] NMR (06287) x 3      [] Vaso  [] Manual (23818) x       [] Ultrasound  [x] TA x        [] Mech Traction (11643)  [] Aquatic Therapy x     [] ES (un) (91538):   [] Home Management Training x  [] ES(attended) (44663)   [] Dry Needling 1-2 muscles (20065):  [] Dry Needling 3+ muscles (031649)  [] Group:      [] Other:     GOALS:   Patient stated goal: learn how to balance  [] Progressing: [] Met: [] Not Met: [] Adjusted     Therapist goals for Patient:   Short Term Goals: To be achieved in: 2 weeks  1. Independent in HEP and progression per patient tolerance, in order to prevent return of swelling   [] Progressing: [] Met: [] Not Met: [] Adjusted  2. Patient will have a decrease in swelling/pain to facilitate improvement in movement, function, and ADLs as indicated by improvement with LLIS. [] Progressing: [] Met: [] Not Met: [] Adjusted     Long Term Goals: To be achieved in: 6 weeks  1. FOTO score of at least 102 to assist with reaching prior level of function. [] Progressing: [] Met: [] Not Met: [] Adjusted  2. Patient will demonstrate increased AROM/strength of 4+ to [facility] so that pt can resume ambulation without increase in symptoms. [] Progressing: [] Met: [] Not Met: [] Adjusted  3. Pt will be able to ambulate without fear of falling for short distances. [] Progressing: [] Met: [] Not Met: [] Adjusted     Overall Progression Towards Functional goals/ Treatment Progress Update:  [] Patient is progressing as expected towards functional goals listed. [] Progression is slowed due to complexities/Impairments listed. [] Progression has been slowed due to co-morbidities.   [x] Plan just implemented, too soon to assess goals progression <30days   [] Goals require adjustment due to lack of progress  [] Patient is not progressing as expected and requires additional follow up with physician  [] Other    Persisting Functional Limitations/Impairments:  []Sleeping []Sitting               [x]Standing [x]Transfers        [x]Walking []Kneeling               [x]Stairs []Squatting / bending   [x]ADLs [x]Reaching  [x]Lifting  []Housework  []Driving []Job related tasks  []Sports/Recreation []Other:      ASSESSMENT: increased weakness in L hip that causes increased difficulty in maintaining balance. Pt having difficulty with foot placement to perform ex correctly. Many cues for exercises with weight shift. Continue to strengthen LE for better control       Treatment/Activity Tolerance:  [x] Patient able to complete tx [] Patient limited by fatigue  [] Patient limited by pain  [] Patient limited by other medical complications  [] Other:     Prognosis: [x] Good [] Fair  [] Poor    Patient Requires Follow-up: [x] Yes  [] No    Plan for next treatment session:    PLAN: See eval. PT 2x / week for 4 weeks. [x] Continue per plan of care [] Alter current plan (see comments)  [] Plan of care initiated [] Hold pending MD visit [] Discharge    Electronically signed by: Iram Peres, PT PT, DPT    Note: If patient does not return for scheduled/ recommended follow up visits, this note will serve as a discharge from care along with most recent update on progress.

## 2022-10-13 ENCOUNTER — HOSPITAL ENCOUNTER (OUTPATIENT)
Dept: PHYSICAL THERAPY | Age: 77
Setting detail: THERAPIES SERIES
Discharge: HOME | End: 2022-10-13
Payer: MEDICARE

## 2022-10-13 NOTE — FLOWSHEET NOTE
[facility] [address]     Physical Therapy  Cancellation/No-show Note  Patient Name:  Nisha Riley  :  1945   Date:  10/13/2022  Cancelled visits to date: 1  No-shows to date: 1    Patient status for today's appointment patient:  []  Cancelled  10/3/22  []  Rescheduled appointment  [x]  No-show  10/13     Reason given by patient:  []  Patient ill  []  Conflicting appointment getting blood work  []  No transportation    []  Conflict with work  [x]  No reason given  []  Other:     Comments:      Phone call information:   [x]  Phone call made today to patient at _10:17 time at number provided:   [phone]   [x]  Patient answered, conversation as follows: she thought all her appt were on Fri will check her dates now/     []  Patient did not answer, message left as follows:  []  Phone call not made today  []  Phone call not needed - pt contacted us to cancel and provided reason for cancellation.      Electronically signed by:  lCay Barraza PT

## 2022-10-14 ENCOUNTER — APPOINTMENT (OUTPATIENT)
Dept: PHYSICAL THERAPY | Age: 77
End: 2022-10-14
Payer: MEDICARE

## 2022-10-17 ENCOUNTER — HOSPITAL ENCOUNTER (OUTPATIENT)
Dept: PHYSICAL THERAPY | Age: 77
Setting detail: THERAPIES SERIES
Discharge: HOME | End: 2022-10-17
Payer: MEDICARE

## 2022-10-17 PROCEDURE — 97112 NEUROMUSCULAR REEDUCATION: CPT

## 2022-10-17 PROCEDURE — 97530 THERAPEUTIC ACTIVITIES: CPT

## 2022-10-17 NOTE — FLOWSHEET NOTE
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]  Muscle weakness (generalized) [M62.81]  Treatment Diagnosis: balance dysfunction due to neuropathy  Insurance/Certification information:  PT Insurance Information: Medicare and BCBS  Physician Information:  DARNELL Chance CNP  Plan of care signed (Y/N): []  Yes [x]  No     Date of Patient follow up with Physician:      Progress Report: [x]  Yes  []  No     Date Range for reporting period:  Beginnin/15/2022  PN: 10/17/22  Ending:     Progress report due (10 Rx/or 30 days whichever is less): visit #10 or  (date)     Recertification due (POC duration/ or 90 days whichever is less): visit #8 or 12/15/22 (date)     Visit # Insurance Allowable Auth required? Date Range   7/10  []  Yes  []  No      Latex Allergy:  [x]NO      []YES  Preferred Language for Healthcare:   [x]English       []other:    Functional Scale:           Date assessed:  TO physical FS primary measure score = 98; risk adjusted = 52  9/15/22    Pain level:  0/10 knees    SUBJECTIVE: She denies feeling restricted with activities today. She has been very focused with how she walks, turns and moves. She has been utilizing handrails throughout her home, and shower chair appropriately. She is not using cane unless she is more fatigued and needs that extra support. She is ready to DC next visit. OBJECTIVE:   10/17 - CTSIB: 15sec all 4 conditions   - several errors between oculomotor and vestibular accuracy on target   - Smooth pursuits = small adjustments V>H; Saccades = Diff w/ H; Convergence (L eye deviate) = abnormal; VOR (H/V) = WNL;  Romberg = 10 sec, Romberg EC = 10 sec;  Tandem L = 9sec; Tandem R = 15sec, Airex Romberg = 15sec; Airex EC Romberg = 7sec  TUG = 10sec (no AD)    RESTRICTIONS/PRECAUTIONS:     Exercises/Interventions:     Therapeutic Exercises (48162) Resistance / level Sets/sec Reps Notes   Nu step     Step ups 6' fwd/lat B  Many cues   In // bars hip abd/ext B  Many cues   LAQ 4# 2 15 10/17                                      Therapeutic Activities (12862)       Bidex recovery rapids   Wt shift 3 rotations   Blazepods:  4 pods - foot taps   1 color  2 color    2 x 30''  2 x 30'' 10/17 - SBA                        Neuromuscular Re-ed (64307)       Vestibular Screening, Balance & Mobility testing    Balance education (vision, proprioception, vestibular)    Alt Step Taps: eyes down, eyes forwards 8'' 1 8 R/L ea 10/17   Balance:  1 foot on step  1 foot on step w/ head turns/pitches 8'' 1 10x R/L ea    Twist Turns (w/ letter finding)  9/30 - improved setup   VOR: seated 2ft / 8ft - V/H  1 60'' ea 10/17 - increased time   Saccades: V/H  Smooth Pursuits: V/H/diagonal   10x ea 10/17 - great diff with vertical, especially to the sides   Gaze Stabilization w/ Gait  Head/Body Turn  Standing Folding: EC  1 10 10/17 - EC standing; no diff   Static Balance: w/ head turns/pitches R/L U/D     Normal OLLIE EO/EC    Staggered R/L airex      Airex    9/30   Oculomotor Training  9/30 - youtube visual tracking exercises   VORcancellation: V/H  1 15x ea 10/17- greater diff V>H; eyes move vertically ahead of target   EC Head Shaking/Pitches  1 20 ea 10/17 - seated   Standing Marches        Manual Intervention (54359)                                                   Modalities:     Pt. Education:  09/19/2022  -patient educated on diagnosis, prognosis and expectations for rehab  -all patient questions were answered    Home Exercise Program:  9/23 - VOR elle sheet provided.   9/15: SLS at counter 2 x 10'    Therapeutic Exercise and NMR EXR  [] (51039) Provided verbal/tactile cueing for activities related to strengthening, flexibility, endurance, ROM for improvements in  [] LE / Lumbar: LE, proximal hip, and core control with self care, mobility, lifting, ambulation. [] UE / Cervical: cervical, postural, scapular, scapulothoracic and UE control with self care, reaching, carrying, lifting, house/yardwork, driving, computer work. [x] (81932) Provided verbal/tactile cueing for activities related to improving balance, coordination, kinesthetic sense, posture, motor skill, proprioception to assist with   [x] LE / lumbar: LE, proximal hip, and core control in self care, mobility, lifting, ambulation and eccentric single leg control. [] UE / cervical: cervical, scapular, scapulothoracic and UE control with self care, reaching, carrying, lifting, house/yardwork, driving, computer work.   [] (59907) Therapist is in constant attendance of 2 or more patients providing skilled therapy interventions, but not providing any significant amount of measurable one-on-one time to either patient, for improvements in  [] LE / lumbar: LE, proximal hip, and core control in self care, mobility, lifting, ambulation and eccentric single leg control. [] UE / cervical: cervical, scapular, scapulothoracic and UE control with self care, reaching, carrying, lifting, house/yardwork, driving, computer work.      NMR and Therapeutic Activities:    [x] (23532 or 04618) Provided verbal/tactile cueing for activities related to improving balance, coordination, kinesthetic sense, posture, motor skill, proprioception and motor activation to allow for proper function of   [x] LE: / Lumbar core, proximal hip and LE with self care and ADLs  [] UE / Cervical: cervical, postural, scapular, scapulothoracic and UE control with self care, carrying, lifting, driving, computer work.   [] (18568) Gait Re-education- Provided training and instruction to the patient for proper LE, core and proximal hip recruitment and positioning and eccentric body weight control with ambulation re-education including up and down stairs     Home Management Training / Self Care:  [] (51298) Provided self-care/home management training related to activities of daily living and compensatory training, and/or use of adaptive equipment for improvement with: ADLs and compensatory training, meal preparation, safety procedures and instruction in use of adaptive equipment, including bathing, grooming, dressing, personal hygiene, basic household cleaning and chores. Home Exercise Program:    [x] (96954) Reviewed/Progressed HEP activities related to strengthening, flexibility, endurance, ROM of   [] LE / Lumbar: core, proximal hip and LE for functional self-care, mobility, lifting and ambulation/stair navigation   [] UE / Cervical: cervical, postural, scapular, scapulothoracic and UE control with self care, reaching, carrying, lifting, house/yardwork, driving, computer work  [] (43190)Reviewed/Progressed HEP activities related to improving balance, coordination, kinesthetic sense, posture, motor skill, proprioception of   [] LE: core, proximal hip and LE for self care, mobility, lifting, and ambulation/stair navigation    [] UE / Cervical: cervical, postural,  scapular, scapulothoracic and UE control with self care, reaching, carrying, lifting, house/yardwork, driving, computer work    Manual Treatments:  PROM / STM / Oscillations-Mobs:  G-I, II, III, IV (PA's, Inf., Post.)  [] (07929) Provided manual therapy to mobilize LE, proximal hip and/or LS spine soft tissue/joints for the purpose of modulating pain, promoting relaxation,  increasing ROM, reducing/eliminating soft tissue swelling/inflammation/restriction, improving soft tissue extensibility and allowing for proper ROM for normal function with   [] LE / lumbar: self care, mobility, lifting and ambulation. [] UE / Cervical: self care, reaching, carrying, lifting, house/yardwork, driving, computer work.      Modalities:  [] (78577) Vasopneumatic compression: Utilized vasopneumatic compression to decrease edema / swelling for the purpose of improving mobility and quad tone / recruitment which will allow for increased overall function including but not limited to self-care, transfers, ambulation, and ascending / descending stairs. Charges:  Timed Code Treatment Minutes: 45   Total Treatment Minutes: 45     [] EVAL - LOW (99254)   [] EVAL - MOD (21074)  [] EVAL - HIGH (38757)  [] RE-EVAL (08667)  [] OR(55729) x     [] Ionto  [x] NMR (22804) x 2      [] Vaso  [] Manual (38142) x       [] Ultrasound  [x] TA x 1        [] Mech Traction (81381)  [] Aquatic Therapy x     [] ES (un) (20225):   [] Home Management Training x  [] ES(attended) (39164)   [] Dry Needling 1-2 muscles (70917):  [] Dry Needling 3+ muscles (630954)  [] Group:      [] Other:     GOALS:   Patient stated goal: learn how to balance  [] Progressing: [] Met: [] Not Met: [] Adjusted     Therapist goals for Patient:   Short Term Goals: To be achieved in: 2 weeks  1. Independent in HEP and progression per patient tolerance, in order to prevent return of swelling   [] Progressing: [] Met: [] Not Met: [] Adjusted  2. Patient will have a decrease in swelling/pain to facilitate improvement in movement, function, and ADLs as indicated by improvement with LLIS. [] Progressing: [] Met: [] Not Met: [] Adjusted     Long Term Goals: To be achieved in: 6 weeks  1. FOTO score of at least 100 to assist with reaching prior level of function. [] Progressing: [] Met: [] Not Met: [] Adjusted  2. Patient will demonstrate increased AROM/strength of 4+ to [facility] so that pt can resume ambulation without increase in symptoms. [] Progressing: [] Met: [] Not Met: [] Adjusted  3. Pt will be able to ambulate without fear of falling for short distances.   [] Progressing: [x] Met: [] Not Met: [] Adjusted     Overall Progression Towards Functional goals/ Treatment Progress Update:  [x] Patient is progressing as expected towards functional goals listed. [] Progression is slowed due to complexities/Impairments listed. [] Progression has been slowed due to co-morbidities. [] Plan just implemented, too soon to assess goals progression <30days   [] Goals require adjustment due to lack of progress  [] Patient is not progressing as expected and requires additional follow up with physician  [] Other    Persisting Functional Limitations/Impairments:  []Sleeping []Sitting               [x]Standing [x]Transfers        [x]Walking []Kneeling               [x]Stairs []Squatting / bending   [x]ADLs [x]Reaching  [x]Lifting  []Housework  []Driving []Job related tasks  []Sports/Recreation []Other:      ASSESSMENT:  Patient performed well today and demos understanding of several vestibular tips to prevent LOBs. She is ready to DC next visit, despite some low level vestibular difficulties with oculomotor tasks. Treatment/Activity Tolerance:  [x] Patient able to complete tx [] Patient limited by fatigue  [] Patient limited by pain  [] Patient limited by other medical complications  [] Other:     Prognosis: [x] Good [] Fair  [] Poor    Patient Requires Follow-up: [x] Yes  [] No    Plan for next treatment session:    PLAN: See eval. PT 2x / week for 4 weeks. [x] Continue per plan of care [] Alter current plan (see comments)  [] Plan of care initiated [] Hold pending MD visit [] Discharge    Electronically signed by: Tramaine Gibbs PT PT, DPT    Note: If patient does not return for scheduled/ recommended follow up visits, this note will serve as a discharge from care along with most recent update on progress.

## 2022-10-20 ENCOUNTER — HOSPITAL ENCOUNTER (OUTPATIENT)
Dept: PHYSICAL THERAPY | Age: 77
Setting detail: THERAPIES SERIES
Discharge: HOME | End: 2022-10-20
Payer: MEDICARE

## 2022-10-20 PROCEDURE — 97110 THERAPEUTIC EXERCISES: CPT

## 2022-10-20 PROCEDURE — 97112 NEUROMUSCULAR REEDUCATION: CPT

## 2022-10-20 NOTE — FLOWSHEET NOTE
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]  Muscle weakness (generalized) [M62.81]  Treatment Diagnosis: balance dysfunction due to neuropathy  Insurance/Certification information:  PT Insurance Information: Medicare and BCBS  Physician Information:  DARNELL Arriaga CNP  Plan of care signed (Y/N): []  Yes [x]  No     Date of Patient follow up with Physician:      Progress Report: []  Yes  [x]  No     Date Range for reporting period:  Beginnin/15/2022  PN: 10/17/22  Ending:     Progress report due (10 Rx/or 30 days whichever is less): visit #10 or  (date)     Recertification due (POC duration/ or 90 days whichever is less): visit #8 or 12/15/22 (date)     Visit # Insurance Allowable Auth required? Date Range   8/10  []  Yes  []  No      Latex Allergy:  [x]NO      []YES  Preferred Language for Healthcare:   [x]English       []other:    Functional Scale:           Date assessed:  [facility] physical FS primary measure score = 98; risk adjusted = 52  9/15/22    Pain level:  0/10 knees    SUBJECTIVE: She denies pain at this time. She is not using cane unless she is more fatigued and needs that extra support. OBJECTIVE: 10/20: Pt moving easily this date  10/17 - CTSIB: 15sec all 4 conditions   - several errors between oculomotor and vestibular accuracy on target   - Smooth pursuits = small adjustments V>H; Saccades = Diff w/ H; Convergence (L eye deviate) = abnormal; VOR (H/V) = WNL;  Romberg = 10 sec, Romberg EC = 10 sec; Tandem L = 9sec; Tandem R = 15sec, Airex Romberg = 15sec;  Airex EC Romberg = 7sec  TUG = 10sec (no AD)    RESTRICTIONS/PRECAUTIONS:     Exercises/Interventions:     Therapeutic Exercises (68107) Resistance / level Sets/sec Reps Notes   Nu step   X 6 mins    Step ups 6' fwd/lat B  X 10 Many cues   In // bars hip abd/ext B  X 10 Many cues   LAQ 4# 2 15 10/17                                      Therapeutic Activities (35953)       Bidex recovery rapids   Wt shift 3 rotations   X 4 mins  X 4 mins  Blazepods:  4 pods - foot taps   1 color  2 color    10/17 - SBA                        Neuromuscular Re-ed (36235)       Vestibular Screening, Balance & Mobility testing    Balance education (vision, proprioception, vestibular)    Alt Step Taps: eyes down, eyes forwards 8'' 1 10/17   Balance:  1 foot on step  1 foot on step w/ head turns/pitches 8'' 1    Twist Turns (w/ letter finding)  9/30 - improved setup   VOR: seated 2ft / 8ft - V/H  1 10/17 - increased time   Saccades: V/H  Smooth Pursuits: V/H/diagonal   10/17 - great diff with vertical, especially to the sides   Gaze Stabilization w/ Gait  Head/Body Turn  Standing Folding: EC  1 10/17 - EC standing; no diff   Static Balance: w/ head turns/pitches R/L U/D     Normal OLLIE EO/EC    Staggered R/L airex      Airex    9/30   Oculomotor Training  9/30 - youtube visual tracking exercises   VORcancellation: V/H  1 10/17- greater diff V>H; eyes move vertically ahead of target   EC Head Shaking/Pitches  1 10/17 - seated   Standing Aditya        Manual Intervention (16957)                                                   Modalities:     Pt. Education:  09/19/2022  -patient educated on diagnosis, prognosis and expectations for rehab  -all patient questions were answered    Home Exercise Program:  9/23 - VOR elle sheet provided. 9/15: SLS at counter 2 x 10'    Therapeutic Exercise and NMR EXR  [] (18425) Provided verbal/tactile cueing for activities related to strengthening, flexibility, endurance, ROM for improvements in  [] LE / Lumbar: LE, proximal hip, and core control with self care, mobility, lifting, ambulation.   [] UE / Cervical: cervical, postural, scapular, scapulothoracic and UE control with self care, reaching, carrying, lifting, house/yardwork, driving, computer work. [x] (10071) Provided verbal/tactile cueing for activities related to improving balance, coordination, kinesthetic sense, posture, motor skill, proprioception to assist with   [x] LE / lumbar: LE, proximal hip, and core control in self care, mobility, lifting, ambulation and eccentric single leg control. [] UE / cervical: cervical, scapular, scapulothoracic and UE control with self care, reaching, carrying, lifting, house/yardwork, driving, computer work.   [] (40092) Therapist is in constant attendance of 2 or more patients providing skilled therapy interventions, but not providing any significant amount of measurable one-on-one time to either patient, for improvements in  [] LE / lumbar: LE, proximal hip, and core control in self care, mobility, lifting, ambulation and eccentric single leg control. [] UE / cervical: cervical, scapular, scapulothoracic and UE control with self care, reaching, carrying, lifting, house/yardwork, driving, computer work.      NMR and Therapeutic Activities:    [x] (62135 or 08866) Provided verbal/tactile cueing for activities related to improving balance, coordination, kinesthetic sense, posture, motor skill, proprioception and motor activation to allow for proper function of   [x] LE: / Lumbar core, proximal hip and LE with self care and ADLs  [] UE / Cervical: cervical, postural, scapular, scapulothoracic and UE control with self care, carrying, lifting, driving, computer work.   [] (89787) Gait Re-education- Provided training and instruction to the patient for proper LE, core and proximal hip recruitment and positioning and eccentric body weight control with ambulation re-education including up and down stairs     Home Management Training / Self Care:  [] (66501) Provided self-care/home management training related to activities of daily living and compensatory training, and/or use of adaptive equipment for improvement with: ADLs and compensatory training, meal preparation, safety procedures and instruction in use of adaptive equipment, including bathing, grooming, dressing, personal hygiene, basic household cleaning and chores. Home Exercise Program:    [x] (01657) Reviewed/Progressed HEP activities related to strengthening, flexibility, endurance, ROM of   [] LE / Lumbar: core, proximal hip and LE for functional self-care, mobility, lifting and ambulation/stair navigation   [] UE / Cervical: cervical, postural, scapular, scapulothoracic and UE control with self care, reaching, carrying, lifting, house/yardwork, driving, computer work  [] (04994)Reviewed/Progressed HEP activities related to improving balance, coordination, kinesthetic sense, posture, motor skill, proprioception of   [] LE: core, proximal hip and LE for self care, mobility, lifting, and ambulation/stair navigation    [] UE / Cervical: cervical, postural,  scapular, scapulothoracic and UE control with self care, reaching, carrying, lifting, house/yardwork, driving, computer work    Manual Treatments:  PROM / STM / Oscillations-Mobs:  G-I, II, III, IV (PA's, Inf., Post.)  [] (14325) Provided manual therapy to mobilize LE, proximal hip and/or LS spine soft tissue/joints for the purpose of modulating pain, promoting relaxation,  increasing ROM, reducing/eliminating soft tissue swelling/inflammation/restriction, improving soft tissue extensibility and allowing for proper ROM for normal function with   [] LE / lumbar: self care, mobility, lifting and ambulation. [] UE / Cervical: self care, reaching, carrying, lifting, house/yardwork, driving, computer work.      Modalities:  [] (43230) Vasopneumatic compression: Utilized vasopneumatic compression to decrease edema / swelling for the purpose of improving mobility and quad tone / recruitment which will allow for increased overall function including but not limited to self-care, transfers, ambulation, and ascending / descending stairs. Charges:  Timed Code Treatment Minutes: 45   Total Treatment Minutes: 45     [] EVAL - LOW (88365)   [] EVAL - MOD (78970)  [] EVAL - HIGH (78059)  [] RE-EVAL (46501)  [x] BN(90179) x 2    [] Ionto  [x] NMR (40252) x       [] Vaso  [] Manual (06821) x       [] Ultrasound  [] TA x 1        [] Mech Traction (76332)  [] Aquatic Therapy x     [] ES (un) (24148):   [] Home Management Training x  [] ES(attended) (45945)   [] Dry Needling 1-2 muscles (62502):  [] Dry Needling 3+ muscles (052106)  [] Group:      [] Other:     GOALS:   Patient stated goal: learn how to balance  [] Progressing: [] Met: [] Not Met: [] Adjusted     Therapist goals for Patient:   Short Term Goals: To be achieved in: 2 weeks  1. Independent in HEP and progression per patient tolerance, in order to prevent return of swelling   [] Progressing: [] Met: [] Not Met: [] Adjusted  2. Patient will have a decrease in swelling/pain to facilitate improvement in movement, function, and ADLs as indicated by improvement with LLIS. [] Progressing: [] Met: [] Not Met: [] Adjusted     Long Term Goals: To be achieved in: 6 weeks  1. FOTO score of at least 100 to assist with reaching prior level of function. [] Progressing: [] Met: [] Not Met: [] Adjusted  2. Patient will demonstrate increased AROM/strength of 4+ to [facility] so that pt can resume ambulation without increase in symptoms. [] Progressing: [] Met: [] Not Met: [] Adjusted  3. Pt will be able to ambulate without fear of falling for short distances. [] Progressing: [x] Met: [] Not Met: [] Adjusted     Overall Progression Towards Functional goals/ Treatment Progress Update:  [x] Patient is progressing as expected towards functional goals listed. [] Progression is slowed due to complexities/Impairments listed. [] Progression has been slowed due to co-morbidities.   [] Plan just implemented, too soon to assess goals progression <30days   [] Goals require adjustment due to lack of progress  [] Patient is not progressing as expected and requires additional follow up with physician  [] Other    Persisting Functional Limitations/Impairments:  []Sleeping []Sitting               [x]Standing [x]Transfers        [x]Walking []Kneeling               [x]Stairs []Squatting / bending   [x]ADLs [x]Reaching  [x]Lifting  []Housework  []Driving []Job related tasks  []Sports/Recreation []Other:      ASSESSMENT: Pt performed all exercises well with few cues. PT feels her balance has improved to normal Pt to be seen for 2 more visit to continue to increase strength and endurance     Treatment/Activity Tolerance:  [x] Patient able to complete tx [] Patient limited by fatigue  [] Patient limited by pain  [] Patient limited by other medical complications  [] Other:     Prognosis: [x] Good [] Fair  [] Poor    Patient Requires Follow-up: [x] Yes  [] No    Plan for next treatment session:    PLAN: See eval. PT 2x / week for 4 weeks. [x] Continue per plan of care [] Alter current plan (see comments)  [] Plan of care initiated [] Hold pending MD visit [] Discharge    Electronically signed by: Aria Meza, PT PT, DPT    Note: If patient does not return for scheduled/ recommended follow up visits, this note will serve as a discharge from care along with most recent update on progress.

## 2022-10-24 ENCOUNTER — HOSPITAL ENCOUNTER (OUTPATIENT)
Dept: PHYSICAL THERAPY | Age: 77
Setting detail: THERAPIES SERIES
Discharge: HOME | End: 2022-10-24
Payer: MEDICARE

## 2022-10-24 PROCEDURE — 97112 NEUROMUSCULAR REEDUCATION: CPT

## 2022-10-24 PROCEDURE — 97110 THERAPEUTIC EXERCISES: CPT

## 2022-10-24 NOTE — FLOWSHEET NOTE
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]  Muscle weakness (generalized) [M62.81]  Treatment Diagnosis: balance dysfunction due to neuropathy  Insurance/Certification information:  PT Insurance Information: Medicare and BCBS  Physician Information:  DARNELL Arriaga CNP  Plan of care signed (Y/N): []  Yes [x]  No     Date of Patient follow up with Physician:      Progress Report: []  Yes  [x]  No     Date Range for reporting period:  Beginnin/15/2022  PN: 10/17/22  Ending:     Progress report due (10 Rx/or 30 days whichever is less): visit #10 or  (date)     Recertification due (POC duration/ or 90 days whichever is less): visit #8 or 12/15/22 (date)     Visit # Insurance Allowable Auth required? Date Range   9/10  []  Yes  []  No      Latex Allergy:  [x]NO      []YES  Preferred Language for Healthcare:   [x]English       []other:    Functional Scale:           Date assessed:  [facility] physical FS primary measure score = 98; risk adjusted = 52  9/15/22    Pain level:  4/10 knees    SUBJECTIVE: She spends time in a recliner to help her knees. Only pain is in the knees. Feels she is progressing     OBJECTIVE: 10/24: Pt moving easily through ex   10/17 - CTSIB: 15sec all 4 conditions   - several errors between oculomotor and vestibular accuracy on target   - Smooth pursuits = small adjustments V>H; Saccades = Diff w/ H; Convergence (L eye deviate) = abnormal; VOR (H/V) = WNL;  Romberg = 10 sec, Romberg EC = 10 sec; Tandem L = 9sec; Tandem R = 15sec, Airex Romberg = 15sec;  Airex EC Romberg = 7sec  TUG = 10sec (no AD)    RESTRICTIONS/PRECAUTIONS:     Exercises/Interventions:     Therapeutic Exercises (86048) Resistance / level Sets/sec Reps Notes   Nu step   X 6 mins    Step ups 6' fwd/lat B  X 10 Many cues   In // bars hip abd/ext B [place] band X 10    LAQ 4# 2 15 10/17   Leg press  65#  X 10  S-6 limited ROM                               Therapeutic Activities (32451)       Bidex [place]   Wt shift 4 rotations med  X 4 mins  X 4 mins  1298  Blazepods:  4 pods - foot taps   1 color  2 color    10/17 - SBA   Shuttle balance step up to SLS  10 hold  X 10 B                  Neuromuscular Re-ed (19056)       Vestibular Screening, Balance & Mobility testing    Balance education (vision, proprioception, vestibular)    Alt Step Taps: eyes down, eyes forwards 8'' 1 10/17   Balance:  1 foot on step  1 foot on step w/ head turns/pitches 8'' 1    Twist Turns (w/ letter finding)  9/30 - improved setup   VOR: seated 2ft / 8ft - V/H  1 10/17 - increased time   Saccades: V/H  Smooth Pursuits: V/H/diagonal   10/17 - great diff with vertical, especially to the sides   Gaze Stabilization w/ Gait  Head/Body Turn  Standing Folding: EC  1 10/17 - EC standing; no diff   Static Balance: w/ head turns/pitches R/L U/D     Normal OLLIE EO/EC    Staggered R/L airex      Airex    9/30   Oculomotor Training  9/30 - youtube visual tracking exercises   VORcancellation: V/H  1 10/17- greater diff V>H; eyes move vertically ahead of target   EC Head Shaking/Pitches  1 10/17 - seated   Standing Aditya        Manual Intervention (72685)                                                   Modalities:     Pt. Education:  09/19/2022  -patient educated on diagnosis, prognosis and expectations for rehab  -all patient questions were answered    Home Exercise Program:  9/23 - VOR elle sheet provided.   9/15: SLS at counter 2 x 10'    Therapeutic Exercise and NMR EXR  [] (62358) Provided verbal/tactile cueing for activities related to strengthening, flexibility, endurance, ROM for improvements in  [] LE / Lumbar: LE, proximal hip, and core control with self care, mobility, lifting, ambulation. [] UE / Cervical: cervical, postural, scapular, scapulothoracic and UE control with self care, reaching, carrying, lifting, house/yardwork, driving, computer work. [x] (46149) Provided verbal/tactile cueing for activities related to improving balance, coordination, kinesthetic sense, posture, motor skill, proprioception to assist with   [x] LE / lumbar: LE, proximal hip, and core control in self care, mobility, lifting, ambulation and eccentric single leg control. [] UE / cervical: cervical, scapular, scapulothoracic and UE control with self care, reaching, carrying, lifting, house/yardwork, driving, computer work.   [] (92796) Therapist is in constant attendance of 2 or more patients providing skilled therapy interventions, but not providing any significant amount of measurable one-on-one time to either patient, for improvements in  [] LE / lumbar: LE, proximal hip, and core control in self care, mobility, lifting, ambulation and eccentric single leg control. [] UE / cervical: cervical, scapular, scapulothoracic and UE control with self care, reaching, carrying, lifting, house/yardwork, driving, computer work.      NMR and Therapeutic Activities:    [x] (88527 or 76467) Provided verbal/tactile cueing for activities related to improving balance, coordination, kinesthetic sense, posture, motor skill, proprioception and motor activation to allow for proper function of   [x] LE: / Lumbar core, proximal hip and LE with self care and ADLs  [] UE / Cervical: cervical, postural, scapular, scapulothoracic and UE control with self care, carrying, lifting, driving, computer work.   [] (10270) Gait Re-education- Provided training and instruction to the patient for proper LE, core and proximal hip recruitment and positioning and eccentric body weight control with ambulation re-education including up and down stairs     Home Management Training / Self Care:  [] (94946) Provided self-care/home management training related to activities of daily living and compensatory training, and/or use of adaptive equipment for improvement with: ADLs and compensatory training, meal preparation, safety procedures and instruction in use of adaptive equipment, including bathing, grooming, dressing, personal hygiene, basic household cleaning and chores. Home Exercise Program:    [x] (39275) Reviewed/Progressed HEP activities related to strengthening, flexibility, endurance, ROM of   [] LE / Lumbar: core, proximal hip and LE for functional self-care, mobility, lifting and ambulation/stair navigation   [] UE / Cervical: cervical, postural, scapular, scapulothoracic and UE control with self care, reaching, carrying, lifting, house/yardwork, driving, computer work  [] (96612)Reviewed/Progressed HEP activities related to improving balance, coordination, kinesthetic sense, posture, motor skill, proprioception of   [] LE: core, proximal hip and LE for self care, mobility, lifting, and ambulation/stair navigation    [] UE / Cervical: cervical, postural,  scapular, scapulothoracic and UE control with self care, reaching, carrying, lifting, house/yardwork, driving, computer work    Manual Treatments:  PROM / STM / Oscillations-Mobs:  G-I, II, III, IV (PA's, Inf., Post.)  [] (64748) Provided manual therapy to mobilize LE, proximal hip and/or LS spine soft tissue/joints for the purpose of modulating pain, promoting relaxation,  increasing ROM, reducing/eliminating soft tissue swelling/inflammation/restriction, improving soft tissue extensibility and allowing for proper ROM for normal function with   [] LE / lumbar: self care, mobility, lifting and ambulation. [] UE / Cervical: self care, reaching, carrying, lifting, house/yardwork, driving, computer work.      Modalities:  [] (13720) Vasopneumatic compression: Utilized vasopneumatic compression to decrease edema / swelling for the purpose of improving mobility and quad tone / recruitment which will allow for increased overall function including but not limited to self-care, transfers, ambulation, and ascending / descending stairs. Charges:  Timed Code Treatment Minutes: 42   Total Treatment Minutes: 42     [] EVAL - LOW (68199)   [] EVAL - MOD (66089)  [] EVAL - HIGH (20122)  [] RE-EVAL (88132)  [x] RAMOS(11571) x 2    [] Ionto  [x] NMR (25861) x       [] Vaso  [] Manual (38071) x       [] Ultrasound  [] TA x 1        [] Mech Traction (80522)  [] Aquatic Therapy x     [] ES (un) (86689):   [] Home Management Training x  [] ES(attended) (78683)   [] Dry Needling 1-2 muscles (80106):  [] Dry Needling 3+ muscles (986448)  [] Group:      [] Other:     GOALS:   Patient stated goal: learn how to balance  [] Progressing: [] Met: [] Not Met: [] Adjusted     Therapist goals for Patient:   Short Term Goals: To be achieved in: 2 weeks  1. Independent in HEP and progression per patient tolerance, in order to prevent return of swelling   [] Progressing: [] Met: [] Not Met: [] Adjusted  2. Patient will have a decrease in swelling/pain to facilitate improvement in movement, function, and ADLs as indicated by improvement with LLIS. [] Progressing: [] Met: [] Not Met: [] Adjusted     Long Term Goals: To be achieved in: 6 weeks  1. FOTO score of at least 100 to assist with reaching prior level of function. [] Progressing: [] Met: [] Not Met: [] Adjusted  2. Patient will demonstrate increased AROM/strength of 4+ to [facility] so that pt can resume ambulation without increase in symptoms. [] Progressing: [] Met: [] Not Met: [] Adjusted  3. Pt will be able to ambulate without fear of falling for short distances. [] Progressing: [x] Met: [] Not Met: [] Adjusted     Overall Progression Towards Functional goals/ Treatment Progress Update:  [x] Patient is progressing as expected towards functional goals listed. [] Progression is slowed due to complexities/Impairments listed.   [] Progression has been slowed due to co-morbidities. [] Plan just implemented, too soon to assess goals progression <30days   [] Goals require adjustment due to lack of progress  [] Patient is not progressing as expected and requires additional follow up with physician  [] Other    Persisting Functional Limitations/Impairments:  []Sleeping []Sitting               [x]Standing [x]Transfers        [x]Walking []Kneeling               [x]Stairs []Squatting / bending   [x]ADLs [x]Reaching  [x]Lifting  []Housework  []Driving []Job related tasks  []Sports/Recreation []Other:      ASSESSMENT: Pt performed all exercises well with few cues. Focusing on increasing HEP so patient can continue to strengthen at home. PT feels her balance has improved. Pt to be seen for 1 more visit to continue to increase strength and endurance then discharged. Treatment/Activity Tolerance:  [x] Patient able to complete tx [] Patient limited by fatigue  [] Patient limited by pain  [] Patient limited by other medical complications  [] Other:     Prognosis: [x] Good [] Fair  [] Poor    Patient Requires Follow-up: [x] Yes  [] No    Plan for next treatment session:    PLAN: See eval. PT 2x / week for 4 weeks. [x] Continue per plan of care [] Alter current plan (see comments)  [] Plan of care initiated [] Hold pending MD visit [] Discharge    Electronically signed by: Jj Ulrich, PT PT, DPT    Note: If patient does not return for scheduled/ recommended follow up visits, this note will serve as a discharge from care along with most recent update on progress.

## 2022-10-31 ENCOUNTER — HOSPITAL ENCOUNTER (OUTPATIENT)
Dept: PHYSICAL THERAPY | Age: 77
Setting detail: THERAPIES SERIES
Discharge: HOME | End: 2022-10-31
Payer: MEDICARE

## 2022-10-31 PROCEDURE — 97110 THERAPEUTIC EXERCISES: CPT

## 2022-10-31 PROCEDURE — 97112 NEUROMUSCULAR REEDUCATION: CPT

## 2022-10-31 NOTE — FLOWSHEET NOTE
[facility]  Phone: [phone]   Fax: [phone]   Physical Therapy Discharge Summary    Dear DARNELL Ovalle - *  ,    We had the pleasure of treating the following patient for physical therapy services at [facility]. A summary of our findings can be found in the discharge summary below. If you have any questions or concerns regarding these findings, please do not hesitate to contact me at the office phone number checked above. Thank you for the referral.     Physician Signature:________________________________Date:__________________  By signing above (or electronic signature), therapists plan is approved by physician      Functional Outcome:     FOTO: FOTO physical FS primary measure score = 98; risk adjusted = 52  10/31/22      Overall Response to Treatment:   [x]Patient is responding well to treatment and improvement is noted with regards  to goals   []Patient should continue to improve in reasonable time if they continue HEP   []Patient has plateaued and is no longer responding to skilled PT intervention    []Patient is getting worse and would benefit from return to referring MD   []Patient unable to adhere to initial POC   []Other:     Date range of Visits: 9/15/22-10/31/22  Total Visits: 10    Recommendation:    [x] Discharge to HEP. Follow up with PT or MD PRN.           Physical Therapy Daily Treatment Note    Date:  10/31/2022     Patient Name:  Fritz Alvarez    :  1945  MRN: [id number]  Medical Diagnosis:  Malignant neoplasm of upper-outer quadrant of right female breast [C50.411]  Muscle weakness (generalized) [M62.81]  Treatment Diagnosis: balance dysfunction due to neuropathy  Insurance/Certification information:  PT Insurance Information: Medicare and BCBS  Physician Information:  DARNELL Ovalle - CNP  Plan of care signed (Y/N): []  Yes [x]  No     Date of Patient follow up with Physician:      Progress Report: []  Yes  [x]  No     Date Range for reporting period:  Beginnin/15/2022  PN: 10/17/22  Ending: 10/31/22    Progress report due (10 Rx/or 30 days whichever is less): visit #10 or  (date)     Recertification due (POC duration/ or 90 days whichever is less): visit #8 or 12/15/22 (date)     Visit # Insurance Allowable Auth required? Date Range   10/10  []  Yes  []  No      Latex Allergy:  [x]NO      []YES  Preferred Language for Healthcare:   [x]English       []other:    Functional Scale:           Date assessed:  FOTO physical FS primary measure score = 98; risk adjusted = 52  9/15/22  FOTO physical FS primary measure score = 98; risk adjusted = 52  10/31/22    Pain level:  10 knees    SUBJECTIVE: She has a little discomfort in R knee. Almost done with  infusions, 2 more left. Will start having reconstruction surgery soon when all treatment is finished. OBJECTIVE: 10/31:    9/15/22  9/15 9/15 10/31 10/31   Strength / Myotomes Right Left right Left   LE         Hip Flexors (L1-2) 4 4 4+ 4+   Hip Internal Rotators 4 4+ 4+ 5   Hip External Rotators 4 4 4+ 4+   Quads (L2-4) 3 3 4 4   Hamstrings  3 3 4 4   Ankle Dorsiflexion (L4-5) 4 4 4+ 4   Hip abd 4 4 4+ 4+   Hip add 4 4 4+ 4+               10/24: Pt moving easily through ex   10/17 - CTSIB: 15sec all 4 conditions   - several errors between oculomotor and vestibular accuracy on target   - Smooth pursuits = small adjustments V>H; Saccades = Diff w/ H; Convergence (L eye deviate) = abnormal; VOR (H/V) = WNL;  Romberg = 10 sec, Romberg EC = 10 sec; Tandem L = 9sec; Tandem R = 15sec, Airex Romberg = 15sec;  Airex EC Romberg = 7sec  TUG = 10sec (no AD)    RESTRICTIONS/PRECAUTIONS:     Exercises/Interventions:     Therapeutic Exercises (62353) Resistance / level Sets/sec Reps Notes   Nu step   X 6 mins    Step ups 6' fwd/lat B  X 10 Many cues   In // bars hip abd/ext B [place] band X 10    LAQ 4# 2 15 10/17   Leg press  70# 2 X 10  S-6 limited ROM                               Therapeutic Activities (57862)       Bidex recovery rapids   Wt shift 4 rotations med  X 4 mins  X 4 mins  1298  Blazepods:  4 pods - foot taps   1 color  2 color    10/17 - SBA   Shuttle balance step up to SLS  10 hold  X 10 B                  Neuromuscular Re-ed (60369)       Vestibular Screening, Balance & Mobility testing    Balance education (vision, proprioception, vestibular)    Alt Step Taps: eyes down, eyes forwards 8'' 1 10/17   Balance:  1 foot on step  1 foot on step w/ head turns/pitches 8'' 1    Twist Turns (w/ letter finding)  9/30 - improved setup   VOR: seated 2ft / 8ft - V/H  1 10/17 - increased time   Saccades: V/H  Smooth Pursuits: V/H/diagonal   10/17 - great diff with vertical, especially to the sides   Gaze Stabilization w/ Gait  Head/Body Turn  Standing Folding: EC  1 10/17 - EC standing; no diff   Static Balance: w/ head turns/pitches R/L U/D     Normal OLLIE EO/EC    Staggered R/L airex      Airex    9/30   Oculomotor Training  9/30 - [id number] visual tracking exercises   VORcancellation: V/H  1 10/17- greater diff V>H; eyes move vertically ahead of target   EC Head Shaking/Pitches  1 10/17 - seated   Standing Aditya        Manual Intervention (91909)                                                   Modalities:     Pt. Education:  09/19/2022  -patient educated on diagnosis, prognosis and expectations for rehab  -all patient questions were answered    Home Exercise Program:  9/23 - VOR elle sheet provided. 9/15: SLS at counter 2 x 10'    Therapeutic Exercise and NMR EXR  [] (29512) Provided verbal/tactile cueing for activities related to strengthening, flexibility, endurance, ROM for improvements in  [] LE / Lumbar: LE, proximal hip, and core control with self care, mobility, lifting, ambulation.   [] UE / Cervical: cervical, postural, scapular, scapulothoracic and UE control with self care, reaching, carrying, lifting, house/yardwork, driving, computer work. [x] (71767) Provided verbal/tactile cueing for activities related to improving balance, coordination, kinesthetic sense, posture, motor skill, proprioception to assist with   [x] LE / lumbar: LE, proximal hip, and core control in self care, mobility, lifting, ambulation and eccentric single leg control. [] UE / cervical: cervical, scapular, scapulothoracic and UE control with self care, reaching, carrying, lifting, house/yardwork, driving, computer work.   [] (37298) Therapist is in constant attendance of 2 or more patients providing skilled therapy interventions, but not providing any significant amount of measurable one-on-one time to either patient, for improvements in  [] LE / lumbar: LE, proximal hip, and core control in self care, mobility, lifting, ambulation and eccentric single leg control. [] UE / cervical: cervical, scapular, scapulothoracic and UE control with self care, reaching, carrying, lifting, house/yardwork, driving, computer work.      NMR and Therapeutic Activities:    [x] (71391 or 00464) Provided verbal/tactile cueing for activities related to improving balance, coordination, kinesthetic sense, posture, motor skill, proprioception and motor activation to allow for proper function of   [x] LE: / Lumbar core, proximal hip and LE with self care and ADLs  [] UE / Cervical: cervical, postural, scapular, scapulothoracic and UE control with self care, carrying, lifting, driving, computer work.   [] (70094) Gait Re-education- Provided training and instruction to the patient for proper LE, core and proximal hip recruitment and positioning and eccentric body weight control with ambulation re-education including up and down stairs     Home Management Training / Self Care:  [] (75907) Provided self-care/home management training related to activities of daily living and compensatory training, and/or use of adaptive equipment for improvement with: ADLs and compensatory training, meal preparation, safety procedures and instruction in use of adaptive equipment, including bathing, grooming, dressing, personal hygiene, basic household cleaning and chores. Home Exercise Program:    [x] (09751) Reviewed/Progressed HEP activities related to strengthening, flexibility, endurance, ROM of   [] LE / Lumbar: core, proximal hip and LE for functional self-care, mobility, lifting and ambulation/stair navigation   [] UE / Cervical: cervical, postural, scapular, scapulothoracic and UE control with self care, reaching, carrying, lifting, house/yardwork, driving, computer work  [] (96763)Reviewed/Progressed HEP activities related to improving balance, coordination, kinesthetic sense, posture, motor skill, proprioception of   [] LE: core, proximal hip and LE for self care, mobility, lifting, and ambulation/stair navigation    [] UE / Cervical: cervical, postural,  scapular, scapulothoracic and UE control with self care, reaching, carrying, lifting, house/yardwork, driving, computer work    Manual Treatments:  PROM / STM / Oscillations-Mobs:  G-I, II, III, IV (PA's, Inf., Post.)  [] (57742) Provided manual therapy to mobilize LE, proximal hip and/or LS spine soft tissue/joints for the purpose of modulating pain, promoting relaxation,  increasing ROM, reducing/eliminating soft tissue swelling/inflammation/restriction, improving soft tissue extensibility and allowing for proper ROM for normal function with   [] LE / lumbar: self care, mobility, lifting and ambulation. [] UE / Cervical: self care, reaching, carrying, lifting, house/yardwork, driving, computer work.      Modalities:  [] (75805) Vasopneumatic compression: Utilized vasopneumatic compression to decrease edema / swelling for the purpose of improving mobility and quad tone / recruitment which will allow for increased overall function including but not limited to self-care, transfers, ambulation, and ascending / descending stairs. Charges:  Timed Code Treatment Minutes: 45   Total Treatment Minutes: 45     [] EVAL - LOW (69302)   [] EVAL - MOD (79936)  [] EVAL - HIGH (37158)  [] RE-EVAL (05138)  [x] ZB(63943) x 2    [] Ionto  [x] NMR (37626) x       [] Vaso  [] Manual (12231) x       [] Ultrasound  [] TA x 1        [] Mech Traction (75616)  [] Aquatic Therapy x     [] ES (un) (94928):   [] Home Management Training x  [] ES(attended) (54413)   [] Dry Needling 1-2 muscles (44611):  [] Dry Needling 3+ muscles (073034)  [] Group:      [] Other:     GOALS:   Patient stated goal: learn how to balance  [] Progressing: [x] Met: [] Not Met: [] Adjusted     Therapist goals for Patient:   Short Term Goals: To be achieved in: 2 weeks  1. Independent in HEP and progression per patient tolerance, in order to prevent return of swelling   [] Progressing: [x] Met: [] Not Met: [] Adjusted  2. Patient will have a decrease in swelling/pain to facilitate improvement in movement, function, and ADLs as indicated by improvement with LLIS. [] Progressing: [x] Met: [] Not Met: [] Adjusted     Long Term Goals: To be achieved in: 6 weeks  1. FOTO score of at least 100 to assist with reaching prior level of function. [] Progressing: [] Met: [x] Not Met: [] Adjusted  2. Patient will demonstrate increased AROM/strength of 4+ to [facility] so that pt can resume ambulation without increase in symptoms. [] Progressing: [x] Met: [] Not Met: [] Adjusted  3. Pt will be able to ambulate without fear of falling for short distances. [] Progressing: [x] Met: [] Not Met: [] Adjusted     Overall Progression Towards Functional goals/ Treatment Progress Update:  [x] Patient is progressing as expected towards functional goals listed. [] Progression is slowed due to complexities/Impairments listed. [] Progression has been slowed due to co-morbidities.   [] Plan just implemented, too soon to assess goals progression <30days   [] Goals require adjustment due to lack of progress  [] Patient is not progressing as expected and requires additional follow up with physician  [] Other    Persisting Functional Limitations/Impairments:  []Sleeping []Sitting               [x]Standing [x]Transfers        [x]Walking []Kneeling               [x]Stairs []Squatting / bending   [x]ADLs [x]Reaching  [x]Lifting  []Housework  []Driving []Job related tasks  []Sports/Recreation []Other:      ASSESSMENT: Pt  is independent in all exercises. Patient will be able to continue to strengthen at home. PT feels her balance has improved. Pt has met her goals and is ready for discharge. Treatment/Activity Tolerance:  [x] Patient able to complete tx [] Patient limited by fatigue  [] Patient limited by pain  [] Patient limited by other medical complications  [] Other:     Prognosis: [x] Good [] Fair  [] Poor    Patient Requires Follow-up: [] Yes  [x] No    Plan for next treatment session:    PLAN: See eval. PT 2x / week for 4 weeks. [] Continue per plan of care [] Alter current plan (see comments)  [] Plan of care initiated [] Hold pending MD visit [x] Discharge    Electronically signed by: Clay Barraza, PT PT, DPT    Note: If patient does not return for scheduled/ recommended follow up visits, this note will serve as a discharge from care along with most recent update on progress.

## 2022-12-05 NOTE — PROGRESS NOTES
PCP:  Medical Oncology: Arturo Newell  Radiation: Oleksandr Diane  Other: Rad Spangler        [id number]    [id number] STAGE:  IIIA right breast cancer      Ms. Nesha Castano is a 69y.o.-year-old woman who initially presented to me with  right breast cancer. Since her postoperative visit Ms. Nesha Castano has been doing quite well. She is status post neoadjuvant chemotherapy as well as adjuvant radiation. She has no new breast related concerns today. She desires future breast reconstruction. She has a goal of lowering her hemoglobin A1c before moving forward with reconstruction. She notes at a point in time she was having right shoulder pain thought to be related to her radiation. She has not noticed this in some time. She completed her last Herceptin on 12/7/2022. INTERVAL HISTORY:  On 5/25/2021 she underwent bilateral breast imaging. The left breast is negative. Stable postoperative changes are noted. Within the right breast there is a high density mass with spiculated margins in the 9 to 10 o'clock position measuring 5 cm. It is associated with pleomorphic calcifications. Additionally there is a high density oval mass with lobulated contour which appears continuous and located in the 10 to 11 o'clock position, measuring 6 cm. The right nipple appears retracted. There are at least 6 abnormal appearing lymph nodes. Ultrasound correlate of the right breast 9:00 location identified a 5.2 x 2.7 x 4 cm mass. In the 11 o'clock position the lobulated mass measures 6.3 x 4.2 x 5 cm and appears continuous with the 9:00 mass. In the inferior aspect of the axillary tail there is a hypoechoic mass with indistinct margins and multiple surrounding abnormal lymph nodes. At least 9 abnormal lymph nodes are evident. BI-RADS 5. On 6/3/2021 she underwent two site core needle biopsy of the right breast and axillary lymph node. [id number]      Pathology of the right breast 9 o'clock position identified high-grade invasive carcinoma with DCIS. The  (Prescription Monitoring Program) website was checked with no inappropriate activity found.  Last gabapentin refill March 12, 2022 100 mg number 60 authorized by me   Metaplastic carcinoma cannot be excluded. ER negative OR negative HER-2 positive. Pathology of the right breast 11 o'clock position identified high-grade invasive carcinoma with DCIS. Metaplastic carcinoma cannot be excluded. ER negative OR negative HER-2 positive. Pathology of the right axillary lymph node identified metastatic carcinoma. She was initiated neoadjuvant chemotherapy with carboplatin, Taxotere, Herceptin and Perjeta     On 8/26/2021 she underwent genetic testing which returned negative for pathogenic mutations. Accession #[id number]. On 12/29/2021 she completed neoadjuvant chemotherapy. On 1/5/2022 she underwent interval right breast and axillary imaging. The known malignant mass in the right breast demonstrates continued improvement in decreasing size and bulk consistent with response to therapy. Residual asymmetry with calcifications measures 7 x 6 x 5 cm. The previously biopsied lymph node has normalized in appearance. There is an additional normal-appearing lymph node more superior to the biopsied node. BI-RADS 6. On 2/1/2022 she underwent bilateral mastectomy (left prophylactic) with right sentinel lymph node biopsy. Pathology identified 0.2 mm of residual invasive ductal carcinoma. ER negative OR negative HER-2 positive. Margins were negative. There were 0/3 lymph nodes involved with carcinoma. This included the previously biopsied lymph node. Her left breast was negative. Her right chest wall skin lesion was benign. [id number]     On 5/2022 she completed adjuvant radiation therapy. On 12/7/2022 she completed Herceptin therapy. Exam:  Physical exam has been reviewed and updated  General: no acute distress  Breast:  The patient was examined in the upright and supine position. There is a well healed scar on the  bilateral chest wall. She has some right upper extremity tightness on range of motion.   There are expected  post surgical and radiation related changes. Her contralateral chest wall shows no new masses or changes in breast contour. There were no concerning skin changes of the chest wall. There is no axillary lymphadenopathy palpated bilaterally. Respiratory: respirations are non-labored and there is no audible distress  Cardiovascular: regular rate, extremities appear well perfused  Neurologic: alert, oriented      Assessment/Plan:  [id number]    [id number] STAGE:  IIIA right breast cancer  ER - CA- HER2 positive. S/p neoadjuvant chemotherapy  S/p bilateral mastectomy with SLNB  S/p XRT  S/p Herceptin      I reviewed her physical exam findings. There are no current signs of malignancy. She has some right upper extremity tightness on range of motion. She does have prior injuries to the right shoulder. She reports to be working on her range of motion and with some improvement. Signs/symptoms of recurrence were reviewed. She verbalizes understanding that she should notify our office if she identifies any abnormalities on self evaluation as it may require further workup. I encouraged her to continue self breast evaluation. Follow up surveillance was discussed. She will continue to work with her PCP to manage her glucose control. She plans to follow-up with plastic surgery in the future for desired breast reconstruction. Our plan at this time is to follow up with surgical breast oncology office in ~6 months for a clinical breast exam.         All of the patient's questions were answered at this time however, she was encouraged to call the office with any further inquiries. Approximately 20 minutes of time were spent in preparation, direct patient contact, care coordination, documentation and activities otherwise related to this encounter.

## 2022-12-12 ENCOUNTER — OFFICE VISIT (OUTPATIENT)
Dept: SURGERY | Age: 77
End: 2022-12-12
Payer: MEDICARE

## 2022-12-12 VITALS
BODY MASS INDEX: 23.3 KG/M2 | WEIGHT: 145 LBS | DIASTOLIC BLOOD PRESSURE: 65 MMHG | HEART RATE: 95 BPM | SYSTOLIC BLOOD PRESSURE: 116 MMHG | HEIGHT: 66 IN | RESPIRATION RATE: 18 BRPM

## 2022-12-12 DIAGNOSIS — Z08 ENCOUNTER FOR FOLLOW-UP SURVEILLANCE OF BREAST CANCER: ICD-10-CM

## 2022-12-12 DIAGNOSIS — Z85.3 PERSONAL HISTORY OF BREAST CANCER: Primary | ICD-10-CM

## 2022-12-12 DIAGNOSIS — Z85.3 ENCOUNTER FOR FOLLOW-UP SURVEILLANCE OF BREAST CANCER: ICD-10-CM

## 2022-12-12 PROCEDURE — 3074F SYST BP LT 130 MM HG: CPT | Performed by: SURGERY

## 2022-12-12 PROCEDURE — 1090F PRES/ABSN URINE INCON ASSESS: CPT | Performed by: SURGERY

## 2022-12-12 PROCEDURE — G8400 PT W/DXA NO RESULTS DOC: HCPCS | Performed by: SURGERY

## 2022-12-12 PROCEDURE — 1123F ACP DISCUSS/DSCN MKR DOCD: CPT | Performed by: SURGERY

## 2022-12-12 PROCEDURE — 1036F TOBACCO NON-USER: CPT | Performed by: SURGERY

## 2022-12-12 PROCEDURE — G8420 CALC BMI NORM PARAMETERS: HCPCS | Performed by: SURGERY

## 2022-12-12 PROCEDURE — G8484 FLU IMMUNIZE NO ADMIN: HCPCS | Performed by: SURGERY

## 2022-12-12 PROCEDURE — 99213 OFFICE O/P EST LOW 20 MIN: CPT | Performed by: SURGERY

## 2022-12-12 PROCEDURE — 3078F DIAST BP <80 MM HG: CPT | Performed by: SURGERY

## 2022-12-12 PROCEDURE — G8427 DOCREV CUR MEDS BY ELIG CLIN: HCPCS | Performed by: SURGERY

## 2022-12-12 RX ORDER — GABAPENTIN 300 MG/1
CAPSULE ORAL
COMMUNITY
Start: 2022-11-22

## 2023-06-19 DIAGNOSIS — Z85.3 ENCOUNTER FOR FOLLOW-UP SURVEILLANCE OF BREAST CANCER: ICD-10-CM

## 2023-06-19 DIAGNOSIS — Z90.13 S/P BILATERAL MASTECTOMY: Primary | ICD-10-CM

## 2023-06-19 DIAGNOSIS — Z08 ENCOUNTER FOR FOLLOW-UP SURVEILLANCE OF BREAST CANCER: ICD-10-CM

## 2023-08-09 ENCOUNTER — OFFICE VISIT (OUTPATIENT)
Dept: SURGERY | Age: 78
End: 2023-08-09
Payer: MEDICARE

## 2023-08-09 VITALS
RESPIRATION RATE: 21 BRPM | OXYGEN SATURATION: 97 % | HEART RATE: 86 BPM | DIASTOLIC BLOOD PRESSURE: 89 MMHG | TEMPERATURE: 97.3 F | SYSTOLIC BLOOD PRESSURE: 153 MMHG | HEIGHT: 66 IN | WEIGHT: 181.4 LBS | BODY MASS INDEX: 29.15 KG/M2

## 2023-08-09 DIAGNOSIS — C50.911 MALIGNANT NEOPLASM OF RIGHT FEMALE BREAST, UNSPECIFIED ESTROGEN RECEPTOR STATUS, UNSPECIFIED SITE OF BREAST (HCC): Primary | ICD-10-CM

## 2023-08-09 PROCEDURE — G8427 DOCREV CUR MEDS BY ELIG CLIN: HCPCS | Performed by: SURGERY

## 2023-08-09 PROCEDURE — 3074F SYST BP LT 130 MM HG: CPT | Performed by: SURGERY

## 2023-08-09 PROCEDURE — G8400 PT W/DXA NO RESULTS DOC: HCPCS | Performed by: SURGERY

## 2023-08-09 PROCEDURE — 1036F TOBACCO NON-USER: CPT | Performed by: SURGERY

## 2023-08-09 PROCEDURE — 99205 OFFICE O/P NEW HI 60 MIN: CPT | Performed by: SURGERY

## 2023-08-09 PROCEDURE — 1090F PRES/ABSN URINE INCON ASSESS: CPT | Performed by: SURGERY

## 2023-08-09 PROCEDURE — 3078F DIAST BP <80 MM HG: CPT | Performed by: SURGERY

## 2023-08-09 PROCEDURE — G8419 CALC BMI OUT NRM PARAM NOF/U: HCPCS | Performed by: SURGERY

## 2023-08-09 PROCEDURE — 1123F ACP DISCUSS/DSCN MKR DOCD: CPT | Performed by: SURGERY

## 2023-08-09 NOTE — PROGRESS NOTES
implant based vs autologous, as well as additional planned revisional surgeries was performed with the patient and family. Multiple autologous donor sites were discussed as well. Clinical photos were obtained. We additionally discussed the FDA recommendations regarding monitoring of silicone implants. The risks, benefits, alternatives, outcomes, personnel involved were discussed. Specifically, the risks including, but not limited to: bleeding that may necessitate transfusion or operation, infection, seroma, reoperation, nonhealing, poor cosmetic outcome, asymmetry, scarring, partial or total flap loss, donor site morbidity, VTE (DVT/PE), and death were discussed. All questions were answered in a satisfactory manner according to the patient. Total encounter time: 60 min with > 50% spent with face to face (or vitual) counseling and coordination of care.     Cheyanne Hurtado MD  [id number] [facility]  08/09/23

## 2023-08-11 ENCOUNTER — TELEPHONE (OUTPATIENT)
Dept: SURGERY | Age: 78
End: 2023-08-11

## 2023-08-11 NOTE — TELEPHONE ENCOUNTER
The patient was in the office to see Dr. Silvano Andrade [id number]. PLAN: Discussed multiple options with Leo Odom. Will plan for bilateral delayed TE reconstruction. She understands the limitations secondary to radiation. Will have her send her labs from her PCP and if [id number], will schedule. The patients labs are scanned into Epic under the media tab. Please review and advise.     I will route to MD.

## 2023-08-11 NOTE — TELEPHONE ENCOUNTER
[facility]    Patient stated her A1c was 5.9, however her labs reveal an A1c of >10. Would not recommend reconstruction until her glucose control has significantly improved and her A1c is ~ 6.5    Thanks!   NK

## 2023-08-11 NOTE — TELEPHONE ENCOUNTER
Contacted patient to discuss, patient states that lab scanned is from 3/28/23. Confirmed this is true. Patient states that she did in fact have labs drawn two weeks ago with a much lower A1c. I have requested that patient have those lab results sent and that until we can see those with an A1c of 6.5 or less, surgery cant be offered. We will hold call till new labs are received.

## 2023-08-14 NOTE — TELEPHONE ENCOUNTER
Office visit note from [address] [place] [address] on 7/25/2023 received and scanned into media tab. On page 5 under TESTS lists Blood hemoglobin A1c as 5.4%. Routing to MD for review.

## 2023-08-18 NOTE — TELEPHONE ENCOUNTER
I received a surgery letter from Dr. William Zamora. The patient has Medicare A&B as her primary insurance. The CPT Codes listed do not require pre certification. The patient has [place] Medicare Supplement as her secondary insurance. I spoke with Roselyn Guzman at [facility] ([phone]) to see if they require pre certification as the secondary insurance. Pre certification is not required since they follow Medicare guidelines. Call Reference # [id number]    I spoke with the patient at the home number listed. The patient is now scheduled for surgery with  on 9-. The patient is aware of H&P. The patient is scheduled for her post op appointment 9-. I will submit the surgery letter to [facility] today. I will fax the Alloderm order to [facility] today. I will scan the order and the fax success into Epic under the media tab. I will mail the surgery information and instructions to the patient today. I will close this phone note.

## 2023-08-24 ENCOUNTER — TELEPHONE (OUTPATIENT)
Dept: SURGERY | Age: 78
End: 2023-08-24

## 2023-08-24 NOTE — TELEPHONE ENCOUNTER
Patient called to let Osiris know that she is diabetic. Her sugar runs pretty low in the mornings and she really needs to be able to have something to eat. If her current surgery is scheduled for the afternoon, that's presenting a problem with her diabetes and need to eat. Is there any way she can get an earlier time for surgery?     Please call back at [phone]

## 2023-08-24 NOTE — TELEPHONE ENCOUNTER
Returned patient call to let her that Caitlin Wright is out of office until Monday. Holding call till she returns.

## 2023-08-28 NOTE — TELEPHONE ENCOUNTER
I returned the patients call mentioned below. She is aware that we will work to have her surgery time moved earlier in the day and that she will hear back from me once I have an update. I will close this phone note.

## 2023-09-01 ENCOUNTER — NURSE ONLY (OUTPATIENT)
Dept: SURGERY | Age: 78
End: 2023-09-01

## 2023-09-01 DIAGNOSIS — C50.911 MALIGNANT NEOPLASM OF RIGHT FEMALE BREAST, UNSPECIFIED ESTROGEN RECEPTOR STATUS, UNSPECIFIED SITE OF BREAST (HCC): Primary | ICD-10-CM

## 2023-09-01 NOTE — PROGRESS NOTES
[facility]  Pre Operative Education     Patient: Yvette Betancourt  YOB: 1945    HPI: Yvette Betancourt is a 69 y.o. female who presents today for pre operative education. Chief Complaint   Patient presents with    Consultation     Nurse Visit: Pre Operative Education: 1) Delayed bilateral breast reconstruction with stage 1 tissue expander placement with Alloderm, Surgery Scheduled: 9/14/23       Plan:   Greater than 60 minutes was spent face to face with patient discussing preoperative and postoperative expectations, including wound care, surgical bra, possible wound vac therapy, drain care, medications, nutrition intake of increased protein and activity restrictions.       May Quintana LPN   [facility]   [phone]  9/1/2023

## 2023-09-11 RX ORDER — ZAFIRLUKAST 20 MG/1
10 TABLET, FILM COATED ORAL
Status: ON HOLD | COMMUNITY
End: 2023-09-14

## 2023-09-11 NOTE — PROGRESS NOTES
PRE-OP INSTRUCTIONS FOR SURGICAL PATIENTS          Our Pre-admission Testing Nurses tried and were unable to reach you today. Please read the attached instructions if you did not listen to your voicemail. Follow all instructions provided to you from your surgeon's office, including your ARRIVAL TIME. Arrange for someone to drive you home and be with you for the first 24 hours after discharge. NOTE: at this time ONLY 2 ADULTS may accompany you   One person encouraged to stay at hospital entire time if outpatient surgery    Enter the MAIN entrance located on [address] and report to the surgical desk on the LEFT side of the lobby. Please park in the parking garage or there is free G.I. Windows available after 7am for your use. Bring your insurance card & photo ID with you to register. Bring your medication list with you with dose and frequency listed (including over the counter medications)  Contact your ordering physician/surgeon for medication instructions as soon as possible, especially if taking blood thinners, aspirin, heart, or diabetic medication. Bariatric surgical patients need to call your surgeon if on diabetic medications (as some may need to be stopped 1-week preop)  A Pre-Surgical History and Physical MUST be completed WITHIN 30 DAYS OR LESS prior to your procedure by your Physician or an Urgent Care. DO NOT EAT ANYTHING 8 hours prior to arrival for surgery. You may have sips of WATER ONLY (up to 8 ounces) 4 hours prior to your arrival for surgery. Then nothing further 4 hours prior to arriving at hospital.   NOTE: ALL Gastric, Bariatric & Bowel surgery patients - you MUST follow your surgeon's instructions regarding eating/drinking as you will have very specific instructions to follow. If you did not receive these, call your surgeon's office immediately. No gum, candy, mints, or ice chips day of procedure.    Please refrain from drinking alcohol the day before or day of your

## 2023-09-11 NOTE — PROGRESS NOTES
H&P INCOMPLETE.  [facility] OFFICE CALLED FOR COMPLETED H&P AND ANY RECENT LABS REQUESTED. .     9/13/23 @ [phone]  I called PCP office to get completed H&P, but office is closed. I team Dashawn Robertson we do not have completed H&P and PCP office is closed  Candi Reynoso     9/13/23 @ 1600 messaged Osiris at Dr Corey Pac office that only received partial H&P and unable to get H&P.   PCP office is closed  Candi Reynoso

## 2023-09-14 ENCOUNTER — ANESTHESIA (OUTPATIENT)
Dept: OPERATING ROOM | Age: 78
End: 2023-09-14
Payer: MEDICARE

## 2023-09-14 ENCOUNTER — HOSPITAL ENCOUNTER (OUTPATIENT)
Age: 78
Setting detail: OUTPATIENT SURGERY
Discharge: HOME | End: 2023-09-14
Attending: SURGERY | Admitting: SURGERY
Payer: MEDICARE

## 2023-09-14 ENCOUNTER — ANESTHESIA EVENT (OUTPATIENT)
Dept: OPERATING ROOM | Age: 78
End: 2023-09-14
Payer: MEDICARE

## 2023-09-14 VITALS
TEMPERATURE: 96.7 F | HEIGHT: 66 IN | RESPIRATION RATE: 14 BRPM | OXYGEN SATURATION: 95 % | HEART RATE: 57 BPM | WEIGHT: 185 LBS | SYSTOLIC BLOOD PRESSURE: 161 MMHG | DIASTOLIC BLOOD PRESSURE: 73 MMHG | BODY MASS INDEX: 29.73 KG/M2

## 2023-09-14 DIAGNOSIS — G89.18 POSTOPERATIVE PAIN: Primary | ICD-10-CM

## 2023-09-14 LAB
ALBUMIN SERPL-MCNC: 3.9 G/DL (ref 3.4–5)
ALBUMIN/GLOB SERPL: 1.4 {RATIO} (ref 1.1–2.2)
ALP SERPL-CCNC: 138 U/L (ref 40–129)
ALT SERPL-CCNC: 48 U/L (ref 10–40)
ANION GAP SERPL CALCULATED.3IONS-SCNC: 9 MMOL/L (ref 3–16)
APTT BLD: 29.1 SEC (ref 22.7–35.9)
AST SERPL-CCNC: 47 U/L (ref 15–37)
BILIRUB SERPL-MCNC: 0.3 MG/DL (ref 0–1)
BUN SERPL-MCNC: 27 MG/DL (ref 7–20)
CALCIUM SERPL-MCNC: 9.7 MG/DL (ref 8.3–10.6)
CHLORIDE SERPL-SCNC: 108 MMOL/L (ref 99–110)
CO2 SERPL-SCNC: 26 MMOL/L (ref 21–32)
CREAT SERPL-MCNC: 0.7 MG/DL (ref 0.6–1.2)
DEPRECATED RDW RBC AUTO: 14.1 % (ref 12.4–15.4)
GFR SERPLBLD CREATININE-BSD FMLA CKD-EPI: >60 ML/MIN/{1.73_M2}
GLUCOSE BLD-MCNC: 110 MG/DL (ref 70–99)
GLUCOSE BLD-MCNC: 112 MG/DL (ref 70–99)
GLUCOSE SERPL-MCNC: 126 MG/DL (ref 70–99)
HCT VFR BLD AUTO: 36.3 % (ref 36–48)
HGB BLD-MCNC: 12.4 G/DL (ref 12–16)
INR PPP: 0.99 (ref 0.84–1.16)
MCH RBC QN AUTO: 32.3 PG (ref 26–34)
MCHC RBC AUTO-ENTMCNC: 34.1 G/DL (ref 31–36)
MCV RBC AUTO: 94.7 FL (ref 80–100)
PERFORMED ON: ABNORMAL
PERFORMED ON: ABNORMAL
PLATELET # BLD AUTO: 198 K/UL (ref 135–450)
PMV BLD AUTO: 7.5 FL (ref 5–10.5)
POTASSIUM SERPL-SCNC: 4.7 MMOL/L (ref 3.5–5.1)
PROT SERPL-MCNC: 6.7 G/DL (ref 6.4–8.2)
PROTHROMBIN TIME: 13.1 SEC (ref 11.5–14.8)
RBC # BLD AUTO: 3.83 M/UL (ref 4–5.2)
SODIUM SERPL-SCNC: 143 MMOL/L (ref 136–145)
WBC # BLD AUTO: 4.2 K/UL (ref 4–11)

## 2023-09-14 PROCEDURE — 6360000002 HC RX W HCPCS: Performed by: SURGERY

## 2023-09-14 PROCEDURE — 19357 TISS XPNDR PLMT BRST RCNSTJ: CPT | Performed by: SURGERY

## 2023-09-14 PROCEDURE — 7100000000 HC PACU RECOVERY - FIRST 15 MIN: Performed by: SURGERY

## 2023-09-14 PROCEDURE — 7100000010 HC PHASE II RECOVERY - FIRST 15 MIN: Performed by: SURGERY

## 2023-09-14 PROCEDURE — 85730 THROMBOPLASTIN TIME PARTIAL: CPT

## 2023-09-14 PROCEDURE — 80053 COMPREHEN METABOLIC PANEL: CPT

## 2023-09-14 PROCEDURE — 85027 COMPLETE CBC AUTOMATED: CPT

## 2023-09-14 PROCEDURE — 2709999900 HC NON-CHARGEABLE SUPPLY: Performed by: SURGERY

## 2023-09-14 PROCEDURE — C9399 UNCLASSIFIED DRUGS OR BIOLOG: HCPCS

## 2023-09-14 PROCEDURE — 2500000003 HC RX 250 WO HCPCS

## 2023-09-14 PROCEDURE — 6360000002 HC RX W HCPCS

## 2023-09-14 PROCEDURE — 6360000002 HC RX W HCPCS: Performed by: FAMILY MEDICINE

## 2023-09-14 PROCEDURE — 2500000003 HC RX 250 WO HCPCS: Performed by: FAMILY MEDICINE

## 2023-09-14 PROCEDURE — 2580000003 HC RX 258: Performed by: ANESTHESIOLOGY

## 2023-09-14 PROCEDURE — 6370000000 HC RX 637 (ALT 250 FOR IP): Performed by: ANESTHESIOLOGY

## 2023-09-14 PROCEDURE — 2500000003 HC RX 250 WO HCPCS: Performed by: SURGERY

## 2023-09-14 PROCEDURE — 7100000011 HC PHASE II RECOVERY - ADDTL 15 MIN: Performed by: SURGERY

## 2023-09-14 PROCEDURE — 3600000004 HC SURGERY LEVEL 4 BASE: Performed by: SURGERY

## 2023-09-14 PROCEDURE — 3600000014 HC SURGERY LEVEL 4 ADDTL 15MIN: Performed by: SURGERY

## 2023-09-14 PROCEDURE — C1889 IMPLANT/INSERT DEVICE, NOC: HCPCS | Performed by: SURGERY

## 2023-09-14 PROCEDURE — 85610 PROTHROMBIN TIME: CPT

## 2023-09-14 PROCEDURE — C1729 CATH, DRAINAGE: HCPCS | Performed by: SURGERY

## 2023-09-14 PROCEDURE — C9290 INJ, BUPIVACAINE LIPOSOME: HCPCS | Performed by: SURGERY

## 2023-09-14 PROCEDURE — 7100000001 HC PACU RECOVERY - ADDTL 15 MIN: Performed by: SURGERY

## 2023-09-14 PROCEDURE — 3700000001 HC ADD 15 MINUTES (ANESTHESIA): Performed by: SURGERY

## 2023-09-14 PROCEDURE — 2580000003 HC RX 258: Performed by: SURGERY

## 2023-09-14 PROCEDURE — 15777 ACELLULAR DERM MATRIX IMPLT: CPT | Performed by: SURGERY

## 2023-09-14 PROCEDURE — 2720000010 HC SURG SUPPLY STERILE: Performed by: SURGERY

## 2023-09-14 PROCEDURE — 3700000000 HC ANESTHESIA ATTENDED CARE: Performed by: SURGERY

## 2023-09-14 DEVICE — GRAFT HUM TISS THK2-2.8MM THCK L PERF CNTOUR ACELLULAR DERM: Type: IMPLANTABLE DEVICE | Site: BREAST | Status: FUNCTIONAL

## 2023-09-14 DEVICE — BREAST TISSUE EXPANDER, SUTURE TABS, INTEGRAL INJECTION DOME, 475CC
Type: IMPLANTABLE DEVICE | Site: BREAST | Status: FUNCTIONAL
Brand: MENTOR ARTOURA PLUS, SMOOTH, HIGH PROFILE

## 2023-09-14 RX ORDER — SODIUM CHLORIDE 0.9 % (FLUSH) 0.9 %
5-40 SYRINGE (ML) INJECTION PRN
Status: DISCONTINUED | OUTPATIENT
Start: 2023-09-14 | End: 2023-09-14 | Stop reason: HOSPADM

## 2023-09-14 RX ORDER — ONDANSETRON 2 MG/ML
INJECTION INTRAMUSCULAR; INTRAVENOUS PRN
Status: DISCONTINUED | OUTPATIENT
Start: 2023-09-14 | End: 2023-09-14 | Stop reason: SDUPTHER

## 2023-09-14 RX ORDER — SODIUM CHLORIDE 0.9 % (FLUSH) 0.9 %
5-40 SYRINGE (ML) INJECTION EVERY 12 HOURS SCHEDULED
Status: DISCONTINUED | OUTPATIENT
Start: 2023-09-14 | End: 2023-09-14 | Stop reason: HOSPADM

## 2023-09-14 RX ORDER — SODIUM CHLORIDE, SODIUM LACTATE, POTASSIUM CHLORIDE, CALCIUM CHLORIDE 600; 310; 30; 20 MG/100ML; MG/100ML; MG/100ML; MG/100ML
INJECTION, SOLUTION INTRAVENOUS CONTINUOUS
Status: DISCONTINUED | OUTPATIENT
Start: 2023-09-14 | End: 2023-09-14 | Stop reason: HOSPADM

## 2023-09-14 RX ORDER — GLYCOPYRROLATE 0.2 MG/ML
INJECTION INTRAMUSCULAR; INTRAVENOUS PRN
Status: DISCONTINUED | OUTPATIENT
Start: 2023-09-14 | End: 2023-09-14 | Stop reason: SDUPTHER

## 2023-09-14 RX ORDER — FENTANYL CITRATE 50 UG/ML
INJECTION, SOLUTION INTRAMUSCULAR; INTRAVENOUS PRN
Status: DISCONTINUED | OUTPATIENT
Start: 2023-09-14 | End: 2023-09-14 | Stop reason: SDUPTHER

## 2023-09-14 RX ORDER — CEPHALEXIN 500 MG/1
500 CAPSULE ORAL 4 TIMES DAILY
Qty: 40 CAPSULE | Refills: 0 | Status: SHIPPED | OUTPATIENT
Start: 2023-09-14 | End: 2023-09-24

## 2023-09-14 RX ORDER — LIDOCAINE HYDROCHLORIDE 20 MG/ML
INJECTION, SOLUTION INTRAVENOUS PRN
Status: DISCONTINUED | OUTPATIENT
Start: 2023-09-14 | End: 2023-09-14 | Stop reason: SDUPTHER

## 2023-09-14 RX ORDER — DOCUSATE SODIUM 100 MG/1
100 CAPSULE, LIQUID FILLED ORAL 2 TIMES DAILY
Qty: 14 CAPSULE | Refills: 0 | Status: SHIPPED | OUTPATIENT
Start: 2023-09-14 | End: 2023-09-21

## 2023-09-14 RX ORDER — CALCIUM CHLORIDE 100 MG/ML
INJECTION INTRAVENOUS; INTRAVENTRICULAR PRN
Status: DISCONTINUED | OUTPATIENT
Start: 2023-09-14 | End: 2023-09-14 | Stop reason: SDUPTHER

## 2023-09-14 RX ORDER — ACETAMINOPHEN 500 MG
500 TABLET ORAL 4 TIMES DAILY PRN
Qty: 56 TABLET | Refills: 0 | Status: SHIPPED | OUTPATIENT
Start: 2023-09-14 | End: 2023-09-28

## 2023-09-14 RX ORDER — MEPERIDINE HYDROCHLORIDE 25 MG/ML
12.5 INJECTION INTRAMUSCULAR; INTRAVENOUS; SUBCUTANEOUS EVERY 5 MIN PRN
Status: DISCONTINUED | OUTPATIENT
Start: 2023-09-14 | End: 2023-09-14 | Stop reason: HOSPADM

## 2023-09-14 RX ORDER — ONDANSETRON 2 MG/ML
4 INJECTION INTRAMUSCULAR; INTRAVENOUS
Status: DISCONTINUED | OUTPATIENT
Start: 2023-09-14 | End: 2023-09-14 | Stop reason: HOSPADM

## 2023-09-14 RX ORDER — ROCURONIUM BROMIDE 10 MG/ML
INJECTION, SOLUTION INTRAVENOUS PRN
Status: DISCONTINUED | OUTPATIENT
Start: 2023-09-14 | End: 2023-09-14 | Stop reason: SDUPTHER

## 2023-09-14 RX ORDER — LIDOCAINE HYDROCHLORIDE 10 MG/ML
1 INJECTION, SOLUTION EPIDURAL; INFILTRATION; INTRACAUDAL; PERINEURAL
Status: DISCONTINUED | OUTPATIENT
Start: 2023-09-14 | End: 2023-09-14 | Stop reason: HOSPADM

## 2023-09-14 RX ORDER — HYDROMORPHONE HYDROCHLORIDE 2 MG/ML
INJECTION, SOLUTION INTRAMUSCULAR; INTRAVENOUS; SUBCUTANEOUS PRN
Status: DISCONTINUED | OUTPATIENT
Start: 2023-09-14 | End: 2023-09-14 | Stop reason: SDUPTHER

## 2023-09-14 RX ORDER — FENTANYL CITRATE 50 UG/ML
25 INJECTION, SOLUTION INTRAMUSCULAR; INTRAVENOUS EVERY 5 MIN PRN
Status: DISCONTINUED | OUTPATIENT
Start: 2023-09-14 | End: 2023-09-14 | Stop reason: HOSPADM

## 2023-09-14 RX ORDER — PROPOFOL 10 MG/ML
INJECTION, EMULSION INTRAVENOUS PRN
Status: DISCONTINUED | OUTPATIENT
Start: 2023-09-14 | End: 2023-09-14 | Stop reason: SDUPTHER

## 2023-09-14 RX ORDER — OXYCODONE HYDROCHLORIDE 5 MG/1
5 TABLET ORAL EVERY 6 HOURS PRN
Qty: 28 TABLET | Refills: 0 | Status: SHIPPED | OUTPATIENT
Start: 2023-09-14 | End: 2023-09-21

## 2023-09-14 RX ORDER — APREPITANT 40 MG/1
40 CAPSULE ORAL ONCE
Status: DISCONTINUED | OUTPATIENT
Start: 2023-09-14 | End: 2023-09-14 | Stop reason: HOSPADM

## 2023-09-14 RX ORDER — PROCHLORPERAZINE EDISYLATE 5 MG/ML
5 INJECTION INTRAMUSCULAR; INTRAVENOUS
Status: DISCONTINUED | OUTPATIENT
Start: 2023-09-14 | End: 2023-09-14 | Stop reason: HOSPADM

## 2023-09-14 RX ORDER — ACETAMINOPHEN 325 MG/1
650 TABLET ORAL
Status: DISCONTINUED | OUTPATIENT
Start: 2023-09-14 | End: 2023-09-14 | Stop reason: HOSPADM

## 2023-09-14 RX ORDER — HYDROMORPHONE HYDROCHLORIDE 1 MG/ML
0.5 INJECTION, SOLUTION INTRAMUSCULAR; INTRAVENOUS; SUBCUTANEOUS EVERY 5 MIN PRN
Status: DISCONTINUED | OUTPATIENT
Start: 2023-09-14 | End: 2023-09-14 | Stop reason: HOSPADM

## 2023-09-14 RX ORDER — LABETALOL HYDROCHLORIDE 5 MG/ML
10 INJECTION, SOLUTION INTRAVENOUS
Status: DISCONTINUED | OUTPATIENT
Start: 2023-09-14 | End: 2023-09-14 | Stop reason: HOSPADM

## 2023-09-14 RX ORDER — IPRATROPIUM BROMIDE AND ALBUTEROL SULFATE 2.5; .5 MG/3ML; MG/3ML
1 SOLUTION RESPIRATORY (INHALATION)
Status: DISCONTINUED | OUTPATIENT
Start: 2023-09-14 | End: 2023-09-14 | Stop reason: HOSPADM

## 2023-09-14 RX ORDER — OXYCODONE HYDROCHLORIDE 5 MG/1
5 TABLET ORAL ONCE
Status: COMPLETED | OUTPATIENT
Start: 2023-09-14 | End: 2023-09-14

## 2023-09-14 RX ORDER — DIPHENHYDRAMINE HYDROCHLORIDE 50 MG/ML
12.5 INJECTION INTRAMUSCULAR; INTRAVENOUS
Status: DISCONTINUED | OUTPATIENT
Start: 2023-09-14 | End: 2023-09-14 | Stop reason: HOSPADM

## 2023-09-14 RX ORDER — APREPITANT 40 MG/1
CAPSULE ORAL
Status: COMPLETED
Start: 2023-09-14 | End: 2023-09-14

## 2023-09-14 RX ORDER — LORAZEPAM 2 MG/ML
0.5 INJECTION INTRAMUSCULAR
Status: DISCONTINUED | OUTPATIENT
Start: 2023-09-14 | End: 2023-09-14 | Stop reason: HOSPADM

## 2023-09-14 RX ORDER — SUCCINYLCHOLINE/SOD CL,ISO/PF 200MG/10ML
SYRINGE (ML) INTRAVENOUS PRN
Status: DISCONTINUED | OUTPATIENT
Start: 2023-09-14 | End: 2023-09-14 | Stop reason: SDUPTHER

## 2023-09-14 RX ORDER — INSULIN DEGLUDEC INJECTION 100 U/ML
INJECTION, SOLUTION SUBCUTANEOUS
COMMUNITY

## 2023-09-14 RX ORDER — SODIUM CHLORIDE 9 MG/ML
INJECTION, SOLUTION INTRAVENOUS PRN
Status: DISCONTINUED | OUTPATIENT
Start: 2023-09-14 | End: 2023-09-14 | Stop reason: HOSPADM

## 2023-09-14 RX ADMIN — SUGAMMADEX 200 MG: 100 INJECTION, SOLUTION INTRAVENOUS at 12:30

## 2023-09-14 RX ADMIN — ROCURONIUM BROMIDE 10 MG: 10 INJECTION, SOLUTION INTRAVENOUS at 11:21

## 2023-09-14 RX ADMIN — HYDROMORPHONE HYDROCHLORIDE 1 MG: 2 INJECTION, SOLUTION INTRAMUSCULAR; INTRAVENOUS; SUBCUTANEOUS at 11:33

## 2023-09-14 RX ADMIN — PROPOFOL 20 MG: 10 INJECTION, EMULSION INTRAVENOUS at 11:04

## 2023-09-14 RX ADMIN — CEFAZOLIN 2000 MG: 2 INJECTION, POWDER, FOR SOLUTION INTRAMUSCULAR; INTRAVENOUS at 11:10

## 2023-09-14 RX ADMIN — FENTANYL CITRATE 25 MCG: 50 INJECTION, SOLUTION INTRAMUSCULAR; INTRAVENOUS at 13:55

## 2023-09-14 RX ADMIN — GLYCOPYRROLATE 0.2 MG: 0.2 INJECTION INTRAMUSCULAR; INTRAVENOUS at 12:21

## 2023-09-14 RX ADMIN — FENTANYL CITRATE 50 MCG: 50 INJECTION, SOLUTION INTRAMUSCULAR; INTRAVENOUS at 11:19

## 2023-09-14 RX ADMIN — HYDROMORPHONE HYDROCHLORIDE 0.5 MG: 1 INJECTION, SOLUTION INTRAMUSCULAR; INTRAVENOUS; SUBCUTANEOUS at 13:47

## 2023-09-14 RX ADMIN — CALCIUM CHLORIDE INJECTION 0.25 G: 100 INJECTION, SOLUTION INTRAVENOUS at 11:58

## 2023-09-14 RX ADMIN — Medication 120 MG: at 11:05

## 2023-09-14 RX ADMIN — HYDROMORPHONE HYDROCHLORIDE 0.5 MG: 2 INJECTION, SOLUTION INTRAMUSCULAR; INTRAVENOUS; SUBCUTANEOUS at 12:32

## 2023-09-14 RX ADMIN — GLYCOPYRROLATE 0.2 MG: 0.2 INJECTION INTRAMUSCULAR; INTRAVENOUS at 11:08

## 2023-09-14 RX ADMIN — APREPITANT 40 MG: 40 CAPSULE ORAL at 10:08

## 2023-09-14 RX ADMIN — SODIUM CHLORIDE, POTASSIUM CHLORIDE, SODIUM LACTATE AND CALCIUM CHLORIDE: 600; 310; 30; 20 INJECTION, SOLUTION INTRAVENOUS at 12:28

## 2023-09-14 RX ADMIN — TRANEXAMIC ACID 1000 MG: 100 INJECTION, SOLUTION INTRAVENOUS at 11:18

## 2023-09-14 RX ADMIN — ONDANSETRON 4 MG: 2 INJECTION INTRAMUSCULAR; INTRAVENOUS at 12:19

## 2023-09-14 RX ADMIN — FENTANYL CITRATE 25 MCG: 50 INJECTION, SOLUTION INTRAMUSCULAR; INTRAVENOUS at 13:11

## 2023-09-14 RX ADMIN — LIDOCAINE HYDROCHLORIDE 60 MG: 20 INJECTION, SOLUTION INTRAVENOUS at 11:01

## 2023-09-14 RX ADMIN — CALCIUM CHLORIDE INJECTION 0.25 G: 100 INJECTION, SOLUTION INTRAVENOUS at 12:09

## 2023-09-14 RX ADMIN — FENTANYL CITRATE 50 MCG: 50 INJECTION, SOLUTION INTRAMUSCULAR; INTRAVENOUS at 11:02

## 2023-09-14 RX ADMIN — OXYCODONE HYDROCHLORIDE 5 MG: 5 TABLET ORAL at 15:30

## 2023-09-14 RX ADMIN — SODIUM CHLORIDE, POTASSIUM CHLORIDE, SODIUM LACTATE AND CALCIUM CHLORIDE: 600; 310; 30; 20 INJECTION, SOLUTION INTRAVENOUS at 10:01

## 2023-09-14 RX ADMIN — PROPOFOL 100 MG: 10 INJECTION, EMULSION INTRAVENOUS at 11:03

## 2023-09-14 RX ADMIN — HYDROMORPHONE HYDROCHLORIDE 0.5 MG: 1 INJECTION, SOLUTION INTRAMUSCULAR; INTRAVENOUS; SUBCUTANEOUS at 13:36

## 2023-09-14 RX ADMIN — ROCURONIUM BROMIDE 40 MG: 10 INJECTION, SOLUTION INTRAVENOUS at 11:11

## 2023-09-14 ASSESSMENT — PAIN DESCRIPTION - LOCATION
LOCATION: BREAST

## 2023-09-14 ASSESSMENT — PAIN SCALES - GENERAL
PAINLEVEL_OUTOF10: 6
PAINLEVEL_OUTOF10: 0
PAINLEVEL_OUTOF10: 6
PAINLEVEL_OUTOF10: 7
PAINLEVEL_OUTOF10: 5
PAINLEVEL_OUTOF10: 8
PAINLEVEL_OUTOF10: 7

## 2023-09-14 ASSESSMENT — PAIN DESCRIPTION - ONSET
ONSET: ON-GOING
ONSET: SUDDEN
ONSET: ON-GOING
ONSET: ON-GOING

## 2023-09-14 ASSESSMENT — PAIN DESCRIPTION - PAIN TYPE
TYPE: SURGICAL PAIN
TYPE: SURGICAL PAIN
TYPE: ACUTE PAIN;SURGICAL PAIN
TYPE: SURGICAL PAIN

## 2023-09-14 ASSESSMENT — PAIN DESCRIPTION - DESCRIPTORS
DESCRIPTORS: ACHING
DESCRIPTORS: SHARP
DESCRIPTORS: ACHING

## 2023-09-14 ASSESSMENT — PAIN DESCRIPTION - FREQUENCY
FREQUENCY: INTERMITTENT
FREQUENCY: CONTINUOUS

## 2023-09-14 ASSESSMENT — PAIN DESCRIPTION - ORIENTATION
ORIENTATION: RIGHT;LEFT
ORIENTATION: LEFT;MID
ORIENTATION: RIGHT;LEFT
ORIENTATION: LEFT;RIGHT
ORIENTATION: LEFT;RIGHT
ORIENTATION: RIGHT;LEFT

## 2023-09-14 ASSESSMENT — ENCOUNTER SYMPTOMS: SHORTNESS OF BREATH: 0

## 2023-09-14 ASSESSMENT — PAIN - FUNCTIONAL ASSESSMENT
PAIN_FUNCTIONAL_ASSESSMENT: PREVENTS OR INTERFERES SOME ACTIVE ACTIVITIES AND ADLS
PAIN_FUNCTIONAL_ASSESSMENT: 0-10
PAIN_FUNCTIONAL_ASSESSMENT: PREVENTS OR INTERFERES SOME ACTIVE ACTIVITIES AND ADLS

## 2023-09-14 NOTE — PROGRESS NOTES
Pt states having 7/10 pain in left portion of breast upon getting up from bed. RN spoke to Dr. Edu Larios with anesthesia and received orders for oxycodone.

## 2023-09-14 NOTE — H&P
Resident History and Physical   General Surgery    Chief Complaint: s/p mastectomy    History of Present Illness:    Alexa Saldana is a 69 y.o. female with history as delineated below who presents for delayed breast reconstruction. The planned surgical procedure and site were confirmed by the patient and me. The patient reports feeling as though they are at their baseline level of health with no new symptoms. Past Medical History:        Diagnosis Date    Anesthesia     sensitive, doesnt take much    Asthma     resolved    Breast cancer ([address])     Cancer ([address]) 06/2021    right Breast    Diabetes mellitus ([address])     Diverticulitis     History of chemotherapy 12/29/2021    Dr Triny Hairston [facility], treament concluded    Hx antineoplastic chemo     Hypertension     no meds due to chemo    Sleep apnea     Mild - wears no mask       Past Surgical History:           Procedure Laterality Date    BREAST LUMPECTOMY Left     HYSTERECTOMY (CERVIX STATUS UNKNOWN)      MASTECTOMY Bilateral 2/1/2022    BILATERAL TOTAL MASTECTOMY, RIGHT LOCALIZED SENTINEL LYMPH NODE BIOPSY-FROZEN SECTIONS-, TECHNETIUM NINETY-NINE AND INJECTABLE BLUE DYE IN THE OPERATING ROOM, EXCISION OF RIGHT BREAST SKIN LESION performed by Tez Vital MD at [address] [facility] N/A 6/23/2021    PLACEMENT OF POWER PORT A CATHETER performed by Samuel Ann MD at [address] Right 6/3/2021    US BREAST BIOPSY NEEDLE ADDITIONAL RIGHT 6/3/2021 [facility] ULTRASOUND    US BREAST BIOPSY W LOC DEVICE 1ST LESION RIGHT Right 6/3/2021    US BREAST NEEDLE BIOPSY RIGHT 6/3/2021 [facility] ULTRASOUND    US GUIDED NEEDLE LOC OF RIGHT BREAST Right 1/27/2022    US GUIDED NEEDLE LOC OF RIGHT BREAST 1/27/2022 [facility] ULTRASOUND    US LYMPH NODE BIOPSY  6/3/2021    US LYMPH NODE BIOPSY 6/3/2021 [facility] ULTRASOUND       Allergies:   Other, Sulfa antibiotics, Erythromycin, and Macrolides and ketolides    Medications:   Home

## 2023-09-14 NOTE — ANESTHESIA POSTPROCEDURE EVALUATION
Department of Anesthesiology  Postprocedure Note    Patient: Michelle Menezes  MRN: [id number]  YOB: 1945  Date of evaluation: 9/14/2023      Procedure Summary     Date: 09/14/23 Room / Location: [address] [place] [address] / [address]    Anesthesia Start: 3392 Anesthesia Stop: 1247    Procedure: DELAYED BILATERAL BREAST RECONSTRUCTION WITH STAGE 1 TISSUE EXPANDER PLACEMENT WITH ALLODERM (Bilateral: Breast) Diagnosis:       Bilateral malignant neoplasm of breast in female, unspecified estrogen receptor status, unspecified site of breast ([address])      (Bilateral malignant neoplasm of breast in female, unspecified estrogen receptor status, unspecified site of breast ([address]) [place], C50.912])    Surgeons: Rainer Koenig MD Responsible Provider: Karen Lynn MD    Anesthesia Type: General ASA Status: 3          Anesthesia Type: General    Alexey Phase I: Alexey Score: 10    Alexey Phase II:        Anesthesia Post Evaluation    Patient location during evaluation: PACU  Level of consciousness: awake  Complications: no  Multimodal analgesia pain management approach

## 2023-09-14 NOTE — BRIEF OP NOTE
Brief Postoperative Note      Patient: Norm Taylor  YOB: 1945  MRN: [id number]    Date of Procedure: 9/14/2023    Pre-Op Diagnosis Codes:     * Bilateral malignant neoplasm of breast in female, unspecified estrogen receptor status, unspecified site of breast ([address]) [C50.911, C50.912]    Post-Op Diagnosis: Same       Procedure(s):  DELAYED BILATERAL BREAST RECONSTRUCTION WITH STAGE 1 TISSUE EXPANDER PLACEMENT WITH ALLODERM    Surgeon(s):  Alissa Gage MD    Assistant:  Surgical Assistant: Yennifer Harris  Resident: Zoe Fleischer, MD    Anesthesia: General    Estimated Blood Loss (mL): minimal    Complications: None    Specimens:   * No specimens in log *    Implants:  Implant Name Type Inv. Item Serial No.  Lot No. LRB No. Used Action   GRAFT HUM TISS THK2-2.8MM THCK L PERF CNTOUR ACELLULAR DERM - [id number]  GRAFT HUM TISS THK2-2.8MM [address] [facility] PERF CNTOUR ACELLULAR DERM  [employer] [id number] Right 1 Implanted   GRAFT HUM TISS THK2-2.8MM THCK L PERF CNTOUR ACELLULAR DERM - [id number]  GRAFT HUM TISS THK2-2.8MM [address] [facility] PERF CNTOUR ACELLULAR DERM  [employer] [id number] Right 1 Implanted   GRAFT HUM TISS THK2-2.8MM THCK L PERF CNTOUR ACELLULAR DERM - [id number]  GRAFT HUM TISS THK2-2.8MM [address] [facility] PERF CNTOUR ACELLULAR DERM  [employer] [facility] [id number] Left 1 Implanted   GRAFT HUM TISS THK2-2.8MM [address] [facility] PERF CNTOUR ACELLULAR DERM - [id number]  GRAFT HUM TISS THK2-2.8MM [address]  [employer] [facility]- [id number] Left 1 Implanted         Drains:   Closed/Suction Drain Inferior; Lateral;Right Breast Bulb (Active)       Closed/Suction Drain Inferior; Lateral;Left Breast Bulb (Active)       Findings:  delayed bilateral breast reconstruction with stage 1 tissue expander placement with alloderm      Electronically signed by Zoe Fleischer, MD on 9/14/2023 at 12:27 PM

## 2023-09-14 NOTE — ANESTHESIA PRE PROCEDURE
09/14/2023 09:38 AM    RDW 14.1 09/14/2023 09:38 AM     09/14/2023 09:38 AM       CMP:   Lab Results   Component Value Date/Time     05/03/2022 08:01 PM    K 4.3 05/03/2022 08:01 PM     05/03/2022 08:01 PM    CO2 25 05/03/2022 08:01 PM    BUN 24 05/03/2022 08:01 PM    CREATININE 0.9 05/03/2022 08:01 PM    GFRAA >60 05/03/2022 08:01 PM    GFRAA >60 02/14/2013 07:46 AM    AGRATIO 1.3 05/03/2022 08:01 PM    LABGLOM >60 05/03/2022 08:01 PM    GLUCOSE 256 05/03/2022 08:01 PM    PROT 7.4 05/03/2022 08:01 PM    PROT 7.0 02/14/2013 07:46 AM    CALCIUM 10.2 05/03/2022 08:01 PM    BILITOT <0.2 05/03/2022 08:01 PM    ALKPHOS 127 05/03/2022 08:01 PM    AST 34 05/03/2022 08:01 PM    ALT 28 05/03/2022 08:01 PM       POC Tests:   Recent Labs     09/14/23  0943   POCGLU 112*       Coags: No results found for: \"PROTIME\", \"INR\", \"APTT\"    HCG (If Applicable): No results found for: \"PREGTESTUR\", \"PREGSERUM\", \"HCG\", \"HCGQUANT\"     ABGs: No results found for: \"PHART\", \"PO2ART\", \"ZDM3MJB\", \"HBE7OXA\", \"BEART\", \"Z1WUUJZY\"     Type & Screen (If Applicable):  No results found for: \"LABABO\", \"LABRH\"    Drug/Infectious Status (If Applicable):  No results found for: \"HIV\", \"HEPCAB\"    COVID-19 Screening (If Applicable):   Lab Results   Component Value Date/Time    COVID19 Not Detected 01/27/2022 01:49 PM           Anesthesia Evaluation  Patient summary reviewed and Nursing notes reviewed no history of anesthetic complications:   Airway: Mallampati: I  TM distance: >3 FB   Neck ROM: full  Mouth opening: > = 3 FB   Dental: normal exam         Pulmonary:   (+) sleep apnea:  asthma:     (-) shortness of breath                           Cardiovascular:    (+) hypertension:,     (-)  angina          Echocardiogram reviewed                  Neuro/Psych:      (-) CVA           GI/Hepatic/Renal:        (-) GERD and liver disease       Endo/Other:    (+) DiabetesType II DM, , malignancy/cancer.     (-) hypothyroidism

## 2023-09-15 ENCOUNTER — TELEPHONE (OUTPATIENT)
Dept: SURGERY | Age: 78
End: 2023-09-15

## 2023-09-15 NOTE — TELEPHONE ENCOUNTER
Post Op Call    Patient is 1 days out from bilateral breast reconstruction stage 1 w/ TE  surgery     Patient states that he/she is felling: slept through the night. Pain feels controlled. Tightness, 6/10. Able to eat well. Discharge medications include antibiotics and pain medications: yes, taking. Any signs of Fevers greater than 101. 9? Chills? Redness? Warmth? Increased Swelling of tissue? no    Does patient have drains in place? yes, bilaterally   Any questions about drains? No. Emptied last night and had 100 cc each. Does patient have vac in place? no           Reinforce restrictions and use of compression/ dressings :yes, wearing. Patient has post-op appointment scheduled for: 9/21/23. Will call if the interim if any questions/concerns arise.

## 2023-09-15 NOTE — OP NOTE
[address] [facility] SURGERY     OPERATIVE DICTATION    NAME: Sharifa Claire   MRN: [id number]  DATE: 9/14/2023    AGE: 69 y.o. SURGEON: Dorthey Bosworth, MD  ASSISTANT: Orlando Medina (PGY1)     PREOPERATIVE DIAGNOSIS: Acquired absence bilateral breast, status post mastectomy   POSTOPERATIVE DIAGNOSIS: Same     OPERATION: 1) Delayed bilateral stage I breast reconstruction with tissue expander placement    2) Insertion of Alloderm Acellular Dermal Matrix (328 cm^2))     ANESTHESIA: General     ESTIMATED BLOOD LOSS: 75 mL    OPERATIVE INDICATIONS: This is a 69 y.o. female who underwent mastectomy for breast cancer with details listed in a separate operative dictation. She underwent adjuvant radiation and presented for discussion regarding delayed surgery. Options of reconstruction were discussed with the patient and she opted for a staged approach using tissue expander placement in the first stage. We discussed the limitations that radiation places as well as the high risk for wound healing complications or contracture. The plan of surgery, risks, benefits, alternatives, indications, limitations, possible complications, and future surgeries were discussed with the patient and she agreed to proceed. OPERATIVE PROCEDURE: The patient was marked in the standing position in the preoperative holding area. She was then brought to the operating room and placed in the supine position on the operating table. After satisfactory induction with general endotracheal anesthesia, the patient was then prepped and draped in the usual sterile fashion. I began by performing new incisions along the suspected inframammary folds. The subcutaneous tissues were dissected down to the anterior abdominal wall. On the right side, there was extensive radiation damage and scarring that made the dissection significantly more challenging than standard.   It took an additional 30 minutes to elevate the flap as the mastectomy

## 2023-09-21 ENCOUNTER — OFFICE VISIT (OUTPATIENT)
Dept: SURGERY | Age: 78
End: 2023-09-21

## 2023-09-21 VITALS
RESPIRATION RATE: 18 BRPM | DIASTOLIC BLOOD PRESSURE: 75 MMHG | TEMPERATURE: 97.6 F | HEART RATE: 81 BPM | SYSTOLIC BLOOD PRESSURE: 117 MMHG | OXYGEN SATURATION: 96 %

## 2023-09-21 DIAGNOSIS — Z09 POSTOP CHECK: Primary | ICD-10-CM

## 2023-09-21 PROCEDURE — 99024 POSTOP FOLLOW-UP VISIT: CPT

## 2023-09-28 ENCOUNTER — OFFICE VISIT (OUTPATIENT)
Dept: SURGERY | Age: 78
End: 2023-09-28

## 2023-09-28 VITALS
HEART RATE: 76 BPM | RESPIRATION RATE: 18 BRPM | OXYGEN SATURATION: 98 % | TEMPERATURE: 98 F | BODY MASS INDEX: 30.51 KG/M2 | SYSTOLIC BLOOD PRESSURE: 132 MMHG | WEIGHT: 189 LBS | DIASTOLIC BLOOD PRESSURE: 80 MMHG

## 2023-09-28 DIAGNOSIS — Z09 POSTOP CHECK: Primary | ICD-10-CM

## 2023-09-28 PROCEDURE — 99024 POSTOP FOLLOW-UP VISIT: CPT

## 2023-10-05 ENCOUNTER — OFFICE VISIT (OUTPATIENT)
Dept: SURGERY | Age: 78
End: 2023-10-05

## 2023-10-05 VITALS
TEMPERATURE: 97.9 F | RESPIRATION RATE: 18 BRPM | DIASTOLIC BLOOD PRESSURE: 86 MMHG | HEART RATE: 80 BPM | SYSTOLIC BLOOD PRESSURE: 149 MMHG | OXYGEN SATURATION: 97 %

## 2023-10-05 DIAGNOSIS — Z09 POSTOP CHECK: Primary | ICD-10-CM

## 2023-10-05 PROCEDURE — 99024 POSTOP FOLLOW-UP VISIT: CPT

## 2023-10-18 ENCOUNTER — TELEPHONE (OUTPATIENT)
Dept: SURGERY | Age: 78
End: 2023-10-18

## 2023-10-18 ENCOUNTER — OFFICE VISIT (OUTPATIENT)
Dept: SURGERY | Age: 78
End: 2023-10-18

## 2023-10-18 VITALS
BODY MASS INDEX: 30.51 KG/M2 | DIASTOLIC BLOOD PRESSURE: 91 MMHG | HEART RATE: 81 BPM | TEMPERATURE: 97.4 F | SYSTOLIC BLOOD PRESSURE: 152 MMHG | WEIGHT: 189 LBS | OXYGEN SATURATION: 98 %

## 2023-10-18 DIAGNOSIS — Z09 POSTOP CHECK: Primary | ICD-10-CM

## 2023-10-18 PROCEDURE — 99024 POSTOP FOLLOW-UP VISIT: CPT

## 2023-10-18 NOTE — TELEPHONE ENCOUNTER
RN called pt back. Discussed alternating medications to help with breakthrough pain. Pt verbalized understanding.

## 2023-10-18 NOTE — TELEPHONE ENCOUNTER
Patient called stating she is in a lot of pain from her fill today in the office. She took 4 Ibuprofen at 12:30. She would like to know if she can take an Oxycodone at the same time she takes her next Ibuprofen.     Please call: [phone]

## 2023-11-15 ENCOUNTER — OFFICE VISIT (OUTPATIENT)
Dept: SURGERY | Age: 78
End: 2023-11-15

## 2023-11-15 VITALS
DIASTOLIC BLOOD PRESSURE: 87 MMHG | TEMPERATURE: 96.7 F | HEART RATE: 74 BPM | SYSTOLIC BLOOD PRESSURE: 167 MMHG | BODY MASS INDEX: 30.02 KG/M2 | OXYGEN SATURATION: 98 % | WEIGHT: 186 LBS

## 2023-11-15 DIAGNOSIS — Z09 POSTOP CHECK: Primary | ICD-10-CM

## 2023-11-15 PROCEDURE — 99024 POSTOP FOLLOW-UP VISIT: CPT | Performed by: SURGERY

## 2023-11-16 ENCOUNTER — TELEPHONE (OUTPATIENT)
Dept: SURGERY | Age: 78
End: 2023-11-16

## 2023-11-16 NOTE — TELEPHONE ENCOUNTER
The patient was in the office to see Dr. Gigi Castro yesterday. PLAN: Doing well overall. Will plan for second stage with implants. We discussed her risk from a wound healing perspective with implants in the setting of prior radiation. Will submit to insurance and schedule. I received a surgery letter. The patient has Medicare A&B as her primary insurance. The CPT Codes listed do not require pre certification. The patient has [place] Medicare Supplement as her secondary insurance. I spoke with Verna Fairchild at [facility] ([phone]) to see if they require pre certification as the secondary insurance. Pre certification is not required since they follow Medicare guidelines. Call Reference # [id number]     I spoke with the patient at the home number listed. The patient is now scheduled for surgery with  on 12-. The patient is aware of H&P. The patient is scheduled for her post op appointment 12-. I will submit the surgery letter to [facility] today. I will mail the surgery information and instructions to the patient today. I will close this phone note.

## 2023-12-27 ENCOUNTER — OFFICE VISIT (OUTPATIENT)
Dept: SURGERY | Age: 78
End: 2023-12-27

## 2023-12-27 VITALS
HEART RATE: 92 BPM | OXYGEN SATURATION: 97 % | TEMPERATURE: 97.2 F | DIASTOLIC BLOOD PRESSURE: 86 MMHG | BODY MASS INDEX: 31.15 KG/M2 | WEIGHT: 193 LBS | RESPIRATION RATE: 18 BRPM | SYSTOLIC BLOOD PRESSURE: 179 MMHG

## 2023-12-27 DIAGNOSIS — Z09 POSTOP CHECK: Primary | ICD-10-CM

## 2023-12-27 PROCEDURE — 99024 POSTOP FOLLOW-UP VISIT: CPT | Performed by: SURGERY

## 2024-01-03 ENCOUNTER — TELEPHONE (OUTPATIENT)
Dept: SURGERY | Age: 79
End: 2024-01-03

## 2024-01-03 NOTE — TELEPHONE ENCOUNTER
The patient called with questions following her post op visit on 12/27/23.     Please call: [phone]

## 2024-01-12 ENCOUNTER — OFFICE VISIT (OUTPATIENT)
Dept: ORTHOPEDIC SURGERY | Age: 79
End: 2024-01-12

## 2024-01-12 VITALS — HEIGHT: 66 IN | BODY MASS INDEX: 32.47 KG/M2 | WEIGHT: 202 LBS

## 2024-01-12 DIAGNOSIS — M25.562 LEFT KNEE PAIN, UNSPECIFIED CHRONICITY: Primary | ICD-10-CM

## 2024-01-30 NOTE — PROGRESS NOTES
[facility]    PROCEDURE: 1) Exchange for permanent implants bilateral breasts                          2) Right breast capsulectomy                          3) Bilateral breast high volume fat grafting  DATE: 12/19/23    Ricki Zeng has been recovering well since her procedure. Pain has been well controlled without pain medications.     EXAM    BP (!) 146/90 (Site: Left Upper Arm, Position: Sitting, Cuff Size: Medium Adult)   Pulse 92   Temp 97.5 °F (36.4 °C) (Infrared)   Resp 16   Ht 1.676 m (5' 5.98\")   Wt 91.6 kg (202 lb)   BMI 32.62 kg/m²     GEN: NAD   BREAST: Incisions healing appropriately   ABD: Good contour    IMP: 78 y.o.female s/p IBR & fat grafting  PLAN: Healing well overall.  We discussed options for improvement in the symmetry and overall she states that she is happy.  Will return in 6 months for evaluation to determine next steps.    Alex Suresh MD  [facility] Plastic & Reconstructive Surgery  [phone]  01/31/24

## 2024-01-31 ENCOUNTER — OFFICE VISIT (OUTPATIENT)
Dept: SURGERY | Age: 79
End: 2024-01-31

## 2024-01-31 VITALS
HEIGHT: 66 IN | DIASTOLIC BLOOD PRESSURE: 90 MMHG | BODY MASS INDEX: 32.47 KG/M2 | TEMPERATURE: 97.5 F | WEIGHT: 202 LBS | RESPIRATION RATE: 16 BRPM | SYSTOLIC BLOOD PRESSURE: 146 MMHG | HEART RATE: 92 BPM

## 2024-01-31 DIAGNOSIS — Z09 POSTOP CHECK: Primary | ICD-10-CM

## 2024-01-31 PROCEDURE — 99024 POSTOP FOLLOW-UP VISIT: CPT | Performed by: SURGERY

## 2024-02-12 ENCOUNTER — TELEPHONE (OUTPATIENT)
Dept: SURGERY | Age: 79
End: 2024-02-12

## 2024-02-12 NOTE — TELEPHONE ENCOUNTER
Patient called wanting to speak to speak to someone about swelling she is experiencing in her right arm still. She states she called weeks ago about this issue & was told to prop her arm up, but would like to know what else she can do as she is experiencing this still    Patient phone # [phone]

## 2024-02-20 NOTE — PROGRESS NOTES
[facility]    PROCEDURE: 1) Exchange for permanent implants bilateral breasts                          2) Right breast capsulectomy                          3) Bilateral breast high volume fat grafting  DATE: 12/19/23    Ricki Zeng has been recovering well since her procedure. Pain has been well controlled without pain medications. Pain presents today with concerns with right arm swelling. She states it just began a few weeks ago. She denies fevers or chills or swelling of breast.     EXAM    /84 (Site: Left Upper Arm, Position: Sitting, Cuff Size: Medium Adult)   Pulse 84   Temp 97.6 °F (36.4 °C) (Temporal)   Resp 16   Ht 1.676 m (5' 6\")   Wt 91.6 kg (202 lb)   SpO2 95%   BMI 32.60 kg/m²      GEN: NAD   BREAST: Incisions healing appropriately   ABD: Good contour  EXT:  swelling of right arm consistently with lymphedema    IMP: 78 y.o.female s/p IBR & fat grafting  PLAN: It appears she may beginning hyphaema in right arm. We will place referral for the lymphedema clinic and informed her to let breast surgeon know as well. If this does not resolve symptoms we can discuss referral to [facility].     Brandie Mtz, APRN-CNP  [facility] Plastic & Reconstructive Surgery  [phone]  2/21/24

## 2024-02-21 ENCOUNTER — OFFICE VISIT (OUTPATIENT)
Dept: SURGERY | Age: 79
End: 2024-02-21

## 2024-02-21 VITALS
HEIGHT: 66 IN | DIASTOLIC BLOOD PRESSURE: 84 MMHG | TEMPERATURE: 97.6 F | HEART RATE: 84 BPM | OXYGEN SATURATION: 95 % | BODY MASS INDEX: 32.47 KG/M2 | SYSTOLIC BLOOD PRESSURE: 139 MMHG | WEIGHT: 202 LBS | RESPIRATION RATE: 16 BRPM

## 2024-02-21 DIAGNOSIS — I89.0 ACQUIRED LYMPHEDEMA: Primary | ICD-10-CM

## 2024-02-21 PROCEDURE — 99024 POSTOP FOLLOW-UP VISIT: CPT

## 2024-03-05 ENCOUNTER — HOSPITAL ENCOUNTER (OUTPATIENT)
Dept: PHYSICAL THERAPY | Age: 79
Setting detail: THERAPIES SERIES
Discharge: HOME | End: 2024-03-05
Payer: MEDICARE

## 2024-03-05 DIAGNOSIS — R29.898 SHOULDER WEAKNESS: ICD-10-CM

## 2024-03-05 DIAGNOSIS — Z91.81 RISK FOR FALLS: ICD-10-CM

## 2024-03-05 DIAGNOSIS — M25.611 DECREASED RANGE OF MOTION OF RIGHT SHOULDER: ICD-10-CM

## 2024-03-05 DIAGNOSIS — I89.0 LYMPHEDEMA: Primary | ICD-10-CM

## 2024-03-05 PROCEDURE — 97530 THERAPEUTIC ACTIVITIES: CPT

## 2024-03-05 PROCEDURE — 97535 SELF CARE MNGMENT TRAINING: CPT

## 2024-03-05 PROCEDURE — 97161 PT EVAL LOW COMPLEX 20 MIN: CPT

## 2024-03-05 NOTE — PLAN OF CARE
with secondary lymphedema     Barriers to/and or personal factors that will affect rehab potential:   Co-morbidities    Physical Therapy Evaluation Complexity Justification  [x] A history of present problem and 1-2 personal factors and/or co-morbidities that impact the plan of care  [x] A total of 1-2 elements  found upon examination of body systems using standardized tests and measures addressing any of the following: body structures, functions (impairments), activity limitations, and/or participation restrictions  [x] A clinical presentation with stable and/or uncomplicated characteristics   [x] Clinical decision making of LOW (10040 - Typically 20 minutes face-to-face) complexity using standardized patient assessment instrument and/or measurable assessment of functional outcome.    Today's Assessment: See above    Medical Necessity Documentation:  I certify that this patient meets the below criteria necessary for medical necessity for care and/or justification of therapy services:  The patient has functional impairments and/or activity limitations and would benefit from continued outpatient therapy services to address the deficits outlined in the patients goals  The patient has a musculoskeletal condition(s) with a corresponding ICD-10 code that is of complexity and severity that require skilled therapeutic intervention. This has a direct and significant impact on the need for therapy and significantly impacts the rate of recovery.       Return to Play: NA    Prognosis for POC: [x] Good [] Fair  [] Poor    Patient requires continued skilled intervention: [x] Yes  [] No      CHARGE CAPTURE     PT CHARGE GRID   CPT Code (TIMED) minutes # CPT Code (UNTIMED) #     Therex (31170)     EVAL:LOW (79444 - Typically 20 minutes face-to-face) 1    Neuromusc. Re-ed (55434)    Re-Eval (43050)     Manual (26843)    Estim Unattended (71453)     Ther. Act (50182) 5 1  Mech. Traction (71797)    ADL 35 2  Dry Needle 1-2 muscle

## 2024-03-07 ENCOUNTER — HOSPITAL ENCOUNTER (OUTPATIENT)
Dept: PHYSICAL THERAPY | Age: 79
Setting detail: THERAPIES SERIES
Discharge: HOME | End: 2024-03-07
Payer: MEDICARE

## 2024-03-07 PROCEDURE — 97535 SELF CARE MNGMENT TRAINING: CPT

## 2024-03-07 PROCEDURE — 97110 THERAPEUTIC EXERCISES: CPT

## 2024-03-07 NOTE — FLOWSHEET NOTE
[facility] - Outpatient Rehabilitation and Therapy [address], [place] [address] office: [phone] fax: [phone]      Physical Therapy: TREATMENT/PROGRESS NOTE   Patient: Ricki Zeng (78 y.o. female)   Examination Date: 2024   :  1945 MRN: [id number]   Visit #:   Insurance Allowable Auth Needed   Med Nec []Yes    [x]No    Insurance: Payor: MEDICARE / Plan: MEDICARE PART A AND B / Product Type: *No Product type* /   Insurance ID: [id number] - (Medicare)  Secondary Insurance (if applicable): BCBS   Treatment Diagnosis:     ICD-10-CM    1. Lymphedema  I89.0       2. Risk for falls  Z91.81       3. Decreased range of motion of right shoulder  M25.611       4. Shoulder weakness  R29.898          Medical Diagnosis:  Acquired lymphedema [I89.0]   Referring Physician: Brandie Mtz, APR*  PCP: Neville Botello MD       Plan of care signed (Y/N): yes    Date of Patient follow up with Physician:      Progress Report/POC: NO  POC update due: (10 visits /OR AUTH LIMITS, whichever is less)  2024                                             Precautions/ Contra-indications:           Latex allergy:  NO  Pacemaker:    NO  Contraindications for Manipulation: NA  Date of Surgery: double mastectomy 2022, reconstruction Dec 2023  Other:    Red Flags:  None    C-SSRS Triggered by Intake questionnaire:   [x] No, Questionnaire did not trigger screening.   [] Yes, Patient intake triggered further evaluation      [] C-SSRS Screening completed  [] PCP notified via Plan of Care  [] Emergency services notified     Preferred Language for Healthcare:   [x] English       [] other:    SUBJECTIVE EXAMINATION     Patient stated complaint: Pt reports she feels swelling is down some. Wearing tensoshape today.     Pt is getting B TKA - L 2024 and R 2024     Test used Initial score  3/5/24 2024   Pain Summary VAS 0 0   Functional questionnaire LLIS 28 - 39%

## 2024-03-09 ENCOUNTER — HOSPITAL ENCOUNTER (EMERGENCY)
Age: 79
Discharge: HOME | End: 2024-03-09
Attending: EMERGENCY MEDICINE
Payer: MEDICARE

## 2024-03-09 VITALS
TEMPERATURE: 98 F | HEART RATE: 89 BPM | WEIGHT: 205.3 LBS | HEIGHT: 66 IN | BODY MASS INDEX: 32.99 KG/M2 | DIASTOLIC BLOOD PRESSURE: 77 MMHG | SYSTOLIC BLOOD PRESSURE: 169 MMHG | RESPIRATION RATE: 16 BRPM | OXYGEN SATURATION: 95 %

## 2024-03-09 DIAGNOSIS — B37.9 CANDIDA-INDUCED PANNICULITIS: Primary | ICD-10-CM

## 2024-03-09 DIAGNOSIS — M79.3 CANDIDA-INDUCED PANNICULITIS: Primary | ICD-10-CM

## 2024-03-09 PROCEDURE — 99283 EMERGENCY DEPT VISIT LOW MDM: CPT

## 2024-03-09 RX ORDER — NYSTATIN 100000 [USP'U]/G
POWDER TOPICAL
Qty: 60 G | Refills: 1 | Status: SHIPPED | OUTPATIENT
Start: 2024-03-09

## 2024-03-09 ASSESSMENT — PAIN - FUNCTIONAL ASSESSMENT: PAIN_FUNCTIONAL_ASSESSMENT: NONE - DENIES PAIN

## 2024-03-10 NOTE — ED PROVIDER NOTES
occasionally words are mis-transcribed.)     Rudolph Flynn MD (electronically signed)         Rudolph Flynn MD  03/09/24 0794

## 2024-03-12 ENCOUNTER — HOSPITAL ENCOUNTER (OUTPATIENT)
Dept: PHYSICAL THERAPY | Age: 79
Setting detail: THERAPIES SERIES
Discharge: HOME | End: 2024-03-12
Payer: MEDICARE

## 2024-03-12 ENCOUNTER — HOSPITAL ENCOUNTER (OUTPATIENT)
Dept: PHYSICAL THERAPY | Age: 79
Setting detail: THERAPIES SERIES
End: 2024-03-12
Payer: MEDICARE

## 2024-03-12 PROCEDURE — 97140 MANUAL THERAPY 1/> REGIONS: CPT

## 2024-03-12 PROCEDURE — 97110 THERAPEUTIC EXERCISES: CPT

## 2024-03-12 PROCEDURE — 97535 SELF CARE MNGMENT TRAINING: CPT

## 2024-03-12 NOTE — FLOWSHEET NOTE
mobility training and education.  Manual Therapy (59838) as indicated to include: Passive Range of Motion and Manual Lymph Drainage  Modalities as needed that may include: Vasoneumatic Compression  Patient education on  lymphedema    Plan: POC initiated as per evaluation    Electronically Signed by Vickie Holman PT, DPT, CLT  Date: 03/12/2024     Note: Portions of this note have been templated and/or copied from initial evaluation, reassessments and prior notes for documentation efficiency.

## 2024-03-20 ENCOUNTER — HOSPITAL ENCOUNTER (OUTPATIENT)
Dept: PHYSICAL THERAPY | Age: 79
Setting detail: THERAPIES SERIES
Discharge: HOME | End: 2024-03-20
Payer: MEDICARE

## 2024-03-20 PROCEDURE — 97535 SELF CARE MNGMENT TRAINING: CPT

## 2024-03-20 PROCEDURE — 97530 THERAPEUTIC ACTIVITIES: CPT

## 2024-03-20 NOTE — FLOWSHEET NOTE
[facility] - Outpatient Rehabilitation and Therapy [address], [place] [address] office: [phone] fax: [phone]      Physical Therapy: TREATMENT/PROGRESS NOTE   Patient: Ricki Zeng (78 y.o. female)   Examination Date: 2024   :  1945 MRN: [id number]   Visit #:   Insurance Allowable Auth Needed   Med Nec []Yes    [x]No    Insurance: Payor: MEDICARE / Plan: MEDICARE PART A AND B / Product Type: *No Product type* /   Insurance ID: [id number] - (Medicare)  Secondary Insurance (if applicable): BCBS   Treatment Diagnosis:     ICD-10-CM    1. Lymphedema  I89.0       2. Risk for falls  Z91.81       3. Decreased range of motion of right shoulder  M25.611       4. Shoulder weakness  R29.898          Medical Diagnosis:  Acquired lymphedema [I89.0]   Referring Physician: Brandie Mtz, APR*  PCP: Neville Botello MD       Plan of care signed (Y/N): yes    Date of Patient follow up with Physician:      Progress Report/POC: NO  POC update due: (10 visits /OR AUTH LIMITS, whichever is less)  2024                                             Precautions/ Contra-indications:           Latex allergy:  NO  Pacemaker:    NO  Contraindications for Manipulation: NA  Date of Surgery: double mastectomy 2022, reconstruction Dec 2023  Other:    Red Flags:  None    C-SSRS Triggered by Intake questionnaire:   [x] No, Questionnaire did not trigger screening.   [] Yes, Patient intake triggered further evaluation      [] C-SSRS Screening completed  [] PCP notified via Plan of Care  [] Emergency services notified     Preferred Language for Healthcare:   [x] English       [] other:    SUBJECTIVE EXAMINATION     Patient stated complaint: Pt reports it is a bad knee day today. MD apt went well. Compression is going well but rolls down by axilla. Lost glove. 6 min late.     Pt is getting B TKA - L 2024 and R 2024     Test used Initial score  3/5/24 2024

## 2024-03-21 ENCOUNTER — HOSPITAL ENCOUNTER (OUTPATIENT)
Dept: PHYSICAL THERAPY | Age: 79
Setting detail: THERAPIES SERIES
Discharge: HOME | End: 2024-03-21
Payer: MEDICARE

## 2024-03-21 PROCEDURE — 97530 THERAPEUTIC ACTIVITIES: CPT

## 2024-03-21 PROCEDURE — 97535 SELF CARE MNGMENT TRAINING: CPT

## 2024-03-21 NOTE — FLOWSHEET NOTE
[facility] - Outpatient Rehabilitation and Therapy [address], [place] [address] office: [phone] fax: [phone]      Physical Therapy: TREATMENT/PROGRESS NOTE   Patient: Ricki Zeng (78 y.o. female)   Examination Date: 2024   :  1945 MRN: [id number]   Visit #:   Insurance Allowable Auth Needed   Med Nec []Yes    [x]No    Insurance: Payor: MEDICARE / Plan: MEDICARE PART A AND B / Product Type: *No Product type* /   Insurance ID: [id number] - (Medicare)  Secondary Insurance (if applicable): BCBS   Treatment Diagnosis:     ICD-10-CM    1. Lymphedema  I89.0       2. Risk for falls  Z91.81       3. Decreased range of motion of right shoulder  M25.611       4. Shoulder weakness  R29.898          Medical Diagnosis:  Acquired lymphedema [I89.0]   Referring Physician: Brandie Mtz, APR*  PCP: Neville Botello MD       Plan of care signed (Y/N): yes    Date of Patient follow up with Physician:      Progress Report/POC: NO  POC update due: (10 visits /OR AUTH LIMITS, whichever is less)  2024                                             Precautions/ Contra-indications:           Latex allergy:  NO  Pacemaker:    NO  Contraindications for Manipulation: NA  Date of Surgery: double mastectomy 2022, reconstruction Dec 2023  Other:    Red Flags:  None    C-SSRS Triggered by Intake questionnaire:   [x] No, Questionnaire did not trigger screening.   [] Yes, Patient intake triggered further evaluation      [] C-SSRS Screening completed  [] PCP notified via Plan of Care  [] Emergency services notified     Preferred Language for Healthcare:   [x] English       [] other:    SUBJECTIVE EXAMINATION     Patient stated complaint: States bandages fell down but she left them on. Bandages did not hurt.     Pt is getting B TKA - L 2024 and R 2024     Test used Initial score  3/5/24 2024   Pain Summary VAS 0 0   Functional questionnaire LLIS 28/72 - 39%

## 2024-03-26 ENCOUNTER — HOSPITAL ENCOUNTER (OUTPATIENT)
Dept: PHYSICAL THERAPY | Age: 79
Setting detail: THERAPIES SERIES
Discharge: HOME | End: 2024-03-26
Payer: MEDICARE

## 2024-03-26 PROCEDURE — 97140 MANUAL THERAPY 1/> REGIONS: CPT

## 2024-03-26 PROCEDURE — 97535 SELF CARE MNGMENT TRAINING: CPT

## 2024-03-26 NOTE — FLOWSHEET NOTE
donning/doffing compression.                                                     Status: [] Progressing: [] Met: [] Not Met: [] Adjusted    TREATMENT PLAN     Frequency/Duration: 3x/week for 4 weeks for the following treatment interventions:    Interventions:  Therapeutic Exercise (12441) including: strength training, ROM, and functional mobility  Therapeutic Activities (86372) including: functional mobility training and education.  Manual Therapy (04116) as indicated to include: Passive Range of Motion and Manual Lymph Drainage  Modalities as needed that may include: Vasoneumatic Compression  Patient education on  lymphedema    Plan: POC initiated as per evaluation    Electronically Signed by Sara Barahona PT, DPT,  Date: 03/26/2024     Note: Portions of this note have been templated and/or copied from initial evaluation, reassessments and prior notes for documentation efficiency.

## 2024-03-28 ENCOUNTER — HOSPITAL ENCOUNTER (OUTPATIENT)
Dept: PHYSICAL THERAPY | Age: 79
Setting detail: THERAPIES SERIES
Discharge: HOME | End: 2024-03-28
Payer: MEDICARE

## 2024-03-28 PROCEDURE — 97535 SELF CARE MNGMENT TRAINING: CPT

## 2024-03-28 PROCEDURE — 97140 MANUAL THERAPY 1/> REGIONS: CPT

## 2024-03-28 NOTE — FLOWSHEET NOTE
[facility] - Outpatient Rehabilitation and Therapy [address], [place] [address] office: [phone] fax: [phone]      Physical Therapy: TREATMENT/PROGRESS NOTE   Patient: Ricki Zeng (78 y.o. female)   Examination Date: 2024   :  1945 MRN: [id number]   Visit #:   Insurance Allowable Auth Needed   Med Nec []Yes    [x]No    Insurance: Payor: MEDICARE / Plan: MEDICARE PART A AND B / Product Type: *No Product type* /   Insurance ID: [id number] - (Medicare)  Secondary Insurance (if applicable): BCBS   Treatment Diagnosis:     ICD-10-CM    1. Lymphedema  I89.0       2. Risk for falls  Z91.81       3. Decreased range of motion of right shoulder  M25.611       4. Shoulder weakness  R29.898          Medical Diagnosis:  Acquired lymphedema [I89.0]   Referring Physician: Brandie Mtz, APR*  PCP: Neville Botello MD       Plan of care signed (Y/N): yes    Date of Patient follow up with Physician:      Progress Report/POC: NO  POC update due: (10 visits /OR AUTH LIMITS, whichever is less)  2024                                             Precautions/ Contra-indications:           Latex allergy:  NO  Pacemaker:    NO  Contraindications for Manipulation: NA  Date of Surgery: double mastectomy 2022, reconstruction Dec 2023  Other:    Red Flags:  None    C-SSRS Triggered by Intake questionnaire:   [x] No, Questionnaire did not trigger screening.   [] Yes, Patient intake triggered further evaluation      [] C-SSRS Screening completed  [] PCP notified via Plan of Care  [] Emergency services notified     Preferred Language for Healthcare:   [x] English       [] other:    SUBJECTIVE EXAMINATION     Patient stated complaint: Pt reports she is doing well. Still wrapped this date.     Pt is getting B TKA - L 2024 and R 2024     Test used Initial score  3/5/24 2024   Pain Summary VAS 0 0   Functional questionnaire LLIS  - 39% dysfunction

## 2024-04-02 ENCOUNTER — HOSPITAL ENCOUNTER (OUTPATIENT)
Dept: PHYSICAL THERAPY | Age: 79
Setting detail: THERAPIES SERIES
Discharge: HOME | End: 2024-04-02
Payer: MEDICARE

## 2024-04-02 PROCEDURE — 97535 SELF CARE MNGMENT TRAINING: CPT

## 2024-04-02 PROCEDURE — 97140 MANUAL THERAPY 1/> REGIONS: CPT

## 2024-04-02 NOTE — FLOWSHEET NOTE
[facility] - Outpatient Rehabilitation and Therapy [address], [place] [address] office: [phone] fax: [phone]      Physical Therapy: TREATMENT/PROGRESS NOTE   Patient: Ricki Zeng (78 y.o. female)   Examination Date: 2024   :  1945 MRN: [id number]   Visit #:   Insurance Allowable Auth Needed   Med Nec []Yes    [x]No    Insurance: Payor: MEDICARE / Plan: MEDICARE PART A AND B / Product Type: *No Product type* /   Insurance ID: [id number] - (Medicare)  Secondary Insurance (if applicable): BCBS   Treatment Diagnosis:     ICD-10-CM    1. Lymphedema  I89.0       2. Risk for falls  Z91.81       3. Decreased range of motion of right shoulder  M25.611       4. Shoulder weakness  R29.898          Medical Diagnosis:  Acquired lymphedema [I89.0]   Referring Physician: Brandie Mtz, APR*  PCP: Neville Botello MD       Plan of care signed (Y/N): yes    Date of Patient follow up with Physician:      Progress Report/POC: NO  POC update due: (10 visits /OR AUTH LIMITS, whichever is less)  2024                                             Precautions/ Contra-indications:           Latex allergy:  NO  Pacemaker:    NO  Contraindications for Manipulation: NA  Date of Surgery: double mastectomy 2022, reconstruction Dec 2023  Other:    Red Flags:  None    C-SSRS Triggered by Intake questionnaire:   [x] No, Questionnaire did not trigger screening.   [] Yes, Patient intake triggered further evaluation      [] C-SSRS Screening completed  [] PCP notified via Plan of Care  [] Emergency services notified     Preferred Language for Healthcare:   [x] English       [] other:    SUBJECTIVE EXAMINATION     Patient stated complaint: Pt reports her knees are really swollen and hurt, wrapping still on from Thursday.  Pt retaped her wraps.     Pt is getting B TKA - L 2024 and R 2024     Test used Initial score  3/5/24 2024   Pain Summary VAS 0 6 knees

## 2024-04-04 ENCOUNTER — HOSPITAL ENCOUNTER (OUTPATIENT)
Dept: PHYSICAL THERAPY | Age: 79
Setting detail: THERAPIES SERIES
Discharge: HOME | End: 2024-04-04
Payer: MEDICARE

## 2024-04-04 PROCEDURE — 97110 THERAPEUTIC EXERCISES: CPT

## 2024-04-04 PROCEDURE — 97535 SELF CARE MNGMENT TRAINING: CPT

## 2024-04-04 NOTE — FLOWSHEET NOTE
[facility] - Outpatient Rehabilitation and Therapy [address], [place] [address] office: [phone] fax: [phone]      Physical Therapy: TREATMENT/PROGRESS NOTE   Patient: Ricki Zeng (78 y.o. female)   Examination Date: 2024   :  1945 MRN: [id number]   Visit #:   Insurance Allowable Auth Needed   Med Nec []Yes    [x]No    Insurance: Payor: MEDICARE / Plan: MEDICARE PART A AND B / Product Type: *No Product type* /   Insurance ID: [id number] - (Medicare)  Secondary Insurance (if applicable): BCBS   Treatment Diagnosis:     ICD-10-CM    1. Lymphedema  I89.0       2. Risk for falls  Z91.81       3. Decreased range of motion of right shoulder  M25.611       4. Shoulder weakness  R29.898          Medical Diagnosis:  Acquired lymphedema [I89.0]   Referring Physician: Brandie Mtz, APR*  PCP: Neville Botello MD       Plan of care signed (Y/N): yes    Date of Patient follow up with Physician:      Progress Report/POC: NO  POC update due: (10 visits /OR AUTH LIMITS, whichever is less)  2024                                             Precautions/ Contra-indications:           Latex allergy:  NO  Pacemaker:    NO  Contraindications for Manipulation: NA  Date of Surgery: double mastectomy 2022, reconstruction Dec 2023  Other:    Red Flags:  None    C-SSRS Triggered by Intake questionnaire:   [x] No, Questionnaire did not trigger screening.   [] Yes, Patient intake triggered further evaluation      [] C-SSRS Screening completed  [] PCP notified via Plan of Care  [] Emergency services notified     Preferred Language for Healthcare:   [x] English       [] other:    SUBJECTIVE EXAMINATION     Patient stated complaint: States she is doing well today. Reports bandages slid down a bit but feels less heaviness.     Pt is getting B TKA - L 2024 and R 2024     Test used Initial score  3/5/24 2024   Pain Summary VAS 0 3/10   Functional

## 2024-04-10 ENCOUNTER — HOSPITAL ENCOUNTER (OUTPATIENT)
Dept: PHYSICAL THERAPY | Age: 79
Setting detail: THERAPIES SERIES
Discharge: HOME | End: 2024-04-10
Payer: MEDICARE

## 2024-04-10 PROCEDURE — 97535 SELF CARE MNGMENT TRAINING: CPT

## 2024-04-10 PROCEDURE — 97530 THERAPEUTIC ACTIVITIES: CPT

## 2024-04-10 NOTE — PLAN OF CARE
assist with return top prior level of function.                Status: [] Progressing: [x] Met: [] Not Met: [] Adjusted  Improve R shoulder flexion AROM to 140 degrees or  better to allow for proper joint functioning as indicated by patients functional deficits and improve ability to reach into cabinets.  Status: [x] Progressing: [] Met: [] Not Met: [] Adjusted  Pt to improve strength to 4/5 or better of shoulder elevators to allow for proper muscle and joint use in functional mobility, ADLs and prior level of function and improve ability to complete vacuuming  Status: [x] Progressing: [] Met: [] Not Met: [] Adjusted  Patient will return to  donning coat independently without increased symptoms or restriction to work towards return to prior level of function.                                       Status: [] Progressing: [x] Met: [] Not Met: [] Adjusted  Decrease total girth of R UE by 20.0 cm so that pt can return to functional activities including  Bathing/Grooming and home management without increased symptoms or restriction.                   Status: [x] Progressing: [] Met: [] Not Met: [] Adjusted  Pt will demonstrate independence with donning/doffing compression.                                                     Status: [] Progressing: [] Met: [x] Not Met: [] Adjusted    TREATMENT PLAN     Frequency/Duration: 3x/week for 4 weeks + 2x/wk for 3 wks for the following treatment interventions:    Interventions:  Therapeutic Exercise (53282) including: strength training, ROM, and functional mobility  Therapeutic Activities (34462) including: functional mobility training and education.  Manual Therapy (21605) as indicated to include: Passive Range of Motion and Manual Lymph Drainage  Modalities as needed that may include: Vasoneumatic Compression  Patient education on  lymphedema    Plan: POC initiated as per evaluation    Electronically Signed by Vickie Holman PT, DPT    Date: 04/10/2024     Note: Portions of this

## 2024-04-13 ENCOUNTER — HOSPITAL ENCOUNTER (OUTPATIENT)
Dept: PHYSICAL THERAPY | Age: 79
Setting detail: THERAPIES SERIES
Discharge: HOME | End: 2024-04-13
Payer: MEDICARE

## 2024-04-13 PROCEDURE — 97535 SELF CARE MNGMENT TRAINING: CPT

## 2024-04-13 NOTE — FLOWSHEET NOTE
[facility] - Outpatient Rehabilitation and Therapy [address], [place] [address] office: [phone] fax: [phone]    Physical Therapy: TREATMENT/PROGRESS NOTE   Patient: Ricki Zeng (78 y.o. female)   Examination Date: 2024   :  1945 MRN: [id number]   Visit #:  (12+6)  Insurance Allowable Auth Needed   Med Nec []Yes    [x]No    Insurance: Payor: MEDICARE / Plan: MEDICARE PART A AND B / Product Type: *No Product type* /   Insurance ID: [id number] - (Medicare)  Secondary Insurance (if applicable): BCBS   Treatment Diagnosis:     ICD-10-CM    1. Lymphedema I89.0       2. Risk for falls  Z91.81       3. Decreased range of motion of right shoulder  M25.611       4. Shoulder weakness  R29.898          Medical Diagnosis:  Acquired lymphedema [I89.0]   Referring Physician: Brandie Mtz, APR*  PCP: Neville Botello MD       Plan of care signed (Y/N): yes    Date of Patient follow up with Physician:      Progress Report/POC: NO  POC update due: (10 visits /OR AUTH LIMITS, whichever is less)  5/10/24                                            Precautions/ Contra-indications:           Latex allergy:  NO  Pacemaker:    NO  Contraindications for Manipulation: NA  Date of Surgery: double mastectomy 2022, reconstruction Dec 2023  Other:    Red Flags:  None    C-SSRS Triggered by Intake questionnaire:   [x] No, Questionnaire did not trigger screening.   [] Yes, Patient intake triggered further evaluation      [] C-SSRS Screening completed  [] PCP notified via Plan of Care  [] Emergency services notified     Preferred Language for Healthcare:   [x] English       [] other:    SUBJECTIVE EXAMINATION     Patient stated complaint: Pt reports she did a lot physically yesterday. Is having pain in L knee but none in her arm. Stretched some this morning.     Pt is getting B TKA - L 2024 and R 2024     Test used Initial score  3/5/24 POC   4/10/24 2024

## 2024-04-16 ENCOUNTER — HOSPITAL ENCOUNTER (OUTPATIENT)
Dept: PHYSICAL THERAPY | Age: 79
Setting detail: THERAPIES SERIES
Discharge: HOME | End: 2024-04-16
Payer: MEDICARE

## 2024-04-16 DIAGNOSIS — I89.0 ACQUIRED LYMPHEDEMA: Primary | ICD-10-CM

## 2024-04-16 PROCEDURE — A6549 G COMPRESSION STOCKING: HCPCS

## 2024-04-16 PROCEDURE — 97140 MANUAL THERAPY 1/> REGIONS: CPT

## 2024-04-16 PROCEDURE — 97535 SELF CARE MNGMENT TRAINING: CPT

## 2024-04-16 NOTE — FLOWSHEET NOTE
[facility] - Outpatient Rehabilitation and Therapy [address], [place] [address] office: [phone] fax: [phone]    Physical Therapy: TREATMENT/PROGRESS NOTE   Patient: Ricki Zeng (78 y.o. female)   Examination Date: 2024   :  1945 MRN: [id number]   Visit #:  (12+6)  Insurance Allowable Auth Needed   Med Nec []Yes    [x]No    Insurance: Payor: MEDICARE / Plan: MEDICARE PART A AND B / Product Type: *No Product type* /   Insurance ID: [id number] - (Medicare)  Secondary Insurance (if applicable): BCBS   Treatment Diagnosis:     ICD-10-CM    1. Lymphedema I89.0       2. Risk for falls  Z91.81       3. Decreased range of motion of right shoulder  M25.611       4. Shoulder weakness  R29.898          Medical Diagnosis:  Acquired lymphedema [I89.0]   Referring Physician: Brandie Mtz, APR*  PCP: Neville Botello MD       Plan of care signed (Y/N): yes    Date of Patient follow up with Physician:      Progress Report/POC: NO  POC update due: (10 visits /OR AUTH LIMITS, whichever is less)  5/10/24                                            Precautions/ Contra-indications:           Latex allergy:  NO  Pacemaker:    NO  Contraindications for Manipulation: NA  Date of Surgery: double mastectomy 2022, reconstruction Dec 2023  Other:    Red Flags:  None    C-SSRS Triggered by Intake questionnaire:   [x] No, Questionnaire did not trigger screening.   [] Yes, Patient intake triggered further evaluation      [] C-SSRS Screening completed  [] PCP notified via Plan of Care  [] Emergency services notified     Preferred Language for Healthcare:   [x] English       [] other:    SUBJECTIVE EXAMINATION     Patient stated complaint: Pt reports wrapping stayed up well following LV. States her hand swelling went down significantly but returned over night when she didn't wear her glove.     Pt is getting B TKA - L 2024 and R 2024     Test used Initial

## 2024-04-18 ENCOUNTER — HOSPITAL ENCOUNTER (OUTPATIENT)
Dept: PHYSICAL THERAPY | Age: 79
Setting detail: THERAPIES SERIES
Discharge: HOME | End: 2024-04-18
Payer: MEDICARE

## 2024-04-18 PROCEDURE — 97535 SELF CARE MNGMENT TRAINING: CPT

## 2024-04-18 NOTE — FLOWSHEET NOTE
mobility  Therapeutic Activities (96521) including: functional mobility training and education.  Manual Therapy (15601) as indicated to include: Passive Range of Motion and Manual Lymph Drainage  Modalities as needed that may include: Vasoneumatic Compression  Patient education on  lymphedema    Plan: POC initiated as per evaluation    Electronically Signed by Vickie Holman PT, DPT    Date: 04/18/2024     Note: Portions of this note have been templated and/or copied from initial evaluation, reassessments and prior notes for documentation efficiency.

## 2024-04-24 ENCOUNTER — HOSPITAL ENCOUNTER (OUTPATIENT)
Dept: PHYSICAL THERAPY | Age: 79
Setting detail: THERAPIES SERIES
Discharge: HOME | End: 2024-04-24
Payer: MEDICARE

## 2024-04-24 PROCEDURE — 97140 MANUAL THERAPY 1/> REGIONS: CPT

## 2024-04-24 PROCEDURE — 97535 SELF CARE MNGMENT TRAINING: CPT

## 2024-04-24 NOTE — FLOWSHEET NOTE
[facility] - Outpatient Rehabilitation and Therapy [address], [place] [address] office: [phone] fax: [phone]    Physical Therapy: TREATMENT/PROGRESS NOTE   Patient: Ricki Zeng (78 y.o. female)   Examination Date: 2024   :  1945 MRN: [id number]   Visit #:  (12+6)  Insurance Allowable Auth Needed   Med Nec []Yes    [x]No    Insurance: Payor: MEDICARE / Plan: MEDICARE PART A AND B / Product Type: *No Product type* /   Insurance ID: [id number] - (Medicare)  Secondary Insurance (if applicable): BCBS   Treatment Diagnosis:     ICD-10-CM    1. Lymphedema I89.0       2. Risk for falls  Z91.81       3. Decreased range of motion of right shoulder  M25.611       4. Shoulder weakness  R29.898          Medical Diagnosis:  Acquired lymphedema [I89.0]   Referring Physician: Brandie Mtz, APR*  PCP: Neville Botello MD       Plan of care signed (Y/N): yes    Date of Patient follow up with Physician:      Progress Report/POC: NO  POC update due: (10 visits /OR AUTH LIMITS, whichever is less)  5/10/24                                            Precautions/ Contra-indications:           Latex allergy:  NO  Pacemaker:    NO  Contraindications for Manipulation: NA  Date of Surgery: double mastectomy 2022, reconstruction Dec 2023  Other:    Red Flags:  None    C-SSRS Triggered by Intake questionnaire:   [x] No, Questionnaire did not trigger screening.   [] Yes, Patient intake triggered further evaluation      [] C-SSRS Screening completed  [] PCP notified via Plan of Care  [] Emergency services notified     Preferred Language for Healthcare:   [x] English       [] other:    SUBJECTIVE EXAMINATION     Patient stated complaint: Pt reports she feels her hand has been improving. Still wearing compression. Has an appointment at [facility] to get fit for compression.     Pt is getting B TKA - L 2024 and R 2024     Test used Initial score  3/5/24 POC   4/10/24

## 2024-04-26 ENCOUNTER — HOSPITAL ENCOUNTER (OUTPATIENT)
Dept: PHYSICAL THERAPY | Age: 79
Setting detail: THERAPIES SERIES
Discharge: HOME | End: 2024-04-26
Payer: MEDICARE

## 2024-04-26 PROCEDURE — 97140 MANUAL THERAPY 1/> REGIONS: CPT

## 2024-04-26 PROCEDURE — 97535 SELF CARE MNGMENT TRAINING: CPT

## 2024-04-26 NOTE — FLOWSHEET NOTE
in: 2 weeks  Independent in HEP and progression per patient tolerance, in order to progress toward full function and prevent re-injury.               Status: [] Progressing: [x] Met: [] Not Met: [] Adjusted  Patient will have a decrease in pain to 0/10 to help facilitate improvement in movement, function, and ADLs as indicated by functional deficits.              Status: [] Progressing: [x] Met: [] Not Met: [] Adjusted  Pt will demonstrate independence with skin care.                Status: [] Progressing: [x] Met: [] Not Met: [] Adjusted     Long Term Goals: To be achieved in: 4 weeks  Disability index score of 15% or less for the LLIS to assist with return top prior level of function.                Status: [] Progressing: [x] Met: [] Not Met: [] Adjusted  Improve R shoulder flexion AROM to 140 degrees or  better to allow for proper joint functioning as indicated by patients functional deficits and improve ability to reach into cabinets.  Status: [x] Progressing: [] Met: [] Not Met: [] Adjusted  Pt to improve strength to 4/5 or better of shoulder elevators to allow for proper muscle and joint use in functional mobility, ADLs and prior level of function and improve ability to complete vacuuming  Status: [x] Progressing: [] Met: [] Not Met: [] Adjusted  Patient will return to  donning coat independently without increased symptoms or restriction to work towards return to prior level of function.                                       Status: [] Progressing: [x] Met: [] Not Met: [] Adjusted  Decrease total girth of R UE by 20.0 cm so that pt can return to functional activities including  Bathing/Grooming and home management without increased symptoms or restriction.                   Status: [x] Progressing: [] Met: [] Not Met: [] Adjusted  Pt will demonstrate independence with donning/doffing compression.                                                     Status: [] Progressing: [] Met: [x] Not Met: []

## 2024-05-01 ENCOUNTER — HOSPITAL ENCOUNTER (OUTPATIENT)
Dept: PHYSICAL THERAPY | Age: 79
Setting detail: THERAPIES SERIES
Discharge: HOME | End: 2024-05-01
Payer: MEDICARE

## 2024-05-01 PROCEDURE — 97535 SELF CARE MNGMENT TRAINING: CPT

## 2024-05-01 PROCEDURE — 97530 THERAPEUTIC ACTIVITIES: CPT

## 2024-05-01 PROCEDURE — 97110 THERAPEUTIC EXERCISES: CPT

## 2024-05-01 NOTE — PLAN OF CARE
[facility] - Outpatient Rehabilitation and Therapy [address], [place] [address] office: [phone] fax: [phone]    Physical Therapy Re-Certification Plan of Care    Dear CESAR Holly  ,    We had the pleasure of treating the following patient for physical therapy services at [facility] Out[facility] Physical Therapy. A summary of our findings can be found in the updated assessment below.  This includes our plan of care.  If you have any questions or concerns regarding these findings, please do not hesitate to contact me at the office phone number checked above.  Thank you for the referral.     Physician Signature:________________________________Date:__________________  By signing above (or electronic signature), therapist's plan is approved by physician      Functional Outcome:    Test used Initial score  3/5/24 POC   4/10/24 POC  5/1/24   Pain Summary VAS 0 0/10 - lymphedema related 0/10 - lymphedema related   Functional questionnaire LLIS 28/72 - 39% dysfunction 10/72 - 13% dysfunction 11/72 - 15%       Overall Response to Treatment:  Patient is responding well to treatment and improvement is noted with regards to goals   Pt presents with significant reduction in R UE lymphedema with reduced limb volume, decreased fibrotic tissue, and improved skin health. Pt has been compliant with home program. Pt with improved R shoulder flexion and ABD AROM but continue with AROM and strength limitations. Pt is getting fit for day time and night compression tomorrow. Additional visits added to ensure independence with donning and doffing compression and to continue to progress shoulder AROM and strength.     Total Visits: 16     Recommendation:    [x] Continue PT - finish remaining 2 visits and then 1-2x/wk for 3 wks to ensure independence with compression and home program   [] Hold PT, pending MD visit   [] Discharge to [facility]. Follow up with PT or MD PRN.     Physical Therapy:

## 2024-05-03 ENCOUNTER — HOSPITAL ENCOUNTER (OUTPATIENT)
Dept: PHYSICAL THERAPY | Age: 79
Setting detail: THERAPIES SERIES
Discharge: HOME | End: 2024-05-03
Payer: MEDICARE

## 2024-05-03 PROCEDURE — 97535 SELF CARE MNGMENT TRAINING: CPT

## 2024-05-03 NOTE — FLOWSHEET NOTE
treatment interventions:    Interventions:  Therapeutic Exercise (42507) including: strength training, ROM, and functional mobility  Therapeutic Activities (39425) including: functional mobility training and education.  Manual Therapy (03954) as indicated to include: Passive Range of Motion and Manual Lymph Drainage  Modalities as needed that may include: Vasoneumatic Compression  Patient education on  lymphedema    Plan:  continue with MLD, exercise, and low stretch bandages with transition to compression garments once obtained     Electronically Signed by Vickie Holman PT, DPT, CLT    Date: 05/03/2024     Note: Portions of this note have been templated and/or copied from initial evaluation, reassessments and prior notes for documentation efficiency.

## 2024-05-10 ENCOUNTER — HOSPITAL ENCOUNTER (OUTPATIENT)
Dept: PHYSICAL THERAPY | Age: 79
Setting detail: THERAPIES SERIES
Discharge: HOME | End: 2024-05-10
Payer: MEDICARE

## 2024-05-10 PROCEDURE — 97535 SELF CARE MNGMENT TRAINING: CPT

## 2024-05-10 NOTE — FLOWSHEET NOTE
[facility] - Outpatient Rehabilitation and Therapy [address], [place] [address] office: [phone] fax: [phone]    Physical Therapy: TREATMENT/PROGRESS NOTE   Patient: Ricki Zeng (78 y.o. female)   Examination Date: 05/10/2024   :  1945 MRN: [id number]   Visit #:  (12+6+6)  Insurance Allowable Auth Needed   Med Nec []Yes    [x]No    Insurance: Payor: MEDICARE / Plan: MEDICARE PART A AND B / Product Type: *No Product type* /   Insurance ID: [id number] - (Medicare)  Secondary Insurance (if applicable): BCBS   Treatment Diagnosis:     ICD-10-CM    1. Lymphedema I89.0       2. Risk for falls  Z91.81       3. Decreased range of motion of right shoulder  M25.611       4. Shoulder weakness  R29.898          Medical Diagnosis:  Acquired lymphedema [I89.0]   Referring Physician: Brandie Mtz, APR*  PCP: Neville Botello MD       Plan of care signed (Y/N): yes    Date of Patient follow up with Physician:      Progress Report/POC: NO  POC update due: (10 visits /OR AUTH LIMITS, whichever is less)  6/10/24                                            Precautions/ Contra-indications:           Latex allergy:  NO  Pacemaker:    NO  Contraindications for Manipulation: NA  Date of Surgery: double mastectomy 2022, reconstruction Dec 2023  Other:    Red Flags:  None    C-SSRS Triggered by Intake questionnaire:   [x] No, Questionnaire did not trigger screening.   [] Yes, Patient intake triggered further evaluation      [] C-SSRS Screening completed  [] PCP notified via Plan of Care  [] Emergency services notified     Preferred Language for Healthcare:   [x] English       [] other:    SUBJECTIVE EXAMINATION     Patient stated complaint: Pt reports she will get compression garments next Wednesday. Still wearing her bandages from .     Pt is getting B TKA - L 2024 and R 2024     Test used Initial score  3/5/24 POC   4/10/24 POC  5/1/24 05/10/2024   Pain

## 2024-05-22 ENCOUNTER — HOSPITAL ENCOUNTER (OUTPATIENT)
Dept: PHYSICAL THERAPY | Age: 79
Setting detail: THERAPIES SERIES
Discharge: HOME | End: 2024-05-22
Payer: MEDICARE

## 2024-05-22 PROCEDURE — 97110 THERAPEUTIC EXERCISES: CPT

## 2024-05-22 PROCEDURE — 97535 SELF CARE MNGMENT TRAINING: CPT

## 2024-05-22 NOTE — FLOWSHEET NOTE
[facility] - Outpatient Rehabilitation and Therapy [address], [place] [address] office: [phone] fax: [phone]    Physical Therapy: TREATMENT/PROGRESS NOTE   Patient: Ricki Zeng (78 y.o. female)   Examination Date: 2024   :  1945 MRN: [id number]   Visit #:  (12+6+6)  Insurance Allowable Auth Needed   Med Nec []Yes    [x]No    Insurance: Payor: MEDICARE / Plan: MEDICARE PART A AND B / Product Type: *No Product type* /   Insurance ID: [id number] - (Medicare)  Secondary Insurance (if applicable): BCBS   Treatment Diagnosis:     ICD-10-CM    1. Lymphedema I89.0       2. Risk for falls  Z91.81       3. Decreased range of motion of right shoulder  M25.611       4. Shoulder weakness  R29.898          Medical Diagnosis:  Acquired lymphedema [I89.0]   Referring Physician: Brandie Mtz, APR*  PCP: Neville Botello MD       Plan of care signed (Y/N): yes    Date of Patient follow up with Physician:      Progress Report/POC: NO  POC update due: (10 visits /OR AUTH LIMITS, whichever is less)  6/10/24                                            Precautions/ Contra-indications:           Latex allergy:  NO  Pacemaker:    NO  Contraindications for Manipulation: NA  Date of Surgery: double mastectomy 2022, reconstruction Dec 2023  Other:    Red Flags:  None    C-SSRS Triggered by Intake questionnaire:   [x] No, Questionnaire did not trigger screening.   [] Yes, Patient intake triggered further evaluation      [] C-SSRS Screening completed  [] PCP notified via Plan of Care  [] Emergency services notified     Preferred Language for Healthcare:   [x] English       [] other:    SUBJECTIVE EXAMINATION     Patient stated complaint: pt wearing new compression garments this date. Having some numbness in middle finger. Has not received night compression yet.     Pt is getting B TKA - L 2024 and R 2024     Test used Initial score  3/5/24 POC   4/10/24

## 2024-05-24 NOTE — PROGRESS NOTES
Name_______________________________________Printed:____________________  Date and time of surgery_6/25 0700_______________________Arrival Time:_0600____/per office____   1. The instructions given regarding when and if a patient needs to stop oral intake prior to surgery varies.Follow the specific instructions you were given                  __x_Nothing to eat or to drink after Midnight the night before.                   ____Carbo loading or instructions will be given to select patients-if you have been given those instructions -please do the following                           The evening before your surgery after dinner before midnight drink 40 ounces of gatorade.If you are diabetic use sugar free.  The morning of surgery drink 40 ounces of water.This needs to be finished 3 hours prior to your surgery start time.    2. Take the following pills with a small sip of water on the morning of surgery____gabapentin_______________________________________________                  Do not take blood pressure medications ending in pril or sartan the timmy prior to surgery or the morning of surgery. Dr Leblanc's patient are not to take any medications the AM of surgery.         3. Aspirin, Ibuprofen, Advil, Naproxen, Vitamin E and other Anti-inflammatory products and supplements should be stopped for 5 -7days before surgery or as directed by your physician.   4. Check with your Doctor regarding stopping Plavix, Coumadin,Eliquis, Lovenox,Effient,Pradaxa,Xarelto, Fragmin or other blood thinners and follow their instructions.   5. Do not smoke, and do not drink any alcoholic beverages 24 hours prior to surgery.  This includes NA Beer.Refrain from the usage of any recreational drugs.   6. You may brush your teeth and gargle the morning of surgery.  DO NOT SWALLOW WATER   7. You MUST make arrangements for a responsible adult to stay on site while you are here and take you home after your surgery. You will not be allowed to leave alone  or drive yourself home.  It is strongly suggested someone stay with you the first 24 hrs. Your surgery will be cancelled if you do not have a ride home.   8. A parent/legal guardian must accompany a child scheduled for surgery and plan to stay at the hospital until the child is discharged.  Please do not bring other children with you.   9. Please wear simple, loose fitting clothing to the hospital.  Do not bring valuables (money, credit cards, checkbooks, etc.) Do not wear any makeup (including no eye makeup) or nail polish on your fingers or toes.             10. DO NOT wear any jewelry or piercings on day of surgery.  All body piercing jewelry must be removed.             11. If you have ___dentures, they will be removed before going to the OR; we will provide you a container.  If you wear ___contact lenses or ___glasses, they will be removed; please bring a case for them.             12. Please see your family doctor/pediatrician for a history & physical and/or concerning medications.  Bring any test results/reports from your physician's office.   PCP__________________Phone___________H&P Appt. Date________             13 If you  have a Living Will and Durable Power of  for Healthcare, please bring in a copy.             14. Notify your Surgeon if you develop any illness between now and surgery  time, cough, cold, fever, sore throat, nausea, vomiting, etc.  Please notify your surgeon if you experience dizziness, shortness of breath or blurred vision between now & the time of your surgery             15. DO NOT shave your operative site 96 hours prior to surgery. For face & neck surgery, men may use an electric razor 48 hours prior to surgery.             16. Shower the night before or morning of surgery using an antibacterial soap or as you have been instructed.             17. To provide excellent care visitors will be limited to one in the room at any given time.             18.  Please bring picture ID

## 2024-05-24 NOTE — PROGRESS NOTES
Patient instructed to call DR Swenson office and arrange to  packet of information and showering instructions for surgery

## 2024-05-28 ENCOUNTER — TELEPHONE (OUTPATIENT)
Dept: ORTHOPEDIC SURGERY | Age: 79
End: 2024-05-28

## 2024-05-28 NOTE — TELEPHONE ENCOUNTER
Auth: NPR  Date: 6/25/2024  Reference # [id number]  Spoke with: NONE  Type of SX:OUTPATIENT  Location: [facility]  CPT: 63367   DX: M17.12  SX area: L KNEE  Insurance: MEDICARE

## 2024-05-29 ENCOUNTER — HOSPITAL ENCOUNTER (OUTPATIENT)
Dept: PHYSICAL THERAPY | Age: 79
Setting detail: THERAPIES SERIES
Discharge: HOME | End: 2024-05-29
Payer: MEDICARE

## 2024-05-29 PROCEDURE — 97530 THERAPEUTIC ACTIVITIES: CPT

## 2024-05-29 NOTE — PLAN OF CARE
Status: [] Progressing: [x] Met: [] Not Met: [] Adjusted     Long Term Goals: To be achieved in: 4 weeks  Disability index score of 15% or less for the LLIS to assist with return top prior level of function.                Status: [] Progressing: [x] Met: [] Not Met: [] Adjusted  Improve R shoulder flexion AROM to 140 degrees or  better to allow for proper joint functioning as indicated by patients functional deficits and improve ability to reach into cabinets.  Status: [] Progressing: [] Met: [x] Not Met: [] Adjusted  Pt to improve strength to 4/5 or better of shoulder elevators to allow for proper muscle and joint use in functional mobility, ADLs and prior level of function and improve ability to complete vacuuming  Status: [] Progressing: [] Met: [x] Not Met: [] Adjusted  Patient will return to  donning coat independently without increased symptoms or restriction to work towards return to prior level of function.                                       Status: [] Progressing: [x] Met: [] Not Met: [] Adjusted  Decrease total girth of R UE by 20.0 cm so that pt can return to functional activities including  Bathing/Grooming and home management without increased symptoms or restriction.                   Status: [] Progressing: [x] Met: [] Not Met: [] Adjusted  Pt will demonstrate independence with donning/doffing compression.                                                     Status: [] Progressing: [x] Met: [] Not Met: [] Adjusted    TREATMENT PLAN     Frequency/Duration: 3x/week for 4 weeks + 2x/wk for 3 wks for the following treatment interventions:    Interventions:  Therapeutic Exercise (76021) including: strength training, ROM, and functional mobility  Therapeutic Activities (22663) including: functional mobility training and education.  Manual Therapy (34982) as indicated to include: Passive Range of Motion and Manual Lymph Drainage  Modalities as needed that may include: Vasoneumatic

## 2024-06-06 ENCOUNTER — HOSPITAL ENCOUNTER (OUTPATIENT)
Age: 79
Discharge: HOME | End: 2024-06-06
Payer: MEDICARE

## 2024-06-06 DIAGNOSIS — M25.562 LEFT KNEE PAIN, UNSPECIFIED CHRONICITY: ICD-10-CM

## 2024-06-06 LAB
25(OH)D3 SERPL-MCNC: 45.3 NG/ML
ALBUMIN SERPL-MCNC: 4.2 G/DL (ref 3.4–5)
ALBUMIN/GLOB SERPL: 1.3 {RATIO} (ref 1.1–2.2)
ALP SERPL-CCNC: 115 U/L (ref 40–129)
ANION GAP SERPL CALCULATED.3IONS-SCNC: 10 MMOL/L (ref 3–16)
APTT BLD: 27.5 SEC (ref 22.1–36.4)
AST SERPL-CCNC: 35 U/L (ref 15–37)
BASOPHILS # BLD: 0 K/UL (ref 0–0.2)
BASOPHILS NFR BLD: 0.3 %
BILIRUB SERPL-MCNC: 0.5 MG/DL (ref 0–1)
BUN SERPL-MCNC: 35 MG/DL (ref 7–20)
CALCIUM SERPL-MCNC: 10.2 MG/DL (ref 8.3–10.6)
CHLORIDE SERPL-SCNC: 109 MMOL/L (ref 99–110)
CO2 SERPL-SCNC: 25 MMOL/L (ref 21–32)
CREAT SERPL-MCNC: 1 MG/DL (ref 0.6–1.2)
DEPRECATED RDW RBC AUTO: 14 % (ref 12.4–15.4)
EOSINOPHIL NFR BLD: 0.8 %
GFR SERPLBLD CREATININE-BSD FMLA CKD-EPI: 58 ML/MIN/{1.73_M2}
GLUCOSE SERPL-MCNC: 82 MG/DL (ref 70–99)
HCT VFR BLD AUTO: 39.3 % (ref 36–48)
HGB BLD-MCNC: 13.6 G/DL (ref 12–16)
LYMPHOCYTES # BLD: 1.1 K/UL (ref 1–5.1)
LYMPHOCYTES NFR BLD: 14.3 %
MCH RBC QN AUTO: 32.7 PG (ref 26–34)
MCHC RBC AUTO-ENTMCNC: 34.7 G/DL (ref 31–36)
MONOCYTES # BLD: 0.8 K/UL (ref 0–1.3)
MONOCYTES NFR BLD: 10.5 %
NEUTROPHILS # BLD: 5.6 K/UL (ref 1.7–7.7)
NEUTROPHILS NFR BLD: 74.1 %
PLATELET # BLD AUTO: 275 K/UL (ref 135–450)
PMV BLD AUTO: 8.6 FL (ref 5–10.5)
POTASSIUM SERPL-SCNC: 4.4 MMOL/L (ref 3.5–5.1)
PROT SERPL-MCNC: 7.4 G/DL (ref 6.4–8.2)
PROTHROMBIN TIME: 14 SEC (ref 11.9–14.9)
RBC # BLD AUTO: 4.17 M/UL (ref 4–5.2)
SODIUM SERPL-SCNC: 144 MMOL/L (ref 136–145)

## 2024-06-06 PROCEDURE — 83036 HEMOGLOBIN GLYCOSYLATED A1C: CPT

## 2024-06-06 PROCEDURE — 80053 COMPREHEN METABOLIC PANEL: CPT

## 2024-06-06 PROCEDURE — 85610 PROTHROMBIN TIME: CPT

## 2024-06-06 PROCEDURE — 36415 COLL VENOUS BLD VENIPUNCTURE: CPT

## 2024-06-06 PROCEDURE — 85025 COMPLETE CBC W/AUTO DIFF WBC: CPT

## 2024-06-06 PROCEDURE — 85730 THROMBOPLASTIN TIME PARTIAL: CPT

## 2024-06-06 PROCEDURE — 87081 CULTURE SCREEN ONLY: CPT

## 2024-06-06 PROCEDURE — 82306 VITAMIN D 25 HYDROXY: CPT

## 2024-06-06 NOTE — DISCHARGE INSTRUCTIONS
Dr. Leos Total KNEE Replacement Instructions     Follow-up with Dr. Leos in 2 weeks; [phone] ([facility])    Incision care  Should your incision start to drain let our office know.  Continue chaim hose on the operative leg (this should be thigh high) during the day, can remove at night.   Okay to shower tomorrow, as long as your post-op waterproof dressing is still in place.  Do not tub bathe or submerge your incision in water.  Should your incision start to drain, let our office know.  You may remove your Therabond dressing on post-op day 7.  Prior to any wound care please wash and dry your hands.  During the day, continue to wear your CHAIM stocking on the operative leg. Take the stocking off at night. You do not need to wear a stocking on your non-operative leg. You should wear the compression stocking until your post-op visit, this keeps lower extremity swelling to a minimum and reduces joint stiffness.      Exercise  Physical therapy will be started right away and should be set up prior to your surgery.  PT will be 2-3 times a week for 4-6 weeks.  Some patients will start this at home for two weeks then transition to outpatient PT, while others will go straight to outpatient PT the day after surgery.  Unless told otherwise, your operative leg is \"weight bearing as tolerated\".  This means you can put all of your weight on your surgery leg.  You may progress off support as tolerated.   DO NOT place a pillow under your knee.  To elevate the operative leg, elevate the ENTIRE leg.  The knee should not rest in a bent position.  Dr. Leos wants his total joint patients to walk for 5-10 minutes every hour while awake.  Ask your surgeon's office for  FMLA paperwork or a return to work note.    Ice  For pain and swelling, put ice or a cold pack on the area for 10 to 20 minutes at a time. Put a thin cloth between the ice and your skin such as a clean pillow case or thin towel.    Diet  Resume your home

## 2024-06-07 LAB
EST. AVERAGE GLUCOSE BLD GHB EST-MCNC: 125.5 MG/DL
HBA1C MFR BLD: 6 %

## 2024-06-09 LAB — MRSA SPEC QL CULT: NORMAL

## 2024-06-12 ENCOUNTER — TELEPHONE (OUTPATIENT)
Dept: ORTHOPEDIC SURGERY | Age: 79
End: 2024-06-12

## 2024-06-12 NOTE — TELEPHONE ENCOUNTER
I left a voicemail for the patient to call me back reschedule her RTKA that is scheduled 7.23 because  will be out that week.

## 2024-06-12 NOTE — TELEPHONE ENCOUNTER
Patient is currently scheduled on 7/23/24 for RTKA. Patient needs to reschedule surgery.  The new surgery date 7/30/2024   PO: 8/15/2024in [place] at 11:15 AM.   Patient understood all information.     I will give her a new appointment sheet for her RTKA

## 2024-06-17 ENCOUNTER — OFFICE VISIT (OUTPATIENT)
Dept: ORTHOPEDIC SURGERY | Age: 79
End: 2024-06-17

## 2024-06-17 DIAGNOSIS — M17.0 PRIMARY OSTEOARTHRITIS OF BOTH KNEES: Primary | ICD-10-CM
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
## 2024-06-24 RX ORDER — TRANEXAMIC ACID 10 MG/ML
1000 INJECTION, SOLUTION INTRAVENOUS ONCE
Status: COMPLETED | OUTPATIENT
Start: 2024-06-25 | End: 2024-06-25

## 2024-06-24 RX ORDER — TRANEXAMIC ACID 10 MG/ML
1000 INJECTION, SOLUTION INTRAVENOUS
Status: DISCONTINUED | OUTPATIENT
Start: 2024-06-25 | End: 2024-06-25 | Stop reason: HOSPADM

## 2024-06-25 ENCOUNTER — ANESTHESIA EVENT (OUTPATIENT)
Dept: OPERATING ROOM | Age: 79
End: 2024-06-25
Payer: MEDICARE

## 2024-06-25 ENCOUNTER — APPOINTMENT (OUTPATIENT)
Dept: GENERAL RADIOLOGY | Age: 79
End: 2024-06-25
Attending: ORTHOPAEDIC SURGERY
Payer: MEDICARE

## 2024-06-25 ENCOUNTER — ANESTHESIA (OUTPATIENT)
Dept: OPERATING ROOM | Age: 79
End: 2024-06-25
Payer: MEDICARE

## 2024-06-25 ENCOUNTER — HOSPITAL ENCOUNTER (OUTPATIENT)
Age: 79
Setting detail: OBSERVATION
Discharge: HOME | End: 2024-06-26
Attending: ORTHOPAEDIC SURGERY | Admitting: ORTHOPAEDIC SURGERY
Payer: MEDICARE

## 2024-06-25 DIAGNOSIS — M17.12 OSTEOARTHRITIS OF LEFT KNEE, UNSPECIFIED OSTEOARTHRITIS TYPE: Primary | ICD-10-CM

## 2024-06-25 LAB
ABO + RH BLD: NORMAL
BLD GP AB SCN SERPL QL: NORMAL
GLUCOSE BLD-MCNC: 146 MG/DL (ref 70–99)
GLUCOSE BLD-MCNC: 148 MG/DL (ref 70–99)
GLUCOSE BLD-MCNC: 208 MG/DL (ref 70–99)
PERFORMED ON: ABNORMAL

## 2024-06-25 PROCEDURE — 6360000002 HC RX W HCPCS: Performed by: NURSE ANESTHETIST, CERTIFIED REGISTERED

## 2024-06-25 PROCEDURE — 2500000003 HC RX 250 WO HCPCS: Performed by: NURSE ANESTHETIST, CERTIFIED REGISTERED

## 2024-06-25 PROCEDURE — 6360000002 HC RX W HCPCS: Performed by: ORTHOPAEDIC SURGERY

## 2024-06-25 PROCEDURE — C1776 JOINT DEVICE (IMPLANTABLE): HCPCS | Performed by: ORTHOPAEDIC SURGERY

## 2024-06-25 PROCEDURE — 2580000003 HC RX 258: Performed by: PHYSICIAN ASSISTANT

## 2024-06-25 PROCEDURE — 2580000003 HC RX 258: Performed by: ORTHOPAEDIC SURGERY

## 2024-06-25 PROCEDURE — 3600000004 HC SURGERY LEVEL 4 BASE: Performed by: ORTHOPAEDIC SURGERY

## 2024-06-25 PROCEDURE — 3600000014 HC SURGERY LEVEL 4 ADDTL 15MIN: Performed by: ORTHOPAEDIC SURGERY

## 2024-06-25 PROCEDURE — 6370000000 HC RX 637 (ALT 250 FOR IP): Performed by: PHYSICIAN ASSISTANT

## 2024-06-25 PROCEDURE — 83036 HEMOGLOBIN GLYCOSYLATED A1C: CPT

## 2024-06-25 PROCEDURE — 86900 BLOOD TYPING SEROLOGIC ABO: CPT

## 2024-06-25 PROCEDURE — 3700000000 HC ANESTHESIA ATTENDED CARE: Performed by: ORTHOPAEDIC SURGERY

## 2024-06-25 PROCEDURE — 2709999900 HC NON-CHARGEABLE SUPPLY: Performed by: ORTHOPAEDIC SURGERY

## 2024-06-25 PROCEDURE — 99024 POSTOP FOLLOW-UP VISIT: CPT | Performed by: NURSE PRACTITIONER

## 2024-06-25 PROCEDURE — C1713 ANCHOR/SCREW BN/BN,TIS/BN: HCPCS | Performed by: ORTHOPAEDIC SURGERY

## 2024-06-25 PROCEDURE — 2500000003 HC RX 250 WO HCPCS: Performed by: PHYSICIAN ASSISTANT

## 2024-06-25 PROCEDURE — 73560 X-RAY EXAM OF KNEE 1 OR 2: CPT

## 2024-06-25 PROCEDURE — 27447 TOTAL KNEE ARTHROPLASTY: CPT | Performed by: PHYSICIAN ASSISTANT

## 2024-06-25 PROCEDURE — 7100000001 HC PACU RECOVERY - ADDTL 15 MIN: Performed by: ORTHOPAEDIC SURGERY

## 2024-06-25 PROCEDURE — 7100000000 HC PACU RECOVERY - FIRST 15 MIN: Performed by: ORTHOPAEDIC SURGERY

## 2024-06-25 PROCEDURE — 86850 RBC ANTIBODY SCREEN: CPT

## 2024-06-25 PROCEDURE — APPNB45 APP NON BILLABLE 31-45 MINUTES: Performed by: NURSE PRACTITIONER

## 2024-06-25 PROCEDURE — 86901 BLOOD TYPING SEROLOGIC RH(D): CPT

## 2024-06-25 PROCEDURE — G0378 HOSPITAL OBSERVATION PER HR: HCPCS

## 2024-06-25 PROCEDURE — 2500000003 HC RX 250 WO HCPCS: Performed by: ORTHOPAEDIC SURGERY

## 2024-06-25 PROCEDURE — 6370000000 HC RX 637 (ALT 250 FOR IP): Performed by: ORTHOPAEDIC SURGERY

## 2024-06-25 PROCEDURE — 3700000001 HC ADD 15 MINUTES (ANESTHESIA): Performed by: ORTHOPAEDIC SURGERY

## 2024-06-25 PROCEDURE — 6360000002 HC RX W HCPCS: Performed by: ANESTHESIOLOGY

## 2024-06-25 PROCEDURE — 6360000002 HC RX W HCPCS: Performed by: PHYSICIAN ASSISTANT

## 2024-06-25 PROCEDURE — 27447 TOTAL KNEE ARTHROPLASTY: CPT | Performed by: ORTHOPAEDIC SURGERY

## 2024-06-25 DEVICE — IMPLANTABLE DEVICE
Type: IMPLANTABLE DEVICE | Site: KNEE | Status: FUNCTIONAL
Brand: BIOMET® BONE CEMENT R

## 2024-06-25 DEVICE — IMPLANTABLE DEVICE
Type: IMPLANTABLE DEVICE | Site: KNEE | Status: FUNCTIONAL
Brand: PERSONA®

## 2024-06-25 DEVICE — IMPLANTABLE DEVICE: Type: IMPLANTABLE DEVICE | Site: KNEE | Status: FUNCTIONAL

## 2024-06-25 DEVICE — DUP USE 333565 IMPL KNEE PSN TIB STM 5 DEG SZ D L: Type: IMPLANTABLE DEVICE | Site: KNEE | Status: FUNCTIONAL

## 2024-06-25 DEVICE — IMPLANTABLE DEVICE
Type: IMPLANTABLE DEVICE | Site: KNEE | Status: FUNCTIONAL
Brand: PERSONA® VIVACIT-E®

## 2024-06-25 RX ORDER — DEXMEDETOMIDINE HYDROCHLORIDE 100 UG/ML
INJECTION, SOLUTION INTRAVENOUS PRN
Status: DISCONTINUED | OUTPATIENT
Start: 2024-06-25 | End: 2024-06-25 | Stop reason: SDUPTHER

## 2024-06-25 RX ORDER — INSULIN GLARGINE 100 [IU]/ML
16 INJECTION, SOLUTION SUBCUTANEOUS NIGHTLY
Status: DISCONTINUED | OUTPATIENT
Start: 2024-06-25 | End: 2024-06-26 | Stop reason: HOSPADM

## 2024-06-25 RX ORDER — OXYCODONE HYDROCHLORIDE 5 MG/1
5 TABLET ORAL EVERY 4 HOURS PRN
Status: DISCONTINUED | OUTPATIENT
Start: 2024-06-25 | End: 2024-06-26 | Stop reason: HOSPADM

## 2024-06-25 RX ORDER — FAMOTIDINE 20 MG/1
20 TABLET, FILM COATED ORAL 2 TIMES DAILY
Status: DISCONTINUED | OUTPATIENT
Start: 2024-06-25 | End: 2024-06-26 | Stop reason: HOSPADM

## 2024-06-25 RX ORDER — ACETAMINOPHEN 650 MG
TABLET, EXTENDED RELEASE ORAL
Status: COMPLETED | OUTPATIENT
Start: 2024-06-25 | End: 2024-06-25

## 2024-06-25 RX ORDER — SODIUM CHLORIDE 9 MG/ML
INJECTION, SOLUTION INTRAVENOUS CONTINUOUS
Status: DISCONTINUED | OUTPATIENT
Start: 2024-06-25 | End: 2024-06-26 | Stop reason: HOSPADM

## 2024-06-25 RX ORDER — LIDOCAINE HYDROCHLORIDE 10 MG/ML
0.5 INJECTION, SOLUTION EPIDURAL; INFILTRATION; INTRACAUDAL; PERINEURAL ONCE
Status: DISCONTINUED | OUTPATIENT
Start: 2024-06-25 | End: 2024-06-25 | Stop reason: HOSPADM

## 2024-06-25 RX ORDER — SODIUM CHLORIDE 0.9 % (FLUSH) 0.9 %
5-40 SYRINGE (ML) INJECTION PRN
Status: DISCONTINUED | OUTPATIENT
Start: 2024-06-25 | End: 2024-06-25 | Stop reason: HOSPADM

## 2024-06-25 RX ORDER — HYDROMORPHONE HYDROCHLORIDE 2 MG/ML
0.5 INJECTION, SOLUTION INTRAMUSCULAR; INTRAVENOUS; SUBCUTANEOUS EVERY 5 MIN PRN
Status: DISCONTINUED | OUTPATIENT
Start: 2024-06-25 | End: 2024-06-25 | Stop reason: HOSPADM

## 2024-06-25 RX ORDER — ACETAMINOPHEN 500 MG
1000 TABLET ORAL 3 TIMES DAILY
Status: DISCONTINUED | OUTPATIENT
Start: 2024-06-25 | End: 2024-06-26 | Stop reason: HOSPADM

## 2024-06-25 RX ORDER — DIPHENHYDRAMINE HCL 25 MG
25 TABLET ORAL EVERY 6 HOURS PRN
Status: DISCONTINUED | OUTPATIENT
Start: 2024-06-25 | End: 2024-06-26 | Stop reason: HOSPADM

## 2024-06-25 RX ORDER — 0.9 % SODIUM CHLORIDE 0.9 %
1000 INTRAVENOUS SOLUTION INTRAVENOUS ONCE
Status: DISCONTINUED | OUTPATIENT
Start: 2024-06-25 | End: 2024-06-26 | Stop reason: HOSPADM

## 2024-06-25 RX ORDER — NALOXONE HYDROCHLORIDE 0.4 MG/ML
INJECTION, SOLUTION INTRAMUSCULAR; INTRAVENOUS; SUBCUTANEOUS PRN
Status: DISCONTINUED | OUTPATIENT
Start: 2024-06-25 | End: 2024-06-25 | Stop reason: HOSPADM

## 2024-06-25 RX ORDER — GABAPENTIN 300 MG/1
600 CAPSULE ORAL 3 TIMES DAILY
Status: DISCONTINUED | OUTPATIENT
Start: 2024-06-26 | End: 2024-06-26 | Stop reason: HOSPADM

## 2024-06-25 RX ORDER — ONDANSETRON 2 MG/ML
INJECTION INTRAMUSCULAR; INTRAVENOUS PRN
Status: DISCONTINUED | OUTPATIENT
Start: 2024-06-25 | End: 2024-06-25 | Stop reason: SDUPTHER

## 2024-06-25 RX ORDER — SENNA AND DOCUSATE SODIUM 50; 8.6 MG/1; MG/1
1 TABLET, FILM COATED ORAL 2 TIMES DAILY
Status: DISCONTINUED | OUTPATIENT
Start: 2024-06-25 | End: 2024-06-26 | Stop reason: HOSPADM

## 2024-06-25 RX ORDER — TRAZODONE HYDROCHLORIDE 50 MG/1
50 TABLET ORAL NIGHTLY
Status: DISCONTINUED | OUTPATIENT
Start: 2024-06-26 | End: 2024-06-26 | Stop reason: HOSPADM

## 2024-06-25 RX ORDER — DIPHENHYDRAMINE HYDROCHLORIDE 50 MG/ML
25 INJECTION INTRAMUSCULAR; INTRAVENOUS EVERY 6 HOURS PRN
Status: DISCONTINUED | OUTPATIENT
Start: 2024-06-25 | End: 2024-06-26 | Stop reason: HOSPADM

## 2024-06-25 RX ORDER — INSULIN LISPRO 100 [IU]/ML
2 INJECTION, SOLUTION INTRAVENOUS; SUBCUTANEOUS
Status: DISCONTINUED | OUTPATIENT
Start: 2024-06-25 | End: 2024-06-26 | Stop reason: HOSPADM

## 2024-06-25 RX ORDER — SODIUM CHLORIDE 9 MG/ML
INJECTION, SOLUTION INTRAVENOUS PRN
Status: DISCONTINUED | OUTPATIENT
Start: 2024-06-25 | End: 2024-06-25 | Stop reason: HOSPADM

## 2024-06-25 RX ORDER — INSULIN LISPRO 100 [IU]/ML
0-8 INJECTION, SOLUTION INTRAVENOUS; SUBCUTANEOUS
Status: DISCONTINUED | OUTPATIENT
Start: 2024-06-25 | End: 2024-06-26 | Stop reason: HOSPADM

## 2024-06-25 RX ORDER — LANOLIN ALCOHOL/MO/W.PET/CERES
400 CREAM (GRAM) TOPICAL 2 TIMES DAILY
Status: DISCONTINUED | OUTPATIENT
Start: 2024-06-26 | End: 2024-06-26 | Stop reason: HOSPADM

## 2024-06-25 RX ORDER — ACETAMINOPHEN 500 MG
1000 TABLET ORAL ONCE
Status: COMPLETED | OUTPATIENT
Start: 2024-06-25 | End: 2024-06-25

## 2024-06-25 RX ORDER — ONDANSETRON 2 MG/ML
4 INJECTION INTRAMUSCULAR; INTRAVENOUS
Status: DISCONTINUED | OUTPATIENT
Start: 2024-06-25 | End: 2024-06-25 | Stop reason: HOSPADM

## 2024-06-25 RX ORDER — ASPIRIN 81 MG/1
81 TABLET ORAL 2 TIMES DAILY
Status: DISCONTINUED | OUTPATIENT
Start: 2024-06-25 | End: 2024-06-26 | Stop reason: HOSPADM

## 2024-06-25 RX ORDER — KETOROLAC TROMETHAMINE 15 MG/ML
15 INJECTION, SOLUTION INTRAMUSCULAR; INTRAVENOUS EVERY 6 HOURS
Status: DISCONTINUED | OUTPATIENT
Start: 2024-06-25 | End: 2024-06-26 | Stop reason: HOSPADM

## 2024-06-25 RX ORDER — SODIUM CHLORIDE 0.9 % (FLUSH) 0.9 %
5-40 SYRINGE (ML) INJECTION EVERY 12 HOURS SCHEDULED
Status: DISCONTINUED | OUTPATIENT
Start: 2024-06-25 | End: 2024-06-26 | Stop reason: HOSPADM

## 2024-06-25 RX ORDER — ATORVASTATIN CALCIUM 20 MG/1
20 TABLET, FILM COATED ORAL NIGHTLY
Status: DISCONTINUED | OUTPATIENT
Start: 2024-06-26 | End: 2024-06-26 | Stop reason: HOSPADM

## 2024-06-25 RX ORDER — DEXTROSE MONOHYDRATE 100 MG/ML
INJECTION, SOLUTION INTRAVENOUS CONTINUOUS PRN
Status: DISCONTINUED | OUTPATIENT
Start: 2024-06-25 | End: 2024-06-26 | Stop reason: HOSPADM

## 2024-06-25 RX ORDER — SODIUM CHLORIDE 9 MG/ML
INJECTION, SOLUTION INTRAVENOUS PRN
Status: DISCONTINUED | OUTPATIENT
Start: 2024-06-25 | End: 2024-06-26 | Stop reason: HOSPADM

## 2024-06-25 RX ORDER — VANCOMYCIN HYDROCHLORIDE 1 G/20ML
INJECTION, POWDER, LYOPHILIZED, FOR SOLUTION INTRAVENOUS
Status: COMPLETED | OUTPATIENT
Start: 2024-06-25 | End: 2024-06-25

## 2024-06-25 RX ORDER — GLUCAGON 1 MG/ML
1 KIT INJECTION PRN
Status: DISCONTINUED | OUTPATIENT
Start: 2024-06-25 | End: 2024-06-26 | Stop reason: HOSPADM

## 2024-06-25 RX ORDER — GLYCOPYRROLATE 0.2 MG/ML
INJECTION INTRAMUSCULAR; INTRAVENOUS PRN
Status: DISCONTINUED | OUTPATIENT
Start: 2024-06-25 | End: 2024-06-25 | Stop reason: SDUPTHER

## 2024-06-25 RX ORDER — PHENYLEPHRINE HCL IN 0.9% NACL 1 MG/10 ML
SYRINGE (ML) INTRAVENOUS PRN
Status: DISCONTINUED | OUTPATIENT
Start: 2024-06-25 | End: 2024-06-25 | Stop reason: SDUPTHER

## 2024-06-25 RX ORDER — PROMETHAZINE HYDROCHLORIDE 25 MG/1
12.5 TABLET ORAL EVERY 6 HOURS PRN
Status: DISCONTINUED | OUTPATIENT
Start: 2024-06-25 | End: 2024-06-26 | Stop reason: HOSPADM

## 2024-06-25 RX ORDER — ONDANSETRON 2 MG/ML
4 INJECTION INTRAMUSCULAR; INTRAVENOUS EVERY 6 HOURS PRN
Status: DISCONTINUED | OUTPATIENT
Start: 2024-06-25 | End: 2024-06-26 | Stop reason: HOSPADM

## 2024-06-25 RX ORDER — DEXAMETHASONE SODIUM PHOSPHATE 4 MG/ML
INJECTION, SOLUTION INTRA-ARTICULAR; INTRALESIONAL; INTRAMUSCULAR; INTRAVENOUS; SOFT TISSUE PRN
Status: DISCONTINUED | OUTPATIENT
Start: 2024-06-25 | End: 2024-06-25 | Stop reason: SDUPTHER

## 2024-06-25 RX ORDER — SODIUM CHLORIDE 0.9 % (FLUSH) 0.9 %
5-40 SYRINGE (ML) INJECTION EVERY 12 HOURS SCHEDULED
Status: DISCONTINUED | OUTPATIENT
Start: 2024-06-25 | End: 2024-06-25 | Stop reason: HOSPADM

## 2024-06-25 RX ORDER — SODIUM CHLORIDE 9 MG/ML
INJECTION, SOLUTION INTRAVENOUS CONTINUOUS
Status: DISCONTINUED | OUTPATIENT
Start: 2024-06-25 | End: 2024-06-25 | Stop reason: HOSPADM

## 2024-06-25 RX ORDER — INSULIN LISPRO 100 [IU]/ML
0-4 INJECTION, SOLUTION INTRAVENOUS; SUBCUTANEOUS NIGHTLY
Status: DISCONTINUED | OUTPATIENT
Start: 2024-06-25 | End: 2024-06-26 | Stop reason: HOSPADM

## 2024-06-25 RX ORDER — MAGNESIUM HYDROXIDE 1200 MG/15ML
LIQUID ORAL CONTINUOUS PRN
Status: COMPLETED | OUTPATIENT
Start: 2024-06-25 | End: 2024-06-25

## 2024-06-25 RX ORDER — PROPOFOL 10 MG/ML
INJECTION, EMULSION INTRAVENOUS CONTINUOUS PRN
Status: DISCONTINUED | OUTPATIENT
Start: 2024-06-25 | End: 2024-06-25 | Stop reason: SDUPTHER

## 2024-06-25 RX ORDER — SODIUM CHLORIDE 0.9 % (FLUSH) 0.9 %
5-40 SYRINGE (ML) INJECTION PRN
Status: DISCONTINUED | OUTPATIENT
Start: 2024-06-25 | End: 2024-06-26 | Stop reason: HOSPADM

## 2024-06-25 RX ADMIN — FAMOTIDINE 20 MG: 10 INJECTION, SOLUTION INTRAVENOUS at 22:05

## 2024-06-25 RX ADMIN — DEXAMETHASONE SODIUM PHOSPHATE 10 MG: 4 INJECTION, SOLUTION INTRAMUSCULAR; INTRAVENOUS at 13:58

## 2024-06-25 RX ADMIN — ACETAMINOPHEN 1000 MG: 500 TABLET ORAL at 16:21

## 2024-06-25 RX ADMIN — WATER 2000 MG: 1 INJECTION INTRAMUSCULAR; INTRAVENOUS; SUBCUTANEOUS at 21:59

## 2024-06-25 RX ADMIN — SODIUM CHLORIDE: 9 INJECTION, SOLUTION INTRAVENOUS at 08:21

## 2024-06-25 RX ADMIN — SENNOSIDES AND DOCUSATE SODIUM 1 TABLET: 50; 8.6 TABLET ORAL at 22:05

## 2024-06-25 RX ADMIN — ASPIRIN 81 MG: 81 TABLET, COATED ORAL at 22:07

## 2024-06-25 RX ADMIN — DEXMEDETOMIDINE HYDROCHLORIDE 4 MCG: 100 INJECTION, SOLUTION INTRAVENOUS at 12:24

## 2024-06-25 RX ADMIN — CEFAZOLIN 2000 MG: 2 INJECTION, POWDER, FOR SOLUTION INTRAMUSCULAR; INTRAVENOUS at 12:01

## 2024-06-25 RX ADMIN — Medication 100 MCG: at 13:11

## 2024-06-25 RX ADMIN — PROPOFOL 100 MCG/KG/MIN: 10 INJECTION, EMULSION INTRAVENOUS at 12:25

## 2024-06-25 RX ADMIN — SODIUM CHLORIDE: 9 INJECTION, SOLUTION INTRAVENOUS at 15:56

## 2024-06-25 RX ADMIN — GLYCOPYRROLATE 0.2 MG: 0.2 INJECTION INTRAMUSCULAR; INTRAVENOUS at 13:03

## 2024-06-25 RX ADMIN — ACETAMINOPHEN 1000 MG: 500 TABLET ORAL at 08:20

## 2024-06-25 RX ADMIN — TRANEXAMIC ACID 1000 MG: 10 INJECTION, SOLUTION INTRAVENOUS at 12:05

## 2024-06-25 RX ADMIN — KETOROLAC TROMETHAMINE 15 MG: 15 INJECTION, SOLUTION INTRAMUSCULAR; INTRAVENOUS at 16:21

## 2024-06-25 RX ADMIN — KETOROLAC TROMETHAMINE 15 MG: 15 INJECTION, SOLUTION INTRAMUSCULAR; INTRAVENOUS at 22:06

## 2024-06-25 RX ADMIN — Medication 100 MCG: at 13:29

## 2024-06-25 RX ADMIN — INSULIN GLARGINE 16 UNITS: 100 INJECTION, SOLUTION SUBCUTANEOUS at 22:03

## 2024-06-25 RX ADMIN — INSULIN LISPRO 2 UNITS: 100 INJECTION, SOLUTION INTRAVENOUS; SUBCUTANEOUS at 17:55

## 2024-06-25 RX ADMIN — ACETAMINOPHEN 1000 MG: 500 TABLET ORAL at 22:07

## 2024-06-25 RX ADMIN — SODIUM CHLORIDE, PRESERVATIVE FREE 10 ML: 5 INJECTION INTRAVENOUS at 22:03

## 2024-06-25 RX ADMIN — MEPIVACAINE HYDROCHLORIDE 50 MG: 20 INJECTION, SOLUTION EPIDURAL; INFILTRATION at 12:22

## 2024-06-25 RX ADMIN — GLYCOPYRROLATE 0.2 MG: 0.2 INJECTION INTRAMUSCULAR; INTRAVENOUS at 13:07

## 2024-06-25 RX ADMIN — ONDANSETRON 4 MG: 2 INJECTION INTRAMUSCULAR; INTRAVENOUS at 12:56

## 2024-06-25 ASSESSMENT — LIFESTYLE VARIABLES: SMOKING_STATUS: 0

## 2024-06-25 ASSESSMENT — PAIN - FUNCTIONAL ASSESSMENT: PAIN_FUNCTIONAL_ASSESSMENT: 0-10

## 2024-06-25 ASSESSMENT — ENCOUNTER SYMPTOMS: SHORTNESS OF BREATH: 0

## 2024-06-25 NOTE — ANESTHESIA PROCEDURE NOTES
Spinal Block    Patient location during procedure: OR  End time: 6/25/2024 12:25 PM  Reason for block: primary anesthetic and at surgeon's request  Staffing  Performed: anesthesiologist   Anesthesiologist: Linda Alvarez MD  Resident/CRNA: Sulema Baez APRN - JAMARCUS  Performed by: Sulema Baez APRN - CRNA  Authorized by: Linda Alvarez MD    Spinal Block  Patient position: sitting  Prep: ChloraPrep, site prepped and draped and chloraprep x2  Patient monitoring: cardiac monitor, continuous pulse ox, continuous capnometry and frequent blood pressure checks  Approach: midline  Location: L3/L4  Guidance: paresthesia technique  Provider prep: mask and sterile gloves  Local infiltration: lidocaine  Needle  Needle type: Pencan   Needle gauge: 25 G  Kit: [id number]  Expiration date: 5/31/2026  Assessment  Sensory level: T6  Swirl obtained: Yes  CSF: clear  Attempts: 2  Hemodynamics: stable  Preanesthetic Checklist  Completed: patient identified, IV checked, site marked, risks and benefits discussed, surgical/procedural consents, equipment checked, pre-op evaluation, timeout performed, anesthesia consent given, oxygen available, monitors applied/VS acknowledged, fire risk safety assessment completed and verbalized and blood product R/B/A discussed and consented

## 2024-06-25 NOTE — PROGRESS NOTES
Nursing care provided to prep patient for surgery.  Preop orders completed.  Verified patient has completed 5 days of G preop showers that includes the day of surgery.  Pneumatic sleeves and compression stockings applied as ordered.  Teaching / education initiated regarding perioperative experience, postop expectations, plan of care, and pain management during hospital stay.  Patient verbalized understanding.  Pain goal expectations, care plan and education has been reviewed and mutually agreed upon with the patient.

## 2024-06-25 NOTE — PROGRESS NOTES
Pt VSS. O2 95% on room air. Pt denies pain at this time. Pt had spinal block preoperatively- Left lower extremity with decreased sensation and movement. Surgical site to L knee CDI, solomon hose in place, left pedal pulse and posterior tibial pulses palpable. Pt seen by anesthesia. Phase I criteria met, transferring to .

## 2024-06-25 NOTE — PROGRESS NOTES
4 Eyes Skin Assessment     NAME:  Ricki Zeng  YOB: 1945  MEDICAL RECORD NUMBER:  [id number]    The patient is being assessed for  Admission    I agree that at least one RN has performed a thorough Head to Toe Skin Assessment on the patient. ALL assessment sites listed below have been assessed.      Areas assessed by both nurses:    Head, Face, Ears, Shoulders, Back, Chest, Arms, Elbows, Hands, Sacrum. Buttock, Coccyx, Ischium, Legs. Feet and Heels, and Under Medical Devices         Does the Patient have a Wound? No noted wound(s)       Krzysztof Prevention initiated by RN: No  Wound Care Orders initiated by RN: No    Pressure Injury (Stage 3,4, Unstageable, DTI, NWPT, and Complex wounds) if present, place Wound referral order by RN under : No    New Ostomies, if present place, Ostomy referral order under : No     Nurse 1 eSignature: Electronically signed by Malaika Wayne RN on 6/25/24 at 4:09 PM EDT    **SHARE this note so that the co-signing nurse can place an eSignature**    Nurse 2 eSignature: {Esignature:[id number]}

## 2024-06-25 NOTE — PROGRESS NOTES
Pt admitted to room 4474. AOX4. VSS. Alert to room and call light. Admission done. Skin assessed. Dressing site clean dry and intact.     Electronically signed by Malaika Wayne RN on 6/25/2024 at 4:08 PM

## 2024-06-25 NOTE — PROGRESS NOTES
Pt arrived from OR to PACU. Pt denies pain at this time. Spinal performed preoperatively limiting sensation and movement of B LE. Pt awakens to voice. O2 95% on room air. Will cont to monitor.
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
## 2024-06-25 NOTE — INTERVAL H&P NOTE
Update History & Physical    The patient's History and Physical of June 13, 2024 was reviewed with the patient and I examined the patient. There was no change. The surgical site was confirmed by the patient and me.     Plan: The risks, benefits, expected outcome, and alternative to the recommended procedure have been discussed with the patient. Patient understands and wants to proceed with the procedure.     Electronically signed by Hamzah Leos MD on 6/25/2024 at 11:50 AM

## 2024-06-25 NOTE — PROGRESS NOTES
[facility] Orthopedic Surgery   Progress Note    CHIEF COMPLAINT/DIAGNOSIS: S/p left Total Knee Arthroplasty    OBJECTIVE  Physical    VITALS:  /87   Pulse 86   Temp 97 °F (36.1 °C) (Temporal)   Resp 16   Ht 1.676 m (5' 6\")   Wt 88.5 kg (195 lb)   SpO2 96%   BMI 31.47 kg/m²     Data    ALL MEDICATIONS HAVE BEEN REVIEWED    CBC: No results for input(s): \"WBC\", \"HGB\", \"HCT\", \"PLT\" in the last 72 hours.  BMP: No results for input(s): \"NA\", \"K\", \"CL\", \"CO2\", \"PHOS\", \"BUN\", \"CREATININE\" in the last 72 hours.    Invalid input(s): \"CA\"  INR: No results for input(s): \"INR\" in the last 72 hours.    Post-op films show stable total knee arthroplasty construct without acute complication.     ASSESSMENT:  S/p left Total Knee Arthroplasty (6/25/24), POD#0  DM II  HTN  HLD    PLAN:   Insulin protocol, med correction dosing  - WB status:  WBAT; reviewed post op precautions  - DVT prophylaxis: ASA 81mg BID x 30 days    - PT/OT  - Pain Control: tylenol, mobic and oxy prn.  Due to orthopaedic surgical procedure/condition, patient may require pain medication for up to 6-8 weeks.  - Dispo: home with home therapy tomorrow    Follow-up with Dr. Leos as scheduled on 7/11.  [phone]  Future Appointments   Date Time Provider Department Center   7/11/2024 10:15 AM Hamzah Leos MD [facility]   8/15/2024 11:15 AM Hamzah Leos MD [facility]       DARNELL Carbajal - CNP  6/25/2024  10:12 AM

## 2024-06-26 VITALS
HEART RATE: 95 BPM | WEIGHT: 195 LBS | TEMPERATURE: 98 F | HEIGHT: 66 IN | DIASTOLIC BLOOD PRESSURE: 85 MMHG | OXYGEN SATURATION: 96 % | RESPIRATION RATE: 16 BRPM | SYSTOLIC BLOOD PRESSURE: 127 MMHG | BODY MASS INDEX: 31.34 KG/M2

## 2024-06-26 DIAGNOSIS — Z98.890 S/P LEFT KNEE SURGERY: Primary | ICD-10-CM

## 2024-06-26 LAB
ANION GAP SERPL CALCULATED.3IONS-SCNC: 9 MMOL/L (ref 3–16)
BUN SERPL-MCNC: 32 MG/DL (ref 7–20)
CALCIUM SERPL-MCNC: 8.9 MG/DL (ref 8.3–10.6)
CHLORIDE SERPL-SCNC: 109 MMOL/L (ref 99–110)
CO2 SERPL-SCNC: 21 MMOL/L (ref 21–32)
CREAT SERPL-MCNC: 1.1 MG/DL (ref 0.6–1.2)
DEPRECATED RDW RBC AUTO: 13.8 % (ref 12.4–15.4)
EST. AVERAGE GLUCOSE BLD GHB EST-MCNC: 128.4 MG/DL
GFR SERPLBLD CREATININE-BSD FMLA CKD-EPI: 51 ML/MIN/{1.73_M2}
GLUCOSE BLD-MCNC: 223 MG/DL (ref 70–99)
GLUCOSE BLD-MCNC: 236 MG/DL (ref 70–99)
GLUCOSE SERPL-MCNC: 291 MG/DL (ref 70–99)
HBA1C MFR BLD: 6.1 %
HCT VFR BLD AUTO: 33.3 % (ref 36–48)
HGB BLD-MCNC: 11.1 G/DL (ref 12–16)
MCH RBC QN AUTO: 31.8 PG (ref 26–34)
MCHC RBC AUTO-ENTMCNC: 33.4 G/DL (ref 31–36)
MCV RBC AUTO: 95.4 FL (ref 80–100)
PERFORMED ON: ABNORMAL
PERFORMED ON: ABNORMAL
PLATELET # BLD AUTO: 219 K/UL (ref 135–450)
PMV BLD AUTO: 8.2 FL (ref 5–10.5)
POTASSIUM SERPL-SCNC: 5.2 MMOL/L (ref 3.5–5.1)
RBC # BLD AUTO: 3.49 M/UL (ref 4–5.2)
SODIUM SERPL-SCNC: 139 MMOL/L (ref 136–145)
WBC # BLD AUTO: 8.3 K/UL (ref 4–11)

## 2024-06-26 PROCEDURE — 97116 GAIT TRAINING THERAPY: CPT

## 2024-06-26 PROCEDURE — APPNB45 APP NON BILLABLE 31-45 MINUTES: Performed by: PHYSICIAN ASSISTANT

## 2024-06-26 PROCEDURE — 6370000000 HC RX 637 (ALT 250 FOR IP): Performed by: PHYSICIAN ASSISTANT

## 2024-06-26 PROCEDURE — 97535 SELF CARE MNGMENT TRAINING: CPT

## 2024-06-26 PROCEDURE — 6360000002 HC RX W HCPCS: Performed by: PHYSICIAN ASSISTANT

## 2024-06-26 PROCEDURE — 2580000003 HC RX 258: Performed by: PHYSICIAN ASSISTANT

## 2024-06-26 PROCEDURE — 96376 TX/PRO/DX INJ SAME DRUG ADON: CPT

## 2024-06-26 PROCEDURE — 36415 COLL VENOUS BLD VENIPUNCTURE: CPT

## 2024-06-26 PROCEDURE — 97161 PT EVAL LOW COMPLEX 20 MIN: CPT

## 2024-06-26 PROCEDURE — 97165 OT EVAL LOW COMPLEX 30 MIN: CPT

## 2024-06-26 PROCEDURE — G0378 HOSPITAL OBSERVATION PER HR: HCPCS

## 2024-06-26 PROCEDURE — 96374 THER/PROPH/DIAG INJ IV PUSH: CPT

## 2024-06-26 PROCEDURE — 97530 THERAPEUTIC ACTIVITIES: CPT

## 2024-06-26 PROCEDURE — 85027 COMPLETE CBC AUTOMATED: CPT

## 2024-06-26 PROCEDURE — 80048 BASIC METABOLIC PNL TOTAL CA: CPT

## 2024-06-26 RX ORDER — SENNA AND DOCUSATE SODIUM 50; 8.6 MG/1; MG/1
1 TABLET, FILM COATED ORAL DAILY
Qty: 14 TABLET | Refills: 0 | Status: SHIPPED | OUTPATIENT
Start: 2024-06-26

## 2024-06-26 RX ORDER — ACETAMINOPHEN 500 MG
1000 TABLET ORAL 3 TIMES DAILY
Qty: 84 TABLET | Refills: 0 | Status: SHIPPED | OUTPATIENT
Start: 2024-06-26

## 2024-06-26 RX ORDER — ASPIRIN 81 MG/1
81 TABLET ORAL 2 TIMES DAILY
Qty: 60 TABLET | Refills: 0 | Status: SHIPPED | OUTPATIENT
Start: 2024-06-26 | End: 2024-07-26

## 2024-06-26 RX ORDER — OXYCODONE HYDROCHLORIDE 5 MG/1
5 TABLET ORAL EVERY 4 HOURS PRN
Qty: 42 TABLET | Refills: 0 | Status: SHIPPED | OUTPATIENT
Start: 2024-06-26 | End: 2024-07-03

## 2024-06-26 RX ADMIN — ASPIRIN 81 MG: 81 TABLET, COATED ORAL at 09:43

## 2024-06-26 RX ADMIN — SODIUM CHLORIDE: 9 INJECTION, SOLUTION INTRAVENOUS at 04:26

## 2024-06-26 RX ADMIN — Medication 400 MG: at 09:41

## 2024-06-26 RX ADMIN — INSULIN LISPRO 2 UNITS: 100 INJECTION, SOLUTION INTRAVENOUS; SUBCUTANEOUS at 11:48

## 2024-06-26 RX ADMIN — INSULIN LISPRO 2 UNITS: 100 INJECTION, SOLUTION INTRAVENOUS; SUBCUTANEOUS at 07:39

## 2024-06-26 RX ADMIN — ACETAMINOPHEN 1000 MG: 500 TABLET ORAL at 09:43

## 2024-06-26 RX ADMIN — FAMOTIDINE 20 MG: 20 TABLET, FILM COATED ORAL at 09:43

## 2024-06-26 RX ADMIN — GABAPENTIN 600 MG: 300 CAPSULE ORAL at 09:41

## 2024-06-26 RX ADMIN — SENNOSIDES AND DOCUSATE SODIUM 1 TABLET: 50; 8.6 TABLET ORAL at 09:41

## 2024-06-26 RX ADMIN — WATER 2000 MG: 1 INJECTION INTRAMUSCULAR; INTRAVENOUS; SUBCUTANEOUS at 04:26

## 2024-06-26 RX ADMIN — KETOROLAC TROMETHAMINE 15 MG: 15 INJECTION, SOLUTION INTRAMUSCULAR; INTRAVENOUS at 09:41

## 2024-06-26 RX ADMIN — INSULIN LISPRO 2 UNITS: 100 INJECTION, SOLUTION INTRAVENOUS; SUBCUTANEOUS at 11:47

## 2024-06-26 RX ADMIN — OXYCODONE HYDROCHLORIDE 5 MG: 5 TABLET ORAL at 04:22

## 2024-06-26 RX ADMIN — KETOROLAC TROMETHAMINE 15 MG: 15 INJECTION, SOLUTION INTRAMUSCULAR; INTRAVENOUS at 04:24

## 2024-06-26 ASSESSMENT — PAIN SCALES - WONG BAKER
WONGBAKER_NUMERICALRESPONSE: HURTS A LITTLE BIT
WONGBAKER_NUMERICALRESPONSE: NO HURT

## 2024-06-26 ASSESSMENT — PAIN SCALES - GENERAL
PAINLEVEL_OUTOF10: 6
PAINLEVEL_OUTOF10: 6
PAINLEVEL_OUTOF10: 0

## 2024-06-26 ASSESSMENT — PAIN DESCRIPTION - LOCATION
LOCATION: KNEE
LOCATION: LEG

## 2024-06-26 ASSESSMENT — PAIN DESCRIPTION - DESCRIPTORS
DESCRIPTORS: ACHING
DESCRIPTORS: ACHING

## 2024-06-26 ASSESSMENT — PAIN DESCRIPTION - ORIENTATION
ORIENTATION: LEFT
ORIENTATION: LEFT

## 2024-06-26 NOTE — PLAN OF CARE
Problem: Chronic Conditions and Co-morbidities  Goal: Patient's chronic conditions and co-morbidity symptoms are monitored and maintained or improved  6/26/2024 1237 by Kit Verde RN  Outcome: Completed  6/26/2024 1133 by Kit Verde RN  Outcome: Progressing  6/26/2024 0620 by Barry De Luna RN  Outcome: Progressing     Problem: Discharge Planning  Goal: Discharge to home or other facility with appropriate resources  6/26/2024 1237 by Kit Verde RN  Outcome: Completed  6/26/2024 1133 by Kit Verde RN  Outcome: Progressing  6/26/2024 0620 by Barry De Luna RN  Outcome: Progressing     Problem: Pain  Goal: Verbalizes/displays adequate comfort level or baseline comfort level  6/26/2024 1237 by Kit Verde RN  Outcome: Completed  6/26/2024 1133 by Kit Verde RN  Outcome: Progressing  6/26/2024 0620 by Barry De Luna RN  Outcome: Progressing     Problem: Safety - Adult  Goal: Free from fall injury  6/26/2024 1237 by Kit Verde RN  Outcome: Completed  6/26/2024 1133 by Kit Verde RN  Outcome: Progressing  6/26/2024 0620 by Barry De Luna RN  Outcome: Progressing

## 2024-06-26 NOTE — DISCHARGE INSTRUCTIONS
Dr. Leos Total KNEE Replacement Instructions     Follow-up with Dr. Leos in 2 weeks; [phone] ([facility])    Incision care  Should your incision start to drain let our office know.  Continue chaim hose on the operative leg (this should be thigh high) during the day, can remove at night.   Okay to shower tomorrow, as long as your post-op waterproof dressing is still in place.  Do not tub bathe or submerge your incision in water.  Should your incision start to drain, let our office know.  You may remove your Therabond dressing on post-op day 7.  Prior to any wound care please wash and dry your hands.  During the day, continue to wear your CHAIM stocking on the operative leg. Take the stocking off at night. You do not need to wear a stocking on your non-operative leg. You should wear the compression stocking until your post-op visit, this keeps lower extremity swelling to a minimum and reduces joint stiffness.      Exercise  Physical therapy will be started right away and should be set up prior to your surgery.  PT will be 2-3 times a week for 4-6 weeks.  Some patients will start this at home for two weeks then transition to outpatient PT, while others will go straight to outpatient PT the day after surgery.  Unless told otherwise, your operative leg is \"weight bearing as tolerated\".  This means you can put all of your weight on your surgery leg.  You may progress off support as tolerated.   DO NOT place a pillow under your knee.  To elevate the operative leg, elevate the ENTIRE leg.  The knee should not rest in a bent position.  Dr. Leos wants his total joint patients to walk for 5-10 minutes every hour while awake.  Ask your surgeon's office for  FMLA paperwork or a return to work note.    Ice  For pain and swelling, put ice or a cold pack on the area for 10 to 20 minutes at a time. Put a thin cloth between the ice and your skin such as a clean pillow case or thin towel.    Diet  Resume your home  and providers that you had a joint replacement. You may need to be on an antibiotic before any dental work and procedures.  Call your surgeon for your antibiotic prescription.  Smoking cessation assistance can be obtained from your family doctor or by calling [place] [employer]uit Line @ [phone]    Common symptoms of blood clots (DVT/PE) include: localized pain, swelling, calf tenderness, redness or discoloration of the skin. PE symptoms include: shortness of breath, rapid pulse, sweating, and chest pain that worsens with inspiration, coughing up blood, light headedness, feelings of apprehension. If you experience any of these symptoms call the office or go to the closest ER.     Call 911 anytime you think you may need emergency care. For example, call if:    You passed out (lost consciousness).     You have severe trouble breathing.     You have sudden chest pain and shortness of breath, or you cough up blood.   Call your doctor now or seek immediate medical care if:    You have signs of infection, such as:  Increased pain, swelling, warmth, or redness.  Red streaks leading from the incision.  Pus draining from the incision.  A fever over 101     Sharp calf pain     Your incision comes open and begins to bleed, or the bleeding increases.  If you fall, especially if you are on blood thinners     You have pain that is not controlled with medications, rest, or ice         If you have a medical emergency please call 911 or seek immediate medical help.  [facility]  [address] [id number] [phone]

## 2024-06-26 NOTE — CARE COORDINATION
Case Management Assessment  Initial Evaluation    Date/Time of Evaluation: 6/26/2024 8:09 AM  Assessment Completed by: Royal Weldon Jr, RN    If patient is discharged prior to next notation, then this note serves as note for discharge by case management.    Patient Name: Ricki Zeng                   YOB: 1945  Diagnosis: Arthritis of left knee [M17.12]  Osteoarthritis of left knee, unspecified osteoarthritis type [M17.12]                   Date / Time: 6/25/2024  7:33 AM    Patient Admission Status: Observation   Readmission Risk (Low < 19, Mod (19-27), High > 27): Readmission Risk Score: 8.4    Current PCP: Neville Botello MD  PCP verified by CM? (P) Yes    Chart Reviewed: Yes      History Provided by:    Patient Orientation: (P) Alert and Oriented    Patient Cognition: (P) Alert    Hospitalization in the last 30 days (Readmission):  No    If yes, Readmission Assessment in  Navigator will be completed.    Advance Directives:      Code Status: Full Code   Patient's Primary Decision Maker is: Legal Next of Kin      Discharge Planning:    Patient lives with: (P) Family Members Type of Home: House  Primary Care Giver: (P) Self  Patient Support Systems include: (P) Family Members   Current Financial resources:    Current community resources: (P) None  Current services prior to admission: None            Current DME:              Type of Home Care services:  (P) PT, OT    ADLS  Prior functional level: (P) Assistance with the following:, Mobility  Current functional level: (P) Assistance with the following:    PT AM-PAC:   /24  OT AM-PAC:   /24    Family can provide assistance at DC: (P) Yes  Would you like Case Management to discuss the discharge plan with any other family members/significant others, and if so, who? (P) No  Plans to Return to Present Housing: (P) Yes  Other Identified Issues/Barriers to RETURNING to current housing:   Potential Assistance needed at discharge: (P) Other

## 2024-06-26 NOTE — PROGRESS NOTES
[facility] - Inpatient Rehabilitation Department   Phone: [phone]    Physical Therapy    [x] Initial Evaluation            [] Daily Treatment Note         [] Discharge Summary      Patient: Ricki eZng   : 1945   MRN: [id number]   Date of Service:  2024  Admitting Diagnosis: Osteoarthritis of left knee, unspecified osteoarthritis type  Current Admission Summary: L total knee arthroplasty  Past Medical History:  has a past medical history of Anesthesia, Arthritis, Asthma, Breast cancer (HCC), Cancer (HCC), Diabetes mellitus (HCC), Diverticulitis, History of chemotherapy, Hx antineoplastic chemo, Hypertension, Prolonged emergence from general anesthesia, Sleep apnea, and Urinary incontinence.  Past Surgical History:  has a past surgical history that includes Hysterectomy; US BREAST BIOPSY W LOC DEVICE 1ST LESION RIGHT (Right, 6/3/2021); US BREAST BIOPSY W LOC DEVICE EACH ADDL LESION RIGHT (Right, 6/3/2021); US BIOPSY LYMPH NODE (6/3/2021); Breast lumpectomy (Left); Port Surgery (N/A, 2021); Ovary removal; US PLACE BREAST LOC DEVICE 1ST LESION RIGHT (Right, 2022); Mastectomy (Bilateral, 2022); Breast enhancement surgery (Bilateral, 2023); and Breast surgery (Bilateral, 2023).    Discharge Recommendations: Ricki Zeng scored a 20/24 on the AM-PAC short mobility form. Current research shows that an AM-PAC score of 18 or greater is typically associated with a discharge to the patient's home setting. Based on the patient's AM-PAC score and their current functional mobility deficits, it is recommended that the patient have 2-3 sessions per week of Physical Therapy at d/c to increase the patient's independence.  At this time, this patient demonstrates the endurance and safety to discharge home with home services and a follow up treatment frequency of 2-3x/wk.  Please see assessment section for further patient specific details.    If patient discharges prior to next

## 2024-06-26 NOTE — DISCHARGE SUMMARY
Patient ID:  Ricki Zeng  [id number]  1945    Admit date: 6/25/2024    Discharge date: 6/26/2024  1:10 PM    Attending Physician: Hamzah Leos MD     Admission Diagnoses:   Arthritis of left knee [M17.12]  Osteoarthritis of left knee, unspecified osteoarthritis type [M17.12]    Discharge Diagnoses:   Principal Problem:    Osteoarthritis of left knee, unspecified osteoarthritis type  Resolved Problems:    * No resolved hospital problems. *    Past Medical History:   Diagnosis Date    Anesthesia     sensitive, doesnt take much    Arthritis     Asthma     resolved    Breast cancer (HCC)     Cancer (HCC) 06/2021    right Breast    Diabetes mellitus (HCC)     Diverticulitis     History of chemotherapy 12/29/2021    Dr Steve [facility], treament concluded    Hx antineoplastic chemo     Hypertension     no meds due to chemo    Prolonged emergence from general anesthesia     Sleep apnea     Mild - wears no mask    Urinary incontinence        Indication for Admission: Ricki Zeng is a 78 y.o. female who presented with left knee osteoarthritis that was not responsive to conservative measures.     Operations/Procedures Performed:   1. left total knee arthroplasty    Hospital Course: Patient admitted on 6/25/2024 and underwent abovementioned procedure(s) on 6/25/24. Tolerated the procedure well with no complications. Please see full operative report for further details regarding the operation. Postoperatively transferred to the floor in stable condition.  Pain controlled post-op with IV/oral pain medication. Diet was advanced and tolerated this well. At time of discharge, the patient was tolerating oral food and hydration, voiding spontaneously, had return of bowel function, was ambulating without difficulty, and pain was controlled on oral medications. The patient was determined to be suitable for discharge and the patient felt comfortable with that decision.     Consults: Physical and Occupational Therapy, Social

## 2024-06-26 NOTE — PROGRESS NOTES
20:00 Assessment complete. VSS. The care plan and education has been reviewed and mutually agreed upon with the patient.     04:22 Oxycodone given for pain.       Barry De Luna RN

## 2024-06-26 NOTE — PROGRESS NOTES
Gave d/c instructions with list of active meds and when they are next due. Reviewed discharge instructions at bedside and provided printed copy of same. Pt verbalized understanding of all instructions. Denied additional questions. To home as passenger in private vehicle, taken to vehicle by staff transporter.

## 2024-06-26 NOTE — PROGRESS NOTES
AM assessments completed. VSS. Morning meds given, well tolerated. Alert and oriented x4.    The care plan and education has been reviewed and mutually agreed upon with the patient.

## 2024-06-26 NOTE — PROGRESS NOTES
Occupational Therapy    [facility] - Inpatient Rehabilitation Department   Phone: [phone]    Occupational Therapy    [x] Initial Evaluation            [] Daily Treatment Note         [] Discharge Summary      Patient: Ricki Zeng   : 1945   MRN: [id number]   Date of Service:  2024    Admitting Diagnosis:  Osteoarthritis of left knee, unspecified osteoarthritis type  Current Admission Summary: Total L knee arthroplasty  Past Medical History:  has a past medical history of Anesthesia, Arthritis, Asthma, Breast cancer (HCC), Cancer (HCC), Diabetes mellitus (HCC), Diverticulitis, History of chemotherapy, Hx antineoplastic chemo, Hypertension, Prolonged emergence from general anesthesia, Sleep apnea, and Urinary incontinence.  Past Surgical History:  has a past surgical history that includes Hysterectomy; US BREAST BIOPSY W LOC DEVICE 1ST LESION RIGHT (Right, 6/3/2021); US BREAST BIOPSY W LOC DEVICE EACH ADDL LESION RIGHT (Right, 6/3/2021); US BIOPSY LYMPH NODE (6/3/2021); Breast lumpectomy (Left); Port Surgery (N/A, 2021); Ovary removal; US PLACE BREAST LOC DEVICE 1ST LESION RIGHT (Right, 2022); Mastectomy (Bilateral, 2022); Breast enhancement surgery (Bilateral, 2023); and Breast surgery (Bilateral, 2023).    Discharge Recommendations: Ricki Zeng scored a 21/24 on the AM-PAC ADL Inpatient form. Current research shows that an AM-PAC score of 18 or greater is typically associated with a discharge to the patient's home setting. Based on the patient's AM-PAC score, and their current ADL deficits, it is recommended that the patient have 2-3 sessions per week of Occupational Therapy at d/c to increase the patient's independence.  At this time, this patient demonstrates the endurance and safety to discharge home with HHOT services and a follow up treatment frequency of 2-3x/wk.   Please see assessment section for further patient specific details.    HOME HEALTH CARE:  toileting at supervision   Patient will complete functional transfers at supervision   Patient will complete functional mobility at supervision     Above goals reviewed on 6/26/2024.  All goals are ongoing at this time unless indicated above.       Therapy Session Time     Individual Group Co-treatment   Time In 1055      Time Out 1149      Minutes 54           Timed Code Treatment Minutes:   39 Minutes  Total Treatment Minutes:  54 Minutes       Electronically Signed By: YARELIS Cifuentes MOT OTR/L [id number]

## 2024-06-26 NOTE — PLAN OF CARE
Problem: Chronic Conditions and Co-morbidities  Goal: Patient's chronic conditions and co-morbidity symptoms are monitored and maintained or improved  6/26/2024 1133 by Kit Verde RN  Outcome: Progressing  6/26/2024 0620 by Barry De Luna RN  Outcome: Progressing     Problem: Discharge Planning  Goal: Discharge to home or other facility with appropriate resources  6/26/2024 1133 by Kit Verde RN  Outcome: Progressing  6/26/2024 0620 by Barry De Luna RN  Outcome: Progressing     Problem: Pain  Goal: Verbalizes/displays adequate comfort level or baseline comfort level  6/26/2024 1133 by Kit Verde RN  Outcome: Progressing  6/26/2024 0620 by Barry De Luna RN  Outcome: Progressing     Problem: Safety - Adult  Goal: Free from fall injury  6/26/2024 1133 by Kit Verde RN  Outcome: Progressing  6/26/2024 0620 by Barry De Luna RN  Outcome: Progressing

## 2024-06-26 NOTE — DISCHARGE INSTR - COC
example:[id number]}  Last Modified Barium Swallow with Video (Video Swallowing Test): {Done Not Done Date:}    Treatments at the Time of Hospital Discharge:   Respiratory Treatments: ***  Oxygen Therapy:  {Therapy; copd oxygen:22481}  Ventilator:    { CC Vent List:938025022}    Rehab Therapies: Physical Therapy  Weight Bearing Status/Restrictions: { CC Weight Bearin}  Other Medical Equipment (for information only, NOT a DME order):  {EQUIPMENT:499830338}  Other Treatments: ***    Patient's personal belongings (please select all that are sent with patient):  {CHP DME Belongings:199549444}    RN SIGNATURE:  {Esignature:[id number]}    CASE MANAGEMENT/SOCIAL WORK SECTION    Inpatient Status Date: ***    Readmission Risk Assessment Score:  Readmission Risk              Risk of Unplanned Readmission:  0           Discharging to Facility/ Agency   Name:  [facility]    Address: [address] [place] [address], [address] [place] [address]  Phone: [phone]  Fax: [phone]      Dialysis Facility (if applicable)   Name:  Address:  Dialysis Schedule:  Phone:  Fax:    / signature: {Esignature:576148588}    PHYSICIAN SECTION    Prognosis: Good    Condition at Discharge: Stable    Rehab Potential (if transferring to Rehab): Good    Recommended Labs or Other Treatments After Discharge: N/A    Physician Certification: I certify the above information and transfer of Ricki Zeng  is necessary for the continuing treatment of the diagnosis listed and that she requires Home Care for less 30 days.     Update Admission H&P: No change in H&P    PHYSICIAN SIGNATURE:  Electronically signed by Clovis Roque PA-C on 24 at 12:10 PM EDT    INSTRUCTIONS    ACTIVITY: weight-bearing as tolerated. You may progress off support as tolerated. During the day, continue to wear your CHAIM stocking on the operative leg. Take the stocking off at night. You do not need to wear a stocking on your  non-operative leg. You should wear the compression stocking until your post-op visit, this keeps lower extremity swelling to a minimum and reduces joint stiffness.     MEDICATIONS: Upon discharge resume your home medications. Take all medications as prescribed. Take a stool softener (Senna/Colace) if taking narcotic pain medications. Stool softeners are only effective if you are adequately hydrated.  Try and drink 6-8 glasses of water or fluids a day, unless otherwise contraindicated. Despite using Senna/Colace, if you haven't had a bowel movement in 3 days, please switch to Miralax. Miralax is a gentle osmotic laxative and is sold over the counter. Mix one capful of the powder into a glass of water or juice once a day. You should have a bowel movement within 24 hours, if not call the office.     You will be discharged from the hospital with an adequate supply of pain medication. You are encouraged to taper the use of narcotic pain medication as tolerated. Should you require a refill, please call our office. Dr. Leos's office routinely prescribes narcotic pain medication for 4-6 weeks after surgery. If you require pain medication beyond this interval you may be referred to your PCP or to the Pain Clinic for further evaluation.     Plan ahead for refills on pain medication as many narcotics either need to be picked up at the office or mailed. It is best to call 48-72 hours in advance of needing a refill so that you don't run out of medication.     ANTICOAGULATION: Continue your Aspirin as prescribed to prevent blood clots (DVT/PE). As long as your incision remains dry and you tolerate anti-inflammatory medications (Meloxicam, Aleve, Advil, Motrin, ibuprofen, naprosyn), it is OK to take these medications as well.    Common symptoms of blood clots (DVT/PE) include: localized pain, swelling, calf tenderness, redness or discoloration of the skin. PE symptoms include: shortness of breath, rapid pulse, sweating, and chest

## 2024-06-26 NOTE — PROGRESS NOTES
[facility] Orthopedic Surgery   Progress Note    CHIEF COMPLAINT/DIAGNOSIS: S/p left Total Knee Arthroplasty    OBJECTIVE  Patient seen sitting up in chair. Complains of mild knee pain. Denies paresthesias. Recently saw PT, did well.     Physical    VITALS:  /85   Pulse 95   Temp 98 °F (36.7 °C) (Oral)   Resp 16   Ht 1.676 m (5' 5.98\")   Wt 88.5 kg (195 lb)   SpO2 96%   BMI 31.49 kg/m²     Data    ALL MEDICATIONS HAVE BEEN REVIEWED    CBC:   Recent Labs     06/26/24  0507   WBC 8.3   HGB 11.1*   HCT 33.3*        BMP:   Recent Labs     06/26/24  0507      K 5.2*      CO2 21   BUN 32*   CREATININE 1.1     INR: No results for input(s): \"INR\" in the last 72 hours.    Post-op films show stable total knee arthroplasty construct without acute complication.     ASSESSMENT:  S/p left Total Knee Arthroplasty (6/25/24), POD #1  DM II  HTN  HLD    PLAN:   Insulin protocol, med correction dosing  - WB status:  WBAT; reviewed post op precautions  - DVT prophylaxis: ASA 81mg BID x 30 days    - PT/OT  - Pain Control: tylenol, mobic and oxy prn.  Due to orthopaedic surgical procedure/condition, patient may require pain medication for up to 6-8 weeks.  - Dispo: home with home therapy today pending PT/OT    Follow-up with Dr. Leos as scheduled on 7/11.  [phone]  Future Appointments   Date Time Provider Department Center   7/11/2024 10:15 AM Hamzah Leos MD [facility]   8/15/2024 11:15 AM Hamzah Leos MD [facility]       Clovis Roque PA-C  6/26/2024  11:23 AM

## 2024-06-26 NOTE — CARE COORDINATION
VÁSQUEZ Letter       06/26/24 0906   IMM Letter   Observation Status Letter date given: 06/26/24   Observation Status Letter time given: 0809   Observation Status Letter given to Patient/Family/Significant other/Guardian/POA/by: Patient     ALEM GarciaN RN    [facility]  Phone: [phone]

## 2024-06-26 NOTE — PROGRESS NOTES
CLINICAL PHARMACY NOTE: MEDS TO BEDS    Total # of Prescriptions Filled: 4   The following medications were delivered to the patient:  ACETAMINOPHEN EXTRA STRENG 500 TABS  OXYCODONE HCL 5MG TABS  ASPIRIN LOW DOSE 81MG TBEC  STIMULANT LAXATIVE 8.6 - 50MG TABS    Additional Documentation: Patient daughter picked up=signed  Nicole Londonols Pharmacy Tech

## 2024-06-27 ENCOUNTER — TELEPHONE (OUTPATIENT)
Dept: ORTHOPEDIC SURGERY | Age: 79
End: 2024-06-27

## 2024-06-27 NOTE — TELEPHONE ENCOUNTER
S/P left total knee arthroplasty  with Dr. Leos    Called patient for [facility] ortho bundle follow up.  Unable to reach patient, left voicemail.  Instructed patient to call for any questions or concerns.      Priti Champion RN  [phone]         Patient was advised to call with any questions or concerns.     Follow up appointments:    Future Appointments   Date Time Provider Department Center   7/11/2024 10:15 AM Hamzah Leos MD [facility]   8/15/2024 11:15 AM Hamzah Leos MD [facility]

## 2024-06-28 ENCOUNTER — TELEPHONE (OUTPATIENT)
Dept: ORTHOPEDIC SURGERY | Age: 79
End: 2024-06-28

## 2024-06-28 NOTE — TELEPHONE ENCOUNTER
RETURNED CALL TO PATIENT INFORMED HER THAT YES IT IS OK TO TAKE OFF AT NIGHT WHILE SLEEPING PATIENT STATED UNDERSTANDING     Patient is calling she would like to know if she can take off the surgical hose at night. She just need a call back. Please Advise.

## 2024-06-28 NOTE — TELEPHONE ENCOUNTER
Surgery and/or Procedure Scheduling     Contact Name: Ricki Zeng MARICARMEN   Surgical/Procedure Request: sx pm 06/25/24 on lt knee  Patient Contact Number: [phone]     Patient is calling she would like to know if she can take off the surgical hose at night. She just need a call back. Please Advise.

## 2024-07-01 NOTE — OP NOTE
bony surfaces were irrigated and dried. Medium viscosity cement was mixed and the tibial, femoral and patella implant were cemented into place.  A trial tibial insert was place and the knee was held in full extension as the cement was allowed to cure.  Cement that extruded from around each implant was carefully removed.  Once the cement had cured and all excess cement was removed, the trial tibial insert was removed and a real tibial insert was placed.  Stability in both coronal and sagittal planes, range of motion 0 to 120 degrees with gravity, and patellar tracking were checked and found to be satisfactory.      The tourniquet was let down and meticulous hemostasis was achieved. The wound was bathed in betadine then thoroughly irrigated. Vancomycin powder was placed.  The arthrotomy was closed with a #1 Vicryl in an interrupted fashion. The deep subcutaneous tissue was closed with 0 Vicryl in a running fashion and the subcutaneous layer was closed with 2-0 Vicryl in a running fashion.  The skin was closed with monocryl and dermabond. A dry postoperative dressing was applied. The patient was then taken to the Recovery Room in comfortable and stable condition.     ATTESTATION: Dr. Leos was present and scrubbed for the entire procedure.     Hamzah Leos MD

## 2024-07-03 ENCOUNTER — TELEPHONE (OUTPATIENT)
Dept: ORTHOPEDIC SURGERY | Age: 79
End: 2024-07-03

## 2024-07-03 NOTE — TELEPHONE ENCOUNTER
S/P LEFT total knee arthroplasty  with Hamzah Pena MD     Called patient for [facility] ortho bundle follow up.  Unable to reach patient, left voicemail.  Instructed patient to call for any questions or concerns.      Priti Champion RN  [phone]         Follow up appointments:    Future Appointments   Date Time Provider Department Center   7/11/2024 10:15 AM Hamzah Leos MD [facility]   8/15/2024 11:15 AM Hamzah Leos MD [facility]

## 2024-07-08 ENCOUNTER — TELEPHONE (OUTPATIENT)
Dept: ORTHOPEDIC SURGERY | Age: 79
End: 2024-07-08

## 2024-07-08 DIAGNOSIS — Z96.652 S/P TOTAL KNEE ARTHROPLASTY, LEFT: Primary | ICD-10-CM

## 2024-07-08 RX ORDER — OXYCODONE HYDROCHLORIDE 5 MG/1
5 TABLET ORAL EVERY 4 HOURS PRN
Qty: 42 TABLET | Refills: 0 | Status: SHIPPED | OUTPATIENT
Start: 2024-07-08 | End: 2024-07-15

## 2024-07-08 NOTE — TELEPHONE ENCOUNTER
Prescription Refill     Medication Name:  PAIN MED    Pharmacy: [id number] [address] P [phone] - F [phone]     Patient Contact Number:  [phone]      PER PT, Pt NEEDS A REFILL ON PAIN MED, PLEASE, AND WILL YOU CALL THE Pt TO LET HER KNOW WHEN THE MED HAS BEEN SENT IN FOR HER, SO SHE CAN GET SOMEONE TO P/U FOR HER?

## 2024-07-09 ENCOUNTER — TELEPHONE (OUTPATIENT)
Dept: ORTHOPEDIC SURGERY | Age: 79
End: 2024-07-09

## 2024-07-09 NOTE — TELEPHONE ENCOUNTER
S/P Ricki Zeng  with Hamzah Pena MD     Called patient for [facility] ortho bundle follow up.  Unable to reach patient, left voicemail.  Instructed patient to call for any questions or concerns.      Priti Champion, RN  [phone]       Follow up appointments:    Future Appointments   Date Time Provider Department Center   7/11/2024 10:15 AM Hamzah Leos MD [facility]   8/15/2024 11:15 AM Hamzah Leos MD [facility]

## 2024-07-11 ENCOUNTER — OFFICE VISIT (OUTPATIENT)
Dept: ORTHOPEDIC SURGERY | Age: 79
End: 2024-07-11

## 2024-07-11 DIAGNOSIS — Z98.890 S/P LEFT KNEE SURGERY: Primary | ICD-10-CM

## 2024-07-11 NOTE — PROGRESS NOTES
Verified with office patient does not to redo any PAT labs and Dr Leos will do the H/P for the 7/30/24 surgery

## 2024-07-11 NOTE — PROGRESS NOTES
Confirmed patient received language preferred educational packet from the surgeons office and completed the review. Barriers to learning addressed. Patient education material mailed if requested in the language preferred. Reinforced with patient the importance of reading the entire packet of total joint replacement information and educational material received from their surgeon.  Encouraged to call their surgeons  office for questions or if they did not receive the packet. All patient  questions answered. Patient voices understanding.

## 2024-07-11 NOTE — PROGRESS NOTES
Name_______________________________________Printed:____________________  Date and time of surgery___7/30 0930_____________________Arrival Time:_0730____/per office____   1. The instructions given regarding when and if a patient needs to stop oral intake prior to surgery varies.Follow the specific instructions you were given                  _x__Nothing to eat or to drink after Midnight the night before.                   ____Carbo loading or instructions will be given to select patients-if you have been given those instructions -please do the following                           The evening before your surgery after dinner before midnight drink 40 ounces of gatorade.If you are diabetic use sugar free.  The morning of surgery drink 40 ounces of water.This needs to be finished 3 hours prior to your surgery start time.    2. Take the following pills with a small sip of water on the morning of surgery____gabapentin_______________________________________________                  Do not take blood pressure medications ending in pril or sartan the timmy prior to surgery or the morning of surgery. Dr Leblanc's patient are not to take any medications the AM of surgery.         3. Aspirin, Ibuprofen, Advil, Naproxen, Vitamin E and other Anti-inflammatory products and supplements should be stopped for 5 -7days before surgery or as directed by your physician.   4. Check with your Doctor regarding stopping Plavix, Coumadin,Eliquis, Lovenox,Effient,Pradaxa,Xarelto, Fragmin or other blood thinners and follow their instructions.   5. Do not smoke, and do not drink any alcoholic beverages 24 hours prior to surgery.  This includes NA Beer.Refrain from the usage of any recreational drugs.   6. You may brush your teeth and gargle the morning of surgery.  DO NOT SWALLOW WATER   7. You MUST make arrangements for a responsible adult to stay on site while you are here and take you home after your surgery. You will not be allowed to leave alone

## 2024-07-23 ENCOUNTER — HOSPITAL ENCOUNTER (OUTPATIENT)
Dept: PHYSICAL THERAPY | Age: 79
Setting detail: THERAPIES SERIES
Discharge: HOME | End: 2024-07-23
Payer: MEDICARE

## 2024-07-23 ENCOUNTER — TELEPHONE (OUTPATIENT)
Dept: ORTHOPEDIC SURGERY | Age: 79
End: 2024-07-23

## 2024-07-23 DIAGNOSIS — Z91.81 RISK FOR FALLS: ICD-10-CM

## 2024-07-23 DIAGNOSIS — M25.662 DECREASED RANGE OF MOTION (ROM) OF LEFT KNEE: Primary | ICD-10-CM

## 2024-07-23 DIAGNOSIS — R26.9 GAIT ABNORMALITY: ICD-10-CM

## 2024-07-23 DIAGNOSIS — R29.898 HIP WEAKNESS: ICD-10-CM

## 2024-07-23 DIAGNOSIS — M62.81 QUADRICEPS WEAKNESS: ICD-10-CM

## 2024-07-23 DIAGNOSIS — M25.661 DECREASED RANGE OF MOTION (ROM) OF RIGHT KNEE: ICD-10-CM

## 2024-07-23 DIAGNOSIS — Z98.890 S/P LEFT KNEE SURGERY: Primary | ICD-10-CM

## 2024-07-23 PROCEDURE — 97110 THERAPEUTIC EXERCISES: CPT

## 2024-07-23 PROCEDURE — 97161 PT EVAL LOW COMPLEX 20 MIN: CPT

## 2024-07-23 PROCEDURE — MISCCOLD COLD THERAPY UNIT AND PAD: Performed by: ORTHOPAEDIC SURGERY

## 2024-07-23 PROCEDURE — 97016 VASOPNEUMATIC DEVICE THERAPY: CPT

## 2024-07-23 NOTE — TELEPHONE ENCOUNTER
General Question     Subject: ICE MACHINE/INPATIENT  Patient and /or Facility Request: PATIENT CALLED TO ORDER A ICE MACHINE AND THAT SHE WOULD LIKE TO PICK IT UP FROM THE OFFICE. PATIENT ALSO STATES THAT SHE NEEDS TO SCHEDULE HER OVERNIGHT STAY AFTER HER SURGERY. PLS CALL TO ADVISE   Contact Number: [phone]

## 2024-07-23 NOTE — PLAN OF CARE
function.       Barriers to/and or personal factors that will affect rehab potential:   Age  Co-morbidities  past PT/medical experience    Physical Therapy Evaluation Complexity Justification  [x] A history of present problem and 1-2 personal factors and/or co-morbidities that impact the plan of care  [x] A total of 1-2 elements  found upon examination of body systems using standardized tests and measures addressing any of the following: body structures, functions (impairments), activity limitations, and/or participation restrictions  [x] A clinical presentation with stable and/or uncomplicated characteristics   [x] Clinical decision making of LOW (97781 - Typically 20 minutes face-to-face) complexity using standardized patient assessment instrument and/or measurable assessment of functional outcome.    Today's Assessment: See above    Medical Necessity Documentation:  I certify that this patient meets the below criteria necessary for medical necessity for care and/or justification of therapy services:  The patient has functional impairments and/or activity limitations and would benefit from continued outpatient therapy services to address the deficits outlined in the patients goals  The patient had a prior episode of outpatient therapy during this calendar year for a different condition.  Current diagnosis requires skilled therapeutic intervention.    Return to Play: NA    Prognosis for POC: [x] Good [] Fair  [] Poor    Patient requires continued skilled intervention: [x] Yes  [] No      CHARGE CAPTURE     PT CHARGE GRID   CPT Code (TIMED) minutes # CPT Code (UNTIMED) #     Therex (88985)  10 1  EVAL:LOW (91190 - Typically 20 minutes face-to-face) 1    Neuromusc. Re-ed (53244)    Re-Eval (90399)     Manual (87421)    Estim Unattended (13423)     Ther. Act (02422)    Mech. Traction (42108)     Gait (07697)    Dry Needle 1-2 muscle (20560)     Aquatic Therex (14972)    Dry Needle 3+ muscle (20561)     Iontophoresis

## 2024-07-23 NOTE — TELEPHONE ENCOUNTER
S/P LEFT total knee arthroplasty  with Hamzah Pena MD     Spoke with Tobiys     Incision status: pretty good     Edema/Swelling: \"ice machine today at therapy\"    Blood Thinner:     Pain level and status: pain 6/10 but well controlled    Use of pain medications: Tylenol     Bowels: Normal    Home Care Agency active: [facility] for two weeks     Outpatient therapy: Attending after follow up     Do you have all of your medications: Yes     Changes in medications: None at this bela e    Patient was advised to call with any questions or concerns.     Follow up appointments:    Future Appointments   Date Time Provider Department Center   7/26/2024  3:00 PM Vickie Holman, PT [facility]    8/15/2024 11:15 AM Hamzah Leos MD [facility]

## 2024-07-25 NOTE — TELEPHONE ENCOUNTER
OTHER    PATIENT CALLED TO INFORM CLINICAL THAT SHE WOULD LIKE TO PICKUP ICE MACHINE AT [facility], UNSURE IF PICKUP WOULD BE TODAY OR TOMORROW. SHE WOULD LIKE TO SPEAK WITH CLINICAL TO VERIFY.    PLEASE ADVISE

## 2024-07-26 ENCOUNTER — HOSPITAL ENCOUNTER (OUTPATIENT)
Dept: PHYSICAL THERAPY | Age: 79
Setting detail: THERAPIES SERIES
Discharge: HOME | End: 2024-07-26
Payer: MEDICARE

## 2024-07-26 PROCEDURE — 97110 THERAPEUTIC EXERCISES: CPT

## 2024-07-26 PROCEDURE — 97016 VASOPNEUMATIC DEVICE THERAPY: CPT

## 2024-07-26 NOTE — FLOWSHEET NOTE
prior level of function with activities such as prolonged standing and walking.  [] Progressing: [] Met: [] Not Met: [] Adjusted  2. Patient will demonstrate increased B knee ext-flexion AROM to 0-120 without pain to allow for proper joint functioning to enable patient to complete stair navigation without pain or limitations.   [] Progressing: [] Met: [] Not Met: [] Adjusted  3. Patient will demonstrate increased Strength of B quad mm to at least 5/5 throughout without pain to allow for proper functional mobility to enable patient to return to squatting for home management.   [] Progressing: [] Met: [] Not Met: [] Adjusted  4. Patient will return to heavy home management without increased symptoms or restriction.   [] Progressing: [] Met: [] Not Met: [] Adjusted  5. Pt will report ability to sleep 7/7 nights/wk without waking up due to knee pain.     [] Progressing: [] Met: [] Not Met: [] Adjusted     Overall Progression Towards Functional goals/ Treatment Progress Update:  [] Patient is progressing as expected towards functional goals listed.    [] Progression is slowed due to complexities/Impairments listed.  [] Progression has been slowed due to co-morbidities.  [x] Plan just implemented, too soon (<30days) to assess goals progression   [] Goals require adjustment due to lack of progress  [] Patient is not progressing as expected and requires additional follow up with physician  [] Other:     TREATMENT PLAN     Frequency/Duration: 2x/week for 8 weeks for the following treatment interventions:    Interventions:  Therapeutic Exercise (82284) including: strength training, ROM, and functional mobility  Therapeutic Activities (91885) including: functional mobility training and education.  Neuromuscular Re-education (12956) activation and proprioception, including postural re-education.    Gait Training (05261) for normalization of ambulation patterns and AD training.   Manual Therapy (68737) as indicated to include:

## 2024-07-30 ENCOUNTER — ANESTHESIA EVENT (OUTPATIENT)
Dept: OPERATING ROOM | Age: 79
End: 2024-07-30
Payer: MEDICARE

## 2024-07-30 ENCOUNTER — ANESTHESIA (OUTPATIENT)
Dept: OPERATING ROOM | Age: 79
End: 2024-07-30
Payer: MEDICARE

## 2024-07-30 ENCOUNTER — HOSPITAL ENCOUNTER (OUTPATIENT)
Age: 79
Setting detail: OBSERVATION
Discharge: HOME | End: 2024-07-31
Attending: ORTHOPAEDIC SURGERY | Admitting: ORTHOPAEDIC SURGERY
Payer: MEDICARE

## 2024-07-30 ENCOUNTER — APPOINTMENT (OUTPATIENT)
Dept: GENERAL RADIOLOGY | Age: 79
End: 2024-07-30
Attending: ORTHOPAEDIC SURGERY
Payer: MEDICARE

## 2024-07-30 DIAGNOSIS — M17.11 OSTEOARTHRITIS OF RIGHT KNEE, UNSPECIFIED OSTEOARTHRITIS TYPE: Primary | ICD-10-CM

## 2024-07-30 LAB
ABO + RH BLD: NORMAL
BLD GP AB SCN SERPL QL: NORMAL
GLUCOSE BLD-MCNC: 111 MG/DL (ref 70–99)
GLUCOSE BLD-MCNC: 131 MG/DL (ref 70–99)
GLUCOSE BLD-MCNC: 244 MG/DL (ref 70–99)
PERFORMED ON: ABNORMAL

## 2024-07-30 PROCEDURE — G0378 HOSPITAL OBSERVATION PER HR: HCPCS

## 2024-07-30 PROCEDURE — 7100000000 HC PACU RECOVERY - FIRST 15 MIN: Performed by: ORTHOPAEDIC SURGERY

## 2024-07-30 PROCEDURE — 2709999900 HC NON-CHARGEABLE SUPPLY: Performed by: ORTHOPAEDIC SURGERY

## 2024-07-30 PROCEDURE — 2500000003 HC RX 250 WO HCPCS: Performed by: NURSE ANESTHETIST, CERTIFIED REGISTERED

## 2024-07-30 PROCEDURE — 3600000004 HC SURGERY LEVEL 4 BASE: Performed by: ORTHOPAEDIC SURGERY

## 2024-07-30 PROCEDURE — 86850 RBC ANTIBODY SCREEN: CPT

## 2024-07-30 PROCEDURE — 86900 BLOOD TYPING SEROLOGIC ABO: CPT

## 2024-07-30 PROCEDURE — 94760 N-INVAS EAR/PLS OXIMETRY 1: CPT

## 2024-07-30 PROCEDURE — 6370000000 HC RX 637 (ALT 250 FOR IP): Performed by: PHYSICIAN ASSISTANT

## 2024-07-30 PROCEDURE — C1713 ANCHOR/SCREW BN/BN,TIS/BN: HCPCS | Performed by: ORTHOPAEDIC SURGERY

## 2024-07-30 PROCEDURE — 86901 BLOOD TYPING SEROLOGIC RH(D): CPT

## 2024-07-30 PROCEDURE — 94200 LUNG FUNCTION TEST (MBC/MVV): CPT

## 2024-07-30 PROCEDURE — 2500000003 HC RX 250 WO HCPCS: Performed by: ORTHOPAEDIC SURGERY

## 2024-07-30 PROCEDURE — 36415 COLL VENOUS BLD VENIPUNCTURE: CPT

## 2024-07-30 PROCEDURE — 2580000003 HC RX 258: Performed by: ORTHOPAEDIC SURGERY

## 2024-07-30 PROCEDURE — 6360000002 HC RX W HCPCS: Performed by: ANESTHESIOLOGY

## 2024-07-30 PROCEDURE — C1776 JOINT DEVICE (IMPLANTABLE): HCPCS | Performed by: ORTHOPAEDIC SURGERY

## 2024-07-30 PROCEDURE — 6360000002 HC RX W HCPCS: Performed by: NURSE ANESTHETIST, CERTIFIED REGISTERED

## 2024-07-30 PROCEDURE — 3700000000 HC ANESTHESIA ATTENDED CARE: Performed by: ORTHOPAEDIC SURGERY

## 2024-07-30 PROCEDURE — 2500000003 HC RX 250 WO HCPCS: Performed by: ANESTHESIOLOGY

## 2024-07-30 PROCEDURE — 3600000014 HC SURGERY LEVEL 4 ADDTL 15MIN: Performed by: ORTHOPAEDIC SURGERY

## 2024-07-30 PROCEDURE — 94150 VITAL CAPACITY TEST: CPT

## 2024-07-30 PROCEDURE — 73560 X-RAY EXAM OF KNEE 1 OR 2: CPT

## 2024-07-30 PROCEDURE — 6360000002 HC RX W HCPCS: Performed by: ORTHOPAEDIC SURGERY

## 2024-07-30 PROCEDURE — 3700000001 HC ADD 15 MINUTES (ANESTHESIA): Performed by: ORTHOPAEDIC SURGERY

## 2024-07-30 PROCEDURE — 2580000003 HC RX 258: Performed by: ANESTHESIOLOGY

## 2024-07-30 PROCEDURE — 7100000001 HC PACU RECOVERY - ADDTL 15 MIN: Performed by: ORTHOPAEDIC SURGERY

## 2024-07-30 PROCEDURE — 2580000003 HC RX 258: Performed by: PHYSICIAN ASSISTANT

## 2024-07-30 DEVICE — COMPONENT PAT DIA32MM THK8.5MM STD KNEE VIVACIT-E CEM: Type: IMPLANTABLE DEVICE | Site: KNEE | Status: FUNCTIONAL

## 2024-07-30 DEVICE — PSN MC VE ASF R 12MM 6-7/CD: Type: IMPLANTABLE DEVICE | Site: KNEE | Status: FUNCTIONAL

## 2024-07-30 DEVICE — DUP USE 333576 IMPL KNEE PSN TIB STM 5 DEG SZ D R: Type: IMPLANTABLE DEVICE | Site: KNEE | Status: FUNCTIONAL

## 2024-07-30 DEVICE — IMPLANTABLE DEVICE
Type: IMPLANTABLE DEVICE | Site: KNEE | Status: FUNCTIONAL
Brand: BIOMET® BONE CEMENT R

## 2024-07-30 DEVICE — IMPLANTABLE DEVICE: Type: IMPLANTABLE DEVICE | Site: KNEE | Status: FUNCTIONAL

## 2024-07-30 RX ORDER — SODIUM CHLORIDE 9 MG/ML
INJECTION, SOLUTION INTRAVENOUS PRN
Status: DISCONTINUED | OUTPATIENT
Start: 2024-07-30 | End: 2024-07-30 | Stop reason: HOSPADM

## 2024-07-30 RX ORDER — LIDOCAINE HYDROCHLORIDE 10 MG/ML
0.5 INJECTION, SOLUTION EPIDURAL; INFILTRATION; INTRACAUDAL; PERINEURAL ONCE
Status: DISCONTINUED | OUTPATIENT
Start: 2024-07-30 | End: 2024-07-30 | Stop reason: HOSPADM

## 2024-07-30 RX ORDER — DEXAMETHASONE SODIUM PHOSPHATE 4 MG/ML
INJECTION, SOLUTION INTRA-ARTICULAR; INTRALESIONAL; INTRAMUSCULAR; INTRAVENOUS; SOFT TISSUE PRN
Status: DISCONTINUED | OUTPATIENT
Start: 2024-07-30 | End: 2024-07-30 | Stop reason: SDUPTHER

## 2024-07-30 RX ORDER — ACETAMINOPHEN 500 MG
1000 TABLET ORAL 3 TIMES DAILY
Status: DISCONTINUED | OUTPATIENT
Start: 2024-07-30 | End: 2024-07-31 | Stop reason: HOSPADM

## 2024-07-30 RX ORDER — SODIUM CHLORIDE 9 MG/ML
INJECTION, SOLUTION INTRAVENOUS CONTINUOUS
Status: DISCONTINUED | OUTPATIENT
Start: 2024-07-30 | End: 2024-07-31 | Stop reason: HOSPADM

## 2024-07-30 RX ORDER — SODIUM CHLORIDE 9 MG/ML
INJECTION, SOLUTION INTRAVENOUS CONTINUOUS
Status: DISCONTINUED | OUTPATIENT
Start: 2024-07-30 | End: 2024-07-30 | Stop reason: HOSPADM

## 2024-07-30 RX ORDER — SODIUM CHLORIDE 0.9 % (FLUSH) 0.9 %
5-40 SYRINGE (ML) INJECTION PRN
Status: DISCONTINUED | OUTPATIENT
Start: 2024-07-30 | End: 2024-07-30 | Stop reason: HOSPADM

## 2024-07-30 RX ORDER — GABAPENTIN 300 MG/1
600 CAPSULE ORAL 3 TIMES DAILY
Status: DISCONTINUED | OUTPATIENT
Start: 2024-07-31 | End: 2024-07-31 | Stop reason: HOSPADM

## 2024-07-30 RX ORDER — LIDOCAINE HYDROCHLORIDE 10 MG/ML
INJECTION, SOLUTION EPIDURAL; INFILTRATION; INTRACAUDAL; PERINEURAL
Status: COMPLETED | OUTPATIENT
Start: 2024-07-30 | End: 2024-07-30

## 2024-07-30 RX ORDER — SODIUM CHLORIDE 0.9 % (FLUSH) 0.9 %
5-40 SYRINGE (ML) INJECTION PRN
Status: DISCONTINUED | OUTPATIENT
Start: 2024-07-30 | End: 2024-07-31 | Stop reason: HOSPADM

## 2024-07-30 RX ORDER — ONDANSETRON 2 MG/ML
4 INJECTION INTRAMUSCULAR; INTRAVENOUS EVERY 6 HOURS PRN
Status: DISCONTINUED | OUTPATIENT
Start: 2024-07-30 | End: 2024-07-31 | Stop reason: HOSPADM

## 2024-07-30 RX ORDER — DIPHENHYDRAMINE HCL 25 MG
25 TABLET ORAL EVERY 6 HOURS PRN
Status: DISCONTINUED | OUTPATIENT
Start: 2024-07-30 | End: 2024-07-31 | Stop reason: HOSPADM

## 2024-07-30 RX ORDER — ONDANSETRON 2 MG/ML
4 INJECTION INTRAMUSCULAR; INTRAVENOUS
Status: DISCONTINUED | OUTPATIENT
Start: 2024-07-30 | End: 2024-07-30 | Stop reason: HOSPADM

## 2024-07-30 RX ORDER — SODIUM CHLORIDE 0.9 % (FLUSH) 0.9 %
5-40 SYRINGE (ML) INJECTION EVERY 12 HOURS SCHEDULED
Status: DISCONTINUED | OUTPATIENT
Start: 2024-07-30 | End: 2024-07-31 | Stop reason: HOSPADM

## 2024-07-30 RX ORDER — SODIUM CHLORIDE 9 MG/ML
INJECTION, SOLUTION INTRAVENOUS PRN
Status: DISCONTINUED | OUTPATIENT
Start: 2024-07-30 | End: 2024-07-31 | Stop reason: HOSPADM

## 2024-07-30 RX ORDER — VANCOMYCIN HYDROCHLORIDE 1 G/20ML
INJECTION, POWDER, LYOPHILIZED, FOR SOLUTION INTRAVENOUS
Status: COMPLETED | OUTPATIENT
Start: 2024-07-30 | End: 2024-07-30

## 2024-07-30 RX ORDER — NALOXONE HYDROCHLORIDE 0.4 MG/ML
INJECTION, SOLUTION INTRAMUSCULAR; INTRAVENOUS; SUBCUTANEOUS PRN
Status: DISCONTINUED | OUTPATIENT
Start: 2024-07-30 | End: 2024-07-30 | Stop reason: HOSPADM

## 2024-07-30 RX ORDER — LANOLIN ALCOHOL/MO/W.PET/CERES
400 CREAM (GRAM) TOPICAL 2 TIMES DAILY
Status: DISCONTINUED | OUTPATIENT
Start: 2024-07-31 | End: 2024-07-31 | Stop reason: HOSPADM

## 2024-07-30 RX ORDER — GLUCAGON 1 MG/ML
1 KIT INJECTION PRN
Status: DISCONTINUED | OUTPATIENT
Start: 2024-07-30 | End: 2024-07-31 | Stop reason: HOSPADM

## 2024-07-30 RX ORDER — PHENYLEPHRINE HCL IN 0.9% NACL 1 MG/10 ML
SYRINGE (ML) INTRAVENOUS PRN
Status: DISCONTINUED | OUTPATIENT
Start: 2024-07-30 | End: 2024-07-30 | Stop reason: SDUPTHER

## 2024-07-30 RX ORDER — DIPHENHYDRAMINE HYDROCHLORIDE 50 MG/ML
25 INJECTION INTRAMUSCULAR; INTRAVENOUS EVERY 6 HOURS PRN
Status: DISCONTINUED | OUTPATIENT
Start: 2024-07-30 | End: 2024-07-31 | Stop reason: HOSPADM

## 2024-07-30 RX ORDER — TRAZODONE HYDROCHLORIDE 50 MG/1
50 TABLET ORAL NIGHTLY
Status: DISCONTINUED | OUTPATIENT
Start: 2024-07-31 | End: 2024-07-31 | Stop reason: HOSPADM

## 2024-07-30 RX ORDER — ACETAMINOPHEN 650 MG
TABLET, EXTENDED RELEASE ORAL
Status: COMPLETED | OUTPATIENT
Start: 2024-07-30 | End: 2024-07-30

## 2024-07-30 RX ORDER — LIDOCAINE HYDROCHLORIDE 20 MG/ML
INJECTION, SOLUTION INFILTRATION; PERINEURAL PRN
Status: DISCONTINUED | OUTPATIENT
Start: 2024-07-30 | End: 2024-07-30 | Stop reason: SDUPTHER

## 2024-07-30 RX ORDER — TRANEXAMIC ACID 10 MG/ML
1000 INJECTION, SOLUTION INTRAVENOUS
Status: COMPLETED | OUTPATIENT
Start: 2024-07-30 | End: 2024-07-30

## 2024-07-30 RX ORDER — HYDROMORPHONE HYDROCHLORIDE 2 MG/ML
0.5 INJECTION, SOLUTION INTRAMUSCULAR; INTRAVENOUS; SUBCUTANEOUS EVERY 5 MIN PRN
Status: DISCONTINUED | OUTPATIENT
Start: 2024-07-30 | End: 2024-07-30 | Stop reason: HOSPADM

## 2024-07-30 RX ORDER — INSULIN LISPRO 100 [IU]/ML
0-8 INJECTION, SOLUTION INTRAVENOUS; SUBCUTANEOUS
Status: DISCONTINUED | OUTPATIENT
Start: 2024-07-30 | End: 2024-07-31

## 2024-07-30 RX ORDER — 0.9 % SODIUM CHLORIDE 0.9 %
1000 INTRAVENOUS SOLUTION INTRAVENOUS ONCE
Status: COMPLETED | OUTPATIENT
Start: 2024-07-30 | End: 2024-07-30

## 2024-07-30 RX ORDER — ONDANSETRON 2 MG/ML
INJECTION INTRAMUSCULAR; INTRAVENOUS PRN
Status: DISCONTINUED | OUTPATIENT
Start: 2024-07-30 | End: 2024-07-30

## 2024-07-30 RX ORDER — DEXTROSE MONOHYDRATE 100 MG/ML
INJECTION, SOLUTION INTRAVENOUS CONTINUOUS PRN
Status: DISCONTINUED | OUTPATIENT
Start: 2024-07-30 | End: 2024-07-31 | Stop reason: HOSPADM

## 2024-07-30 RX ORDER — FAMOTIDINE 20 MG/1
20 TABLET, FILM COATED ORAL 2 TIMES DAILY
Status: DISCONTINUED | OUTPATIENT
Start: 2024-07-30 | End: 2024-07-31 | Stop reason: HOSPADM

## 2024-07-30 RX ORDER — PROPOFOL 10 MG/ML
INJECTION, EMULSION INTRAVENOUS CONTINUOUS PRN
Status: DISCONTINUED | OUTPATIENT
Start: 2024-07-30 | End: 2024-07-30 | Stop reason: SDUPTHER

## 2024-07-30 RX ORDER — PROMETHAZINE HYDROCHLORIDE 25 MG/1
12.5 TABLET ORAL EVERY 6 HOURS PRN
Status: DISCONTINUED | OUTPATIENT
Start: 2024-07-30 | End: 2024-07-31 | Stop reason: HOSPADM

## 2024-07-30 RX ORDER — ATORVASTATIN CALCIUM 20 MG/1
20 TABLET, FILM COATED ORAL NIGHTLY
Status: DISCONTINUED | OUTPATIENT
Start: 2024-07-31 | End: 2024-07-31 | Stop reason: HOSPADM

## 2024-07-30 RX ORDER — INSULIN LISPRO 100 [IU]/ML
0-4 INJECTION, SOLUTION INTRAVENOUS; SUBCUTANEOUS NIGHTLY
Status: DISCONTINUED | OUTPATIENT
Start: 2024-07-30 | End: 2024-07-31

## 2024-07-30 RX ORDER — FENTANYL CITRATE 50 UG/ML
INJECTION, SOLUTION INTRAMUSCULAR; INTRAVENOUS PRN
Status: DISCONTINUED | OUTPATIENT
Start: 2024-07-30 | End: 2024-07-30 | Stop reason: SDUPTHER

## 2024-07-30 RX ORDER — SENNA AND DOCUSATE SODIUM 50; 8.6 MG/1; MG/1
1 TABLET, FILM COATED ORAL 2 TIMES DAILY
Status: DISCONTINUED | OUTPATIENT
Start: 2024-07-30 | End: 2024-07-31 | Stop reason: HOSPADM

## 2024-07-30 RX ORDER — ASPIRIN 81 MG/1
81 TABLET ORAL 2 TIMES DAILY
Status: DISCONTINUED | OUTPATIENT
Start: 2024-07-30 | End: 2024-07-31 | Stop reason: HOSPADM

## 2024-07-30 RX ORDER — SODIUM CHLORIDE 0.9 % (FLUSH) 0.9 %
5-40 SYRINGE (ML) INJECTION EVERY 12 HOURS SCHEDULED
Status: DISCONTINUED | OUTPATIENT
Start: 2024-07-30 | End: 2024-07-30 | Stop reason: HOSPADM

## 2024-07-30 RX ORDER — MAGNESIUM HYDROXIDE 1200 MG/15ML
LIQUID ORAL CONTINUOUS PRN
Status: COMPLETED | OUTPATIENT
Start: 2024-07-30 | End: 2024-07-30

## 2024-07-30 RX ORDER — OXYCODONE HYDROCHLORIDE 5 MG/1
5 TABLET ORAL EVERY 4 HOURS PRN
Status: DISCONTINUED | OUTPATIENT
Start: 2024-07-30 | End: 2024-07-31 | Stop reason: HOSPADM

## 2024-07-30 RX ADMIN — DEXAMETHASONE SODIUM PHOSPHATE 8 MG: 4 INJECTION, SOLUTION INTRAMUSCULAR; INTRAVENOUS at 15:35

## 2024-07-30 RX ADMIN — Medication 100 MCG: at 16:23

## 2024-07-30 RX ADMIN — FAMOTIDINE 20 MG: 20 TABLET, FILM COATED ORAL at 21:09

## 2024-07-30 RX ADMIN — CEFAZOLIN 2000 MG: 2 INJECTION, POWDER, FOR SOLUTION INTRAMUSCULAR; INTRAVENOUS at 15:11

## 2024-07-30 RX ADMIN — TRANEXAMIC ACID 1000 MG: 10 INJECTION, SOLUTION INTRAVENOUS at 14:55

## 2024-07-30 RX ADMIN — FENTANYL CITRATE 25 MCG: 50 INJECTION, SOLUTION INTRAMUSCULAR; INTRAVENOUS at 15:24

## 2024-07-30 RX ADMIN — SODIUM CHLORIDE: 9 INJECTION, SOLUTION INTRAVENOUS at 16:25

## 2024-07-30 RX ADMIN — LIDOCAINE HYDROCHLORIDE 50 MG: 20 INJECTION, SOLUTION INFILTRATION; PERINEURAL at 15:10

## 2024-07-30 RX ADMIN — ASPIRIN 81 MG: 81 TABLET, COATED ORAL at 21:09

## 2024-07-30 RX ADMIN — ACETAMINOPHEN 1000 MG: 500 TABLET ORAL at 21:09

## 2024-07-30 RX ADMIN — Medication 100 MCG: at 16:12

## 2024-07-30 RX ADMIN — LIDOCAINE HYDROCHLORIDE 5 MG: 10 INJECTION, SOLUTION EPIDURAL; INFILTRATION; INTRACAUDAL; PERINEURAL at 14:55

## 2024-07-30 RX ADMIN — Medication 100 MCG: at 15:42

## 2024-07-30 RX ADMIN — OXYCODONE HYDROCHLORIDE 5 MG: 5 TABLET ORAL at 21:09

## 2024-07-30 RX ADMIN — SODIUM CHLORIDE: 9 INJECTION, SOLUTION INTRAVENOUS at 14:49

## 2024-07-30 RX ADMIN — Medication 100 MCG: at 16:31

## 2024-07-30 RX ADMIN — Medication 100 MCG: at 16:17

## 2024-07-30 RX ADMIN — FENTANYL CITRATE 25 MCG: 50 INJECTION, SOLUTION INTRAMUSCULAR; INTRAVENOUS at 14:56

## 2024-07-30 RX ADMIN — FENTANYL CITRATE 25 MCG: 50 INJECTION, SOLUTION INTRAMUSCULAR; INTRAVENOUS at 14:49

## 2024-07-30 RX ADMIN — ONDANSETRON 4 MG: 2 INJECTION INTRAMUSCULAR; INTRAVENOUS at 15:35

## 2024-07-30 RX ADMIN — MEPIVACAINE HYDROCHLORIDE 30 MG: 20 INJECTION, SOLUTION EPIDURAL; INFILTRATION at 14:55

## 2024-07-30 RX ADMIN — ACETAMINOPHEN 1000 MG: 500 TABLET ORAL at 18:38

## 2024-07-30 RX ADMIN — Medication 100 MCG: at 15:58

## 2024-07-30 RX ADMIN — Medication 100 MCG: at 15:31

## 2024-07-30 RX ADMIN — SODIUM CHLORIDE: 9 INJECTION, SOLUTION INTRAVENOUS at 18:12

## 2024-07-30 RX ADMIN — Medication 100 MCG: at 16:08

## 2024-07-30 RX ADMIN — SODIUM CHLORIDE 1000 ML: 9 INJECTION, SOLUTION INTRAVENOUS at 18:14

## 2024-07-30 RX ADMIN — PROPOFOL 125 MCG/KG/MIN: 10 INJECTION, EMULSION INTRAVENOUS at 15:11

## 2024-07-30 RX ADMIN — PROPOFOL 40 MCG/KG/MIN: 10 INJECTION, EMULSION INTRAVENOUS at 15:10

## 2024-07-30 RX ADMIN — Medication 100 MCG: at 15:50

## 2024-07-30 RX ADMIN — Medication 100 MCG: at 16:00

## 2024-07-30 RX ADMIN — Medication 100 MCG: at 15:25

## 2024-07-30 ASSESSMENT — PAIN DESCRIPTION - ORIENTATION: ORIENTATION: RIGHT

## 2024-07-30 ASSESSMENT — ENCOUNTER SYMPTOMS: SHORTNESS OF BREATH: 0

## 2024-07-30 ASSESSMENT — PAIN DESCRIPTION - DESCRIPTORS: DESCRIPTORS: ACHING

## 2024-07-30 ASSESSMENT — PAIN - FUNCTIONAL ASSESSMENT
PAIN_FUNCTIONAL_ASSESSMENT: PREVENTS OR INTERFERES SOME ACTIVE ACTIVITIES AND ADLS
PAIN_FUNCTIONAL_ASSESSMENT: 0-10

## 2024-07-30 ASSESSMENT — PAIN SCALES - GENERAL
PAINLEVEL_OUTOF10: 0
PAINLEVEL_OUTOF10: 0
PAINLEVEL_OUTOF10: 6
PAINLEVEL_OUTOF10: 1

## 2024-07-30 ASSESSMENT — PAIN DESCRIPTION - LOCATION
LOCATION: LEG
LOCATION: KNEE

## 2024-07-30 ASSESSMENT — LIFESTYLE VARIABLES: SMOKING_STATUS: 0

## 2024-07-30 NOTE — PROGRESS NOTES
Patient to PACU from OR, very drowsy. VSS- on 6L simple mask satting high 90s. Sinus hien to NSR on monitor. Dressing to R knee CDI, ice applied. R pedal pulse 1+ with cap refill < 3 seconds. Patient has sensation at L1 s/p spinal anesthetic. Xray called. Will continue to monitor

## 2024-07-30 NOTE — ANESTHESIA POSTPROCEDURE EVALUATION
Department of Anesthesiology  Postprocedure Note    Patient: Ricki Zeng  MRN: [id number]  YOB: 1945  Date of evaluation: 7/30/2024    Procedure Summary       Date: 07/30/24 Room / Location: [address]    Anesthesia Start: 1449 Anesthesia Stop: 1655    Procedure: RIGHT TOTAL KNEE REPLACEMENT-ANN (Right: Knee) Diagnosis:       Arthritis of right knee      (Arthritis of right knee [M17.11])    Surgeons: Hamzah Leos MD Responsible Provider: Nik Cazares MD    Anesthesia Type: general, MAC, spinal ASA Status: 3            Anesthesia Type: No value filed.    Alexey Phase I: Alexey Score: 7    Alexey Phase II:      Anesthesia Post Evaluation    Patient location during evaluation: PACU  Patient participation: complete - patient participated  Level of consciousness: awake  Airway patency: patent  Nausea & Vomiting: no vomiting and no nausea  Cardiovascular status: hemodynamically stable  Respiratory status: acceptable  Hydration status: stable  Multimodal analgesia pain management approach  Pain management: adequate    No notable events documented.

## 2024-07-30 NOTE — PROGRESS NOTES
[facility] Orthopedic Surgery   Progress Note    CHIEF COMPLAINT/DIAGNOSIS: S/p right Total Knee Arthroplasty    SUBJECTIVE: The patient is seen sitting up in the chair after having worked with therapy; describes mild knee pain.  Baseline peripheral neuropathy.  Denies new issues.  Feels ready to dc.   -> 279.    OBJECTIVE  Physical    VITALS:  /73   Pulse 88   Temp 96.9 °F (36.1 °C) (Temporal)   Resp 16   Ht 1.676 m (5' 6\")   Wt 87.5 kg (193 lb)   SpO2 95%   BMI 31.15 kg/m²     GENERAL: Alert and oriented x3, in no acute distress.    MUSCULOSKELETAL: Able to dorsi and plantarflex the ankle without issue.  INCISION:  Covered with post-op dressing is c/d/I.  ROM: right knee ROM deferred.  Sensory:  Intact to light touch in peroneal and tibial distributions.   Vascular:   2+ DP pulses with brisk cap refill;  calf soft and nontender    Data    ALL MEDICATIONS HAVE BEEN REVIEWED    CBC: No results for input(s): \"WBC\", \"HGB\", \"HCT\", \"PLT\" in the last 72 hours.  BMP: No results for input(s): \"NA\", \"K\", \"CL\", \"CO2\", \"PHOS\", \"BUN\", \"CREATININE\" in the last 72 hours.    Invalid input(s): \"CA\"  INR: No results for input(s): \"INR\" in the last 72 hours.    Post-op films show stable total knee arthroplasty construct without acute complication.     ASSESSMENT:  S/p right Total Knee Arthroplasty (7/30/24), POD#1  DM II  HLD  HTN    PLAN:   Insulin correction dose changed after discussion with pharmacy    Needs to stay until lunch to recheck and get another correction dose then can go home.   - WB status:  WBAT; reviewed post op precautions  - DVT prophylaxis: ASA 81mg BID x 30 days    - PT/OT  - Pain Control: tylenol, mobic and oxy prn.  Due to orthopaedic surgical procedure/condition, patient may require pain medication for up to 6-8 weeks.  - ID:  Ancef x 2.  - Dispo: home with home therapy today as long as insulin is better    Follow-up with Dr. Leos as scheduled on 8/15.  [phone]  Future Appointments

## 2024-07-30 NOTE — PROGRESS NOTES
Called patient on home phone with no answer, left VM on patient cell phone about surgical arrival time. Waiting on call back,

## 2024-07-30 NOTE — H&P
infection, dislocation, leg length inequality requiring shoe lift, fracture, implant loosening, hardware failure, nerve or vascular injury, need for further surgery, deep vein thrombus, pulmonary embolism.   The patient has verbalized understanding of these risks and wishes to proceed.

## 2024-07-30 NOTE — OP NOTE
Patient: Ricki Zeng                    : 1945     MRN: [id number]     Date: 24     SURGEON: Hamzah Leos MD     ASSISTANT: Shiv Roque    The above listed assistant was present for the entirety of the procedure and vital for the performance of the procedure. They assisted with positioning, prepping, draping of the patient before the procedure and instrument manipulation, extremity repositioning during the procedure as well as wound closure, dressing application after the procedure. Please note that no intern, resident, or other hospital staff was available to assist during the surgery       PREOPERATIVE DIAGNOSIS: RIGHT knee osteoarthritis.     POSTOPERATIVE DIAGNOSIS: RIGHT knee osteoarthritis.     PROCEDURE: RIGHTtotal knee arthroplasty.     CPT: 80622    ANESTHESIA: Spinal    COMPLICATIONS: None.     ESTIMATED BLOOD LOSS: 30 mL.         SPECIMENS: Bone and soft tissue to pathology per protocol.        DRAINS: None         IMPLANTS USED:   Implant Name Type Inv. Item Serial No.  Lot No. LRB No. Used Action   CEMENT BNE 40GM HI VISC RADPQ FOR REV SURG - [id number]  CEMENT BNE 40GM HI VISC RADPQ FOR REV SURG  [employer]- [id number] Right 1 Implanted   CEMENT BNE 40GM HI VISC RADPQ FOR REV SURG - [id number]  CEMENT BNE 40GM HI VISC RADPQ FOR REV SURG  [employer]- [id number] Right 1 Implanted   DUP USE [id number] IMPL KNEE PSN TIB STM 5 DEG SZ D R - [id number] Knee DUP USE [id number] IMPL KNEE PSN TIB STM 5 DEG SZ D R  [employer] [id number] Right 1 Implanted   COMPONENT PAT [id number] THK8.5MM STD KNEE VIVACIT-E ROCIO - [id number]  COMPONENT PAT [id number] THK8.5MM STD KNEE VIVACIT-E ROCIO  [employer]- [id number] Right 1 Implanted   COMPONENT FEM SZ 7 VALERIE R KNEE CO CHROM ROCIO CRUCE RET COR - [id number]  COMPONENT FEM SZ 7 VALERIE R KNEE CO CHROM ROCIO CRUCE RET COR  [employer]- [id number] Right 1 Implanted   PSN MC VE ASF R 11MM 6-7/CD

## 2024-07-30 NOTE — ANESTHESIA PRE PROCEDURE
Department of Anesthesiology  Preprocedure Note       Name:  Ricki Zeng   Age:  79 y.o.  :  1945                                          MRN:  [id number]         Date:  2024      Surgeon: Surgeon(s):  Hamzah Leos MD    Procedure: Procedure(s):  RIGHT TOTAL KNEE REPLACEMENT-ANN    Medications prior to admission:   Prior to Admission medications    Medication Sig Start Date End Date Taking? Authorizing Provider   acetaminophen (TYLENOL) 500 MG tablet Take 2 tablets by mouth 3 times daily 24   Clovis Roque PA-C   sennosides-docusate sodium (SENOKOT-S) 8.6-50 MG tablet Take 1 tablet by mouth daily 24   Clovis Roque PA-C   aspirin (SB LOW DOSE ASA EC) 81 MG EC tablet Take 1 tablet by mouth in the morning and at bedtime 24  Clovis Roque PA-C   nystatin (MYCOSTATIN) 991230 UNIT/GM powder Apply topically 4 times daily. 3/9/24   Rudolph Flynn MD   Insulin Degludec (TRESIBA FLEXTOUCH) 100 UNIT/ML SOPN Inject into the skin    Agata Rivers MD   traZODone (DESYREL) 50 MG tablet Take 1 tablet by mouth nightly 23   Agata Rivers MD   gabapentin (NEURONTIN) 300 MG capsule TAKE 2 CAPSULES BY MOUTH THREE TIMES DAILY 22   Agata Rivers MD   Insulin Aspart, w/Niacinamide, (FIASP FLEXTOUCH) 100 UNIT/ML SOPN Inject into the skin Sliding scale 2 units bid    Agata Rivers MD   Magnesium 400 MG CAPS Take 400 mg by mouth 2 times daily 11/15/21   Ac Jean-Baptiste DO   Cyanocobalamin (VITAMIN B 12 PO) Take by mouth    Agata Rivers MD   atorvastatin (LIPITOR) 20 MG tablet TAKE 1 TABLET BY MOUTH AT BEDTIME AS DIRECTED. 21   Agata Rivers MD       Current medications:    Current Facility-Administered Medications   Medication Dose Route Frequency Provider Last Rate Last Admin    0.9 % sodium chloride infusion   IntraVENous Continuous Nik Cazares MD        lidocaine PF 1 % injection 0.5 mL  0.5 mL IntraDERmal Once

## 2024-07-30 NOTE — PLAN OF CARE
Problem: Chronic Conditions and Co-morbidities  Goal: Patient's chronic conditions and co-morbidity symptoms are monitored and maintained or improved  Outcome: Progressing     Problem: Discharge Planning  Goal: Discharge to home or other facility with appropriate resources  Outcome: Progressing     Problem: Pain  Goal: Verbalizes/displays adequate comfort level or baseline comfort level  Outcome: Progressing  Flowsheets (Taken 7/30/2024 9402)  Verbalizes/displays adequate comfort level or baseline comfort level: Assess pain using appropriate pain scale

## 2024-07-30 NOTE — PROGRESS NOTES
Incentive Spirometry education and demonstration completed by Respiratory Therapy Yes      Response to education: Very Good     Teaching Time: 5 minutes    Minimum Predicted Vital Capacity - 593 mL.  Patient's Actual Vital Capacity - 1000 mL. Turning over to Nursing for routine follow-up Yes.    Comments:     Electronically signed by Abigail Sanchez RCP on 7/30/2024 at 6:17 PM

## 2024-07-30 NOTE — PROGRESS NOTES
Nursing care provided to prep patient for surgery.  Preop orders completed.  Verified patient has completed 3 days of [facility] preop showers that includes the day of surgery, Dr. Leos aware.  Pneumatic sleeves and compression stockings applied as ordered.  Teaching / education initiated regarding perioperative experience, postop expectations, plan of care, and pain management during hospital stay.  Patient verbalized understanding.  Pain goal expectations, care plan and education has been reviewed and mutually agreed upon with the patient.

## 2024-07-30 NOTE — PROGRESS NOTES
Patient has transitioned out of phase 1 care. Sensation felt at L5 on both legs. Able to move legs and feet. Patient denies any nausea/pain at this time. VSS. On Room air. R pedal pulse palpable. Patient tolerating liquids/crackers. Will transfer to T.

## 2024-07-30 NOTE — ANESTHESIA PROCEDURE NOTES
Spinal Block    End time: 7/30/2024 3:05 PM  Reason for block: primary anesthetic and at surgeon's request  Staffing  Performed: anesthesiologist, resident/CRNA and other anesthesia staff   Anesthesiologist: Nik Cazares MD  Resident/CRNA: Elena Hunt APRN - JAMARCUS  Performed by: Elena Hunt APRN - CRNA  Authorized by: Nik Cazares MD    Spinal Block  Patient position: sitting  Prep: ChloraPrep  Patient monitoring: cardiac monitor, continuous pulse ox, continuous capnometry, frequent blood pressure checks and oxygen  Approach: midline  Location: L3/L4  Guidance: paresthesia technique  Provider prep: sterile gloves and mask  Local infiltration: lidocaine  Needle  Needle type: Pencan   Needle gauge: 25 G  Needle length: 3.5 in  Assessment  Attempts: 3+ (attempt per SRNA, difficult anatomy, completed per Dr. Cazares)  Preanesthetic Checklist  Completed: patient identified, IV checked, site marked, risks and benefits discussed, surgical/procedural consents, equipment checked, pre-op evaluation, timeout performed, anesthesia consent given, oxygen available, monitors applied/VS acknowledged, fire risk safety assessment completed and verbalized and blood product R/B/A discussed and consented

## 2024-07-31 VITALS
HEIGHT: 66 IN | OXYGEN SATURATION: 94 % | BODY MASS INDEX: 31.02 KG/M2 | RESPIRATION RATE: 17 BRPM | DIASTOLIC BLOOD PRESSURE: 64 MMHG | WEIGHT: 193 LBS | SYSTOLIC BLOOD PRESSURE: 144 MMHG | TEMPERATURE: 97.7 F | HEART RATE: 68 BPM

## 2024-07-31 LAB
ANION GAP SERPL CALCULATED.3IONS-SCNC: 13 MMOL/L (ref 3–16)
BUN SERPL-MCNC: 34 MG/DL (ref 7–20)
CALCIUM SERPL-MCNC: 9.1 MG/DL (ref 8.3–10.6)
CHLORIDE SERPL-SCNC: 106 MMOL/L (ref 99–110)
CO2 SERPL-SCNC: 19 MMOL/L (ref 21–32)
CREAT SERPL-MCNC: 0.9 MG/DL (ref 0.6–1.2)
DEPRECATED RDW RBC AUTO: 15.1 % (ref 12.4–15.4)
GFR SERPLBLD CREATININE-BSD FMLA CKD-EPI: 65 ML/MIN/{1.73_M2}
GLUCOSE BLD-MCNC: 279 MG/DL (ref 70–99)
GLUCOSE BLD-MCNC: 286 MG/DL (ref 70–99)
GLUCOSE SERPL-MCNC: 348 MG/DL (ref 70–99)
HCT VFR BLD AUTO: 32.4 % (ref 36–48)
HGB BLD-MCNC: 10.9 G/DL (ref 12–16)
MCH RBC QN AUTO: 33 PG (ref 26–34)
MCHC RBC AUTO-ENTMCNC: 33.6 G/DL (ref 31–36)
MCV RBC AUTO: 98.2 FL (ref 80–100)
PERFORMED ON: ABNORMAL
PERFORMED ON: ABNORMAL
PLATELET # BLD AUTO: 263 K/UL (ref 135–450)
PMV BLD AUTO: 7.7 FL (ref 5–10.5)
POTASSIUM SERPL-SCNC: 4.8 MMOL/L (ref 3.5–5.1)
RBC # BLD AUTO: 3.3 M/UL (ref 4–5.2)
SODIUM SERPL-SCNC: 138 MMOL/L (ref 136–145)
WBC # BLD AUTO: 9.8 K/UL (ref 4–11)

## 2024-07-31 PROCEDURE — APPNB45 APP NON BILLABLE 31-45 MINUTES: Performed by: NURSE PRACTITIONER

## 2024-07-31 PROCEDURE — 2580000003 HC RX 258: Performed by: PHYSICIAN ASSISTANT

## 2024-07-31 PROCEDURE — 97116 GAIT TRAINING THERAPY: CPT

## 2024-07-31 PROCEDURE — 6370000000 HC RX 637 (ALT 250 FOR IP): Performed by: PHYSICIAN ASSISTANT

## 2024-07-31 PROCEDURE — 6370000000 HC RX 637 (ALT 250 FOR IP): Performed by: NURSE PRACTITIONER

## 2024-07-31 PROCEDURE — G0378 HOSPITAL OBSERVATION PER HR: HCPCS

## 2024-07-31 PROCEDURE — 97535 SELF CARE MNGMENT TRAINING: CPT

## 2024-07-31 PROCEDURE — 97165 OT EVAL LOW COMPLEX 30 MIN: CPT

## 2024-07-31 PROCEDURE — 85027 COMPLETE CBC AUTOMATED: CPT

## 2024-07-31 PROCEDURE — 6360000002 HC RX W HCPCS: Performed by: PHYSICIAN ASSISTANT

## 2024-07-31 PROCEDURE — 97530 THERAPEUTIC ACTIVITIES: CPT

## 2024-07-31 PROCEDURE — 99024 POSTOP FOLLOW-UP VISIT: CPT | Performed by: NURSE PRACTITIONER

## 2024-07-31 PROCEDURE — 80048 BASIC METABOLIC PNL TOTAL CA: CPT

## 2024-07-31 PROCEDURE — 97161 PT EVAL LOW COMPLEX 20 MIN: CPT

## 2024-07-31 RX ORDER — OXYCODONE HYDROCHLORIDE 5 MG/1
5 TABLET ORAL EVERY 4 HOURS PRN
Qty: 42 TABLET | Refills: 0 | Status: SHIPPED | OUTPATIENT
Start: 2024-07-31 | End: 2024-08-07

## 2024-07-31 RX ORDER — ASPIRIN 81 MG/1
81 TABLET, CHEWABLE ORAL 2 TIMES DAILY
Qty: 60 TABLET | Refills: 0 | Status: SHIPPED | OUTPATIENT
Start: 2024-07-31 | End: 2024-08-30

## 2024-07-31 RX ORDER — INSULIN LISPRO 100 [IU]/ML
7 INJECTION, SOLUTION INTRAVENOUS; SUBCUTANEOUS ONCE
Status: COMPLETED | OUTPATIENT
Start: 2024-07-31 | End: 2024-07-31

## 2024-07-31 RX ORDER — MELOXICAM 15 MG/1
15 TABLET ORAL DAILY
Qty: 14 TABLET | Refills: 0 | Status: SHIPPED | OUTPATIENT
Start: 2024-07-31 | End: 2024-08-14

## 2024-07-31 RX ORDER — INSULIN LISPRO 100 [IU]/ML
0-16 INJECTION, SOLUTION INTRAVENOUS; SUBCUTANEOUS
Status: DISCONTINUED | OUTPATIENT
Start: 2024-07-31 | End: 2024-07-31 | Stop reason: HOSPADM

## 2024-07-31 RX ORDER — SENNOSIDES A AND B 8.6 MG/1
1 TABLET, FILM COATED ORAL 2 TIMES DAILY
Qty: 28 TABLET | Refills: 0 | Status: SHIPPED | OUTPATIENT
Start: 2024-07-31 | End: 2024-08-14

## 2024-07-31 RX ORDER — INSULIN LISPRO 100 [IU]/ML
0.08 INJECTION, SOLUTION INTRAVENOUS; SUBCUTANEOUS
Status: DISCONTINUED | OUTPATIENT
Start: 2024-07-31 | End: 2024-07-31 | Stop reason: HOSPADM

## 2024-07-31 RX ORDER — INSULIN GLARGINE 100 [IU]/ML
0.25 INJECTION, SOLUTION SUBCUTANEOUS NIGHTLY
Status: DISCONTINUED | OUTPATIENT
Start: 2024-07-31 | End: 2024-07-31 | Stop reason: HOSPADM

## 2024-07-31 RX ORDER — INSULIN LISPRO 100 [IU]/ML
0-4 INJECTION, SOLUTION INTRAVENOUS; SUBCUTANEOUS NIGHTLY
Status: DISCONTINUED | OUTPATIENT
Start: 2024-07-31 | End: 2024-07-31 | Stop reason: HOSPADM

## 2024-07-31 RX ORDER — ACETAMINOPHEN 500 MG
1000 TABLET ORAL 3 TIMES DAILY
Qty: 84 TABLET | Refills: 0 | Status: SHIPPED | OUTPATIENT
Start: 2024-07-31 | End: 2024-08-14

## 2024-07-31 RX ORDER — INSULIN LISPRO 100 [IU]/ML
0.08 INJECTION, SOLUTION INTRAVENOUS; SUBCUTANEOUS
Status: DISCONTINUED | OUTPATIENT
Start: 2024-07-31 | End: 2024-07-31 | Stop reason: SDUPTHER

## 2024-07-31 RX ADMIN — OXYCODONE HYDROCHLORIDE 5 MG: 5 TABLET ORAL at 10:14

## 2024-07-31 RX ADMIN — INSULIN LISPRO 7 UNITS: 100 INJECTION, SOLUTION INTRAVENOUS; SUBCUTANEOUS at 11:54

## 2024-07-31 RX ADMIN — Medication 400 MG: at 10:16

## 2024-07-31 RX ADMIN — INSULIN LISPRO 8 UNITS: 100 INJECTION, SOLUTION INTRAVENOUS; SUBCUTANEOUS at 11:55

## 2024-07-31 RX ADMIN — ASPIRIN 81 MG: 81 TABLET, COATED ORAL at 10:15

## 2024-07-31 RX ADMIN — FAMOTIDINE 20 MG: 20 TABLET, FILM COATED ORAL at 10:16

## 2024-07-31 RX ADMIN — OXYCODONE HYDROCHLORIDE 5 MG: 5 TABLET ORAL at 04:51

## 2024-07-31 RX ADMIN — GABAPENTIN 600 MG: 300 CAPSULE ORAL at 10:15

## 2024-07-31 RX ADMIN — CEFAZOLIN 2000 MG: 2 INJECTION, POWDER, FOR SOLUTION INTRAMUSCULAR; INTRAVENOUS at 00:06

## 2024-07-31 RX ADMIN — ACETAMINOPHEN 1000 MG: 500 TABLET ORAL at 10:16

## 2024-07-31 RX ADMIN — INSULIN LISPRO 7 UNITS: 100 INJECTION, SOLUTION INTRAVENOUS; SUBCUTANEOUS at 10:14

## 2024-07-31 RX ADMIN — CEFAZOLIN 2000 MG: 2 INJECTION, POWDER, FOR SOLUTION INTRAMUSCULAR; INTRAVENOUS at 06:50

## 2024-07-31 RX ADMIN — SENNOSIDES AND DOCUSATE SODIUM 1 TABLET: 50; 8.6 TABLET ORAL at 10:15

## 2024-07-31 RX ADMIN — SODIUM CHLORIDE: 9 INJECTION, SOLUTION INTRAVENOUS at 03:59

## 2024-07-31 ASSESSMENT — PAIN DESCRIPTION - ORIENTATION
ORIENTATION: RIGHT
ORIENTATION: RIGHT

## 2024-07-31 ASSESSMENT — PAIN DESCRIPTION - LOCATION
LOCATION: KNEE
LOCATION: KNEE

## 2024-07-31 ASSESSMENT — PAIN SCALES - GENERAL
PAINLEVEL_OUTOF10: 2
PAINLEVEL_OUTOF10: 6
PAINLEVEL_OUTOF10: 0
PAINLEVEL_OUTOF10: 7

## 2024-07-31 ASSESSMENT — PAIN DESCRIPTION - DESCRIPTORS
DESCRIPTORS: ACHING
DESCRIPTORS: ACHING

## 2024-07-31 ASSESSMENT — PAIN SCALES - WONG BAKER: WONGBAKER_NUMERICALRESPONSE: HURTS A LITTLE BIT

## 2024-07-31 ASSESSMENT — PAIN - FUNCTIONAL ASSESSMENT: PAIN_FUNCTIONAL_ASSESSMENT: PREVENTS OR INTERFERES SOME ACTIVE ACTIVITIES AND ADLS

## 2024-07-31 NOTE — PROGRESS NOTES
Pt alert ad oriented, pleasant and cooperative with care. Surgical Pain to R knee is controlled by prn oxycodone. VSS. Ambulated to bathroom 3x using front wheeled walker. Continuous IVF and IV atb once given. VSS on room air. PIV replaced to left forearm. All safety precaution in place. No needs at this time. Will monitor.

## 2024-07-31 NOTE — PROGRESS NOTES
[facility] - Inpatient Rehabilitation Department   Phone: [phone]    Physical Therapy    [x] Initial Evaluation            [] Daily Treatment Note         [x] Discharge Summary      Patient: Ricki Zeng   : 1945   MRN: [id number]   Date of Service:  2024  Admitting Diagnosis: Osteoarthritis of right knee, unspecified osteoarthritis type    Current Admission Summary: Pt is a 78 y/o F s/p R TKA, Dr. Leos. Pt s/p L TKA 1 mo ago.    Past Medical History:  has a past medical history of Anesthesia, Arthritis, Asthma, Breast cancer (HCC), Cancer (HCC), Diabetes mellitus (HCC), Diverticulitis, History of chemotherapy, Hx antineoplastic chemo, Hypertension, Prolonged emergence from general anesthesia, Sleep apnea, and Urinary incontinence.  Past Surgical History:  has a past surgical history that includes Hysterectomy; US BREAST BIOPSY W LOC DEVICE 1ST LESION RIGHT (Right, 2021); US BREAST BIOPSY W LOC DEVICE EACH ADDL LESION RIGHT (Right, 2021); US BIOPSY LYMPH NODE (2021); Breast lumpectomy (Left); Port Surgery (N/A, 2021); Ovary removal; US PLACE BREAST LOC DEVICE 1ST LESION RIGHT (Right, 2022); Mastectomy (Bilateral, 2022); Breast enhancement surgery (Bilateral, 2023); Breast surgery (Bilateral, 2023); Total knee arthroplasty (Left, 2024); joint replacement; and Total knee arthroplasty (Right, 2024).    Discharge Recommendations: Ricki Zeng scored a 20/24 on the AM-PAC short mobility form. Current research shows that an AM-PAC score of 18 or greater is typically associated with a discharge to the patient's home setting. Based on the patient's AM-PAC score and their current functional mobility deficits, it is recommended that the patient have 2-3 sessions per week of Physical Therapy at d/c to increase the patient's independence.  At this time, this patient demonstrates the endurance and safety to discharge home with HHPT and a

## 2024-07-31 NOTE — PROGRESS NOTES
[facility] - Inpatient Rehabilitation Department   Phone: [phone]    Occupational Therapy    [x] Initial Evaluation            [] Daily Treatment Note         [x] Discharge Summary      Patient: Ricki Zeng   : 1945   MRN: [id number]   Date of Service:  2024    Admitting Diagnosis:  Osteoarthritis of right knee, unspecified osteoarthritis type  Current Admission Summary: s/p R TKA on    Past Medical History:  has a past medical history of Anesthesia, Arthritis, Asthma, Breast cancer (HCC), Cancer (HCC), Diabetes mellitus (HCC), Diverticulitis, History of chemotherapy, Hx antineoplastic chemo, Hypertension, Prolonged emergence from general anesthesia, Sleep apnea, and Urinary incontinence.  Past Surgical History:  has a past surgical history that includes Hysterectomy; US BREAST BIOPSY W LOC DEVICE 1ST LESION RIGHT (Right, 2021); US BREAST BIOPSY W LOC DEVICE EACH ADDL LESION RIGHT (Right, 2021); US BIOPSY LYMPH NODE (2021); Breast lumpectomy (Left); Port Surgery (N/A, 2021); Ovary removal; US PLACE BREAST LOC DEVICE 1ST LESION RIGHT (Right, 2022); Mastectomy (Bilateral, 2022); Breast enhancement surgery (Bilateral, 2023); Breast surgery (Bilateral, 2023); Total knee arthroplasty (Left, 2024); joint replacement; and Total knee arthroplasty (Right, 2024).    Discharge Recommendations: Ricki Zeng scored a 21/24 on the AM-PAC ADL Inpatient form.  At this time, no further OT is recommended upon discharge due to patient at independent level.  Recommend patient returns to prior setting with prior services.      DME Required For Discharge: No DME required    Precautions/Restrictions:  WBAT RLE  Positional Restrictions:no positional restrictions    Pre-Admission Information   Lives With: daughter - (teacher that starts back on Monday)      Type of Home: house  Home Layout: one level  Home Access:  1 step to enter with handrail.

## 2024-07-31 NOTE — CARE COORDINATION
VÁSQUEZ Letter       07/31/24 0912   IMM Letter   Observation Status Letter date given: 07/31/24   Observation Status Letter time given: 0822   Observation Status Letter given to Patient/Family/Significant other/Guardian/POA/by: Patient     ALEM GarciaN RN    [facility]  Phone: [phone]

## 2024-07-31 NOTE — DISCHARGE INSTR - COC
Barium Swallow with Video (Video Swallowing Test): {Done Not Done Date:304088012}    Treatments at the Time of Hospital Discharge:   Respiratory Treatments: ***  Oxygen Therapy:  {Therapy; copd oxygen:11900}  Ventilator:    { CC Vent List:888725256}    Rehab Therapies: Physical Therapy  Weight Bearing Status/Restrictions: No weight bearing restrictions  Other Medical Equipment (for information only, NOT a DME order):  {EQUIPMENT:431833671}  Other Treatments: INSTRUCTIONS    ACTIVITY: weight-bearing as tolerated. You may progress off support as tolerated. During the day, continue to wear your CHAIM stocking on the operative leg. Take the stocking off at night. You do not need to wear a stocking on your non-operative leg. You should wear the compression stocking until your post-op visit, this keeps lower extremity swelling to a minimum and reduces joint stiffness.     MEDICATIONS: Upon discharge resume your home medications. Take all medications as prescribed. Take a stool softener (Senna/Colace) if taking narcotic pain medications. Stool softeners are only effective if you are adequately hydrated.  Try and drink 6-8 glasses of water or fluids a day, unless otherwise contraindicated. Despite using Senna/Colace, if you haven't had a bowel movement in 3 days, please switch to Miralax. Miralax is a gentle osmotic laxative and is sold over the counter. Mix one capful of the powder into a glass of water or juice once a day. You should have a bowel movement within 24 hours, if not call the office.     You will be discharged from the hospital with an adequate supply of pain medication. You are encouraged to taper the use of narcotic pain medication as tolerated. Should you require a refill, please call our office. Dr. Leos's office routinely prescribes narcotic pain medication for 4-6 weeks after surgery. If you require pain medication beyond this interval you may be referred to your PCP or to the Pain Clinic for further

## 2024-07-31 NOTE — PROGRESS NOTES
Gave d/c instructions with list of active meds and when they are next due. Reviewed discharge instructions at bedside and provided printed copy of same. Pt verbalized understanding of all instructions. Denied additional questions. To home as passenger in private vehicle, taken to vehicle by RN.

## 2024-07-31 NOTE — PLAN OF CARE
Problem: Chronic Conditions and Co-morbidities  Goal: Patient's chronic conditions and co-morbidity symptoms are monitored and maintained or improved  Outcome: Completed     Problem: Discharge Planning  Goal: Discharge to home or other facility with appropriate resources  Outcome: Completed     Problem: Pain  Goal: Verbalizes/displays adequate comfort level or baseline comfort level  Outcome: Completed     Problem: Safety - Adult  Goal: Free from fall injury  Outcome: Completed     Problem: ABCDS Injury Assessment  Goal: Absence of physical injury  Outcome: Completed

## 2024-07-31 NOTE — DISCHARGE SUMMARY
Patient ID:  Ricki Zeng  [id number]  1945    Admit date: 7/30/2024    Discharge date:7/31/24    Attending Physician: Hamzah Leos MD     Admission Diagnoses:   Arthritis of right knee [M17.11]  Osteoarthritis of right knee, unspecified osteoarthritis type [M17.11]    Discharge Diagnoses:   Principal Problem:    Osteoarthritis of right knee, unspecified osteoarthritis type  Resolved Problems:    * No resolved hospital problems. *    Past Medical History:   Diagnosis Date    Anesthesia     sensitive, doesnt take much    Arthritis     Asthma     resolved    Breast cancer (HCC)     Cancer (HCC) 06/2021    right Breast    Diabetes mellitus (HCC)     Diverticulitis     History of chemotherapy 12/29/2021    Dr [facility], treament concluded    Hx antineoplastic chemo     Hypertension     no meds due to chemo    Prolonged emergence from general anesthesia     Sleep apnea     Mild - wears no mask    Urinary incontinence        Indication for Admission: Ricki Zeng is a 79 y.o. female who presented with right knee osteoarthritis that was not responsive to conservative measures.   she carries a history of IDDM, HLD, HTN.    Operations/Procedures Performed:   1. right total knee arthroplasty    Hospital Course: Patient admitted on 7/30/2024 and underwent abovementioned procedure(s) on 7/30/24. Tolerated the procedure well with no complications. Please see full operative report for further details regarding the operation. Postoperatively transferred to the floor in stable condition.  Pain controlled post-op with IV/oral pain medication. Diet was advanced and tolerated this well.  At time of discharge, the patient was tolerating oral food and hydration, voiding spontaneously, had return of bowel function, was ambulating without difficulty, and pain was controlled on oral medications. The patient was determined to be suitable for discharge and the patient felt comfortable with that decision.     Consults: Physical

## 2024-07-31 NOTE — CARE COORDINATION
Case Management Assessment  Initial Evaluation    Date/Time of Evaluation: 7/31/2024 8:18 AM  Assessment Completed by: Royal Weldon Jr, RN    If patient is discharged prior to next notation, then this note serves as note for discharge by case management.    Patient Name: Ricki Zeng                   YOB: 1945  Diagnosis: Arthritis of right knee [M17.11]  Osteoarthritis of right knee, unspecified osteoarthritis type [M17.11]                   Date / Time: 7/30/2024  7:33 AM    Patient Admission Status: Observation   Readmission Risk (Low < 19, Mod (19-27), High > 27): Readmission Risk Score: 10.6    Current PCP: Neville Botello MD  PCP verified by CM? (P) Yes    Chart Reviewed: Yes      History Provided by: (P) Patient  Patient Orientation: (P) Alert and Oriented    Patient Cognition: (P) Alert    Hospitalization in the last 30 days (Readmission):  No    If yes, Readmission Assessment in CM Navigator will be completed.    Advance Directives:      Code Status: Full Code   Patient's Primary Decision Maker is: (P) Legal Next of Kin      Discharge Planning:    Patient lives with: (P) Alone Type of Home: (P) House  Primary Care Giver: (P) Self  Patient Support Systems include: (P) Family Members   Current Financial resources: (P) Medicare  Current community resources: (P) None  Current services prior to admission: (P) None            Current DME:              Type of Home Care services:  (P) None    ADLS  Prior functional level: (P) Assistance with the following:, Mobility  Current functional level: (P) Assistance with the following:, Mobility    PT AM-PAC:   /24  OT AM-PAC:   /24    Family can provide assistance at DC: (P) Yes  Would you like Case Management to discuss the discharge plan with any other family members/significant others, and if so, who? (P) No  Plans to Return to Present Housing: (P) Yes  Other Identified Issues/Barriers to RETURNING to current housing:   Potential

## 2024-07-31 NOTE — PROGRESS NOTES
CLINICAL PHARMACY NOTE: MEDS TO BEDS    Total # of Prescriptions Filled: 6   The following medications were delivered to the patient:  ASPIRIN 81MG  MELOXICAM 15MG  SENNA-TIME  ASPIRIN 81MG  ACETAMINOPHEN 5  OXYCODONE HCL 5MG    Additional Documentation:  Delivered to patients room = singed  Ok to be delivered per ROSS Shaffer -Pharmacy Tech.

## 2024-07-31 NOTE — CARE COORDINATION
Case Management -  Discharge Note      Patient Name: Ricki Zeng                   YOB: 1945            Readmission Risk (Low < 19, Mod (19-27), High > 27): Readmission Risk Score: 10.6    Current PCP: Neville Botello MD    ([facility]) Important Message from Medicare:    Date: 07/31/2024    PT AM-PAC:   /24  OT AM-PAC:   /24    Patient/patient representative has been educated on the benefits of HHC as well as the possible risks of declining recommended services. Patient/patient representative has acknowledged the information provided and decided on the following discharge plan. Patient/ patient representative has been provided freedom of choice regarding service provider, supported by basic dialogue that supports the patient's individualized plan of care/goals.    Home Care Information:   Is patient resuming current home health care services: No    Home Care Agency:     FACILITY[facility]  ADDRESS:   [address] [place] [address]   PHONE:        [phone]  FAX:              [phone]              Services: PT    Home Health Order Obtained: yes    Home health agency notified of discharge: yes      Financial    Payor: MEDICARE / Plan: MEDICARE PART A AND B / Product Type: *No Product type* /     Pharmacy:  Potential assistance Purchasing Medications: No  Meds-to-Beds request:        [id number] - [place], [place] - [address] [phone] - F [phone]  [address] [address]  Phone: [phone] Fax: [phone]    [id number] - [place], [place] - [address] - P [phone] - F [phone]  [address] [address]  Phone: [phone] Fax: [phone]    [facility] - [place], [place] - [address] - P [phone] - F [phone]  [address] [facility] [address]  Phone: [phone] Fax:

## 2024-08-15 ENCOUNTER — OFFICE VISIT (OUTPATIENT)
Dept: ORTHOPEDIC SURGERY | Age: 79
End: 2024-08-15

## 2024-08-15 VITALS — HEIGHT: 65 IN | BODY MASS INDEX: 32.15 KG/M2 | WEIGHT: 193 LBS

## 2024-08-15 DIAGNOSIS — Z47.89 AFTERCARE FOLLOWING SURGERY OF THE MUSCULOSKELETAL SYSTEM: Primary | ICD-10-CM

## 2024-08-15 PROCEDURE — 99024 POSTOP FOLLOW-UP VISIT: CPT | Performed by: ORTHOPAEDIC SURGERY

## 2024-08-15 NOTE — PROGRESS NOTES
Patient: Ricki Zeng                  : 1945   MRN: [id number]   Date of Visit: 8/15/24     Physician: Hamzah Leos MD.     Reason for Visit: Status post TKA     Subjective History of Present Illness:     Ricki Zeng is here for regularly scheduled follow-up s/p RIGHT TKA. Reports they are doing well, occasional aches and pains are controlled with over the counter medications. Taking opioid medications sparingly and continuing to wean. They do not report fevers, chills or drainage from the incision site    Physical Examination??: ?   General: Patient is alert and oriented x 3 and appears comfortable.   Incision is well-approximated, no erythema, fluctuance   Able to perform SLR   ROM 5-85    SILT throughout LE     Radiographs: AP/lateral of the operative knee with well-positioned total knee arthroplasty. No evidence of fracture subluxation or dislocation. No evidence of migration or loosening.      Assessment and Plan?: The patient is progressing well approximately 2 weeks s/p TKA     1. A thorough discussion was had with the patient concerning the ?postoperative course and the patient is in agreement with the plan.   2. They will continue to wean from pain medications and progress weight bearing    3. Discussed the need for antibiotics prior to dental procedures at least for 1 years after arthroplasty.  4. Will see the patient in 4 weeks with repeat xrays at that time of left and right knees  _______________________      Hamzah Leos MD

## 2024-08-19 ENCOUNTER — HOSPITAL ENCOUNTER (OUTPATIENT)
Dept: PHYSICAL THERAPY | Age: 79
Setting detail: THERAPIES SERIES
Discharge: HOME | End: 2024-08-19
Payer: MEDICARE

## 2024-08-19 DIAGNOSIS — R60.9 SWELLING: Primary | ICD-10-CM

## 2024-08-19 PROCEDURE — 97110 THERAPEUTIC EXERCISES: CPT

## 2024-08-19 PROCEDURE — 97530 THERAPEUTIC ACTIVITIES: CPT

## 2024-08-19 NOTE — PLAN OF CARE
[facility] - Outpatient Rehabilitation and Therapy [address], [place] [address] office: [phone] fax: [phone]     Physical Therapy Re-Certification Plan of Care    Dear Dr. Deric MD  ,    We had the pleasure of treating the following patient for physical therapy services at [facility]. A summary of our findings can be found in the updated assessment below.  This includes our plan of care.  If you have any questions or concerns regarding these findings, please do not hesitate to contact me at the office phone number checked above.  Thank you for the referral.     Physician Signature:________________________________Date:__________________  By signing above (or electronic signature), therapist's plan is approved by physician      Functional Outcome:    Test used Initial score  24 POC  24   Pain Summary VAS 10 5/10    Functional questionnaire LEFS 30/80 - 63% dysfunction  34/80 - 57% dysfunction      Overall Response to Treatment:  Pt is 8 wks s/p L TKA and 3 wks s/p R TKA. Pt presents with reduced flexion and ext AROM of B knees, B LE weakness, and gait impairments. Pt currently ambulating with a RW with decreased kizzy, WB on RLE, and reduced heel strike. Pt at an increased risk for falls noted by TUG time - advised to continue with RW for now. Pt with significant edema in B LE - provided with tensoshape and discussed importance of ice for edema management. Pt will benefit from OP PT to improve B LE strength and ROM, balance, and gait and reduce swelling and pain to improve independence and safety with functional mobility.     Total Visits: 3     Recommendation:    [x] Continue PT 2x / wk for 7 weeks.   [] Hold PT, pending MD visit   [] Discharge to [facility]. Follow up with PT or MD PRN.     Physical Therapy: TREATMENT/PROGRESS NOTE   Patient: Ricki Zeng (79 y.o. female)   Examination Date: 2024   :  1945 MRN: [id number]

## 2024-08-21 ENCOUNTER — HOSPITAL ENCOUNTER (OUTPATIENT)
Dept: PHYSICAL THERAPY | Age: 79
Setting detail: THERAPIES SERIES
Discharge: HOME | End: 2024-08-21
Payer: MEDICARE

## 2024-08-21 ENCOUNTER — APPOINTMENT (OUTPATIENT)
Dept: PHYSICAL THERAPY | Age: 79
End: 2024-08-21
Payer: MEDICARE

## 2024-08-21 ENCOUNTER — TELEPHONE (OUTPATIENT)
Dept: ORTHOPEDIC SURGERY | Age: 79
End: 2024-08-21

## 2024-08-21 PROCEDURE — 97110 THERAPEUTIC EXERCISES: CPT

## 2024-08-21 NOTE — FLOWSHEET NOTE
[facility] - Outpatient Rehabilitation and Therapy [address], [place] [address] office: [phone] fax: [phone]       Physical Therapy: TREATMENT/PROGRESS NOTE   Patient: Ricki Zeng (79 y.o. female)   Examination Date: 2024   :  1945 MRN: [id number]   Visit #:   Insurance Allowable Auth Needed   Med Nec []Yes    [x]No    Insurance: Payor: MEDICARE / Plan: MEDICARE PART A AND B / Product Type: *No Product type* /   Insurance ID: [id number] - (Medicare)  Secondary Insurance (if applicable): BCBS   Treatment Diagnosis:     ICD-10-CM    1. Decreased range of motion (ROM) of left knee  M25.662       2. Decreased range of motion (ROM) of right knee  M25.661       3. Hip weakness  R29.898       4. Quadriceps weakness  M62.81       5. Gait abnormality  R26.9       6. Risk for falls  Z91.81    7. Swelling  R60.9             Medical Diagnosis:  S/P left knee surgery [Z98.890]   Referring Physician: Hamzah Leos MD  PCP: Neville Botello MD     Plan of care signed (Y/N): yes    Date of Patient follow up with Physician:      Progress Report/POC: NO  POC update due: (10 visits /OR AUTH LIMITS, whichever is less)  2024                                             Medical History:  Comorbidities:  Cancer/Tumor  Diabetes (Type I or II)  Osteoarthritis  Relevant Medical History: double mastectomy 2022, reconstruction Dec 2023, RUE lymphedema; L TKA 24, R TKA 24                                          Precautions/ Contra-indications:           Latex allergy:  NO  Pacemaker:    NO  Contraindications for Manipulation: NA    Red Flags:  None    Suicide Screening:   The patient did not verbalize a primary behavioral concern, suicidal ideation, suicidal intent, or demonstrate suicidal behaviors.    Preferred Language for Healthcare:   [x] English       [] other:    SUBJECTIVE EXAMINATION     Patient stated complaint: Reports she felt good following LV.

## 2024-08-21 NOTE — TELEPHONE ENCOUNTER
S/P RIGHTtotal knee arthroplasty  with Dr. Leos     Called patient for [facility] ortho bundle follow up.  Unable to reach patient, left voicemail.  Instructed patient to call for any questions or concerns.      Priti Champion, RN  [phone]       Patient was advised to call with any questions or concerns.     Follow up appointments:    Future Appointments   Date Time Provider Department Center   8/26/2024 10:20 AM Vickie Holman, PT [facility] PT [facility]   8/28/2024 10:40 AM Vickie Holman, PT [facility]   9/5/2024 10:20 AM Vickie Holman, PT [facility]   9/10/2024 10:40 AM Vickie Holman, PT [facility]   9/12/2024 11:00 AM Hamzah Leos MD [facility]   9/13/2024 10:20 AM Vickie Holman, PT [facility]   9/16/2024 10:20 AM Vickie Holman, PT [facility]   9/19/2024 11:40 AM Vickie Holman, PT [facility]   9/23/2024 10:20 AM Vickie Holman, PT [facility]   9/25/2024 10:40 AM Vickie Holman, PT [facility]

## 2024-08-26 ENCOUNTER — HOSPITAL ENCOUNTER (OUTPATIENT)
Dept: PHYSICAL THERAPY | Age: 79
Setting detail: THERAPIES SERIES
Discharge: HOME | End: 2024-08-26
Payer: MEDICARE

## 2024-08-26 PROCEDURE — 97110 THERAPEUTIC EXERCISES: CPT

## 2024-08-26 NOTE — FLOWSHEET NOTE
Not Met: [] Adjusted  3. Patient will demonstrate increased Strength of B quad mm to at least 5/5 throughout without pain to allow for proper functional mobility to enable patient to return to squatting for home management.   [x] Progressing: [] Met: [] Not Met: [] Adjusted  4. Patient will return to heavy home management without increased symptoms or restriction.   [x] Progressing: [] Met: [] Not Met: [] Adjusted  5. Pt will report ability to sleep 7/7 nights/wk without waking up due to knee pain.     [x] Progressing: [] Met: [] Not Met: [] Adjusted     Overall Progression Towards Functional goals/ Treatment Progress Update:  [] Patient is progressing as expected towards functional goals listed.    [] Progression is slowed due to complexities/Impairments listed.  [] Progression has been slowed due to co-morbidities.  [x] Plan just implemented, too soon (<30days) to assess goals progression   [] Goals require adjustment due to lack of progress  [] Patient is not progressing as expected and requires additional follow up with physician  [] Other:     TREATMENT PLAN     Frequency/Duration: 2x/week for 8 weeks for the following treatment interventions:    Interventions:  Therapeutic Exercise (38230) including: strength training, ROM, and functional mobility  Therapeutic Activities (78858) including: functional mobility training and education.  Neuromuscular Re-education (37225) activation and proprioception, including postural re-education.    Gait Training (35894) for normalization of ambulation patterns and AD training.   Manual Therapy (45651) as indicated to include: Passive Range of Motion, Gr I-IV mobilizations, and Soft Tissue Mobilization  Modalities as needed that may include: Cryotherapy and Vasoneumatic Compression  Patient education on joint protection, postural re-education, activity modification, and progression of HEP    Plan: Cont POC- Continue emphasis/focus on exercise progression, modulating pain, and

## 2024-08-28 ENCOUNTER — HOSPITAL ENCOUNTER (OUTPATIENT)
Dept: PHYSICAL THERAPY | Age: 79
Setting detail: THERAPIES SERIES
Discharge: HOME | End: 2024-08-28
Payer: MEDICARE

## 2024-08-28 PROCEDURE — 97110 THERAPEUTIC EXERCISES: CPT

## 2024-08-28 PROCEDURE — 97530 THERAPEUTIC ACTIVITIES: CPT

## 2024-08-28 NOTE — FLOWSHEET NOTE
Progressing: [] Met: [] Not Met: [] Adjusted    Long Term Goals: To be achieved in: 8 weeks  1. Disability index score of 25% or less for the LEFS to assist with reaching prior level of function with activities such as prolonged standing and walking.  [x] Progressing: [] Met: [] Not Met: [] Adjusted  2. Patient will demonstrate increased B knee ext-flexion AROM to 0-120 without pain to allow for proper joint functioning to enable patient to complete stair navigation without pain or limitations.   [x] Progressing: [] Met: [] Not Met: [] Adjusted  3. Patient will demonstrate increased Strength of B quad mm to at least 5/5 throughout without pain to allow for proper functional mobility to enable patient to return to squatting for home management.   [x] Progressing: [] Met: [] Not Met: [] Adjusted  4. Patient will return to heavy home management without increased symptoms or restriction.   [x] Progressing: [] Met: [] Not Met: [] Adjusted  5. Pt will report ability to sleep 7/7 nights/wk without waking up due to knee pain.     [x] Progressing: [] Met: [] Not Met: [] Adjusted     Overall Progression Towards Functional goals/ Treatment Progress Update:  [] Patient is progressing as expected towards functional goals listed.    [] Progression is slowed due to complexities/Impairments listed.  [] Progression has been slowed due to co-morbidities.  [x] Plan just implemented, too soon (<30days) to assess goals progression   [] Goals require adjustment due to lack of progress  [] Patient is not progressing as expected and requires additional follow up with physician  [] Other:     TREATMENT PLAN     Frequency/Duration: 2x/week for 8 weeks for the following treatment interventions:    Interventions:  Therapeutic Exercise (03799) including: strength training, ROM, and functional mobility  Therapeutic Activities (18217) including: functional mobility training and education.  Neuromuscular Re-education (96691) activation and

## 2024-09-05 ENCOUNTER — HOSPITAL ENCOUNTER (OUTPATIENT)
Dept: PHYSICAL THERAPY | Age: 79
Setting detail: THERAPIES SERIES
Discharge: HOME | End: 2024-09-05
Payer: MEDICARE

## 2024-09-05 PROCEDURE — 97110 THERAPEUTIC EXERCISES: CPT

## 2024-09-05 PROCEDURE — 97112 NEUROMUSCULAR REEDUCATION: CPT

## 2024-09-05 NOTE — FLOWSHEET NOTE
reaching prior level of function with activities such as prolonged standing and walking.  [x] Progressing: [] Met: [] Not Met: [] Adjusted  2. Patient will demonstrate increased B knee ext-flexion AROM to 0-120 without pain to allow for proper joint functioning to enable patient to complete stair navigation without pain or limitations.   [x] Progressing: [] Met: [] Not Met: [] Adjusted  3. Patient will demonstrate increased Strength of B quad mm to at least 5/5 throughout without pain to allow for proper functional mobility to enable patient to return to squatting for home management.   [x] Progressing: [] Met: [] Not Met: [] Adjusted  4. Patient will return to heavy home management without increased symptoms or restriction.   [x] Progressing: [] Met: [] Not Met: [] Adjusted  5. Pt will report ability to sleep 7/7 nights/wk without waking up due to knee pain.     [x] Progressing: [] Met: [] Not Met: [] Adjusted     Overall Progression Towards Functional goals/ Treatment Progress Update:  [] Patient is progressing as expected towards functional goals listed.    [] Progression is slowed due to complexities/Impairments listed.  [] Progression has been slowed due to co-morbidities.  [x] Plan just implemented, too soon (<30days) to assess goals progression   [] Goals require adjustment due to lack of progress  [] Patient is not progressing as expected and requires additional follow up with physician  [] Other:     TREATMENT PLAN     Frequency/Duration: 2x/week for 8 weeks for the following treatment interventions:    Interventions:  Therapeutic Exercise (75748) including: strength training, ROM, and functional mobility  Therapeutic Activities (21117) including: functional mobility training and education.  Neuromuscular Re-education (86765) activation and proprioception, including postural re-education.    Gait Training (82744) for normalization of ambulation patterns and AD training.   Manual Therapy (40134) as indicated

## 2024-09-10 ENCOUNTER — HOSPITAL ENCOUNTER (OUTPATIENT)
Dept: PHYSICAL THERAPY | Age: 79
Setting detail: THERAPIES SERIES
Discharge: HOME | End: 2024-09-10
Payer: MEDICARE

## 2024-09-10 PROCEDURE — 97110 THERAPEUTIC EXERCISES: CPT

## 2024-09-10 PROCEDURE — 97530 THERAPEUTIC ACTIVITIES: CPT

## 2024-09-12 ENCOUNTER — HOSPITAL ENCOUNTER (OUTPATIENT)
Dept: PHYSICAL THERAPY | Age: 79
Setting detail: THERAPIES SERIES
Discharge: HOME | End: 2024-09-12
Payer: MEDICARE

## 2024-09-12 ENCOUNTER — OFFICE VISIT (OUTPATIENT)
Dept: ORTHOPEDIC SURGERY | Age: 79
End: 2024-09-12

## 2024-09-12 VITALS — HEIGHT: 67 IN | BODY MASS INDEX: 27.47 KG/M2 | WEIGHT: 175 LBS

## 2024-09-12 DIAGNOSIS — Z96.651 HISTORY OF TOTAL KNEE ARTHROPLASTY, RIGHT: ICD-10-CM

## 2024-09-12 DIAGNOSIS — Z96.652 S/P TOTAL KNEE ARTHROPLASTY, LEFT: Primary | ICD-10-CM

## 2024-09-12 PROCEDURE — 97110 THERAPEUTIC EXERCISES: CPT

## 2024-09-12 PROCEDURE — 97530 THERAPEUTIC ACTIVITIES: CPT

## 2024-09-12 PROCEDURE — 99024 POSTOP FOLLOW-UP VISIT: CPT | Performed by: ORTHOPAEDIC SURGERY

## 2024-09-12 PROCEDURE — 97112 NEUROMUSCULAR REEDUCATION: CPT

## 2024-09-13 ENCOUNTER — APPOINTMENT (OUTPATIENT)
Dept: PHYSICAL THERAPY | Age: 79
End: 2024-09-13
Payer: MEDICARE

## 2024-09-16 ENCOUNTER — HOSPITAL ENCOUNTER (OUTPATIENT)
Dept: PHYSICAL THERAPY | Age: 79
Setting detail: THERAPIES SERIES
Discharge: HOME | End: 2024-09-16
Payer: MEDICARE

## 2024-09-16 PROCEDURE — 97110 THERAPEUTIC EXERCISES: CPT

## 2024-09-19 ENCOUNTER — HOSPITAL ENCOUNTER (OUTPATIENT)
Dept: PHYSICAL THERAPY | Age: 79
Setting detail: THERAPIES SERIES
Discharge: HOME | End: 2024-09-19
Payer: MEDICARE

## 2024-09-19 PROCEDURE — 97110 THERAPEUTIC EXERCISES: CPT

## 2024-09-19 PROCEDURE — 97140 MANUAL THERAPY 1/> REGIONS: CPT

## 2024-09-23 ENCOUNTER — TELEPHONE (OUTPATIENT)
Dept: ORTHOPEDIC SURGERY | Age: 79
End: 2024-09-23

## 2024-09-23 ENCOUNTER — HOSPITAL ENCOUNTER (OUTPATIENT)
Dept: PHYSICAL THERAPY | Age: 79
Setting detail: THERAPIES SERIES
Discharge: HOME | End: 2024-09-23
Payer: MEDICARE

## 2024-09-23 PROCEDURE — 97110 THERAPEUTIC EXERCISES: CPT

## 2024-09-23 PROCEDURE — 97140 MANUAL THERAPY 1/> REGIONS: CPT

## 2024-09-25 ENCOUNTER — HOSPITAL ENCOUNTER (OUTPATIENT)
Dept: PHYSICAL THERAPY | Age: 79
Setting detail: THERAPIES SERIES
Discharge: HOME | End: 2024-09-25
Payer: MEDICARE

## 2024-09-25 PROCEDURE — 97110 THERAPEUTIC EXERCISES: CPT

## 2024-09-30 ENCOUNTER — HOSPITAL ENCOUNTER (OUTPATIENT)
Dept: PHYSICAL THERAPY | Age: 79
Setting detail: THERAPIES SERIES
End: 2024-09-30
Payer: MEDICARE

## 2024-10-03 ENCOUNTER — APPOINTMENT (OUTPATIENT)
Dept: PHYSICAL THERAPY | Age: 79
End: 2024-10-03
Payer: MEDICARE

## 2024-10-07 ENCOUNTER — HOSPITAL ENCOUNTER (OUTPATIENT)
Dept: PHYSICAL THERAPY | Age: 79
Setting detail: THERAPIES SERIES
Discharge: HOME | End: 2024-10-07
Payer: MEDICARE

## 2024-10-07 PROCEDURE — 97110 THERAPEUTIC EXERCISES: CPT

## 2024-10-07 PROCEDURE — 97140 MANUAL THERAPY 1/> REGIONS: CPT

## 2024-10-07 NOTE — FLOWSHEET NOTE
modulating pain, and increasing ROM. Next visit plan to progress weights, progress reps, and add new exercises     Electronically Signed by Vickie Holman PT, DPT, CLT, CNS  Date: 10/07/2024    Daphney Merida [facility]  Therapist was present, directed the patient's care, made skilled judgement, and was responsible for assessment and treatment of the patient.       Note: Portions of this note have been templated and/or copied from initial evaluation, reassessments and prior notes for documentation efficiency.    Note: If patient does not return for scheduled/recommended follow up visits, this note will serve as a discharge from care along with the most recent update on progress.    Ortho Evaluation

## 2024-10-09 ENCOUNTER — HOSPITAL ENCOUNTER (OUTPATIENT)
Dept: PHYSICAL THERAPY | Age: 79
Setting detail: THERAPIES SERIES
Discharge: HOME | End: 2024-10-09
Payer: MEDICARE

## 2024-10-09 PROCEDURE — 97110 THERAPEUTIC EXERCISES: CPT

## 2024-10-09 PROCEDURE — 97140 MANUAL THERAPY 1/> REGIONS: CPT

## 2024-10-09 NOTE — FLOWSHEET NOTE
[facility] - Outpatient Rehabilitation and Therapy [address], [place] [address] office: [phone] fax: [phone]         Physical Therapy: TREATMENT/PROGRESS NOTE   Patient: Ricki Zeng (79 y.o. female)   Examination Date: 10/09/2024   :  1945 MRN: [id number]   Visit #: 15 / 26  Insurance Allowable Auth Needed   Med Nec []Yes    [x]No    Insurance: Payor: MEDICARE / Plan: MEDICARE PART A AND B / Product Type: *No Product type* /   Insurance ID: [id number] - (Medicare)  Secondary Insurance (if applicable): BCBS   Treatment Diagnosis:     ICD-10-CM    1. Decreased range of motion (ROM) of left knee  M25.662       2. Decreased range of motion (ROM) of right knee  M25.661       3. Hip weakness  R29.898       4. Quadriceps weakness  M62.81       5. Gait abnormality  R26.9       6. Risk for falls  Z91.81    7. Swelling  R60.9             Medical Diagnosis:  S/P left knee surgery [Z98.890]   Referring Physician: Hamzah Leos MD  PCP: Neville Botello MD     Plan of care signed (Y/N): yes    Date of Patient follow up with Physician:      Progress Report/POC: NO  POC update due: (10 visits /OR AUTH LIMITS, whichever is less)  10/19/2024                                             Medical History:  Comorbidities:  Cancer/Tumor  Diabetes (Type I or II)  Osteoarthritis  Relevant Medical History: double mastectomy 2022, reconstruction Dec 2023, RUE lymphedema; L TKA 24, R TKA 24                                          Precautions/ Contra-indications:           Latex allergy:  NO  Pacemaker:    NO  Contraindications for Manipulation: NA    Red Flags:  None    Suicide Screening:   The patient did not verbalize a primary behavioral concern, suicidal ideation, suicidal intent, or demonstrate suicidal behaviors.    Preferred Language for Healthcare:   [x] English       [] other:    SUBJECTIVE EXAMINATION     Patient stated complaint: Pt reports feeling good since

## 2024-10-15 ENCOUNTER — HOSPITAL ENCOUNTER (OUTPATIENT)
Dept: PHYSICAL THERAPY | Age: 79
Setting detail: THERAPIES SERIES
Discharge: HOME | End: 2024-10-15
Payer: MEDICARE

## 2024-10-15 PROCEDURE — 97110 THERAPEUTIC EXERCISES: CPT

## 2024-10-15 PROCEDURE — 97530 THERAPEUTIC ACTIVITIES: CPT

## 2024-10-15 NOTE — FLOWSHEET NOTE
code:    Access Code: [email or web address]  URL: [email or web address]  Date: 07/23/2024  Prepared by: Vickie Holman    Exercises  - Long Sitting Calf Stretch with Strap  - 1 x daily - 7 x weekly - 2 sets - 30 sec hold  - Long Sitting Quad Set  - 1 x daily - 7 x weekly - 2 sets - 10 reps  - Active Straight Leg Raise with Quad Set  - 1 x daily - 7 x weekly - 2 sets - 10 reps  - Supine Heel Slide with Strap  - 1 x daily - 7 x weekly - 2 sets - 10 reps  - Seated Heel Slide  - 1 x daily - 7 x weekly - 2 sets - 10 reps    7/23 educated on importance of icing 3x/day for pain modulation and reduced swelling     9/25  Access Code: [email or web address]  URL: [email or web address]  Date: 09/25/2024  Prepared by: Vickie Holman    Exercises  - Seated Knee Extension Stretch with Chair  - 2 x daily - 7 x weekly - 6-10 min hold  - Supine Knee Extension Stretch on Towel Roll  - 2 x daily - 7 x weekly - 6-10 min hold  - Clamshell  - 2 x daily - 7 x weekly - 3 sets - 10 reps  - Supine Bridge  - 2 x daily - 7 x weekly - 3 sets - 10 reps    10/15  Access Code: [email or web address]  URL: [email or web address]  Date: 10/15/2024  Prepared by: Vickie Holman     Exercises  - Seated Knee Extension Stretch with Chair  - 3 x daily - 7 x weekly - 6 min hold  - Seated Table Hamstring Stretch  - 3 x daily - 7 x weekly - 3 sets - 30 sec hold  - Long Sitting Quad Set  - 3 x daily - 7 x weekly - 2 sets - 10 reps  - Active Straight Leg Raise with Quad Set  - 3 x daily - 7 x weekly - 2 sets - 10 reps  - Supine Short Arc Quad  - 3 x daily - 7 x weekly - 2 sets - 10 reps  - Supine Bridge  - 3 x daily - 7 x weekly - 2 sets - 10 reps  - Clamshell  - 3 x daily - 7 x weekly - 2 sets - 10 reps    ASSESSMENT     Today's Assessment:   Pt continues to be limited by significant lack in R knee extension. Reviewed HEP to ensure pt was completing with correct form. Pt continues to require VC and TC when completing SAQ and SLR for quad activation. Progressed standing interventions in

## 2024-10-18 ENCOUNTER — HOSPITAL ENCOUNTER (OUTPATIENT)
Dept: PHYSICAL THERAPY | Age: 79
Setting detail: THERAPIES SERIES
Discharge: HOME | End: 2024-10-18
Payer: MEDICARE

## 2024-10-18 PROCEDURE — 97140 MANUAL THERAPY 1/> REGIONS: CPT

## 2024-10-18 PROCEDURE — 97530 THERAPEUTIC ACTIVITIES: CPT

## 2024-10-18 PROCEDURE — 97110 THERAPEUTIC EXERCISES: CPT

## 2024-10-18 NOTE — PLAN OF CARE
[facility] - Outpatient Rehabilitation and Therapy [address], [place] [address] office: [phone] fax: [phone]   Physical Therapy Re-Certification Plan of Care    Dear Dr. Hamzah Leos MD  ,    We had the pleasure of treating the following patient for physical therapy services at [facility] Physical Therapy. A summary of our findings can be found in the updated assessment below.  This includes our plan of care.  If you have any questions or concerns regarding these findings, please do not hesitate to contact me at the office phone number checked above.  Thank you for the referral.     Physician Signature:________________________________Date:__________________  By signing above (or electronic signature), therapist's plan is approved by physician      Functional Outcome: LEFs 45/80 -  56% dysfunction     Overall Response to Treatment:  Pt continues to make progress with strength and AROM of bilateral lower extremities. Pt has continues to lack 1 deg R knee ext and presents with B quad and hip weakness. Pt requires significant cues for form with exercises and education on compliance with HEP. Pt has met 3 goals and is progressing towards remaining 5 goals. Pt requires continued OP PT to further improve strength, ROM, and balance and progress towards use of cane vs RW.       Mvmt (norm) AROM L AROM R Notes   KNEE Flex (140) 112 109     Ext (0) Lacking 0 degrees with quad set  Lacking 1 degree with quad set         Total Visits: 17     Recommendation:    [x] Continue PT 2x / wk for 6 weeks.   [] Hold PT, pending MD visit   [] Discharge to [facility]. Follow up with PT or MD PRN.        Physical Therapy: TREATMENT/PROGRESS NOTE   Patient: Ricki Zeng (79 y.o. female)   Examination Date: 10/18/2024   :  1945 MRN: [id number]   Visit #:   Insurance Allowable Auth Needed   Med Nec []Yes    [x]No    Insurance: Payor: MEDICARE / Plan: MEDICARE PART A AND B / Product

## 2024-10-22 ENCOUNTER — HOSPITAL ENCOUNTER (OUTPATIENT)
Dept: PHYSICAL THERAPY | Age: 79
Setting detail: THERAPIES SERIES
Discharge: HOME | End: 2024-10-22
Payer: MEDICARE

## 2024-10-22 PROCEDURE — 97530 THERAPEUTIC ACTIVITIES: CPT

## 2024-10-22 PROCEDURE — 97110 THERAPEUTIC EXERCISES: CPT

## 2024-10-22 NOTE — FLOWSHEET NOTE
[facility] - Outpatient Rehabilitation and Therapy [address], [place] [address] office: [phone] fax: [phone]     Physical Therapy: TREATMENT/PROGRESS NOTE   Patient: Ricki Zeng (79 y.o. female)   Examination Date: 10/22/2024   :  1945 MRN: [id number]   Visit #:   Insurance Allowable Auth Needed   Med Nec []Yes    [x]No    Insurance: Payor: MEDICARE / Plan: MEDICARE PART A AND B / Product Type: *No Product type* /   Insurance ID: [id number] - (Medicare)  Secondary Insurance (if applicable): BCBS   Treatment Diagnosis:     ICD-10-CM    1. Decreased range of motion (ROM) of left knee  M25.662       2. Decreased range of motion (ROM) of right knee  M25.661       3. Hip weakness  R29.898       4. Quadriceps weakness  M62.81       5. Gait abnormality  R26.9       6. Risk for falls  Z91.81    7. 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    Preferred Language for Healthcare:   [x] English       [] other:    SUBJECTIVE EXAMINATION     Patient stated complaint: Pt

## 2024-10-24 ENCOUNTER — HOSPITAL ENCOUNTER (OUTPATIENT)
Dept: PHYSICAL THERAPY | Age: 79
Setting detail: THERAPIES SERIES
Discharge: HOME | End: 2024-10-24
Payer: MEDICARE

## 2024-10-24 PROCEDURE — 97530 THERAPEUTIC ACTIVITIES: CPT

## 2024-10-24 PROCEDURE — 97110 THERAPEUTIC EXERCISES: CPT

## 2024-10-24 NOTE — FLOWSHEET NOTE
Abduction 5 5 Seated  Functional ABD noted bilaterally in standing    KNEE  Flexion 5 5      Extension 3- 3-        Edema:   L:  - suprapatellar - 45.3 cm  - mid patellar - 41.5 cm    R:  - suprapatellar - 44.0 cm  - mid patellar - 41.0 cm    Gait - R trendelenburg when ambulating without AD    B patellar hypomobility superior and inferiorly       L knee extension - lacking 1 degree after LLLD stretch   R knee extension - lacking 3 degrees after LLLD stretch     9/10  R knee flexion - 108 deg with knee flexion stretch on stair   L knee flexion - 118 deg with knee flexion stretch on stair     Unsteadiness and reduced gait speed with SBQC      R knee flexion - 108 deg with knee flexion stretch on stair   L knee flexion - 114 deg with knee flexion stretch on stair       R knee flexion - 106 deg with knee flexion stretch on stair   L knee flexion - 114 deg with knee flexion stretch on stair       R knee flexion - 102 deg with knee flexion stretch on stair   L knee flexion - 110 deg with knee flexion stretch on stair     R knee ext - lacking 2 deg with quad set after LLLD stretch   L knee ext - 0 deg with quad set after LLLD stretch        R knee ext - lacking 5 deg with quad set after LLLD stretch   R knee flexion - 95 deg with reclined heel slide  L knee flexion - 110 deg with reclined heel slide    24   ROM/Strength: (Blank cells denote NT)      Mvmt (norm) AROM L AROM R Notes   KNEE Flex (140) 110 95     Ext (0) Lacking 3 deg with LAQ    0 deg with quad set  Lacking 11 deg with LAQ    Lacking 10 with quad set          MMT L MMT R Notes       HIP  Flexion 3+ 3+ Seated     Abduction 4+ 4+ Seated   KNEE  Flexion 4+ 4+      Extension 3- 3-      Edema:   L:  - suprapatellar - 46.5 cm  - mid patellar - 43.2 cm    R:  - suprapatellar - 49.0 cm  - mid patellar - 44.2 cm    Gait - amb with RW, reduced heel strike bilaterally, decreased kizzy, reduced WB on R    TU.48 sec with RW    24

## 2024-10-29 ENCOUNTER — HOSPITAL ENCOUNTER (OUTPATIENT)
Dept: PHYSICAL THERAPY | Age: 79
Setting detail: THERAPIES SERIES
Discharge: HOME | End: 2024-10-29
Payer: MEDICARE

## 2024-10-29 PROCEDURE — 97112 NEUROMUSCULAR REEDUCATION: CPT

## 2024-10-29 PROCEDURE — 97110 THERAPEUTIC EXERCISES: CPT

## 2024-10-29 PROCEDURE — 97530 THERAPEUTIC ACTIVITIES: CPT

## 2024-10-29 NOTE — FLOWSHEET NOTE
Portions of this note have been templated and/or copied from initial evaluation, reassessments and prior notes for documentation efficiency.    Note: If patient does not return for scheduled/recommended follow up visits, this note will serve as a discharge from care along with the most recent update on progress.    Ortho Evaluation

## 2024-10-31 ENCOUNTER — HOSPITAL ENCOUNTER (OUTPATIENT)
Dept: PHYSICAL THERAPY | Age: 79
Setting detail: THERAPIES SERIES
Discharge: HOME | End: 2024-10-31
Payer: MEDICARE

## 2024-10-31 PROCEDURE — 97530 THERAPEUTIC ACTIVITIES: CPT

## 2024-10-31 PROCEDURE — 97112 NEUROMUSCULAR REEDUCATION: CPT

## 2024-10-31 PROCEDURE — 97110 THERAPEUTIC EXERCISES: CPT

## 2024-10-31 NOTE — FLOWSHEET NOTE
ability to sleep 7/7 nights/wk without waking up due to knee pain.     [] Progressing: [x] Met: [] Not Met: [] Adjusted     Overall Progression Towards Functional goals/ Treatment Progress Update:  [] Patient is progressing as expected towards functional goals listed.    [x] Progression is slowed due to complexities/Impairments listed.  [] Progression has been slowed due to co-morbidities.  [] Plan just implemented, too soon (<30days) to assess goals progression   [] Goals require adjustment due to lack of progress  [] Patient is not progressing as expected and requires additional follow up with physician  [] Other:     TREATMENT PLAN     Frequency/Duration: 2x/week for 8 + 2x/wk for 6 wks weeks for the following treatment interventions:    Interventions:  Therapeutic Exercise (95268) including: strength training, ROM, and functional mobility  Therapeutic Activities (06287) including: functional mobility training and education.  Neuromuscular Re-education (45444) activation and proprioception, including postural re-education.    Gait Training (19434) for normalization of ambulation patterns and AD training.   Manual Therapy (65330) as indicated to include: Passive Range of Motion, Gr I-IV mobilizations, and Soft Tissue Mobilization  Modalities as needed that may include: Cryotherapy and Vasoneumatic Compression  Patient education on joint protection, postural re-education, activity modification, and progression of HEP    Plan: Cont POC- Continue emphasis/focus on exercise progression, modulating pain, and increasing ROM. Next visit plan to progress weights, progress reps, and add new exercises     Electronically Signed by Vickie Holman, PT    Date: 10/31/2024      EMILIA Wolfe  Therapist was present, directed the patient's care, made skilled judgement, and was responsible for assessment and treatment of the patient.        Note: Portions of this note have been templated and/or copied from initial evaluation,

## 2024-11-07 ENCOUNTER — HOSPITAL ENCOUNTER (OUTPATIENT)
Dept: PHYSICAL THERAPY | Age: 79
Setting detail: THERAPIES SERIES
Discharge: HOME | End: 2024-11-07
Payer: MEDICARE

## 2024-11-07 PROCEDURE — 97530 THERAPEUTIC ACTIVITIES: CPT

## 2024-11-07 PROCEDURE — 97110 THERAPEUTIC EXERCISES: CPT

## 2024-11-07 NOTE — FLOWSHEET NOTE
Gait (65705)    Dry Needle 1-2 muscle (47568)     Aquatic Therex (85227)    Dry Needle 3+ muscle (83057)     Iontophoresis (03113)    VASO (45611)     Ultrasound (94563)    Group Therapy (02098)     Estim Attended (24321)    Canalith Repositioning (37048)     Other:    Other:    Total Timed Code Tx Minutes 38 3       Total Treatment Minutes 38      Charge Justification:  (63933) THERAPEUTIC EXERCISE - Provided verbal/tactile cueing for activities related to strengthening, flexibility, endurance, ROM performed to prevent loss of range of motion, maintain or improve muscular strength or increase flexibility, following either an injury or surgery.   (97054) THERAPEUTIC ACTIVITY - use of dynamic activities to improve functional performance. (Ex include squatting, ascending/descending stairs, walking, bending, lifting, catching, throwing, pushing, pulling, jumping.)  Direct, one on one contact, billed in 15-minute increments.    GOALS     Patient stated goal: standing and walking  [x] Progressing: [] Met: [] Not Met: [] Adjusted    Therapist goals for Patient:   Short Term Goals: To be achieved in: 2 weeks  1. Independent in HEP and progression per patient tolerance, in order to prevent re-injury.   [] Progressing: [x] Met: [] Not Met: [] Adjusted  2. Patient will have a decrease in pain to <2/10 with ambulation to facilitate improvement in movement, function, and ADLs as indicated by Functional Deficits.  [] Progressing: [x] Met: [] Not Met: [] Adjusted    Long Term Goals: To be achieved in: 8 weeks  1. Disability index score of 25% or less for the LEFS to assist with reaching prior level of function with activities such as prolonged standing and walking.  [x] Progressing: [] Met: [] Not Met: [] Adjusted  2. Patient will demonstrate increased B knee ext-flexion AROM to 0-120 without pain to allow for proper joint functioning to enable patient to complete stair navigation without pain or limitations.   [x]

## 2024-11-08 ENCOUNTER — HOSPITAL ENCOUNTER (OUTPATIENT)
Dept: PHYSICAL THERAPY | Age: 79
Setting detail: THERAPIES SERIES
Discharge: HOME | End: 2024-11-08
Payer: MEDICARE

## 2024-11-08 PROCEDURE — 97530 THERAPEUTIC ACTIVITIES: CPT

## 2024-11-08 PROCEDURE — 97110 THERAPEUTIC EXERCISES: CPT

## 2024-11-08 NOTE — FLOWSHEET NOTE
(83534 - Typically 20 minutes face-to-face)     Neuromusc. Re-ed (65766)    Re-Eval (48444)     Manual (54144)    Estim Unattended (37704)     Ther. Act (06864) 28 2  Mech. Traction (86326)     Gait (16415)    Dry Needle 1-2 muscle (24437)     Aquatic Therex (04623)    Dry Needle 3+ muscle (20561)     Iontophoresis (34698)    VASO (83625)     Ultrasound (18101)    Group Therapy (13604)     Estim Attended (87929)    Canalith Repositioning (01257)     Other:    Other:    Total Timed Code Tx Minutes 38 3       Total Treatment Minutes 38      Charge Justification:  (90394) THERAPEUTIC EXERCISE - Provided verbal/tactile cueing for activities related to strengthening, flexibility, endurance, ROM performed to prevent loss of range of motion, maintain or improve muscular strength or increase flexibility, following either an injury or surgery.   (12183) THERAPEUTIC ACTIVITY - use of dynamic activities to improve functional performance. (Ex include squatting, ascending/descending stairs, walking, bending, lifting, catching, throwing, pushing, pulling, jumping.)  Direct, one on one contact, billed in 15-minute increments.    GOALS     Patient stated goal: standing and walking  [x] Progressing: [] Met: [] Not Met: [] Adjusted    Therapist goals for Patient:   Short Term Goals: To be achieved in: 2 weeks  1. Independent in HEP and progression per patient tolerance, in order to prevent re-injury.   [] Progressing: [x] Met: [] Not Met: [] Adjusted  2. Patient will have a decrease in pain to <2/10 with ambulation to facilitate improvement in movement, function, and ADLs as indicated by Functional Deficits.  [] Progressing: [x] Met: [] Not Met: [] Adjusted    Long Term Goals: To be achieved in: 8 weeks  1. Disability index score of 25% or less for the LEFS to assist with reaching prior level of function with activities such as prolonged standing and walking.  [x] Progressing: [] Met: [] Not Met: [] Adjusted  2. Patient will

## 2024-11-12 ENCOUNTER — HOSPITAL ENCOUNTER (OUTPATIENT)
Dept: PHYSICAL THERAPY | Age: 79
Setting detail: THERAPIES SERIES
Discharge: HOME | End: 2024-11-12
Payer: MEDICARE

## 2024-11-12 PROCEDURE — 97110 THERAPEUTIC EXERCISES: CPT

## 2024-11-12 PROCEDURE — 97530 THERAPEUTIC ACTIVITIES: CPT

## 2024-11-12 PROCEDURE — 97112 NEUROMUSCULAR REEDUCATION: CPT

## 2024-11-12 NOTE — FLOWSHEET NOTE
re-education.    Gait Training (74052) for normalization of ambulation patterns and AD training.   Manual Therapy (04216) as indicated to include: Passive Range of Motion, Gr I-IV mobilizations, and Soft Tissue Mobilization  Modalities as needed that may include: Cryotherapy and Vasoneumatic Compression  Patient education on joint protection, postural re-education, activity modification, and progression of HEP    Plan: Cont POC- Continue emphasis/focus on exercise progression, modulating pain, and increasing ROM. Next visit plan to progress weights, progress reps, and add new exercises     Electronically Signed by Vickie Holman, PT, DPT, CATALINA-HEIDY, CNS    Date: 11/12/2024     Note: Portions of this note have been templated and/or copied from initial evaluation, reassessments and prior notes for documentation efficiency.    Note: If patient does not return for scheduled/recommended follow up visits, this note will serve as a discharge from care along with the most recent update on progress.    Ortho Evaluation

## 2024-11-14 ENCOUNTER — HOSPITAL ENCOUNTER (OUTPATIENT)
Dept: PHYSICAL THERAPY | Age: 79
Setting detail: THERAPIES SERIES
Discharge: HOME | End: 2024-11-14
Payer: MEDICARE

## 2024-11-14 PROCEDURE — 97112 NEUROMUSCULAR REEDUCATION: CPT

## 2024-11-14 PROCEDURE — 97110 THERAPEUTIC EXERCISES: CPT

## 2024-11-14 NOTE — FLOWSHEET NOTE
[facility] - Outpatient Rehabilitation and Therapy [address], [place] [address] office: [phone] fax: [phone]     Physical Therapy: TREATMENT/PROGRESS NOTE   Patient: Ricki Zeng (79 y.o. female)   Examination Date: 2024   :  1945 MRN: [id number]   Visit #:   Insurance Allowable Auth Needed   Med Nec []Yes    [x]No    Insurance: Payor: MEDICARE / Plan: MEDICARE PART A AND B / Product Type: *No Product type* /   Insurance ID: [id number] - (Medicare)  Secondary Insurance (if applicable): BCBS   Treatment Diagnosis:     ICD-10-CM    1. Decreased range of motion (ROM) of left knee  M25.662       2. Decreased range of motion (ROM) of right knee  M25.661       3. Hip weakness  R29.898       4. Quadriceps weakness  M62.81       5. Gait abnormality  R26.9       6. Risk for falls  Z91.81    7. Swelling  R60.9             Medical Diagnosis:  S/P left knee surgery [Z98.890]   Referring Physician: Hamzah Leos MD  PCP: Neville Botello MD     Plan of care signed (Y/N): sent for cosign in [facility] 10/18    Date of Patient follow up with Physician:      Progress Report/POC: NO  POC update due: (10 visits /OR AUTH LIMITS, whichever is less)  2024                                             Medical History:  Comorbidities:  Cancer/Tumor  Diabetes (Type I or II)  Osteoarthritis  Relevant Medical History: double mastectomy 2022, reconstruction Dec 2023, RUE lymphedema; L TKA 24, R TKA 24                                          Precautions/ Contra-indications:           Latex allergy:  NO  Pacemaker:    NO  Contraindications for Manipulation: NA    Red Flags:  None    Suicide Screening:   The patient did not verbalize a primary behavioral concern, suicidal ideation, suicidal intent, or demonstrate suicidal behaviors.    Preferred Language for Healthcare:   [x] English       [] other:    SUBJECTIVE EXAMINATION     Patient stated complaint: States

## 2024-11-18 ENCOUNTER — HOSPITAL ENCOUNTER (OUTPATIENT)
Dept: PHYSICAL THERAPY | Age: 79
Setting detail: THERAPIES SERIES
End: 2024-11-18
Payer: MEDICARE

## 2024-11-20 ENCOUNTER — HOSPITAL ENCOUNTER (OUTPATIENT)
Dept: PHYSICAL THERAPY | Age: 79
Setting detail: THERAPIES SERIES
Discharge: HOME | End: 2024-11-20
Payer: MEDICARE

## 2024-11-20 PROCEDURE — 97110 THERAPEUTIC EXERCISES: CPT

## 2024-11-20 NOTE — PLAN OF CARE
10 reps  - Active Straight Leg Raise with Quad Set  - 1 x daily - 7 x weekly - 2 sets - 10 reps  - Supine Heel Slide with Strap  - 1 x daily - 7 x weekly - 2 sets - 10 reps  - Seated Heel Slide  - 1 x daily - 7 x weekly - 2 sets - 10 reps    7/23 educated on importance of icing 3x/day for pain modulation and reduced swelling     9/25  Access Code: [email or web address]  URL: [email or web address]  Date: 09/25/2024  Prepared by: Vickie Holman    Exercises  - Seated Knee Extension Stretch with Chair  - 2 x daily - 7 x weekly - 6-10 min hold  - Supine Knee Extension Stretch on Towel Roll  - 2 x daily - 7 x weekly - 6-10 min hold  - Clamshell  - 2 x daily - 7 x weekly - 3 sets - 10 reps  - Supine Bridge  - 2 x daily - 7 x weekly - 3 sets - 10 reps    10/15  Access Code: [email or web address]  URL: [email or web address]  Date: 10/15/2024  Prepared by: Vickie Holman     Exercises  - Seated Knee Extension Stretch with Chair  - 3 x daily - 7 x weekly - 6 min hold  - Seated Table Hamstring Stretch  - 3 x daily - 7 x weekly - 3 sets - 30 sec hold  - Long Sitting Quad Set  - 3 x daily - 7 x weekly - 2 sets - 10 reps  - Active Straight Leg Raise with Quad Set  - 3 x daily - 7 x weekly - 2 sets - 10 reps  - Supine Short Arc Quad  - 3 x daily - 7 x weekly - 2 sets - 10 reps  - Supine Bridge  - 3 x daily - 7 x weekly - 2 sets - 10 reps  - Clamshell  - 3 x daily - 7 x weekly - 2 sets - 10 reps    11/14  Access Code: [email or web address]  URL: [email or web address]  Date: 11/14/2024  Prepared by: Vickie Holman    Exercises  - Side Stepping with Resistance at Ankles  - 1 x daily - 7 x weekly - 3 sets - 10 reps  - Hip Extension with Resistance Loop  - 1 x daily - 7 x weekly - 2 sets - 10 reps    ASSESSMENT     Today's Assessment: See above    Medical Necessity Documentation:  I certify that this patient meets the below criteria necessary for medical necessity for care and/or justification of therapy services:  The patient has functional impairments and/or

## 2024-11-22 ENCOUNTER — HOSPITAL ENCOUNTER (OUTPATIENT)
Dept: PHYSICAL THERAPY | Age: 79
Setting detail: THERAPIES SERIES
Discharge: HOME | End: 2024-11-22
Payer: MEDICARE

## 2024-11-22 PROCEDURE — 97110 THERAPEUTIC EXERCISES: CPT

## 2024-11-22 PROCEDURE — 97530 THERAPEUTIC ACTIVITIES: CPT

## 2024-11-22 NOTE — FLOWSHEET NOTE
[facility] - Outpatient Rehabilitation and Therapy [address], [place] [address] office: [phone] fax: [phone]     Physical Therapy: TREATMENT/PROGRESS NOTE   Patient: Ricki Zeng (79 y.o. female)   Examination Date: 2024   :  1945 MRN: [id number]   Visit #:   Insurance Allowable Auth Needed   Med Nec []Yes    [x]No    Insurance: Payor: MEDICARE / Plan: MEDICARE PART A AND B / Product Type: *No Product type* /   Insurance ID: [id number] - (Medicare)  Secondary Insurance (if applicable): BCBS   Treatment Diagnosis:     ICD-10-CM    1. Decreased range of motion (ROM) of left knee  M25.662       2. Decreased range of motion (ROM) of right knee  M25.661       3. Hip weakness  R29.898       4. Quadriceps weakness  M62.81       5. Gait abnormality  R26.9       6. Risk for falls  Z91.81    7. Swelling  R60.9             Medical Diagnosis:  S/P left knee surgery [Z98.890]   Referring Physician: Hamzah Leos MD  PCP: Neville Botello MD     Plan of care signed (Y/N): YES    Date of Patient follow up with Physician:      Progress Report/POC: NO  POC update due: (10 visits /OR AUTH LIMITS, whichever is less)  24                                            Medical History:  Comorbidities:  Cancer/Tumor  Diabetes (Type I or II)  Osteoarthritis  Relevant Medical History: double mastectomy 2022, reconstruction Dec 2023, RUE lymphedema; L TKA 24, R TKA 24                                          Precautions/ Contra-indications:           Latex allergy:  NO  Pacemaker:    NO  Contraindications for Manipulation: NA    Red Flags:  None    Suicide Screening:   The patient did not verbalize a primary behavioral concern, suicidal ideation, suicidal intent, or demonstrate suicidal behaviors.    Preferred Language for Healthcare:   [x] English       [] other:    SUBJECTIVE EXAMINATION     Patient stated complaint: Pt reports she is doing well. Stood

## 2024-11-25 ENCOUNTER — HOSPITAL ENCOUNTER (OUTPATIENT)
Dept: PHYSICAL THERAPY | Age: 79
Setting detail: THERAPIES SERIES
Discharge: HOME | End: 2024-11-25
Payer: MEDICARE

## 2024-11-25 PROCEDURE — 97110 THERAPEUTIC EXERCISES: CPT

## 2024-11-25 PROCEDURE — 97530 THERAPEUTIC ACTIVITIES: CPT

## 2024-11-25 NOTE — FLOWSHEET NOTE
[facility] - Outpatient Rehabilitation and Therapy [address], [place] [address] office: [phone] fax: [phone]     Physical Therapy: TREATMENT/PROGRESS NOTE   Patient: Ricki Zeng (79 y.o. female)   Examination Date: 2024   :  1945 MRN: [id number]   Visit #:   Insurance Allowable Auth Needed   Med Nec []Yes    [x]No    Insurance: Payor: MEDICARE / Plan: MEDICARE PART A AND B / Product Type: *No Product type* /   Insurance ID: [id number] - (Medicare)  Secondary Insurance (if applicable): BCBS   Treatment Diagnosis:     ICD-10-CM    1. Decreased range of motion (ROM) of left knee  M25.662       2. Decreased range of motion (ROM) of right knee  M25.661       3. Hip weakness  R29.898       4. Quadriceps weakness  M62.81       5. Gait abnormality  R26.9       6. Risk for falls  Z91.81    7. Swelling  R60.9             Medical Diagnosis:  S/P left knee surgery [Z98.890]   Referring Physician: Hamzah Leos MD  PCP: Neville Botello MD     Plan of care signed (Y/N): YES    Date of Patient follow up with Physician:      Progress Report/POC: NO  POC update due: (10 visits /OR AUTH LIMITS, whichever is less)  24                                            Medical History:  Comorbidities:  Cancer/Tumor  Diabetes (Type I or II)  Osteoarthritis  Relevant Medical History: double mastectomy 2022, reconstruction Dec 2023, RUE lymphedema; L TKA 24, R TKA 24                                          Precautions/ Contra-indications:           Latex allergy:  NO  Pacemaker:    NO  Contraindications for Manipulation: NA    Red Flags:  None    Suicide Screening:   The patient did not verbalize a primary behavioral concern, suicidal ideation, suicidal intent, or demonstrate suicidal behaviors.    Preferred Language for Healthcare:   [x] English       [] other:    SUBJECTIVE EXAMINATION     Patient stated complaint: Pt states she is doing okay. Reports

## 2024-11-27 ENCOUNTER — HOSPITAL ENCOUNTER (OUTPATIENT)
Dept: PHYSICAL THERAPY | Age: 79
Setting detail: THERAPIES SERIES
Discharge: HOME | End: 2024-11-27
Payer: MEDICARE

## 2024-11-27 PROCEDURE — 97110 THERAPEUTIC EXERCISES: CPT

## 2024-11-27 PROCEDURE — 97530 THERAPEUTIC ACTIVITIES: CPT

## 2024-11-27 NOTE — FLOWSHEET NOTE
and has a little soreness.      Test used Initial score  7/23/24 POC  8/19/24 POC   9/19/24 POC  10/18/24 POC 11/27/2024   Pain Summary VAS 6/10 5/10  0/10 0/10 0/10 0/10   Functional questionnaire LEFS 30/80 - 63% dysfunction  34/80 - 57% dysfunction  47/80 - 41% dysfunction 45/80 - 43% dysfunction  51/80 - 35% dysfunction     Other:                      OBJECTIVE EXAMINATION     11/20/24    Mvmt (norm) AROM L AROM R Notes   KNEE Flex (140) 111 112     Ext (0) Lacking 0 degrees with quad set    Lacking 3 with LAQ   Lacking 0 degree with quad set    Lacking 4 with LAQ           MMT L MMT R Notes       HIP  Flexion 4+ 4+ Seated     Abduction 4 4+ Seated     KNEE  Flexion 5 5 Seated     Extension 4+ 4+          Seated   In available range      10/18     Mvmt (norm) AROM L AROM R Notes   KNEE Flex (140) 112 109     Ext (0) Lacking 0 degrees with quad set  Lacking 1 degree with quad set           MMT L MMT R Notes       HIP  Flexion 4+ 4+ Seated     Abduction 5 5 Seated  Functional ABD noted bilaterally in standing    KNEE  Flexion 5 5 Seated     Extension 4 4          Seated      10/15:  R knee flexion - 106 degrees   L knee flexion - 110 degrees   L knee extension - lacking 0 degrees after exercises   R knee extension - lacking 1 degree after exercises    L knee extension - lacking 0 degrees with quad set   R knee extension - lacking 3 degrees with quad set     10/9:  L knee extension - lacking 0 degrees after manual   R knee extension - lacking 1 degree after manual   L knee extension - lacking 0 degrees   R knee extension - lacking 3 degrees     10/7:  L knee extension - lacking 0 degrees after manual   R knee extension - lacking 1 degree after manual   L knee extension - lacking 2 degrees   R knee extension - lacking 3 degrees     9/25   Mvmt (norm) AROM L AROM R Notes   Knee Ext (0) 0 0 At end of session following weighted LLLD stretch and quad sets      9/23   Mvmt (norm) AROM L AROM R Notes   Knee Ext (0)

## 2024-12-02 ENCOUNTER — HOSPITAL ENCOUNTER (OUTPATIENT)
Dept: PHYSICAL THERAPY | Age: 79
Setting detail: THERAPIES SERIES
Discharge: HOME | End: 2024-12-02
Payer: MEDICARE

## 2024-12-02 PROCEDURE — 97110 THERAPEUTIC EXERCISES: CPT

## 2024-12-02 PROCEDURE — 97530 THERAPEUTIC ACTIVITIES: CPT

## 2024-12-02 NOTE — FLOWSHEET NOTE
6    Nu step Level 1.0 6 min     10/31  Short arc quad with foam   SLR with quad set    Knee extension motion restore machine  LAQ  Reclined heel slide - with SOS and slant board      LLLD B knee ext stretch - heel prop + 3# weight     Lateral band walk  Standing hip ext  Standing hip abd Lime TB  Lime TB  Lime TB 2  2 4 laps  10 B   10 B    Long sit hamstring stretch      TKE  Blue   20 B 11/27 TA    IB  - Gastroc stretch   - HR    3 x 30\"  2      15    HSS - 8\" 2nd step   Knee flexion stretch   Glute bridge   SL Hip abd (\"clamshell\")      2 x 30\" B   2 B  10/31 B UE support   Manual Intervention (94277)  TIME                                 NMR re-education (20416) resistance Sets/time Reps CUES NEEDED   Retro gliders  2 10 B 12/2   Tandem balance  Int UE support   SLS 30 sec 3 B Int UE support    Cone taps Fingertip support   Amb in parallel bars with 1 UE support     Amb with SPC L UE   10/31 progressed laps      11/7 no LOB, cues for increased heel strike, TA   Koki   - heel tap over   - step + anterior WS  - lateral step overs  10/31 progressed sets, B UE support    Therapeutic Activity (26892)  Sets/time     POC and objective measures completed. Educated pt on importance of continuing HEP with emphasis on knee extension exercises.   Standing in parallel bars   - Hip hike 2 in step   - Hip ext  - Hip abd  - Alternating LE taps on 6 in step   - Lateral step ups 4 in step   10/29 increased sets                        STS  10/31 VC for eccentric control    Fwd step ups - to SLS  Lateral step ups  1 UE support  1 UE support    Educated and provided handout on updated HEP to improve B knee extension     POC - objective measures taken. Discussed goals and POC.   Provided with size XL full leg tensoshape for B LE for edema management and reviewed importance of icing   Cued for increased heel strike with gait  Educated on staying within RW to improve upright trunk, cues for increased heel strike  Adjusted height of

## 2024-12-05 ENCOUNTER — HOSPITAL ENCOUNTER (OUTPATIENT)
Dept: PHYSICAL THERAPY | Age: 79
Setting detail: THERAPIES SERIES
End: 2024-12-05
Payer: MEDICARE

## 2024-12-06 ENCOUNTER — OFFICE VISIT (OUTPATIENT)
Dept: ORTHOPEDIC SURGERY | Age: 79
End: 2024-12-06

## 2024-12-06 DIAGNOSIS — Z96.651 HISTORY OF TOTAL KNEE ARTHROPLASTY, RIGHT: Primary | ICD-10-CM

## 2024-12-06 DIAGNOSIS — Z98.890 S/P LEFT KNEE SURGERY: ICD-10-CM

## 2024-12-06 DIAGNOSIS — Z96.652 S/P TOTAL KNEE ARTHROPLASTY, LEFT: ICD-10-CM

## 2024-12-06 NOTE — PROGRESS NOTES
Patient: Ricki Zeng                  : 1945   MRN: [id number]   Date of Visit: 24     Physician: Hamzah Leos MD.     Reason for Visit: Status post B/L TKA     Subjective History of Present Illness:     Ricki Zeng is here for regularly scheduled follow-up s/p RIGHT TKA. Reports they are doing well, occasional aches and pains are controlled with over the counter medications.They do not report fevers, chills or drainage from the incision site    Physical Examination??: ?   General: Patient is alert and oriented x 3 and appears comfortable.   Incision is well-approximated, no erythema, fluctuance   Able to perform SLR   B/L KNEE ROM 0-105    SILT throughout LE     Radiographs: Standing AP/lateral/[place] RIGHT Knee: Imaging was reviewed with the patient. There is a well positioned knee arthroplasty. There is no radiographic evidence of loosening, fracture or implant failure.    Radiographs: Standing AP/lateral/[place] LEFT Knee: Imaging was reviewed with the patient. There is a well positioned knee arthroplasty. There is no radiographic evidence of loosening, fracture or implant failure.    Assessment and Plan?: The patient is progressing well approximately 5 months  s/p B/L TKA     1. A thorough discussion was had with the patient concerning the ?postoperative course and the patient is in agreement with the plan.   2. Spoke with the patient regarding the use of over-the-counter medications both oral and topical medications for pain control.  3. Discussed the need for antibiotics prior to dental procedures at least for 1 years after arthroplasty.  4. Will see the patient in 12 months     Greater than 30 minutes was spent preparing to see the patient, reviewing prior records, independently evaluating prior imaging, performing necessary examination and history, counseling about treatment options and performing clinical documentation.    _______________________      Hamzah Leos MD

## 2024-12-09 ENCOUNTER — HOSPITAL ENCOUNTER (OUTPATIENT)
Dept: PHYSICAL THERAPY | Age: 79
Setting detail: THERAPIES SERIES
Discharge: HOME | End: 2024-12-09
Payer: MEDICARE

## 2024-12-09 PROCEDURE — 97110 THERAPEUTIC EXERCISES: CPT

## 2024-12-09 PROCEDURE — 97112 NEUROMUSCULAR REEDUCATION: CPT

## 2024-12-09 NOTE — FLOWSHEET NOTE
[facility] Repositioning (40320)     Other:    Other:    Total Timed Code Tx Minutes 40 3       Total Treatment Minutes 40      Charge Justification:  (78009) THERAPEUTIC EXERCISE - Provided verbal/tactile cueing for activities related to strengthening, flexibility, endurance, ROM performed to prevent loss of range of motion, maintain or improve muscular strength or increase flexibility, following either an injury or surgery.   (33972) NEUROMUSCULAR RE-EDUCATION - Therapeutic procedure, 1 or more areas, each 15 minutes; neuromuscular reeducation of movement, balance, coordination, kinesthetic sense, posture, and/or proprioception for sitting and/or standing activities    GOALS     Patient stated goal: standing and walking  [x] Progressing: [] Met: [] Not Met: [] Adjusted    Therapist goals for Patient:   Short Term Goals: To be achieved in: 2 weeks  1. Independent in HEP and progression per patient tolerance, in order to prevent re-injury.   [] Progressing: [x] Met: [] Not Met: [] Adjusted  2. Patient will have a decrease in pain to <2/10 with ambulation to facilitate improvement in movement, function, and ADLs as indicated by Functional Deficits.  [] Progressing: [x] Met: [] Not Met: [] Adjusted    Long Term Goals: To be achieved in: 8 weeks  1. Disability index score of 25% or less for the LEFS to assist with reaching prior level of function with activities such as prolonged standing and walking.  [x] Progressing: [] Met: [] Not Met: [] Adjusted  2. Patient will demonstrate increased B knee ext-flexion AROM to 0-120 without pain to allow for proper joint functioning to enable patient to complete stair navigation without pain or limitations.   [x] Progressing: [] Met: [] Not Met: [] Adjusted  3. Patient will demonstrate increased Strength of B quad mm to at least 5/5 throughout without pain to allow for proper functional mobility to enable patient to return to squatting for home management.   [x] Progressing: []

## 2024-12-11 ENCOUNTER — HOSPITAL ENCOUNTER (OUTPATIENT)
Dept: PHYSICAL THERAPY | Age: 79
Setting detail: THERAPIES SERIES
Discharge: HOME | End: 2024-12-11
Payer: MEDICARE

## 2024-12-11 PROCEDURE — 97110 THERAPEUTIC EXERCISES: CPT

## 2024-12-11 NOTE — FLOWSHEET NOTE
(81450)     Ultrasound (70895)    Group Therapy (47023)     Estim Attended (04084)    Canalith Repositioning (68597)     Other:    Other:    Total Timed Code Tx Minutes 30 2       Total Treatment Minutes 30      Charge Justification:  (38892) THERAPEUTIC EXERCISE - Provided verbal/tactile cueing for activities related to strengthening, flexibility, endurance, ROM performed to prevent loss of range of motion, maintain or improve muscular strength or increase flexibility, following either an injury or surgery.     GOALS     Patient stated goal: standing and walking  [x] Progressing: [] Met: [] Not Met: [] Adjusted    Therapist goals for Patient:   Short Term Goals: To be achieved in: 2 weeks  1. Independent in HEP and progression per patient tolerance, in order to prevent re-injury.   [] Progressing: [x] Met: [] Not Met: [] Adjusted  2. Patient will have a decrease in pain to <2/10 with ambulation to facilitate improvement in movement, function, and ADLs as indicated by Functional Deficits.  [] Progressing: [x] Met: [] Not Met: [] Adjusted    Long Term Goals: To be achieved in: 8 weeks  1. Disability index score of 25% or less for the LEFS to assist with reaching prior level of function with activities such as prolonged standing and walking.  [x] Progressing: [] Met: [] Not Met: [] Adjusted  2. Patient will demonstrate increased B knee ext-flexion AROM to 0-120 without pain to allow for proper joint functioning to enable patient to complete stair navigation without pain or limitations.   [x] Progressing: [] Met: [] Not Met: [] Adjusted  3. Patient will demonstrate increased Strength of B quad mm to at least 5/5 throughout without pain to allow for proper functional mobility to enable patient to return to squatting for home management.   [x] Progressing: [] Met: [] Not Met: [] Adjusted  4. Patient will return to heavy home management without increased symptoms or restriction.   [] Progressing: [x] Met: [] Not Met: []

## 2024-12-12 RX ORDER — AMOXICILLIN 500 MG/1
CAPSULE ORAL
Qty: 4 CAPSULE | Refills: 0 | Status: SHIPPED | OUTPATIENT
Start: 2024-12-12

## 2024-12-12 NOTE — TELEPHONE ENCOUNTER
Prescription Refill     Medication Name:  ANTIBIOTIC   Pharmacy: [facility]   Patient Contact Number:  [phone]     PATIENT HAS A DENTAL APPT COMING UP AND FEEL LIKE SHE ALREADY HAVE A INFECTION

## 2024-12-16 ENCOUNTER — HOSPITAL ENCOUNTER (OUTPATIENT)
Dept: PHYSICAL THERAPY | Age: 79
Setting detail: THERAPIES SERIES
Discharge: HOME | End: 2024-12-16
Payer: MEDICARE

## 2024-12-16 PROCEDURE — 97110 THERAPEUTIC EXERCISES: CPT

## 2024-12-16 PROCEDURE — 97112 NEUROMUSCULAR REEDUCATION: CPT

## 2024-12-16 NOTE — FLOWSHEET NOTE
(09343)     Gait (33317)    Dry Needle 1-2 muscle (77297)     Aquatic Therex (85905)    Dry Needle 3+ muscle (26113)     Iontophoresis (96109)    VASO (92666)     Ultrasound (20198)    Group Therapy (52641)     Estim Attended (67376)    Canalith Repositioning (46507)     Other:    Other:    Total Timed Code Tx Minutes 40 3       Total Treatment Minutes 40      Charge Justification:  (41802) THERAPEUTIC EXERCISE - Provided verbal/tactile cueing for activities related to strengthening, flexibility, endurance, ROM performed to prevent loss of range of motion, maintain or improve muscular strength or increase flexibility, following either an injury or surgery.   (84546) NEUROMUSCULAR RE-EDUCATION - Therapeutic procedure, 1 or more areas, each 15 minutes; neuromuscular reeducation of movement, balance, coordination, kinesthetic sense, posture, and/or proprioception for sitting and/or standing activities    GOALS     Patient stated goal: standing and walking  [x] Progressing: [] Met: [] Not Met: [] Adjusted    Therapist goals for Patient:   Short Term Goals: To be achieved in: 2 weeks  1. Independent in HEP and progression per patient tolerance, in order to prevent re-injury.   [] Progressing: [x] Met: [] Not Met: [] Adjusted  2. Patient will have a decrease in pain to <2/10 with ambulation to facilitate improvement in movement, function, and ADLs as indicated by Functional Deficits.  [] Progressing: [x] Met: [] Not Met: [] Adjusted    Long Term Goals: To be achieved in: 8 weeks  1. Disability index score of 25% or less for the LEFS to assist with reaching prior level of function with activities such as prolonged standing and walking.  [x] Progressing: [] Met: [] Not Met: [] Adjusted  2. Patient will demonstrate increased B knee ext-flexion AROM to 0-120 without pain to allow for proper joint functioning to enable patient to complete stair navigation without pain or limitations.   [x] Progressing: [] Met: [] Not Met: []

## 2024-12-18 ENCOUNTER — APPOINTMENT (OUTPATIENT)
Dept: PHYSICAL THERAPY | Age: 79
End: 2024-12-18
Payer: MEDICARE

## 2024-12-27 ENCOUNTER — HOSPITAL ENCOUNTER (OUTPATIENT)
Dept: PHYSICAL THERAPY | Age: 79
Setting detail: THERAPIES SERIES
Discharge: HOME | End: 2024-12-27
Payer: MEDICARE

## 2024-12-27 PROCEDURE — 97110 THERAPEUTIC EXERCISES: CPT

## 2024-12-27 NOTE — PLAN OF CARE
cm  - mid patellar - 43.2 cm    R:  - suprapatellar - 49.0 cm  - mid patellar - 44.2 cm    Gait - amb with RW, reduced heel strike bilaterally, decreased kizzy, reduced WB on R    TU.48 sec with RW    24     TUG: 10.37 sec with SPC    L knee flexion with heel slide - 105 deg   L knee ext with quad set - lacking 6 deg  L knee ext following LLLD stretch - lacking 3 deg     Edema:   L:  - suprapatellar - 46.0 cm  - mid patellar - 44.0 cm    R:  - suprapatellar - 43.6 cm  - mid patellar - 41.5 cm    24  ROM/Strength: (Blank cells denote NT)      Mvmt (norm) AROM L AROM R Notes PROM L Notes   KNEE Flex (140) 92 105  95     Ext (0) Lacking 10 deg 0  Lacking 3           MMT L MMT R Notes       HIP  Flexion 3+ 4 Seated     Abduction 4 4 Seated   KNEE  Flexion 3- 5      Extension 3- 4      ANKLE  DF 4+ 4+      Posture:   forward head and rounded shoulders  Knee valgus on R    Bandages/Dressings/Incisions:  Unable to assess due to jogger style pants - pt to wear pants that will roll up NV. Swelling noted at posterior knee. No redness, warmth, or tenderness in L calf     Gait:    Pattern: decreased kizzy and decreased step length  Assistive Device Used: Rolling walker (RW)    Balance:  [x] WNL      [] NT       [] Dysfunction noted  Comment:     Falls Risk Assessment (30 days):   Falls Risk assessed and patient requires intervention due to being higher risk   Time Up and Go (TUG):   Not Assessed        Exercises/Interventions     Therapeutic Ex (21931)  resistance Sets/time Reps Notes/Cues/Progressions   POC completed - objective measures taken. Discussed progress with therapy, goals, and continued limitations. Reviewed importance of continuing with knee flexion ROM and holding stretch  X 15 min Supine quad set with heel prop  + SLR   Long sit calf stretch  Bike - seat 6    Nu step Level 1.0 5 min     10/31  Short arc quad with foam   SLR with quad set    Knee extension motion restore machine  LAQ  Reclined

## 2024-12-30 ENCOUNTER — HOSPITAL ENCOUNTER (OUTPATIENT)
Dept: PHYSICAL THERAPY | Age: 79
Setting detail: THERAPIES SERIES
Discharge: HOME | End: 2024-12-30
Payer: MEDICARE

## 2024-12-30 DIAGNOSIS — Z96.652 S/P TOTAL KNEE ARTHROPLASTY, LEFT: Primary | ICD-10-CM

## 2024-12-30 PROCEDURE — 97530 THERAPEUTIC ACTIVITIES: CPT

## 2024-12-30 PROCEDURE — 97110 THERAPEUTIC EXERCISES: CPT

## 2024-12-30 PROCEDURE — 97112 NEUROMUSCULAR REEDUCATION: CPT

## 2024-12-30 RX ORDER — AMOXICILLIN 500 MG/1
CAPSULE ORAL
Qty: 8 CAPSULE | Refills: 2 | Status: SHIPPED | OUTPATIENT
Start: 2024-12-30

## 2024-12-30 NOTE — FLOWSHEET NOTE
[facility] - Outpatient Rehabilitation and Therapy [address], [place] [address] office: [phone] fax: [phone]       Physical Therapy: TREATMENT/PROGRESS NOTE   Patient: Ricki Zeng (79 y.o. female)   Examination Date: 2024   :  1945 MRN: [id number]   Visit #: 32 / 38  Insurance Allowable Auth Needed   Med Nec []Yes    [x]No    Insurance: Payor: MEDICARE / Plan: MEDICARE PART A AND B / Product Type: *No Product type* /   Insurance ID: [id number] - (Medicare)  Secondary Insurance (if applicable): [place] BCBS   Treatment Diagnosis:     ICD-10-CM    1. Decreased range of motion (ROM) of left knee  M25.662       2. Decreased range of motion (ROM) of right knee  M25.661       3. Hip weakness  R29.898       4. Quadriceps weakness  M62.81       5. Gait abnormality  R26.9       6. Risk for falls  Z91.81    7. 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    Preferred Language for Healthcare:   [x] English       [] other:    SUBJECTIVE EXAMINATION     Patient stated complaint: Pt reports she is doing well. Had

## 2024-12-30 NOTE — TELEPHONE ENCOUNTER
Prescription Refill     Medication Name:  ANTIBIOTICS ANOTHER DENTAL APPT  Pharmacy: [facility]  Patient Contact Number:  [phone]

## 2025-01-03 ENCOUNTER — HOSPITAL ENCOUNTER (OUTPATIENT)
Dept: PHYSICAL THERAPY | Age: 80
Setting detail: THERAPIES SERIES
Discharge: HOME | End: 2025-01-03
Payer: MEDICARE

## 2025-01-03 PROCEDURE — 97110 THERAPEUTIC EXERCISES: CPT

## 2025-01-03 NOTE — FLOWSHEET NOTE
flexion - 110 deg with reclined heel slide    24   ROM/Strength: (Blank cells denote NT)      Mvmt (norm) AROM L AROM R Notes   KNEE Flex (140) 110 95     Ext (0) Lacking 3 deg with LAQ    0 deg with quad set  Lacking 11 deg with LAQ    Lacking 10 with quad set          MMT L MMT R Notes       HIP  Flexion 3+ 3+ Seated     Abduction 4+ 4+ Seated   KNEE  Flexion 4+ 4+      Extension 3- 3-      Edema:   L:  - suprapatellar - 46.5 cm  - mid patellar - 43.2 cm    R:  - suprapatellar - 49.0 cm  - mid patellar - 44.2 cm    Gait - amb with RW, reduced heel strike bilaterally, decreased kizzy, reduced WB on R    TU.48 sec with RW    24     TUG: 10.37 sec with SPC    L knee flexion with heel slide - 105 deg   L knee ext with quad set - lacking 6 deg  L knee ext following LLLD stretch - lacking 3 deg     Edema:   L:  - suprapatellar - 46.0 cm  - mid patellar - 44.0 cm    R:  - suprapatellar - 43.6 cm  - mid patellar - 41.5 cm    24  ROM/Strength: (Blank cells denote NT)      Mvmt (norm) AROM L AROM R Notes PROM L Notes   KNEE Flex (140) 92 105  95     Ext (0) Lacking 10 deg 0  Lacking 3           MMT L MMT R Notes       HIP  Flexion 3+ 4 Seated     Abduction 4 4 Seated   KNEE  Flexion 3- 5      Extension 3- 4      ANKLE  DF 4+ 4+      Posture:   forward head and rounded shoulders  Knee valgus on R    Bandages/Dressings/Incisions:  Unable to assess due to jogger style pants - pt to wear pants that will roll up NV. Swelling noted at posterior knee. No redness, warmth, or tenderness in L calf     Gait:    Pattern: decreased kizzy and decreased step length  Assistive Device Used: Rolling walker (RW)    Balance:  [x] WNL      [] NT       [] Dysfunction noted  Comment:     Falls Risk Assessment (30 days):   Falls Risk assessed and patient requires intervention due to being higher risk   Time Up and Go (TUG):   Not Assessed        Exercises/Interventions     Therapeutic Ex (02401)  resistance Sets/time Reps

## 2025-01-06 ENCOUNTER — APPOINTMENT (OUTPATIENT)
Dept: PHYSICAL THERAPY | Age: 80
End: 2025-01-06
Payer: MEDICARE

## 2025-01-08 ENCOUNTER — HOSPITAL ENCOUNTER (OUTPATIENT)
Dept: PHYSICAL THERAPY | Age: 80
Setting detail: THERAPIES SERIES
End: 2025-01-08
Payer: MEDICARE

## 2025-01-10 ENCOUNTER — HOSPITAL ENCOUNTER (OUTPATIENT)
Dept: PHYSICAL THERAPY | Age: 80
Setting detail: THERAPIES SERIES
Discharge: HOME | End: 2025-01-10
Payer: MEDICARE

## 2025-01-10 PROCEDURE — 97110 THERAPEUTIC EXERCISES: CPT

## 2025-01-10 PROCEDURE — 97530 THERAPEUTIC ACTIVITIES: CPT

## 2025-01-10 NOTE — FLOWSHEET NOTE
[facility] - Outpatient Rehabilitation and Therapy [address], [place] [address] office: [phone] fax: [phone]       Physical Therapy: TREATMENT/PROGRESS NOTE   Patient: Ricki Zeng (79 y.o. female)   Examination Date: 01/10/2025   :  1945 MRN: [id number]   Visit #: 34 / 38  Insurance Allowable Auth Needed   Med Nec []Yes    [x]No    Insurance: Payor: MEDICARE / Plan: MEDICARE PART A AND B / Product Type: *No Product type* /   Insurance ID: [id number] - (Medicare)  Secondary Insurance (if applicable): [place] BCBS   Treatment Diagnosis:     ICD-10-CM    1. Decreased range of motion (ROM) of left knee  M25.662       2. Decreased range of motion (ROM) of right knee  M25.661       3. Hip weakness  R29.898       4. Quadriceps weakness  M62.81       5. Gait abnormality  R26.9       6. Risk for falls  Z91.81    7. 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    Preferred Language for Healthcare:   [x] English       [] other:    SUBJECTIVE EXAMINATION     Patient stated complaint: Pt reports her appointment with

## 2025-01-13 ENCOUNTER — HOSPITAL ENCOUNTER (OUTPATIENT)
Dept: PHYSICAL THERAPY | Age: 80
Setting detail: THERAPIES SERIES
Discharge: HOME | End: 2025-01-13
Payer: MEDICARE

## 2025-01-13 PROCEDURE — 97110 THERAPEUTIC EXERCISES: CPT

## 2025-01-13 PROCEDURE — 97112 NEUROMUSCULAR REEDUCATION: CPT

## 2025-01-13 NOTE — FLOWSHEET NOTE
minutes face-to-face)     Neuromusc. Re-ed (16818) 26 2  Re-Eval (52705)     Manual (00954)    Estim Unattended (66750)     Ther. Act (22902)    Mech. Traction (10309)     Gait (01540)    Dry Needle 1-2 muscle (72257)     Aquatic Therex (49172)    Dry Needle 3+ muscle (20561)     Iontophoresis (85543)    VASO (39903)     Ultrasound (79922)    Group Therapy (36333)     Estim Attended (85187)    Canalith Repositioning (90260)     Other:    Other:    Total Timed Code Tx Minutes 38 3       Total Treatment Minutes 38      Charge Justification:  (42579) THERAPEUTIC EXERCISE - Provided verbal/tactile cueing for activities related to strengthening, flexibility, endurance, ROM performed to prevent loss of range of motion, maintain or improve muscular strength or increase flexibility, following either an injury or surgery.   (18419) NEUROMUSCULAR RE-EDUCATION - Therapeutic procedure, 1 or more areas, each 15 minutes; neuromuscular reeducation of movement, balance, coordination, kinesthetic sense, posture, and/or proprioception for sitting and/or standing activities    GOALS     Patient stated goal: standing and walking  [x] Progressing: [] Met: [] Not Met: [] Adjusted    Therapist goals for Patient:   Short Term Goals: To be achieved in: 2 weeks  1. Independent in HEP and progression per patient tolerance, in order to prevent re-injury.   [] Progressing: [x] Met: [] Not Met: [] Adjusted  2. Patient will have a decrease in pain to <2/10 with ambulation to facilitate improvement in movement, function, and ADLs as indicated by Functional Deficits.  [] Progressing: [x] Met: [] Not Met: [] Adjusted    Long Term Goals: To be achieved in: 8 weeks  1. Disability index score of 25% or less for the LEFS to assist with reaching prior level of function with activities such as prolonged standing and walking.  [x] Progressing: [] Met: [] Not Met: [] Adjusted  2. Patient will demonstrate increased B knee ext-flexion AROM to 0-120 without

## 2025-01-15 ENCOUNTER — APPOINTMENT (OUTPATIENT)
Dept: PHYSICAL THERAPY | Age: 80
End: 2025-01-15
Payer: MEDICARE

## 2025-01-20 ENCOUNTER — HOSPITAL ENCOUNTER (OUTPATIENT)
Dept: PHYSICAL THERAPY | Age: 80
Setting detail: THERAPIES SERIES
Discharge: HOME | End: 2025-01-20
Payer: MEDICARE

## 2025-01-20 PROCEDURE — 97112 NEUROMUSCULAR REEDUCATION: CPT

## 2025-01-20 PROCEDURE — 97530 THERAPEUTIC ACTIVITIES: CPT

## 2025-01-20 PROCEDURE — 97110 THERAPEUTIC EXERCISES: CPT

## 2025-01-20 NOTE — FLOWSHEET NOTE
[facility] - Outpatient Rehabilitation and Therapy [address], [place] [address] office: [phone] fax: [phone]       Physical Therapy: TREATMENT/PROGRESS NOTE   Patient: Ricki Zeng (79 y.o. female)   Examination Date: 2025   :  1945 MRN: [id number]   Visit #: 36 / 38  Insurance Allowable Auth Needed   Med Nec []Yes    [x]No    Insurance: Payor: MEDICARE / Plan: MEDICARE PART A AND B / Product Type: *No Product type* /   Insurance ID: [id number] - (Medicare)  Secondary Insurance (if applicable): [place] BCBS   Treatment Diagnosis:     ICD-10-CM    1. Decreased range of motion (ROM) of left knee  M25.662       2. Decreased range of motion (ROM) of right knee  M25.661       3. Hip weakness  R29.898       4. Quadriceps weakness  M62.81       5. Gait abnormality  R26.9       6. Risk for falls  Z91.81    7. 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    Preferred Language for Healthcare:   [x] English       [] other:    SUBJECTIVE EXAMINATION     Patient stated complaint: States she is doing well. Cleaned

## 2025-01-22 ENCOUNTER — HOSPITAL ENCOUNTER (OUTPATIENT)
Dept: PHYSICAL THERAPY | Age: 80
Setting detail: THERAPIES SERIES
Discharge: HOME | End: 2025-01-22
Payer: MEDICARE

## 2025-01-22 PROCEDURE — 97110 THERAPEUTIC EXERCISES: CPT

## 2025-01-22 PROCEDURE — 97112 NEUROMUSCULAR REEDUCATION: CPT

## 2025-01-22 PROCEDURE — 97530 THERAPEUTIC ACTIVITIES: CPT

## 2025-01-22 NOTE — FLOWSHEET NOTE
<2/10 with ambulation to facilitate improvement in movement, function, and ADLs as indicated by Functional Deficits.  [] Progressing: [x] Met: [] Not Met: [] Adjusted    Long Term Goals: To be achieved in: 8 weeks  1. Disability index score of 25% or less for the LEFS to assist with reaching prior level of function with activities such as prolonged standing and walking.  [x] Progressing: [] Met: [] Not Met: [] Adjusted  2. Patient will demonstrate increased B knee ext-flexion AROM to 0-120 without pain to allow for proper joint functioning to enable patient to complete stair navigation without pain or limitations.   [x] Progressing: [] Met: [] Not Met: [] Adjusted  3. Patient will demonstrate increased Strength of B quad mm to at least 5/5 throughout without pain to allow for proper functional mobility to enable patient to return to squatting for home management.   [] Progressing: [x] Met: [] Not Met: [] Adjusted  4. Patient will return to heavy home management without increased symptoms or restriction.   [] Progressing: [x] Met: [] Not Met: [] Adjusted  5. Pt will report ability to sleep 7/7 nights/wk without waking up due to knee pain.     [] Progressing: [x] Met: [] Not Met: [] Adjusted   6. Pt will demonstrate ability to complete 6 MWT without AD to demonstrate improved gait and community ambulation.     [] Progressing: [] Met: [] Not Met: [] Adjusted     Overall Progression Towards Functional goals/ Treatment Progress Update:  [] Patient is progressing as expected towards functional goals listed.    [x] Progression is slowed due to complexities/Impairments listed.  [] Progression has been slowed due to co-morbidities.  [] Plan just implemented, too soon (<30days) to assess goals progression   [] Goals require adjustment due to lack of progress  [] Patient is not progressing as expected and requires additional follow up with physician  [] Other:     TREATMENT PLAN     Frequency/Duration: 2x/week for 8 + 2x/wk

## 2025-01-29 ENCOUNTER — HOSPITAL ENCOUNTER (OUTPATIENT)
Dept: PHYSICAL THERAPY | Age: 80
Setting detail: THERAPIES SERIES
Discharge: HOME | End: 2025-01-29
Payer: MEDICARE

## 2025-01-29 PROCEDURE — 97112 NEUROMUSCULAR REEDUCATION: CPT

## 2025-01-29 PROCEDURE — 97530 THERAPEUTIC ACTIVITIES: CPT

## 2025-01-29 PROCEDURE — 97110 THERAPEUTIC EXERCISES: CPT

## 2025-01-29 NOTE — PLAN OF CARE
[facility] - Outpatient Rehabilitation and Therapy [address], [place] [address] office: [phone] fax: [phone]    Physical Therapy Re-Certification Plan of Care    Dear Dr. Hamzah Leos MD  ,    We had the pleasure of treating the following patient for physical therapy services at [facility]. A summary of our findings can be found in the updated assessment below.  This includes our plan of care.  If you have any questions or concerns regarding these findings, please do not hesitate to contact me at the office phone number checked above.  Thank you for the referral.     Physician Signature:________________________________Date:__________________  By signing above (or electronic signature), therapist's plan is approved by physician      Total Visits: 38     Overall Response to Treatment:  POC completed this date. Pt demonstrates 0-110/111 B knee flexion-extension AROM. Continues with good strength of B quad and HS mm. Pt continues with B hip weakness, R>L leading to gait and balance impairments. Pt with significant R trendelenburg in R SLS leading to reduced L foot clearance with gait. Reviewed HEP and importance of mm activation with each rep. Pt will benefit from continued OP PT with focus on SLS, balance, and endurance to improve functional mobility and reduce risk for falls.     Recommendation:    [x] Continue PT 1x / wk for 4 weeks.   [] Hold PT, pending MD visit   [] Discharge to [facility]. Follow up with PT or MD PRN.        Physical Therapy: TREATMENT/PROGRESS NOTE   Patient: Ricki Zeng (79 y.o. female)   Examination Date: 2025   :  1945 MRN: [id number]   Visit #:   Insurance Allowable Auth Needed   Med Nec []Yes    [x]No    Insurance: Payor: MEDICARE / Plan: MEDICARE PART A AND B / Product Type: *No Product type* /   Insurance ID: [id number] - (Medicare)  Secondary Insurance (if applicable): [place] BCBS   Treatment Diagnosis:

## 2025-02-04 ENCOUNTER — HOSPITAL ENCOUNTER (OUTPATIENT)
Dept: PHYSICAL THERAPY | Age: 80
Setting detail: THERAPIES SERIES
Discharge: HOME | End: 2025-02-04
Payer: MEDICARE

## 2025-02-04 PROCEDURE — 97112 NEUROMUSCULAR REEDUCATION: CPT

## 2025-02-04 PROCEDURE — 97110 THERAPEUTIC EXERCISES: CPT

## 2025-02-04 NOTE — FLOWSHEET NOTE
[facility] - Outpatient Rehabilitation and Therapy [address], [place] [address] office: [phone] fax: [phone]       Physical Therapy: TREATMENT/PROGRESS NOTE   Patient: Ricki Zeng (79 y.o. female)   Examination Date: 2025   :  1945 MRN: [id number]   Visit #: 39 / 42  Insurance Allowable Auth Needed   Med Nec []Yes    [x]No    Insurance: Payor: MEDICARE / Plan: MEDICARE PART A AND B / Product Type: *No Product type* /   Insurance ID: [id number] - (Medicare)  Secondary Insurance (if applicable): [place] BCBS   Treatment Diagnosis:     ICD-10-CM    1. Decreased range of motion (ROM) of left knee  M25.662       2. Decreased range of motion (ROM) of right knee  M25.661       3. Hip weakness  R29.898       4. Quadriceps weakness  M62.81       5. Gait abnormality  R26.9       6. Risk for falls  Z91.81    7. Swelling  R60.9             Medical Diagnosis:  S/P left knee surgery [Z98.890]   Referring Physician: Hamzah Leos MD  PCP: Neville Botello MD     Plan of care signed (Y/N): YES    Date of Patient follow up with Physician:      Progress Report/POC: NO  POC update due: (10 visits /OR AUTH LIMITS, whichever is less)                                              Medical History:  Comorbidities:  Cancer/Tumor  Diabetes (Type I or II)  Osteoarthritis  Relevant Medical History: double mastectomy 2022, reconstruction Dec 2023, RUE lymphedema; L TKA 24, R TKA 24                                          Precautions/ Contra-indications:           Latex allergy:  NO  Pacemaker:    NO  Contraindications for Manipulation: NA    Red Flags:  None    Suicide Screening:   The patient did not verbalize a primary behavioral concern, suicidal ideation, suicidal intent, or demonstrate suicidal behaviors.    Preferred Language for Healthcare:   [x] English       [] other:    SUBJECTIVE EXAMINATION     Patient stated complaint: Pt reports her hips have been

## 2025-02-12 ENCOUNTER — APPOINTMENT (OUTPATIENT)
Dept: PHYSICAL THERAPY | Age: 80
End: 2025-02-12
Payer: MEDICARE

## 2025-02-19 ENCOUNTER — HOSPITAL ENCOUNTER (OUTPATIENT)
Dept: PHYSICAL THERAPY | Age: 80
Setting detail: THERAPIES SERIES
Discharge: HOME | End: 2025-02-19
Payer: MEDICARE

## 2025-02-19 PROCEDURE — 97112 NEUROMUSCULAR REEDUCATION: CPT

## 2025-02-19 PROCEDURE — 97110 THERAPEUTIC EXERCISES: CPT

## 2025-02-19 NOTE — FLOWSHEET NOTE
daughter is now out of the hospital but still not back to work. 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 L knee feels stiff today.      Test used Initial score  7/23/24 POC  8/19/24 POC   9/19/24 POC  10/18/24 POC  11/20/24 POC  12/27/24 POC  1/29/25 02/19/2025   Pain Summary VAS 6/10 5/10  0/10 0/10 0/10 0/10 0/10 0/10   Functional questionnaire LEFS 30/80 - 63% dysfunction  34/80 - 57% dysfunction  47/80 - 41% dysfunction 45/80 - 43% dysfunction  51/80 - 35% dysfunction  51/80 - 35% dysfunction 50/80 - 37% dysfunction    Other:                          OBJECTIVE EXAMINATION   2/4 improved stability and reduce trendelenburg with ambulation     1/29/25    Mvmt (norm) AROM L AROM R Notes   KNEE Flex (140) 111 110     Ext (0) 0 with LAQ    0 with LAQ            MMT L MMT R Notes       HIP  Flexion 4+ 4 Seated     Abduction 5 5 Seated     KNEE  Flexion 5 5 Seated     Extension 5 5          Seated        6 MWT - 545 ft, no AD, reduced stance time RLE, decreased heel strike and foot clearance LLE, increased trunk sway     R trendelenburg noted with R SLS    Unable to complete SLS bilaterally without UE support     1/20 reduced heel strike and arm swing with gait without AD, narrow OLLIE     12/27/24    Mvmt (norm) AROM L AROM R Notes   KNEE Flex (140) 114 110     Ext (0) 0 with LAQ    0 with LAQ            MMT L MMT R Notes       HIP  Flexion 4+ 4+ Seated     Abduction 5 5 Seated     KNEE  Flexion 5 5 Seated     Extension 5 5          Seated        11/20/24    Mvmt (norm) AROM L AROM R Notes   KNEE Flex (140) 111 112     Ext (0) Lacking 0 degrees with quad set    Lacking 3 with LAQ   Lacking 0 degree with quad set    Lacking 4 with LAQ           MMT L MMT R Notes       HIP  Flexion 4+ 4+ Seated     Abduction 4 4+ Seated     KNEE  Flexion 5 5 Seated     Extension 4+ 4+          Seated   In available range      10/18     Mvmt (norm) AROM L AROM R Notes   KNEE Flex (140) 112 109     Ext (0) Lacking 0 degrees with quad set

## 2025-02-26 ENCOUNTER — HOSPITAL ENCOUNTER (OUTPATIENT)
Dept: PHYSICAL THERAPY | Age: 80
Setting detail: THERAPIES SERIES
Discharge: HOME | End: 2025-02-26
Payer: MEDICARE

## 2025-02-26 PROCEDURE — 97112 NEUROMUSCULAR REEDUCATION: CPT

## 2025-02-26 PROCEDURE — 97110 THERAPEUTIC EXERCISES: CPT

## 2025-02-26 NOTE — FLOWSHEET NOTE
[facility] - Outpatient Rehabilitation and Therapy [address], [place] [address] office: [phone] fax: [phone]       Physical Therapy: TREATMENT/PROGRESS NOTE   Patient: Ricki Zeng (79 y.o. female)   Examination Date: 2025   :  1945 MRN: [id number]   Visit #: 41 / 42  Insurance Allowable Auth Needed   Med Nec []Yes    [x]No    Insurance: Payor: MEDICARE / Plan: MEDICARE PART A AND B / Product Type: *No Product type* /   Insurance ID: [id number] - (Medicare)  Secondary Insurance (if applicable): [place] BC[place]   Treatment Diagnosis:     ICD-10-CM    1. Decreased range of motion (ROM) of left knee  M25.662       2. Decreased range of motion (ROM) of right knee  M25.661       3. Hip weakness  R29.898       4. Quadriceps weakness  M62.81       5. Gait abnormality  R26.9       6. Risk for falls  Z91.81    7. Swelling  R60.9             Medical Diagnosis:  S/P left knee surgery [Z98.890]   Referring Physician: Hamzah Leos MD  PCP: Neville Botello MD     Plan of care signed (Y/N): YES    Date of Patient follow up with Physician:      Progress Report/POC: NO  POC update due: (10 visits /OR AUTH LIMITS, whichever is less)                                              Medical History:  Comorbidities:  Cancer/Tumor  Diabetes (Type I or II)  Osteoarthritis  Relevant Medical History: double mastectomy 2022, reconstruction Dec 2023, RUE lymphedema; L TKA 24, R TKA 24                                          Precautions/ Contra-indications:           Latex allergy:  NO  Pacemaker:    NO  Contraindications for Manipulation: NA    Red Flags:  None    Suicide Screening:   The patient did not verbalize a primary behavioral concern, suicidal ideation, suicidal intent, or demonstrate suicidal behaviors.    Preferred Language for Healthcare:   [x] English       [] other:    SUBJECTIVE EXAMINATION     Patient stated complaint: Pt reports she did a lot today.

## 2025-03-05 ENCOUNTER — HOSPITAL ENCOUNTER (OUTPATIENT)
Dept: PHYSICAL THERAPY | Age: 80
Setting detail: THERAPIES SERIES
Discharge: HOME | End: 2025-03-05
Payer: MEDICARE

## 2025-03-05 PROCEDURE — 97110 THERAPEUTIC EXERCISES: CPT

## 2025-03-05 NOTE — PLAN OF CARE
education.  Neuromuscular Re-education (35991) activation and proprioception, including postural re-education.    Gait Training (09342) for normalization of ambulation patterns and AD training.   Manual Therapy (98506) as indicated to include: Passive Range of Motion, Gr I-IV mobilizations, and Soft Tissue Mobilization  Modalities as needed that may include: Cryotherapy and Vasoneumatic Compression  Patient education on joint protection, postural re-education, activity modification, and progression of HEP    Plan: Discharge    Electronically Signed by Vickie Holman PT, DPT, CATALINA-HEIDY, CNS    Date: 03/05/2025     Note: Portions of this note have been templated and/or copied from initial evaluation, reassessments and prior notes for documentation efficiency.    Note: If patient does not return for scheduled/recommended follow up visits, this note will serve as a discharge from care along with the most recent update on progress.    Ortho Evaluation

## 2025-04-29 ENCOUNTER — TELEPHONE (OUTPATIENT)
Dept: ORTHOPEDIC SURGERY | Age: 80
End: 2025-04-29

## 2025-04-29 DIAGNOSIS — Z96.651 HISTORY OF TOTAL KNEE ARTHROPLASTY, RIGHT: Primary | ICD-10-CM

## 2025-04-29 RX ORDER — AMOXICILLIN 500 MG/1
CAPSULE ORAL
Qty: 4 CAPSULE | Refills: 0 | Status: SHIPPED | OUTPATIENT
Start: 2025-04-29

## 2025-04-29 NOTE — TELEPHONE ENCOUNTER
Prescription Refill     Medication Name:  ANTIBIOTICS FOR EMERGENCY DENTAL PROCEDURE   Pharmacy: [id number] - [address] - P [phone] - F [phone]   Patient Contact Number:  [phone]

## 2025-05-14 ENCOUNTER — TELEPHONE (OUTPATIENT)
Dept: ORTHOPEDIC SURGERY | Age: 80
End: 2025-05-14

## 2025-05-14 DIAGNOSIS — Z96.651 HISTORY OF TOTAL KNEE ARTHROPLASTY, RIGHT: Primary | ICD-10-CM

## 2025-05-14 RX ORDER — AMOXICILLIN 500 MG/1
CAPSULE ORAL
Qty: 4 CAPSULE | Refills: 0 | Status: SHIPPED | OUTPATIENT
Start: 2025-05-14

## 2025-05-14 NOTE — TELEPHONE ENCOUNTER
PATIENT IS NEEDING AMOXICILLIN FOR DENTAL APPT MOVED TO THIS FRIDAY 5/16      [facility] #[id number] - [place], [place] - [address] - P [phone] - F [phone]  [address] [facility] [address] [address]  Phone: [phone]  Fax: [phone]

## 2025-05-28 NOTE — ANESTHESIA POSTPROCEDURE EVALUATION
Department of Anesthesiology  Postprocedure Note    Patient: Ricki Zeng  MRN: [id number]  YOB: 1945  Date of evaluation: 6/25/2024    Procedure Summary       Date: 06/25/24 Room / Location: [address]    Anesthesia Start: 1202 Anesthesia Stop: 1420    Procedure: LEFT TOTAL KNEE REPLACEMENT-ANN (Left: Knee) Diagnosis:       Arthritis of left knee      (Arthritis of left knee [M17.12])    Surgeons: Hamzah Leos MD Responsible Provider: Linda Alvarez MD    Anesthesia Type: general, MAC, spinal ASA Status: 3            Anesthesia Type: No value filed.    Alexey Phase I: Alexey Score: 10    Alexey Phase II:      Anesthesia Post Evaluation    Patient location during evaluation: PACU  Patient participation: complete - patient participated  Level of consciousness: awake  Airway patency: patent  Nausea & Vomiting: no vomiting  Cardiovascular status: hemodynamically stable  Respiratory status: acceptable  Hydration status: euvolemic  Multimodal analgesia pain management approach    No notable events documented.   The risk of chronic, cumulative sun damage and risk of development of skin cancer was reviewed today.   The importance of sun protection was reviewed: including the use of a broad spectrum sunscreen of at least SPF 30 that protects against both UVA/UVB rays, with ingredients such as Zinc oxide or titanium dioxide, wearing sun protective clothing and sun avoidance. We reviewed the warning signs of non-melanoma skin cancer and ABCDEs of melanoma  Please follow up should you notice any new or changing pre-existing skin lesion.

## 2025-06-23 ENCOUNTER — TELEPHONE (OUTPATIENT)
Dept: SURGERY | Age: 80
End: 2025-06-23

## 2025-06-23 NOTE — TELEPHONE ENCOUNTER
Patient called in stating that she just started having pain in her right breast and that she feels like the right implant has shifted closer to her armpit     Please contact the patient at [phone]

## 2025-06-24 NOTE — PROGRESS NOTES
[facility]    PROCEDURE: 1) Exchange for permanent implants bilateral breasts                          2) Right breast capsulectomy                          3) Bilateral breast high volume fat grafting  DATE: 12/19/23    Ricki Zeng has been recovering well since her procedure. Pain has been well controlled without pain medications. She notes that she has had some intermittent right breast pain that has somewhat intensified.    PMHx:   Past Medical History:   Diagnosis Date    Anesthesia     sensitive, doesnt take much    Arthritis     Asthma     resolved    Breast cancer (HCC)     Cancer (HCC) 06/2021    right Breast    Diabetes mellitus (HCC)     Diverticulitis     History of chemotherapy 12/29/2021    Dr [facility], treament concluded    Hx antineoplastic chemo     Hypertension     no meds due to chemo    Prolonged emergence from general anesthesia     Sleep apnea     Mild - wears no mask    Urinary incontinence      PSHx:   Past Surgical History:   Procedure Laterality Date    BREAST ENHANCEMENT SURGERY Bilateral 09/14/2023    DELAYED BILATERAL BREAST RECONSTRUCTION WITH STAGE 1 TISSUE EXPANDER PLACEMENT WITH ALLODERM performed by Lyudmila Suresh MD at [facility] OR    BREAST LUMPECTOMY Left     BREAST SURGERY Bilateral 12/19/2023    EXCHANGE FOR PERMANENT IMPLANTS BILATERAL BREASTS, RIGHT BREAST CAPSULECTOMY, BILATERAL BREAST FAT GRAFTING performed by Lyudmila Suresh MD at [facility] OR    HYSTERECTOMY (CERVIX STATUS UNKNOWN)      JOINT REPLACEMENT      left knee    MASTECTOMY Bilateral 02/01/2022    BILATERAL TOTAL MASTECTOMY, RIGHT LOCALIZED SENTINEL LYMPH NODE BIOPSY-FROZEN SECTIONS-, TECHNETIUM NINETY-NINE AND INJECTABLE BLUE DYE IN THE OPERATING ROOM, EXCISION OF RIGHT BREAST SKIN LESION performed by Juliette Vance MD at [facility] OR    OVARY REMOVAL      PORT SURGERY N/A 06/23/2021    PLACEMENT OF POWER PORT A CATHETER performed by Evens Sweet MD at [facility] OR    TOTAL

## 2025-06-25 ENCOUNTER — OFFICE VISIT (OUTPATIENT)
Dept: SURGERY | Age: 80
End: 2025-06-25
Payer: MEDICARE

## 2025-06-25 VITALS
SYSTOLIC BLOOD PRESSURE: 158 MMHG | DIASTOLIC BLOOD PRESSURE: 79 MMHG | BODY MASS INDEX: 31.11 KG/M2 | WEIGHT: 198.2 LBS | HEART RATE: 69 BPM | HEIGHT: 67 IN

## 2025-06-25 DIAGNOSIS — N64.4 MASTODYNIA OF RIGHT BREAST: ICD-10-CM

## 2025-06-25 DIAGNOSIS — Z09 POSTOP CHECK: Primary | ICD-10-CM

## 2025-06-25 PROCEDURE — 1090F PRES/ABSN URINE INCON ASSESS: CPT | Performed by: SURGERY

## 2025-06-25 PROCEDURE — G8417 CALC BMI ABV UP PARAM F/U: HCPCS | Performed by: SURGERY

## 2025-06-25 PROCEDURE — 3077F SYST BP >= 140 MM HG: CPT | Performed by: SURGERY

## 2025-06-25 PROCEDURE — G8400 PT W/DXA NO RESULTS DOC: HCPCS | Performed by: SURGERY

## 2025-06-25 PROCEDURE — 3078F DIAST BP <80 MM HG: CPT | Performed by: SURGERY

## 2025-06-25 PROCEDURE — 99213 OFFICE O/P EST LOW 20 MIN: CPT | Performed by: SURGERY

## 2025-06-25 PROCEDURE — 1123F ACP DISCUSS/DSCN MKR DOCD: CPT | Performed by: SURGERY

## 2025-06-25 PROCEDURE — 1036F TOBACCO NON-USER: CPT | Performed by: SURGERY

## 2025-06-25 PROCEDURE — G8427 DOCREV CUR MEDS BY ELIG CLIN: HCPCS | Performed by: SURGERY

## (undated) DEVICE — SUTURE VICRYL SZ 0 L36IN ABSRB UD CT-1 L36MM 1/2 CIR TAPR PNT VCP946H

## (undated) DEVICE — PSN MC VE ASF R 11MM 6-7/CD: Type: IMPLANTABLE DEVICE | Site: KNEE | Status: NON-FUNCTIONAL

## (undated) DEVICE — NEEDLE HYPO 22GA L1.5IN BLK POLYPR HUB S STL REG BVL STR

## (undated) DEVICE — CHLORAPREP 26ML ORANGE

## (undated) DEVICE — SUTURE VICRYL SZ 0 L36IN ABSRB UD L36MM CT-1 1/2 CIR J946H

## (undated) DEVICE — SUTURE VCRL + SZ 3-0 L27IN ABSRB UD L26MM SH 1/2 CIR VCP416H

## (undated) DEVICE — STERILE TOTAL KNEE DRAPE PACK: Brand: CARDINAL HEALTH

## (undated) DEVICE — GAUZE,SPONGE,4"X4",8PLY,STRL,LF,10/TRAY: Brand: MEDLINE

## (undated) DEVICE — SUTURE VCRL SZ 3-0 L18IN ABSRB UD L26MM SH 1/2 CIR J864D

## (undated) DEVICE — SUTURE MCRYL + SZ 4-0 L27IN ABSRB UD L19MM PS-2 3/8 CIR MCP426H

## (undated) DEVICE — STERILE POLYISOPRENE POWDER-FREE SURGICAL GLOVES WITH EMOLLIENT COATING: Brand: PROTEXIS

## (undated) DEVICE — SUTURE VCRL SZ 4-0 L18IN ABSRB UD L19MM PS-2 3/8 CIR PRIM J496H

## (undated) DEVICE — TOTAL BASIC PK

## (undated) DEVICE — SUTURE MCRYL + SZ 3-0 L27IN ABSRB UD L26MM SH 1/2 CIR MCP416H

## (undated) DEVICE — BLANKET WRM W40.2XL55.9IN IORT LO BODY + MISTRAL AIR

## (undated) DEVICE — SCREW BNE HD 3.5X48 MM HEX PERSONA (NOT IMPLANTED)

## (undated) DEVICE — PENCIL ES ULT VAC W TELSCP NOSE EZ CLN BLDE 10FT

## (undated) DEVICE — PAD FOAM 11.75X7-7/8 IN RESTON LF

## (undated) DEVICE — MERCY FAIRFIELD TURNOVER KIT: Brand: MEDLINE INDUSTRIES, INC.

## (undated) DEVICE — GLOVE ORANGE PI 7 1/2   MSG9075

## (undated) DEVICE — 3M™ TEGADERM™ TRANSPARENT FILM DRESSING FRAME STYLE, 1624W, 2-3/8 IN X 2-3/4 IN (6 CM X 7 CM), 100/CT 4CT/CASE: Brand: 3M™ TEGADERM™

## (undated) DEVICE — INTENDED FOR TISSUE SEPARATION, AND OTHER PROCEDURES THAT REQUIRE A SHARP SURGICAL BLADE TO PUNCTURE OR CUT.: Brand: BARD-PARKER ® STAINLESS STEEL BLADES

## (undated) DEVICE — GOWN SIRUS NONREIN XL W/TWL: Brand: MEDLINE INDUSTRIES, INC.

## (undated) DEVICE — RETRACTOR SURG WIDE FLAT LO PROF LTWT PHOTONGUIDE

## (undated) DEVICE — Device

## (undated) DEVICE — HOOD, PEEL-AWAY: Brand: FLYTE

## (undated) DEVICE — SHEET,DRAPE,40X58,STERILE: Brand: MEDLINE

## (undated) DEVICE — SUTURE VICRYL + SZ 1 L36IN ABSRB UD L36MM CT-1 1/2 CIR VCP947H

## (undated) DEVICE — GLOVE ORANGE PI 8   MSG9080

## (undated) DEVICE — PADDING UNDERCAST W4INXL4YD 100% COT CRIMPED FINISH WBRL II

## (undated) DEVICE — DRESSING CNTCT 4X12IN IS THERABOND 3D

## (undated) DEVICE — PEN: MARKING STD 100/CS: Brand: MEDICAL ACTION INDUSTRIES

## (undated) DEVICE — ADHESIVE SKIN CLOSURE WND 8.661X1.5 IN 22 CM LIQUIBAND SECUR

## (undated) DEVICE — 3M™ TEGADERM™ CHG CHLORHEXIDINE GLUCONATE GEL PAD 1664, 25 EACH/CARTON, 4 CARTONS/CASE: Brand: 3M™ TEGADERM™

## (undated) DEVICE — SUTURE PROL SZ 2-0 L36IN NONABSORBABLE BLU SH L26MM 1/2 CIR 8523H

## (undated) DEVICE — SPONGE DRN W4XL4IN RAYON/POLYESTER 6 PLY NONWOVEN PRECUT

## (undated) DEVICE — DRAPE C ARM UNIV W41XL74IN CLR PLAS XR VELC CLSR POLY STRP

## (undated) DEVICE — SUTURE PDS + SZ 2 0 L27IN ABSRB VLT L26MM CT 2 1 2 CIR PDP333H

## (undated) DEVICE — PACK SURGICAL PROC CUSTOM TISSUE PERFUSION SYSTEM SPY ELITE

## (undated) DEVICE — GLOVE ORANGE PI 8 1/2   MSG9085

## (undated) DEVICE — 3M™ IOBAN™ 2 ANTIMICROBIAL INCISE DRAPE 6648EZ: Brand: IOBAN™ 2

## (undated) DEVICE — DRAIN SURG 15FR L3 16IN DIA47MM SIL RND HUBLESS FULL FLUT

## (undated) DEVICE — FAIRFIELD KNEE LF

## (undated) DEVICE — BOWL MED L 32OZ PLAS W/ MOLD GRAD EZ OPN PEEL PCH

## (undated) DEVICE — SUTURE MONOCRYL + SZ 3-0 L27IN ABSRB UD PS1 L24MM 3/8 CIR REV MCP936H

## (undated) DEVICE — PLASMABLADE X PS210-030S-LIGHT 3.0SL: Brand: PLASMABLADE™ X

## (undated) DEVICE — SUTURE VICRYL + SZ 2-0 L36IN ABSRB UD L36MM CT-1 1/2 CIR VCP945H

## (undated) DEVICE — SUTURE ETHLN SZ 3-0 L18IN NONABSORBABLE BLK PS-2 L19MM 3/8 1669H

## (undated) DEVICE — SYRINGE IRRIG 60ML SFT PLIABLE BLB EZ TO GRP 1 HND USE W/

## (undated) DEVICE — SPONGE LAP W18XL18IN WHT COT 4 PLY FLD STRUNG RADPQ DISP ST 2 PER PACK

## (undated) DEVICE — SUTURE VCRL + SZ 3-0 L18IN ABSRB UD SH 1/2 CIR TAPERCUT NDL VCP864D

## (undated) DEVICE — STRYKER PERFORMANCE SERIES SAGITTAL BLADE: Brand: STRYKER PERFORMANCE SERIES

## (undated) DEVICE — GLOVE ORTHO 7 1/2   MSG9475

## (undated) DEVICE — BANDAGE COMPR W6INXL10YD ST M E WHITE/BEIGE

## (undated) DEVICE — GLOVE SURG SZ 7 L12IN THK7.5MIL DK GRN LTX FREE MSG6570] MEDLINE INDUSTRIES INC]

## (undated) DEVICE — 2108 SERIES SAGITTAL BLADE, NO OFFSET  (12.4 X 1.19 X 82.1MM)

## (undated) DEVICE — SUTURE ABSORBABLE MONOFILAMENT 1 MO-4 36 CM 36 MM VIO PDS +

## (undated) DEVICE — STERILE POLYISOPRENE POWDER-FREE SURGICAL GLOVES: Brand: PROTEXIS

## (undated) DEVICE — HOOD: Brand: FLYTE

## (undated) DEVICE — NEEDLE,22GX1.5",REG,BEVEL: Brand: MEDLINE

## (undated) DEVICE — PLASTIC MAJOR: Brand: MEDLINE INDUSTRIES, INC.

## (undated) DEVICE — PENCIL ES L3M BTTN SWCH S STL HEX LOK BLDE ELECTRD HOLSTER

## (undated) DEVICE — Device: Brand: STABLECUT®

## (undated) DEVICE — BLADE ES ELASTOMERIC COAT INSUL DURABLE BEND UPTO 90DEG

## (undated) DEVICE — DRAPE,T,LAPARO,TRANS,STERILE: Brand: MEDLINE

## (undated) DEVICE — ELECTRODE PT RET AD L9FT HI MOIST COND ADH HYDRGEL CORDED

## (undated) DEVICE — ADHESIVE SKIN CLSR 0.7ML TOP DERMBND ADV

## (undated) DEVICE — GLOVE SURG SZ 6.5 L11.2IN FNGR THK9.8MIL STRW LTX POLYMER

## (undated) DEVICE — TUBING, SUCTION, 9/32" X 12', STRAIGHT: Brand: MEDLINE INDUSTRIES, INC.

## (undated) DEVICE — 20 ML SYRINGE LUER-LOCK TIP: Brand: MONOJECT

## (undated) DEVICE — INTENDED USE FOR SURGICAL MARKING ON INTACT SKIN, ALSO PROVIDES A PERMANENT METHOD OF IDENTIFYING OBJECTS IN THE OPERATING ROOM: Brand: WRITESITE® PLUS MINI PREP RESISTANT MARKER

## (undated) DEVICE — SHEET,DRAPE,53X77,STERILE: Brand: MEDLINE

## (undated) DEVICE — COVER,MAYO STAND,XL,STERILE: Brand: MEDLINE

## (undated) DEVICE — SOLUTION IV IRRIG POUR BRL 0.9% SODIUM CHL 2F7124

## (undated) DEVICE — DECANTER FLD 9IN ST BG FOR ASEP TRNSF OF FLD

## (undated) DEVICE — SUTURE PDS II + SZ 2-0 L27IN ABSRB UD FS-1 L24MM 3/8 CIR REV PDP443H

## (undated) DEVICE — SYRINGE MED 10ML TRNSLUC BRL PLUNG BLK MRK POLYPR CTRL

## (undated) DEVICE — HYPODERMIC SAFETY NEEDLE: Brand: MAGELLAN

## (undated) DEVICE — YANKAUER,OPEN TIP,W/O VENT,STERILE: Brand: MEDLINE INDUSTRIES, INC.

## (undated) DEVICE — PLASMABLADE PS210-030S 3.0S LOCK: Brand: PLASMABLADE™

## (undated) DEVICE — TOWEL,STOP FLAG GOLD N-W: Brand: MEDLINE

## (undated) DEVICE — 450 ML BOTTLE OF 0.05% CHLORHEXIDINE GLUCONATE IN 99.95% STERILE WATER FOR IRRIGATION, USP AND APPLICATOR.: Brand: IRRISEPT ANTIMICROBIAL WOUND LAVAGE

## (undated) DEVICE — DRAPE,CHEST,FENES,15X10,STERIL: Brand: MEDLINE

## (undated) DEVICE — PROBE SET W/ DRP

## (undated) DEVICE — 3M™ IOBAN™ 2 ANTIMICROBIAL INCISE DRAPE 6650EZ: Brand: IOBAN™ 2

## (undated) DEVICE — GLOVE SURG SZ 75 L12IN FNGR THK79MIL GRN LTX FREE

## (undated) DEVICE — DRAPE,U/ SHT,SPLIT,PLAS,STERIL: Brand: MEDLINE

## (undated) DEVICE — SCREW BNE L25MM DIA2.5MM KNEE FULL THRD HEX FEM PERSONA (NOT IMPLANTED)

## (undated) DEVICE — SUTURE MCRYL SZ 3-0 L27IN ABSRB UD PS-2 3/8 CIR REV CUT NDL MCP427H

## (undated) DEVICE — STAPLER SKIN H3.9MM WIRE DIA0.58MM CRWN 6.9MM 35 STPL ROT

## (undated) DEVICE — MAJOR SET UP PK

## (undated) DEVICE — HANDPIECE SET WITH HIGH FLOW TIP AND SUCTION TUBE: Brand: INTERPULSE

## (undated) DEVICE — GAUZE FLUFF 1 PLY: Brand: DEROYAL

## (undated) DEVICE — APPLIER CLP L9.38IN M LIG TI DISP STR RNG HNDL LIGACLP

## (undated) DEVICE — APPLICATOR MEDICATED 26 CC SOLUTION HI LT ORNG CHLORAPREP

## (undated) DEVICE — SYRINGE MED 30ML STD CLR PLAS LUERLOCK TIP N CTRL DISP

## (undated) DEVICE — SPONGE,PEANUT,XRAY,ST,SM,3/8",5/CARD: Brand: MEDLINE INDUSTRIES, INC.

## (undated) DEVICE — SOLUTION IRRIG 500ML 0.9% SOD CHL USP POUR PLAS BTL

## (undated) DEVICE — YANKAUER,BULB TIP,W/O VENT,RIGID,STERILE: Brand: MEDLINE

## (undated) DEVICE — DRAIN SURG 15FR RND FULL FLUT

## (undated) DEVICE — 1010 S-DRAPE TOWEL DRAPE 10/BX: Brand: STERI-DRAPE™

## (undated) DEVICE — PIN SFTY M L1.5IN S STL FOR GRP HLD RET

## (undated) DEVICE — STERILE PVP: Brand: MEDLINE INDUSTRIES, INC.

## (undated) DEVICE — SPONGE LAP W18XL18IN WHT COT 4 PLY FLD STRUNG RADPQ DISP ST

## (undated) DEVICE — DRESSING GRMCDL 6 12FR D1N CNTR HOLE 4MM ANTMCRBL PRTCTVE DI

## (undated) DEVICE — COVER LT HNDL BLU PLAS

## (undated) DEVICE — MASC TURNOVER KIT: Brand: MEDLINE INDUSTRIES, INC.

## (undated) DEVICE — SUTURE STRATAFIX SPRL MCRYL + SZ 3-0 L18IN ABSRB UD PS-2 SXMP1B107